# Patient Record
Sex: MALE | Race: WHITE | NOT HISPANIC OR LATINO | Employment: OTHER | URBAN - NONMETROPOLITAN AREA
[De-identification: names, ages, dates, MRNs, and addresses within clinical notes are randomized per-mention and may not be internally consistent; named-entity substitution may affect disease eponyms.]

---

## 2017-01-09 ENCOUNTER — OFFICE VISIT (OUTPATIENT)
Dept: FAMILY MEDICINE | Facility: CLINIC | Age: 67
End: 2017-01-09
Payer: COMMERCIAL

## 2017-01-09 VITALS
WEIGHT: 259 LBS | TEMPERATURE: 98.2 F | BODY MASS INDEX: 39.25 KG/M2 | OXYGEN SATURATION: 97 % | RESPIRATION RATE: 18 BRPM | DIASTOLIC BLOOD PRESSURE: 62 MMHG | HEART RATE: 76 BPM | SYSTOLIC BLOOD PRESSURE: 128 MMHG | HEIGHT: 68 IN

## 2017-01-09 DIAGNOSIS — I10 BENIGN ESSENTIAL HYPERTENSION: ICD-10-CM

## 2017-01-09 DIAGNOSIS — E78.5 HYPERLIPIDEMIA, UNSPECIFIED HYPERLIPIDEMIA TYPE: ICD-10-CM

## 2017-01-09 DIAGNOSIS — E66.9 OBESITY (BMI 30-39.9): ICD-10-CM

## 2017-01-09 DIAGNOSIS — M25.552 HIP PAIN, LEFT: Primary | ICD-10-CM

## 2017-01-09 PROBLEM — E78.2 MIXED HYPERLIPIDEMIA: Status: ACTIVE | Noted: 2017-01-09

## 2017-01-09 PROCEDURE — 20610 DRAIN/INJ JOINT/BURSA W/O US: CPT | Mod: LT | Performed by: FAMILY MEDICINE

## 2017-01-09 PROCEDURE — 99204 OFFICE O/P NEW MOD 45 MIN: CPT | Mod: 25 | Performed by: FAMILY MEDICINE

## 2017-01-09 RX ORDER — TRIAMCINOLONE ACETONIDE 40 MG/ML
40 INJECTION, SUSPENSION INTRA-ARTICULAR; INTRAMUSCULAR ONCE
Qty: 1 ML | Refills: 0 | COMMUNITY
Start: 2017-01-09 | End: 2018-05-18

## 2017-01-09 RX ORDER — LISINOPRIL 10 MG/1
10 TABLET ORAL
COMMUNITY
Start: 2017-01-05 | End: 2017-02-06

## 2017-01-09 RX ORDER — SIMVASTATIN 20 MG
20 TABLET ORAL
COMMUNITY
Start: 2016-11-09 | End: 2018-02-19

## 2017-01-09 NOTE — PATIENT INSTRUCTIONS
R.I.C.E.    R.I.C.E. stands for Rest, Ice, Compression, and Elevation. Doing these things helps limit pain and swelling after an injury. R.I.C.E. also helps injuries heal faster. Use R.I.C.E. for sprains, strains, and severe bruises or bumps. Follow the tips on this handout and begin R.I.C.E. as soon as possible after an injury.     Rest  Pain is your body s way of telling you to rest an injured area. Whether you have hurt an elbow, hand, foot, or knee, limiting its use will prevent further injury and help you heal.     Ice  Applying ice right after an injury helps prevent swelling and reduce pain. Don t place ice directly on your skin.    Wrap a cold pack or bag of ice in a thin cloth. Place it over the injured area.    Ice for 10 minutes every 3 hours. Don t ice for more than 20 minutes at a time.     Compression  Putting pressure (compression) on an injury helps prevent swelling and provides support.    Wrap the injured area firmly with an elastic bandage. If your hand or foot tingles, becomes discolored, or feels cold to the touch, the bandage may be too tight. Rewrap it more loosely.    If your bandage becomes too loose, rewrap it.    Do not wear an elastic bandage overnight.     Elevation   Keeping an injury elevated helps reduce swelling, pain, and throbbing. Elevation is most effective when the injury is kept elevated higher than the heart.     Call your healthcare provider if you notice any of the following:    Fingers or toes feel numb, are cold to the touch, or change color    Skin looks shiny or tight    Pain, swelling, or bruising worsens and is not improved with elevation     4592-6396 [employer]. [address] [place] [address], [place] [address]. All rights reserved. This information is not intended as a substitute for professional medical care. Always follow your healthcare professional's instructions.        Hip Strain    You have a strain of the muscles around the hip joint. A muscle  strain is a stretching or tearing of muscle fibers. This causes pain, especially when you move that muscle. There may also be some swelling and bruising.  Home care    Stay off the injured leg as much as possible until you can walk on it without pain. If you have a lot of pain with walking, crutches or a walker may be prescribed. These can be rented or purchased at many pharmacies and surgical or orthopedic supply stores. Follow your healthcare provider's advice regarding when to begin putting weight on that leg.    Apply an ice pack over the injured area for 15 to 20 minutes every 3 to 6 hours. You should do this for the first 24 to 48 hours. You can make an ice pack by filling a plastic bag that seals at the top with ice cubes and then wrapping it with a thin towel. Be careful not to injure your skin with the ice treatments. Ice should never be applied directly to skin. Continue the use of ice packs for relief of pain and swelling as needed. After 48 hours, apply heat (warm shower or warm bath) for 15 to 20 minutes several times a day, or alternate ice and heat.    You may use over-the-counter pain medicine to control pain, unless another pain medicine was prescribed. If you have chronic liver or kidney disease or ever had a stomach ulcer or GI bleeding, talk with your healthcare provider before using these medicines.    If you play sports, you may resume these activities when you are able to hop and run on the injured leg without pain.  Follow-up care  Follow up with your healthcare provider, or as advised. If your symptoms do not begin to get better after a week, more tests may be needed.  If X-rays were taken, you will be told of any new findings that may affect your care.  When to seek medical advice  Call your healthcare provider right away if any of these occur:    Increased swelling or  bruising    Increased pain    Losing the ability to put weight on the injured side    4541-2994 [employer]  [address] [place] [address]. All rights reserved. This information is not intended as a substitute for professional medical care. Always follow your healthcare professional's instructions.

## 2017-01-09 NOTE — NURSING NOTE
"Chief Complaint   Patient presents with     Musculoskeletal Problem       Initial /62 mmHg  Pulse 76  Temp(Src) 98.2  F (36.8  C) (Tympanic)  Resp 18  Ht 5' 8\" (1.727 m)  Wt 259 lb (117.482 kg)  BMI 39.39 kg/m2  SpO2 97% Estimated body mass index is 39.39 kg/(m^2) as calculated from the following:    Height as of this encounter: 5' 8\" (1.727 m).    Weight as of this encounter: 259 lb (117.482 kg).  BP completed using cuff size: large  "

## 2017-01-09 NOTE — MR AVS SNAPSHOT
After Visit Summary   1/9/2017    Roscoe Jang    MRN: [id number]           Patient Information     Date Of Birth          1950        Visit Information        Provider Department      1/9/2017 12:40 PM Rell Rivers MD [facility]        Today's Diagnoses     Hip pain, left    -  1     Benign essential hypertension         Hyperlipidemia, unspecified hyperlipidemia type         Obesity (BMI 30-39.9)           Care Instructions      R.I.C.E.    R.I.C.E. stands for Rest, Ice, Compression, and Elevation. Doing these things helps limit pain and swelling after an injury. R.I.C.E. also helps injuries heal faster. Use R.I.C.E. for sprains, strains, and severe bruises or bumps. Follow the tips on this handout and begin R.I.C.E. as soon as possible after an injury.     Rest  Pain is your body s way of telling you to rest an injured area. Whether you have hurt an elbow, hand, foot, or knee, limiting its use will prevent further injury and help you heal.     Ice  Applying ice right after an injury helps prevent swelling and reduce pain. Don t place ice directly on your skin.    Wrap a cold pack or bag of ice in a thin cloth. Place it over the injured area.    Ice for 10 minutes every 3 hours. Don t ice for more than 20 minutes at a time.     Compression  Putting pressure (compression) on an injury helps prevent swelling and provides support.    Wrap the injured area firmly with an elastic bandage. If your hand or foot tingles, becomes discolored, or feels cold to the touch, the bandage may be too tight. Rewrap it more loosely.    If your bandage becomes too loose, rewrap it.    Do not wear an elastic bandage overnight.     Elevation   Keeping an injury elevated helps reduce swelling, pain, and throbbing. Elevation is most effective when the injury is kept elevated higher than the heart.     Call your healthcare provider if you notice any of the following:    Fingers or toes feel numb,  are cold to the touch, or change color    Skin looks shiny or tight    Pain, swelling, or bruising worsens and is not improved with elevation     2189-4920 [employer]. [address] [place] [address], [place] [address]. All rights reserved. This information is not intended as a substitute for professional medical care. Always follow your healthcare professional's instructions.        Hip Strain    You have a strain of the muscles around the hip joint. A muscle strain is a stretching or tearing of muscle fibers. This causes pain, especially when you move that muscle. There may also be some swelling and bruising.  Home care    Stay off the injured leg as much as possible until you can walk on it without pain. If you have a lot of pain with walking, crutches or a walker may be prescribed. These can be rented or purchased at many pharmacies and surgical or orthopedic supply stores. Follow your healthcare provider's advice regarding when to begin putting weight on that leg.    Apply an ice pack over the injured area for 15 to 20 minutes every 3 to 6 hours. You should do this for the first 24 to 48 hours. You can make an ice pack by filling a plastic bag that seals at the top with ice cubes and then wrapping it with a thin towel. Be careful not to injure your skin with the ice treatments. Ice should never be applied directly to skin. Continue the use of ice packs for relief of pain and swelling as needed. After 48 hours, apply heat (warm shower or warm bath) for 15 to 20 minutes several times a day, or alternate ice and heat.    You may use over-the-counter pain medicine to control pain, unless another pain medicine was prescribed. If you have chronic liver or kidney disease or ever had a stomach ulcer or GI bleeding, talk with your healthcare provider before using these medicines.    If you play sports, you may resume these activities when you are able to hop and run on the injured leg without pain.  Follow-up  care  Follow up with your healthcare provider, or as advised. If your symptoms do not begin to get better after a week, more tests may be needed.  If X-rays were taken, you will be told of any new findings that may affect your care.  When to seek medical advice  Call your healthcare provider right away if any of these occur:    Increased swelling or  bruising    Increased pain    Losing the ability to put weight on the injured side    8027-6498 [employer]. [address] [place] [address], [place] [address]. All rights reserved. This information is not intended as a substitute for professional medical care. Always follow your healthcare professional's instructions.              Follow-ups after your visit        Additional Services     PHYSICAL THERAPY REFERRAL       *This therapy referral will be filtered to a centralized scheduling office at [facility] and the patient will receive a call to schedule an appointment at a [place] location most convenient for them. *     [facility] provides Physical Therapy evaluation and treatment and many specialty services across the [place] system.  If requesting a specialty program, please choose from the list below.    If you have not heard from the scheduling office within 2 business days, please call [phone] for all locations, with the exception of [place], please call [phone].  Treatment: Evaluation & Treatment  Special Instructions/Modalities: none  Special Programs: None    Please be aware that coverage of these services is subject to the terms and limitations of your health insurance plan.  Call member services at your health plan with any benefit or coverage questions.      **Note to Provider:  If you are referring outside of [place] for the therapy appointment, please list the name of the location in the  special instructions  above, print the referral and give to the patient to schedule the appointment.          "         Follow-up notes from your care team     Return in about 6 months (around 2017).      Who to contact     If you have questions or need follow up information about today's clinic visit or your schedule please contact [facility] directly at [phone].  Normal or non-critical lab and imaging results will be communicated to you by MyChart, letter or phone within 4 business days after the clinic has received the results. If you do not hear from us within 7 days, please contact the clinic through MyChart or phone. If you have a critical or abnormal lab result, we will notify you by phone as soon as possible.  Submit refill requests through [employer] or call your pharmacy and they will forward the refill request to us. Please allow 3 business days for your refill to be completed.          Additional Information About Your Visit        MyChart Information     [employer] lets you send messages to your doctor, view your test results, renew your prescriptions, schedule appointments and more. To sign up, go to [email or web address] . Click on \"Log in\" on the left side of the screen, which will take you to the Welcome page. Then click on \"Sign up Now\" on the right side of the page.     You will be asked to enter the access code listed below, as well as some personal information. Please follow the directions to create your username and password.     Your access code is: [email or web address]  Expires: 2017  1:25 PM     Your access code will  in 90 days. If you need help or a new code, please call your [facility] or [phone].        Care EveryWhere ID     This is your Care EveryWhere ID. This could be used by other organizations to access your [place] medical records  [id number]        Your Vitals Were     Pulse Temperature Respirations Height BMI (Body Mass Index) Pulse Oximetry    76 98.2  F (36.8  C) (Tympanic) 18 5' 8\" (1.727 m) 39.39 kg/m2 97%       Blood Pressure from Last 3 " Encounters:   01/09/17 128/62    Weight from Last 3 Encounters:   01/09/17 259 lb (117.482 kg)              We Performed the Following     PHYSICAL THERAPY REFERRAL        Primary Care Provider    None Specified       No primary provider on file.        Thank you!     Thank you for choosing [facility]  for your care. Our goal is always to provide you with excellent care. Hearing back from our patients is one way we can continue to improve our services. Please take a few minutes to complete the written survey that you may receive in the mail after your visit with us. Thank you!             Your Updated Medication List - Protect others around you: Learn how to safely use, store and throw away your medicines at www.disposemymeds.org.          This list is accurate as of: 1/9/17  1:26 PM.  Always use your most recent med list.                   Brand Name Dispense Instructions for use    lisinopril 10 MG tablet    PRINIVIL/ZESTRIL     10 mg       simvastatin 20 MG tablet    ZOCOR     20 mg

## 2017-01-09 NOTE — PROCEDURES
PROCEDURE:  Trochanter bursa injection     After a discussion of risks, benefits and side effects of procedure, informed patient consent was obtained.  The left hip was prepped and draped in the usual clean fashion (sterile not required for this procedure).  5.0 cc of 1 % lidocaine was used for local analgesia.    INJECTION:  Using 5 cc of 1 % lidocaine mixed with 40 mg of kenalog, the left trochanteric bursa was successfully injected without complication. Band-Aid applied. Patient did experience some pain relief following injection. Patient tolerated the procedure well.       Plan:   Routine postprocedure care discussed  RICE therapy recommended  Physical therapy if symptoms persist or worsen  Over-the-counter analgesia for pain control        Rell Rivers MD  [facility]

## 2017-01-09 NOTE — PROGRESS NOTES
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 Past medical history significant for hypertension, hyperlipidemia and obesity.    Problem list and histories reviewed & adjusted, as indicated.  Additional history: as documented    There is no problem list on file for this patient.    History reviewed. No pertinent past surgical history.    Social History   Substance Use Topics     Smoking status: Former Smoker     Quit date: 10/03/2007     Smokeless tobacco: Not on file     Alcohol Use: No     History reviewed. No pertinent family history.      Current Outpatient Prescriptions   Medication Sig Dispense Refill     simvastatin (ZOCOR) 20 MG tablet 20 mg       lisinopril (PRINIVIL/ZESTRIL) 10 MG tablet 10 mg       triamcinolone acetonide (KENALOG) 40 MG/ML injection 1 mL (40 mg) by INTRA-ARTICULAR route once 1 mL 0     No Known Allergies  No lab results found.   BP Readings from Last 3 Encounters:   01/09/17 128/62    Wt Readings from Last 3 Encounters:   01/09/17 259 lb (117.482 kg)                  Problem list, Medication list, Allergies, and Medical/Social/Surgical histories reviewed in EPIC and updated as  "appropriate.    ROS:  Constitutional, HEENT, cardiovascular, pulmonary, GI, , musculoskeletal, neuro, skin, endocrine and psych systems are negative, except as otherwise noted.    OBJECTIVE:                                                    /62 mmHg  Pulse 76  Temp(Src) 98.2  F (36.8  C) (Tympanic)  Resp 18  Ht 5' 8\" (1.727 m)  Wt 259 lb (117.482 kg)  BMI 39.39 kg/m2  SpO2 97%  Body mass index is 39.39 kg/(m^2).  GENERAL: alert, no distress and obese  NECK: no adenopathy, no asymmetry, masses, or scars and thyroid normal to palpation  RESP: lungs clear to auscultation - no rales, rhonchi or wheezes  CV: regular rate and rhythm, normal S1 S2, no S3 or S4, no murmur, click or rub, no peripheral edema and peripheral pulses strong  ABDOMEN: soft, nontender, no hepatosplenomegaly, no masses and bowel sounds normal  MS: left hip: mild tenderness over left trochanteric bursa area, no skin discoloration, swelling or warmth noted, SLR negative bilaterally, pulses 3+, sensation to touch and pressure intact, ROM intact  SKIN: no suspicious lesions or rashes  NEURO: Normal strength and tone, mentation intact and speech normal  BACK: no CVA tenderness, no paralumbar tenderness  PSYCH: mentation appears normal, affect normal/bright  LYMPH: no cervical, supraclavicular, axillary, or inguinal adenopathy       ASSESSMENT/PLAN:                                                          ICD-10-CM    1. Hip pain, left M25.552 PHYSICAL THERAPY REFERRAL     Large Joint/Bursa injection and/or drainage (Shoulder, Knee)     Kenalog 40 MG  []     triamcinolone acetonide (KENALOG) 40 MG/ML injection   2. Benign essential hypertension I10    3. Hyperlipidemia, unspecified hyperlipidemia type E78.5    4. Obesity (BMI 30-39.9) E66.9        66-year-old male presents with acute left hip pain. No history of fall, trauma or any other injury. Past medical history significant for hypertension, hyperlipidemia and obesity. Physical " examination remarkable for mild tenderness over left trochanteric bursa area. Discussed in detail differentials including trochanteric bursitis and left hip sprain. Various treatment options discussed including waitful watching versus steriodal injection, patient would like to try second option, involved risks explained in detail. Trochanteric bursa steriodal injection administered in office today. RICE therapy recommended and suggested over-the-counter analgesia. Physical therapy referral placed and suggested to schedule an appointment if symptoms persist or worsen, we'll consider imaging as well. Blood pressure stable. Suggest to continue lisinopril and simvastatin. Patient understood and in agreement with the above plan. All questions answered.      Rell Rivers MD  [facility]

## 2017-01-10 ENCOUNTER — TELEPHONE (OUTPATIENT)
Dept: FAMILY MEDICINE | Facility: CLINIC | Age: 67
End: 2017-01-10

## 2017-01-10 ENCOUNTER — DOCUMENTATION ONLY (OUTPATIENT)
Dept: FAMILY MEDICINE | Facility: CLINIC | Age: 67
End: 2017-01-10

## 2017-01-10 DIAGNOSIS — Z12.11 SCREENING FOR COLON CANCER: ICD-10-CM

## 2017-01-10 DIAGNOSIS — Z86.79 H/O ABDOMINAL AORTIC ANEURYSM: Primary | ICD-10-CM

## 2017-01-10 PROBLEM — I71.40 ABDOMINAL AORTIC ANEURYSM (H): Status: ACTIVE | Noted: 2017-01-10

## 2017-01-10 PROBLEM — K76.0 FATTY LIVER: Status: ACTIVE | Noted: 2017-01-10

## 2017-01-10 NOTE — PROGRESS NOTES
Reviewed medical notes from previous provider. He deferred colonoscopy in the past, known to have abdominal aortic aneurysm and fatty liver. I called him and left the message to return the call so that we can discuss about further options.       Rell Rivers MD  [facility]

## 2017-01-10 NOTE — PROGRESS NOTES
Patient called back, refused colonoscopy. FIT test (will be mailing to his home address) and ultrasound aorta ordered (contact number provided). All questions answered.      Rell Rivers MD  [facility]

## 2017-01-10 NOTE — TELEPHONE ENCOUNTER
Received incoming records from [facility]. Given to Dr. Rivers to review.    Judy Herrera-Station

## 2017-01-11 ENCOUNTER — TELEPHONE (OUTPATIENT)
Dept: FAMILY MEDICINE | Facility: CLINIC | Age: 67
End: 2017-01-11

## 2017-01-11 NOTE — TELEPHONE ENCOUNTER
Left message for patient to return call to clinic  Needs FIT and phone number for ultrasound  [phone]    Myranda Guillen MA

## 2017-01-16 ENCOUNTER — TELEPHONE (OUTPATIENT)
Dept: FAMILY MEDICINE | Facility: CLINIC | Age: 67
End: 2017-01-16

## 2017-01-16 DIAGNOSIS — M25.552 HIP PAIN, LEFT: Primary | ICD-10-CM

## 2017-01-16 NOTE — TELEPHONE ENCOUNTER
Pt informed lt hip MRI ordered by Dr. Rivers.  Has # to schedule.  MD to F/U on results and contact pt.  VIK Huynh RN

## 2017-01-16 NOTE — TELEPHONE ENCOUNTER
"Clinic Action Needed:Yes, Please call patient  Reason for Call:Patient calling reporting ongoing left hip pain. Pain unchanged since clinic visit 1/9/17. Patient is requesting orders for \"imaging.\" Stating he has to schedule ultrasound for abdominal aorta and would like to do this at the same time.     Routed to:[facility] Johan Stanley RN  [facility]            "

## 2017-01-16 NOTE — TELEPHONE ENCOUNTER
Per 01-09-17 OV-      ICD-10-CM      1.  Hip pain, left  M25.552  PHYSICAL THERAPY REFERRAL        Large Joint/Bursa injection and/or drainage (Shoulder, Knee)        Kenalog 40 MG  []        triamcinolone acetonide (KENALOG) 40 MG/ML injection    2.  Benign essential hypertension  I10      3.  Hyperlipidemia, unspecified hyperlipidemia type  E78.5      4.  Obesity (BMI 30-39.9)  E66.9          66-year-old male presents with acute left hip pain. No history of fall, trauma or any other injury. Past medical history significant for hypertension, hyperlipidemia and obesity. Physical examination remarkable for mild tenderness over left trochanteric bursa area. Discussed in detail differentials including trochanteric bursitis and left hip sprain. Various treatment options discussed including waitful watching versus steriodal injection, patient would like to try second option, involved risks explained in detail. Trochanteric bursa steriodal injection administered in office today. RICE therapy recommended and suggested over-the-counter analgesia. Physical therapy referral placed and suggested to schedule an appointment if symptoms persist or worsen, we'll consider imaging as well. Blood pressure stable. Suggest to continue lisinopril and simvastatin. Patient understood and in agreement with the above plan. All questions answered.               Spoke with pt who reports persist lt hip pain, worse with ambulation, describes sharp pain 10/10. Denies groin pain.  Has had 2 massages which have helped temporarily, doing stretching exercises, applying heat/ ice, taking ibu.  Has not started PT.  Pt requesting imaging order for lt hip.   Py will also be scheduling abdominal aortic US so would like to coordinate on same day.  Please advise.  VIK Huynh RN

## 2017-01-18 ENCOUNTER — HOSPITAL ENCOUNTER (OUTPATIENT)
Dept: ULTRASOUND IMAGING | Facility: CLINIC | Age: 67
Discharge: HOME | End: 2017-01-18
Attending: FAMILY MEDICINE | Admitting: FAMILY MEDICINE
Payer: COMMERCIAL

## 2017-01-18 ENCOUNTER — HOSPITAL ENCOUNTER (OUTPATIENT)
Dept: MRI IMAGING | Facility: CLINIC | Age: 67
End: 2017-01-18
Attending: FAMILY MEDICINE
Payer: COMMERCIAL

## 2017-01-18 DIAGNOSIS — M25.552 HIP PAIN, LEFT: ICD-10-CM

## 2017-01-18 DIAGNOSIS — Z86.79 H/O ABDOMINAL AORTIC ANEURYSM: ICD-10-CM

## 2017-01-18 PROCEDURE — 73721 MRI JNT OF LWR EXTRE W/O DYE: CPT | Mod: LT

## 2017-01-18 PROCEDURE — 76775 US EXAM ABDO BACK WALL LIM: CPT

## 2017-01-20 DIAGNOSIS — M25.552 HIP PAIN, LEFT: Primary | ICD-10-CM

## 2017-01-20 NOTE — PROGRESS NOTES
Patient has been informed of MRI left hip which showed probable lumbosacral degenerative disc disease but otherwise unremarkable. Physical therapy ordered, will consider MRI lumbar spine if symptoms persist or worsen. All questions answered.      Results for orders placed or performed during the hospital encounter of 01/18/17   MR Hip Left w/o Contrast    Narrative    MR HIP LEFT WITHOUT CONTRAST January 18, 2017 10:36 AM    HISTORY: Two week history of left hip pain. No specific injury.    TECHNIQUE: Multiplanar, multisequence without contrast. Compromise of  some sequences due to patient breathing motion artifact.    COMPARISON: None.    FINDINGS:   Osseous and Cartilaginous Structures:  No fracture or destructive bone  lesion. No femoral head osteonecrosis. No significant hip  osteoarthritis or apparent chondromalacia.  Probable degenerative disc  disease at the lumbosacral junction is noted.    Acetabular Labrum: No juxta-acetabular cyst.  No obvious labral tear  is appreciated, allowing for the large FOV technique.  If indicated  clinically, MR arthrography would be considered the study of choice in  this regard.    Hip joint space:  No significant joint effusion.     Trochanteric and Iliopsoas Bursae: No fluid collection in the  trochanteric or iliopsoas bursae.    Common Hamstring Tendon: No evidence of tear or significant  tendinosis.    Additional Findings: Muscles and other soft tissues around the hips  and pelvis appear normal.  The gluteus medius and minimus tendons  appear unremarkable.     Internal Pelvic Structures: Unremarkable.      Impression    IMPRESSION:   1. No significant abnormalities in the pelvis or hips bilaterally. In  particular, no left hip region abnormality is identified.  2. Probable degenerative disc disease at the lumbosacral junction.    MD Rell BROWN MD  [facility]

## 2017-02-06 DIAGNOSIS — I10 BENIGN ESSENTIAL HYPERTENSION: Primary | ICD-10-CM

## 2017-02-06 NOTE — TELEPHONE ENCOUNTER
LISINOPRIL      Last Written Prescription Date: ?  Last Fill Quantity: ?, # refills: ?  Last Office Visit with [facility], [facility] or [facility] prescribing provider: 1/9/17       No results found for: POTASSIUM  No results found for: CR  BP Readings from Last 3 Encounters:   01/09/17 128/62

## 2017-02-07 RX ORDER — LISINOPRIL 10 MG/1
10 TABLET ORAL DAILY
Qty: 90 TABLET | Refills: 1 | Status: SHIPPED | OUTPATIENT
Start: 2017-02-07 | End: 2017-07-07

## 2017-07-07 ENCOUNTER — OFFICE VISIT (OUTPATIENT)
Dept: EDUCATION SERVICES | Facility: CLINIC | Age: 67
End: 2017-07-07
Payer: COMMERCIAL

## 2017-07-07 ENCOUNTER — OFFICE VISIT (OUTPATIENT)
Dept: FAMILY MEDICINE | Facility: CLINIC | Age: 67
End: 2017-07-07
Payer: COMMERCIAL

## 2017-07-07 VITALS
WEIGHT: 254 LBS | SYSTOLIC BLOOD PRESSURE: 130 MMHG | HEIGHT: 70 IN | HEART RATE: 88 BPM | DIASTOLIC BLOOD PRESSURE: 80 MMHG | BODY MASS INDEX: 36.36 KG/M2 | OXYGEN SATURATION: 97 %

## 2017-07-07 DIAGNOSIS — E66.9 OBESITY, UNSPECIFIED OBESITY SEVERITY, UNSPECIFIED OBESITY TYPE: ICD-10-CM

## 2017-07-07 DIAGNOSIS — E11.65 TYPE 2 DIABETES MELLITUS WITH HYPERGLYCEMIA, WITHOUT LONG-TERM CURRENT USE OF INSULIN (H): ICD-10-CM

## 2017-07-07 DIAGNOSIS — E11.9 TYPE 2 DIABETES MELLITUS (H): Primary | ICD-10-CM

## 2017-07-07 DIAGNOSIS — I10 BENIGN ESSENTIAL HYPERTENSION: Primary | ICD-10-CM

## 2017-07-07 LAB
ANION GAP SERPL CALCULATED.3IONS-SCNC: 6 MMOL/L (ref 3–14)
BUN SERPL-MCNC: 14 MG/DL (ref 7–30)
CALCIUM SERPL-MCNC: 8.9 MG/DL (ref 8.5–10.1)
CHLORIDE SERPL-SCNC: 107 MMOL/L (ref 94–109)
CO2 SERPL-SCNC: 29 MMOL/L (ref 20–32)
CREAT SERPL-MCNC: 1 MG/DL (ref 0.66–1.25)
CREAT UR-MCNC: 163 MG/DL
GFR SERPL CREATININE-BSD FRML MDRD: 75 ML/MIN/1.7M2
GLUCOSE BLD-MCNC: 216 MG/DL (ref 70–99)
GLUCOSE SERPL-MCNC: 224 MG/DL (ref 70–99)
HBA1C MFR BLD: 8.4 % (ref 4.3–6)
LDLC SERPL DIRECT ASSAY-MCNC: 86 MG/DL
MICROALBUMIN UR-MCNC: 23 MG/L
MICROALBUMIN/CREAT UR: 14.36 MG/G CR (ref 0–17)
POTASSIUM SERPL-SCNC: 4.4 MMOL/L (ref 3.4–5.3)
SODIUM SERPL-SCNC: 142 MMOL/L (ref 133–144)

## 2017-07-07 PROCEDURE — 82947 ASSAY GLUCOSE BLOOD QUANT: CPT | Performed by: NURSE PRACTITIONER

## 2017-07-07 PROCEDURE — G0108 DIAB MANAGE TRN  PER INDIV: HCPCS

## 2017-07-07 PROCEDURE — 80048 BASIC METABOLIC PNL TOTAL CA: CPT | Performed by: NURSE PRACTITIONER

## 2017-07-07 PROCEDURE — 99207 C FOOT EXAM  NO CHARGE: CPT | Performed by: NURSE PRACTITIONER

## 2017-07-07 PROCEDURE — 36415 COLL VENOUS BLD VENIPUNCTURE: CPT | Performed by: NURSE PRACTITIONER

## 2017-07-07 PROCEDURE — 83036 HEMOGLOBIN GLYCOSYLATED A1C: CPT | Performed by: NURSE PRACTITIONER

## 2017-07-07 PROCEDURE — 83721 ASSAY OF BLOOD LIPOPROTEIN: CPT | Performed by: NURSE PRACTITIONER

## 2017-07-07 PROCEDURE — 82043 UR ALBUMIN QUANTITATIVE: CPT | Performed by: NURSE PRACTITIONER

## 2017-07-07 PROCEDURE — 99214 OFFICE O/P EST MOD 30 MIN: CPT | Performed by: NURSE PRACTITIONER

## 2017-07-07 RX ORDER — LISINOPRIL 5 MG/1
5 TABLET ORAL DAILY
Qty: 30 TABLET | Refills: 3 | Status: SHIPPED | OUTPATIENT
Start: 2017-07-07 | End: 2017-10-06

## 2017-07-07 NOTE — PATIENT INSTRUCTIONS
Labs are in the diabetes range     Start metformin 1 tab daily for 1 week and then increase to twice a day     Restart lisinopril at 5 mg daily     See Judi Dotson our diabetic educator after this      I would like to see you back in 3 months   DOT card good for 6 months

## 2017-07-07 NOTE — PROGRESS NOTES
"  SUBJECTIVE:                                                    Roscoe Jang is a 66 year old male who presents to clinic today for the following health issues:      Glucose in Urine      Duration: 1 day    Description (location/character/radiation): Was here for DOT physical, needs follow up labs    Intensity:  moderate    Accompanying signs and symptoms: none    History (similar episodes/previous evaluation): None    Precipitating or alleviating factors: None    Therapies tried and outcome: None       Was told he was pre diabetic in the past   No family history   He was on an ACE up until 3 months ago   He stopped it due to dizziness   He was on 10 mg   He is on a statin   Non smoker    Problem list and histories reviewed & adjusted, as indicated.  Additional history: as documented    Labs reviewed in EPIC    Reviewed and updated as needed this visit by clinical staff       Reviewed and updated as needed this visit by Provider         ROS:  Constitutional, HEENT, cardiovascular, pulmonary, gi and gu systems are negative, except as otherwise noted.    OBJECTIVE:                                                    /80 (BP Location: Right arm, Patient Position: Chair, Cuff Size: Adult Large)  Pulse 88  Ht 5' 9.5\" (1.765 m)  Wt 254 lb (115.2 kg)  SpO2 97%  BMI 36.97 kg/m2  Body mass index is 36.97 kg/(m^2).  GENERAL APPEARANCE: healthy, alert and no distress  HENT: ear canals and TM's normal and nose and mouth without ulcers or lesions  RESP: lungs clear to auscultation - no rales, rhonchi or wheezes  CV: regular rates and rhythm, normal S1 S2, no S3 or S4 and no murmur, click or rub  ABDOMEN: soft, nontender, without hepatosplenomegaly or masses and bowel sounds normal  MS: extremities normal- no gross deformities noted  SKIN: no suspicious lesions or rashes  DIABETIC FOOT EXAM: normal DP and PT pulses, no trophic changes or ulcerative lesions and normal sensory exam    Diagnostic test " results:  Diagnostic Test Results:  Results for orders placed or performed in visit on 07/07/17   Albumin Random Urine Quantitative   Result Value Ref Range    Creatinine Urine 163 mg/dL    Albumin Urine mg/L 23 mg/L    Albumin Urine mg/g Cr 14.36 0 - 17 mg/g Cr   Basic metabolic panel   Result Value Ref Range    Sodium 142 133 - 144 mmol/L    Potassium 4.4 3.4 - 5.3 mmol/L    Chloride 107 94 - 109 mmol/L    Carbon Dioxide 29 20 - 32 mmol/L    Anion Gap 6 3 - 14 mmol/L    Glucose 224 (H) 70 - 99 mg/dL    Urea Nitrogen 14 7 - 30 mg/dL    Creatinine 1.00 0.66 - 1.25 mg/dL    GFR Estimate 75 >60 mL/min/1.7m2    GFR Estimate If Black >90   GFR Calc   >60 mL/min/1.7m2    Calcium 8.9 8.5 - 10.1 mg/dL   Hemoglobin A1c   Result Value Ref Range    Hemoglobin A1C 8.4 (H) 4.3 - 6.0 %   Glucose whole blood   Result Value Ref Range    Glucose Whole Blood 216 (H) 70 - 99 mg/dL   LDL cholesterol direct   Result Value Ref Range    LDL Cholesterol Direct 86 <100 mg/dL        ASSESSMENT/PLAN:                                                    1. Type 2 diabetes mellitus with hyperglycemia, without long-term current use of insulin (H)  New diagnosis   Will start on metformin   He will see the diabetic educator today and continue to work closely with her  I plan on seeing him back in 3 months   Restart ACE  He is on a statin       - DIABETES EDUCATOR REFERRAL  - metFORMIN (GLUCOPHAGE) 500 MG tablet; I tab daily for 1 week and the increase to twice a day  Dispense: 30 tablet; Refill: 1  - LDL cholesterol direct  - FOOT EXAM    2. Benign essential hypertension  Stable   Restart ACE in light of new DM diagnosis   - Albumin Random Urine Quantitative  - Basic metabolic panel  - lisinopril (PRINIVIL/ZESTRIL) 5 MG tablet; Take 1 tablet (5 mg) by mouth daily  Dispense: 30 tablet; Refill: 3    3. Obesity, unspecified obesity severity, unspecified obesity type  Needs to work on diet and exercise this was discussed         Patient  Instructions   Labs are in the diabetes range     Start metformin 1 tab daily for 1 week and then increase to twice a day     Restart lisinopril at 5 mg daily     See Judi Dotson our diabetic educator after this      I would like to see you back in 3 months   DOT card good for 6 months                   Mel Pappas NP  [facility]

## 2017-07-07 NOTE — MR AVS SNAPSHOT
After Visit Summary   7/7/2017    Roscoe Jnag    MRN: [id number]           Patient Information     Date Of Birth          1950        Visit Information        Provider Department      7/7/2017 2:00 PM [address]i [address], RD [facility]        Today's Diagnoses     Type 2 diabetes mellitus (H)    -  1      Care Instructions    1.  Watch your carbohydrate choices 3-4 at meals, 1-2 at snacks    2.  Test blood sugars once daily, rotating testing times    3.  Stay active    4.  Call with your blood sugars in two weeks.  Judi [phone].          Follow-ups after your visit        Your next 10 appointments already scheduled     Jul 07, 2017  3:20 PM CDT   SHORT with Mel Pappas NP   [facility]    [address] [place] [id number] [phone]            Aug 11, 2017  2:00 PM CDT   Diabetic Education with Judi [address], RD   [facility] ([address] [address] [phone]              Who to contact     If you have questions or need follow up information about today's clinic visit or your schedule please contact [facility] directly at [phone].  Normal or non-critical lab and imaging results will be communicated to you by [employer], letter or phone within 4 business days after the clinic has received the results. If you do not hear from us within 7 days, please contact the clinic through [employer] or phone. If you have a critical or abnormal lab result, we will notify you by phone as soon as possible.  Submit refill requests through [employer] or call your pharmacy and they will forward the refill request to us. Please allow 3 business days for your refill to be completed.          Additional Information About Your Visit        [employer] Information     [employer] lets you send messages to your doctor, view your test results, renew your  "prescriptions, schedule appointments and more. To sign up, go to [email or web address] . Click on \"Log in\" on the left side of the screen, which will take you to the Welcome page. Then click on \"Sign up Now\" on the right side of the page.     You will be asked to enter the access code listed below, as well as some personal information. Please follow the directions to create your username and password.     Your access code is: [email or web address]  Expires: 10/5/2017  1:50 PM     Your access code will  in 90 days. If you need help or a new code, please call your [place] clinic or [phone].        Care EveryWhere ID     This is your Care EveryWhere ID. This could be used by other organizations to access your [place] medical records  [id number]         Blood Pressure from Last 3 Encounters:   17 128/62    Weight from Last 3 Encounters:   17 117.5 kg (259 lb)              Today, you had the following     No orders found for display         Today's Medication Changes          These changes are accurate as of: 17  2:40 PM.  If you have any questions, ask your nurse or doctor.               Start taking these medicines.        Dose/Directions    blood glucose monitoring lancets   Used for:  Type 2 diabetes mellitus (H)   Started by:  Judi [address]otson [address], RD        Use to test blood sugar 1 times daily or as directed.   Quantity:  100 each   Refills:  5       blood glucose monitoring test strip   Commonly known as:  RHYS CONTOUR NEXT   Used for:  Type 2 diabetes mellitus (H)   Started by:  Judi [address]otson [address], RD        Use to test blood sugar 1 times daily or as directed.   Quantity:  100 strip   Refills:  6       metFORMIN 500 MG tablet   Commonly known as:  GLUCOPHAGE   Used for:  Type 2 diabetes mellitus with hyperglycemia, without long-term current use of insulin (H)   Started by:  Mel Pappas NP        I tab daily for 1 week and the increase to twice a day   Quantity:  30 tablet   Refills:  1    "      These medicines have changed or have updated prescriptions.        Dose/Directions    lisinopril 5 MG tablet   Commonly known as:  PRINIVIL/ZESTRIL   This may have changed:    - medication strength  - how much to take   Used for:  Benign essential hypertension   Changed by:  Mel Pappas NP        Dose:  5 mg   Take 1 tablet (5 mg) by mouth daily   Quantity:  30 tablet   Refills:  3            Where to get your medicines      These medications were sent to [id number] [address] [facility]  [address] [facility] [address]     Phone:  [phone]     blood glucose monitoring lancets    blood glucose monitoring test strip    lisinopril 5 MG tablet    metFORMIN 500 MG tablet                Primary Care Provider Office Phone # Fax #    Rell Ur MD Tita [phone]       [facility] [address]  [facility] [address]        Equal Access to Services     SHRUTHI MARIANO : Hadii brittaney ku hadasho Soomaali, waaxda luqadaha, qaybta kaalmada adeegyada, letitia upin hayaan nanci tellez . So [facility] [phone].    ATENCIÓN: Si habla español, tiene a tapia disposición servicios gratuitos de asistencia lingüística. Llame al [phone].    We comply with applicable federal civil rights laws and [place] laws. We do not discriminate on the basis of race, color, national origin, age, disability sex, sexual orientation or gender identity.            Thank you!     Thank you for choosing [facility]  for your care. Our goal is always to provide you with excellent care. Hearing back from our patients is one way we can continue to improve our services. Please take a few minutes to complete the written survey that you may receive in the mail after your visit with us. Thank you!             Your Updated Medication List - Protect others around you: Learn how to safely use, store and throw away your medicines at www.disposemymeds.org.          This  list is accurate as of: 7/7/17  2:40 PM.  Always use your most recent med list.                   Brand Name Dispense Instructions for use Diagnosis    blood glucose monitoring lancets     100 each    Use to test blood sugar 1 times daily or as directed.    Type 2 diabetes mellitus (H)       blood glucose monitoring test strip    RHYS CONTOUR NEXT    100 strip    Use to test blood sugar 1 times daily or as directed.    Type 2 diabetes mellitus (H)       KENALOG 40 MG/ML injection   Generic drug:  triamcinolone acetonide     1 mL    1 mL (40 mg) by INTRA-ARTICULAR route once    Hip pain, left       lisinopril 5 MG tablet    PRINIVIL/ZESTRIL    30 tablet    Take 1 tablet (5 mg) by mouth daily    Benign essential hypertension       metFORMIN 500 MG tablet    GLUCOPHAGE    30 tablet    I tab daily for 1 week and the increase to twice a day    Type 2 diabetes mellitus with hyperglycemia, without long-term current use of insulin (H)       simvastatin 20 MG tablet    ZOCOR     20 mg

## 2017-07-07 NOTE — MR AVS SNAPSHOT
After Visit Summary   7/7/2017    Roscoe Jang    MRN: [id number]           Patient Information     Date Of Birth          1950        Visit Information        Provider Department      7/7/2017 3:20 PM Mel Pappas NP [facility]        Today's Diagnoses     Benign essential hypertension    -  1    Type 2 diabetes mellitus with hyperglycemia, without long-term current use of insulin (H)          Care Instructions    Labs are in the diabetes range     Start metformin 1 tab daily for 1 week and then increase to twice a day     Restart lisinopril at 5 mg daily     See Judi Dotson our diabetic educator after this      I would like to see you back in 3 months   DOT card good for 6 months                       Follow-ups after your visit        Additional Services     DIABETES EDUCATOR REFERRAL       DIABETES SELF MANAGEMENT TRAINING (DSMT)      Your provider has referred you to Diabetes Education: PREFERRED PROVIDERS:  FMG: Diabetes Education - All [facility] [phone]   [email or web address]    Type of training and number of hours: New Diagnosis: Initial group DSMT - 10 hours.      Medicare covers: 10 hours of initial DSMT in 12 month period from the time of first visit, plus 2 hours of follow-up DSMT annually, and additional hours as requested for insulin training.    Diabetes Type: Type 2 - On Oral Medication             Diabetes Co-Morbidities: none               A1C Goal:  <7.0       A1C is: Lab Results       Component                Value               Date                       A1C                      8.4                 07/07/2017              If an urgent visit is needed or A1C is above 12, Care Team to call the Diabetes Education Team at [phone] or send an In Basket message to the Diabetes Education Pool (P DIAB ED-PATIENT CARE).    Diabetes Education Topics: Comprehensive Knowledge Assessment and  Instruction    Special Educational Needs Requiring Individual DSMT: None       MEDICAL NUTRITION THERAPY (MNT) for Diabetes    Medical Nutrition Therapy with a Registered Dietitian can be provided in coordination with Diabetes Self-Management Training to assist in achieving optimal diabetes management.    MNT Type and Hours: Do not initiate MNT at this time.                       Medicare will cover: 3 hours initial MNT in 12 month period after first visit, plus 2 hours of follow-up MNT annually    Please be aware that coverage of these services is subject to the terms and limitations of your health insurance plan.  Call member services at your health plan to determine Diabetes Self-Management Training benefits and ask which blood glucose monitor brands are covered by your plan.      Please bring the following with you to your appointment:    (1)  List of current medications   (2)  List of Blood Glucose Monitor brands that are covered by your insurance plan  (3)  Blood Glucose Monitor and log book  (4)   Food records for the 3 days prior to your visit    The Certified Diabetes Educator may make diabetes medication adjustments per the CDE Protocol and Collaborative Practice Agreement.                  Your next 10 appointments already scheduled     Jul 07, 2017  2:00 PM CDT   New Visit with Judi Dotson Rd, [place]   [facility]    [address] [facility] [address] [phone]            Jul 07, 2017  3:20 PM CDT   SHORT with Mel Pappas NP   [facility]    [address] [facility] [address] [phone]              Who to contact     If you have questions or need follow up information about today's clinic visit or your schedule please contact [facility] directly at [phone].  Normal or non-critical lab and imaging results will be communicated to you by MyChart, letter or phone within  "4 business days after the clinic has received the results. If you do not hear from us within 7 days, please contact the clinic through [employer] or phone. If you have a critical or abnormal lab result, we will notify you by phone as soon as possible.  Submit refill requests through [employer] or call your pharmacy and they will forward the refill request to us. Please allow 3 business days for your refill to be completed.          Additional Information About Your Visit        [employer] Information     [employer] lets you send messages to your doctor, view your test results, renew your prescriptions, schedule appointments and more. To sign up, go to [email or web address] . Click on \"Log in\" on the left side of the screen, which will take you to the Welcome page. Then click on \"Sign up Now\" on the right side of the page.     You will be asked to enter the access code listed below, as well as some personal information. Please follow the directions to create your username and password.     Your access code is: [email or web address]  Expires: 10/5/2017  1:50 PM     Your access code will  in 90 days. If you need help or a new code, please call your [place] clinic or [phone].        Care EveryWhere ID     This is your Care EveryWhere ID. This could be used by other organizations to access your [place] medical records  [id number]        Your Vitals Were     Pulse Height Pulse Oximetry BMI (Body Mass Index)          88 5' 9.5\" (1.765 m) 97% 36.97 kg/m2         Blood Pressure from Last 3 Encounters:   17 130/80   17 128/62    Weight from Last 3 Encounters:   17 254 lb (115.2 kg)   17 259 lb (117.5 kg)              We Performed the Following     Albumin Random Urine Quantitative     Basic metabolic panel     DIABETES EDUCATOR REFERRAL     Glucose whole blood     Hemoglobin A1c     LDL cholesterol direct          Today's Medication Changes          These changes are accurate as of: 17  1:50 PM.  If " you have any questions, ask your nurse or doctor.               Start taking these medicines.        Dose/Directions    metFORMIN 500 MG tablet   Commonly known as:  GLUCOPHAGE   Used for:  Type 2 diabetes mellitus with hyperglycemia, without long-term current use of insulin (H)   Started by:  Mel Pappas NP        I tab daily for 1 week and the increase to twice a day   Quantity:  30 tablet   Refills:  1         These medicines have changed or have updated prescriptions.        Dose/Directions    lisinopril 5 MG tablet   Commonly known as:  PRINIVIL/ZESTRIL   This may have changed:    - medication strength  - how much to take   Used for:  Benign essential hypertension   Changed by:  Mel Pappas NP        Dose:  5 mg   Take 1 tablet (5 mg) by mouth daily   Quantity:  30 tablet   Refills:  3            Where to get your medicines      These [facility] [address] [facility]  [address] [facility] [address]     Phone:  [phone]     lisinopril 5 MG tablet    metFORMIN 500 MG tablet                Primary Care Provider Office Phone # Fax #    Rell Ur MD Tita [phone]       [facility] [address]  [facility] [address]        Equal Access to Services     SHRUTHI MARIANO AH: Hadii brittaney ku hadasho Soomaali, waaxda luqadaha, qaybta kaalmada adeegyada, letitia devine haymisbahn nanci flores. So [facility] [phone].    ATENCIÓN: Si habla español, tiene a tapia disposición servicios gratuitos de asistencia lingüística. Llame al [phone].    We comply with applicable federal civil rights laws and [place] laws. We do not discriminate on the basis of race, color, national origin, age, disability sex, sexual orientation or gender identity.            Thank you!     Thank you for choosing [facility]  for your care. Our goal is always to provide you with excellent care. Hearing back from our patients is one  way we can continue to improve our services. Please take a few minutes to complete the written survey that you may receive in the mail after your visit with us. Thank you!             Your Updated Medication List - Protect others around you: Learn how to safely use, store and throw away your medicines at www.disposemymeds.org.          This list is accurate as of: 7/7/17  1:50 PM.  Always use your most recent med list.                   Brand Name Dispense Instructions for use Diagnosis    KENALOG 40 MG/ML injection   Generic drug:  triamcinolone acetonide     1 mL    1 mL (40 mg) by INTRA-ARTICULAR route once    Hip pain, left       lisinopril 5 MG tablet    PRINIVIL/ZESTRIL    30 tablet    Take 1 tablet (5 mg) by mouth daily    Benign essential hypertension       metFORMIN 500 MG tablet    GLUCOPHAGE    30 tablet    I tab daily for 1 week and the increase to twice a day    Type 2 diabetes mellitus with hyperglycemia, without long-term current use of insulin (H)       simvastatin 20 MG tablet    ZOCOR     20 mg

## 2017-07-07 NOTE — PROGRESS NOTES
Diabetes Self Management Training: Initial Assessment Visit for Newly Diagnosed Patients (Complete AADE Goals Flowsheet)    Roscoe Jang presents today for education related to Type 2 diabetes.    He is accompanied by self    Patient's diabetes management related comments/concerns: new to diabetes    Patient's emotional response to diabetes: expresses readiness to learn    Patient would like this visit to be focused around the following diabetes-related behaviors and goals: Healthy Eating, Being Active, Monitoring and Taking Medication    ASSESSMENT:  Patient Problem List and Family Medical History reviewed for relevant medical history, current medical status, and diabetes risk factors.    Current Diabetes Management per Patient:  Taking diabetes medications?   yes:     Diabetes Medication(s)     Biguanides Sig    metFORMIN (GLUCOPHAGE) 500 MG tablet I tab daily for 1 week and the increase to twice a day          *Abbreviated insulin dose documentation key: Insulin Trade Name (nyqgzncic-sjozl-mmlxvp-bedtime) - i.e. Humalog 5-5-5-0 (Humalog 5 units at breakfast, 5 units at lunch, and 5 units at dinner).    Past Diabetes Education: Newly diagnosed    Patient glucose self monitoring as follows: one time daily.   BG meter: Contour Next EZ one meter  BG results: 227 two hours after becker cheeseburger, fries and a root beer     BG values are: Not in goal  Patient's most recent   Lab Results   Component Value Date    A1C 8.4 07/07/2017    is not meeting goal of <7.0    Nutrition:  Breakfast is two toast if he has it, banana in am  Lunch-  Burger and fries or two sandwiches, water.  Chips, not usually fruit to work  Supper- two cheddar dogs, bushes beans 1 cup, regular pepsi, gladys lemonade  Steak, baked potato, green beans and 16 oz milk  Snack- none, popcorn    Physical Activity:    Limitations: back  Stays really active    Diabetes Risk Factors:  family history, age over 45 years and overweight/obesity    Diabetes  "Complications:  Not discussed today.    Vitals:  There were no vitals taken for this visit.  Estimated body mass index is 36.97 kg/(m^2) as calculated from the following:    Height as of an earlier encounter on 7/7/17: 1.765 m (5' 9.5\").    Weight as of an earlier encounter on 7/7/17: 115.2 kg (254 lb).   Last 3 BP:   BP Readings from Last 3 Encounters:   01/09/17 128/62       History   Smoking Status     Former Smoker     Quit date: 10/3/2007   Smokeless Tobacco     Not on file       Labs:  Lab Results   Component Value Date    A1C 8.4 07/07/2017     No results found for: GLC  No results found for: LDL  No results found for: HDL]  No results found for: GFRESTIMATED  No results found for: GFRESTBLACK  No results found for: CR  No results found for: MICROALBUMIN    Socio/Economic Considerations:    Support system: family    Health Beliefs and Attitudes:   Patient Activation Measure Survey Score:  No flowsheet data found.    Stage of Change: ACTION (Actively working towards change)      Diabetes knowledge and skills assessment:     Patient is knowledgeable in diabetes management concepts related to: new to diabetes    Patient needs further education on the following diabetes management concepts: Healthy Eating, Being Active, Monitoring, Taking Medication, Problem Solving, Reducing Risks and Healthy Coping    Barriers to Learning Assessment: No Barriers identified    Based on learning assessment above, most appropriate setting for further diabetes education would be: Individual setting.    INTERVENTION:   Watch carb choices  Stay active  Take metformin  Test BG once daily  Education provided today on:  AADE Self-Care Behaviors:  Healthy Eating: carbohydrate counting, consistency in amount, composition, and timing of food intake and label reading  Being Active: relationship to blood glucose and describe appropriate activity program  Monitoring: purpose, proper technique, log and interpret results, individual blood " glucose targets and frequency of monitoring  Taking Medication: action of prescribed medication, side effects of prescribed medications and when to take medications    Opportunities for ongoing education and support in diabetes-self management were discussed.    Pt verbalized understanding of concepts discussed and recommendations provided today.       Education Materials Provided:  Jordana Taking Charge of Your Diabetes Book, BG Log Sheet and Carbohydrate Counting, metformin information    PLAN:  See Patient Instructions for co-developed, patient-stated behavior change goals.  AVS printed and provided to patient today.    FOLLOW-UP:  Follow-up appointment scheduled on 8/11.    Ongoing plan for education and support: Follow-up visit with diabetes educator in one month, two week phone call      Time Spent: 60 minutes  Encounter Type: Individual    Any diabetes medication dose changes were made via the CDE Protocol and Collaborative Practice Agreement with the patient's primary care provider. A copy of this encounter was shared with the provider.

## 2017-08-09 ENCOUNTER — VIRTUAL VISIT (OUTPATIENT)
Dept: EDUCATION SERVICES | Facility: CLINIC | Age: 67
End: 2017-08-09

## 2017-08-09 DIAGNOSIS — E11.65 TYPE 2 DIABETES MELLITUS WITH HYPERGLYCEMIA, WITHOUT LONG-TERM CURRENT USE OF INSULIN (H): ICD-10-CM

## 2017-08-09 NOTE — MR AVS SNAPSHOT
"              After Visit Summary   8/9/2017    Roscoe Jang    MRN: [id number]           Patient Information     Date Of Birth          1950        Visit Information        Provider Department      8/9/2017 2:35 PM Judi [address], RD [facility]        Today's Diagnoses     Type 2 diabetes mellitus with hyperglycemia, without long-term current use of insulin (H)           Follow-ups after your visit        Your next 10 appointments already scheduled     Aug 11, 2017  2:00 PM CDT   Diabetic Education with Judi [address], RD   [facility]    [address] [place] [id number] [phone]              Who to contact     If you have questions or need follow up information about today's clinic visit or your schedule please contact [facility] directly at [phone].  Normal or non-critical lab and imaging results will be communicated to you by FibeRiohart, letter or phone within 4 business days after the clinic has received the results. If you do not hear from us within 7 days, please contact the clinic through FibeRiohart or phone. If you have a critical or abnormal lab result, we will notify you by phone as soon as possible.  Submit refill requests through [employer] or call your pharmacy and they will forward the refill request to us. Please allow 3 business days for your refill to be completed.          Additional Information About Your Visit        MyChart Information     [employer] lets you send messages to your doctor, view your test results, renew your prescriptions, schedule appointments and more. To sign up, go to [email or web address] . Click on \"Log in\" on the left side of the screen, which will take you to the Welcome page. Then click on \"Sign up Now\" on the right side of the page.     You will be asked to enter the access code listed below, as well as some personal information. Please follow the directions to create " your username and password.     Your access code is: [email or web address]  Expires: 10/5/2017  1:50 PM     Your access code will  in 90 days. If you need help or a new code, please call your [place] clinic or [phone].        Care EveryWhere ID     This is your Care EveryWhere ID. This could be used by other organizations to access your [place] medical records  [id number]         Blood Pressure from Last 3 Encounters:   17 130/80   17 128/62    Weight from Last 3 Encounters:   17 115.2 kg (254 lb)   17 117.5 kg (259 lb)              Today, you had the following     No orders found for display         Today's Medication Changes          These changes are accurate as of: 17  2:51 PM.  If you have any questions, ask your nurse or doctor.               These medicines have changed or have updated prescriptions.        Dose/Directions    metFORMIN 500 MG tablet   Commonly known as:  GLUCOPHAGE   This may have changed:  additional instructions   Used for:  Type 2 diabetes mellitus with hyperglycemia, without long-term current use of insulin (H)        Take one tablet with breakfast and two tablets with lunch   Quantity:  270 tablet   Refills:  1            Where to get your medicines      These medications were sent to [facility] [address] [place], [place] [address]     Phone:  [phone]     metFORMIN 500 MG tablet                Primary Care Provider Office Phone # Fax #    Rell Ur MD Tita [phone]       [address] [facility] [address]        Equal Access to Services     SHRUTHI MARIANO : Hadii brittaney evangelista Soobi, waaxda luqadaha, qaybta kaalmada olya, letitia flores. So [facility] [phone].    ATENCIÓN: Si habla español, tiene a tapia disposición servicios gratuitos de asistencia lingüística. Llame al [phone].    We comply with applicable federal civil rights laws and  [place] laws. We do not discriminate on the basis of race, color, national origin, age, disability sex, sexual orientation or gender identity.            Thank you!     Thank you for choosing [facility]  for your care. Our goal is always to provide you with excellent care. Hearing back from our patients is one way we can continue to improve our services. Please take a few minutes to complete the written survey that you may receive in the mail after your visit with us. Thank you!             Your Updated Medication List - Protect others around you: Learn how to safely use, store and throw away your medicines at www.disposemymeds.org.          This list is accurate as of: 8/9/17  2:51 PM.  Always use your most recent med list.                   Brand Name Dispense Instructions for use Diagnosis    blood glucose monitoring lancets     100 each    Use to test blood sugar 1 times daily or as directed.    Type 2 diabetes mellitus (H)       blood glucose monitoring test strip    RHYS CONTOUR NEXT    100 strip    Use to test blood sugar 1 times daily or as directed.    Type 2 diabetes mellitus (H)       KENALOG 40 MG/ML injection   Generic drug:  triamcinolone acetonide     1 mL    1 mL (40 mg) by INTRA-ARTICULAR route once    Hip pain, left       lisinopril 5 MG tablet    PRINIVIL/ZESTRIL    30 tablet    Take 1 tablet (5 mg) by mouth daily    Benign essential hypertension       metFORMIN 500 MG tablet    GLUCOPHAGE    270 tablet    Take one tablet with breakfast and two tablets with lunch    Type 2 diabetes mellitus with hyperglycemia, without long-term current use of insulin (H)       simvastatin 20 MG tablet    ZOCOR     20 mg

## 2017-08-09 NOTE — PROGRESS NOTES
Breakfast pp Lunch pp Supper  pp HS   2-Aug 143         3-Aug 149         4-Aug 141         5-Aug 138         6-Aug 147    115     7-Aug 139         8-Aug          9-Aug 138          #DIV/0! #DIV/0! #DIV/0! 115 #DIV/0! #DIV/0!     Numbers still elevated in am.  Will increase the Metformin to one tablet at bkfast and two tablets.  Judi Dotson RD, CDE    Any diabetes medication dose changes were made via the CDE Protocol and Collaborative Practice Agreement with the patient's primary care provider. A copy of this encounter was shared with the provider.

## 2017-08-11 ENCOUNTER — ALLIED HEALTH/NURSE VISIT (OUTPATIENT)
Dept: FAMILY MEDICINE | Facility: CLINIC | Age: 67
End: 2017-08-11
Payer: COMMERCIAL

## 2017-08-11 ENCOUNTER — TELEPHONE (OUTPATIENT)
Dept: FAMILY MEDICINE | Facility: CLINIC | Age: 67
End: 2017-08-11

## 2017-08-11 ENCOUNTER — ALLIED HEALTH/NURSE VISIT (OUTPATIENT)
Dept: EDUCATION SERVICES | Facility: CLINIC | Age: 67
End: 2017-08-11
Payer: COMMERCIAL

## 2017-08-11 VITALS — DIASTOLIC BLOOD PRESSURE: 70 MMHG | HEART RATE: 80 BPM | SYSTOLIC BLOOD PRESSURE: 132 MMHG

## 2017-08-11 VITALS — BODY MASS INDEX: 36.27 KG/M2 | WEIGHT: 249.2 LBS

## 2017-08-11 DIAGNOSIS — E11.65 TYPE 2 DIABETES MELLITUS WITH HYPERGLYCEMIA, WITHOUT LONG-TERM CURRENT USE OF INSULIN (H): Primary | ICD-10-CM

## 2017-08-11 DIAGNOSIS — I10 BENIGN ESSENTIAL HYPERTENSION: Primary | ICD-10-CM

## 2017-08-11 PROCEDURE — G0108 DIAB MANAGE TRN  PER INDIV: HCPCS

## 2017-08-11 PROCEDURE — 99207 ZZC NO CHARGE NURSE ONLY: CPT

## 2017-08-11 NOTE — MR AVS SNAPSHOT
After Visit Summary   8/11/2017    Roscoe Jang    MRN: [id number]           Patient Information     Date Of Birth          1950        Visit Information        Provider Department      8/11/2017 2:00 PM Mauri Emery, XENIA [facility]        Care Instructions    1.  Eye exam yearly dilated part important, tell them you have diabetes.  -check your feet, keep shoes on and keep moisturized  -microalbumin test yearly (urine test to check kidneys)  -dentist twice a year at least, brush and floss  -blood pressure: ideal 130/80, ok if 140/90  -a1c goal is under 7%    Call me your blood sugars in two weeks. Judi- [phone].     Make an appt in three months              Follow-ups after your visit        Your next 10 appointments already scheduled     Aug 11, 2017  3:00 PM CDT   Nurse Only with DUARTE SUTTON RN   [facility]    [address] [id number] [phone]              Who to contact     If you have questions or need follow up information about today's clinic visit or your schedule please contact [facility] directly at [phone].  Normal or non-critical lab and imaging results will be communicated to you by MyChart, letter or phone within 4 business days after the clinic has received the results. If you do not hear from us within 7 days, please contact the clinic through [employer] or phone. If you have a critical or abnormal lab result, we will notify you by phone as soon as possible.  Submit refill requests through [employer] or call your pharmacy and they will forward the refill request to us. Please allow 3 business days for your refill to be completed.          Additional Information About Your Visit        MyChart Information     [employer] lets you send messages to your doctor, view your test results, renew your prescriptions, schedule appointments and more. To sign up, go to [email or web address] .  "Click on \"Log in\" on the left side of the screen, which will take you to the Welcome page. Then click on \"Sign up Now\" on the right side of the page.     You will be asked to enter the access code listed below, as well as some personal information. Please follow the directions to create your username and password.     Your access code is: [email or web address]  Expires: 10/5/2017  1:50 PM     Your access code will  in 90 days. If you need help or a new code, please call your [place] clinic or [phone].        Care EveryWhere ID     This is your Care EveryWhere ID. This could be used by other organizations to access your [place] medical records  [id number]        Your Vitals Were     BMI (Body Mass Index)                   36.27 kg/m2            Blood Pressure from Last 3 Encounters:   17 130/80   17 128/62    Weight from Last 3 Encounters:   17 113 kg (249 lb 3.2 oz)   17 115.2 kg (254 lb)   17 117.5 kg (259 lb)              Today, you had the following     No orders found for display       Primary Care Provider Office Phone # Fax #    Rell Ur MD Tita [phone]       [address] [facility] [address]        Equal Access to Services     SHRUTHI MARIANO : Hadii aad ku hadasho Soomaali, waaxda luqadaha, qaybta kaalmada adeegyada, waxhailee flores. So [facility] [phone].    ATENCIÓN: Si habla español, tiene a tapia disposición servicios gratuitos de asistencia lingüística. Llame al [phone].    We comply with applicable federal civil rights laws and [place] laws. We do not discriminate on the basis of race, color, national origin, age, disability sex, sexual orientation or gender identity.            Thank you!     Thank you for choosing [facility]  for your care. Our goal is always to provide you with excellent care. Hearing back from our patients is one way we can continue to improve our services. Please take a few " minutes to complete the written survey that you may receive in the mail after your visit with us. Thank you!             Your Updated Medication List - Protect others around you: Learn how to safely use, store and throw away your medicines at www.disposemymeds.org.          This list is accurate as of: 8/11/17  2:13 PM.  Always use your most recent med list.                   Brand Name Dispense Instructions for use Diagnosis    blood glucose monitoring lancets     100 each    Use to test blood sugar 1 times daily or as directed.    Type 2 diabetes mellitus (H)       blood glucose monitoring test strip    RHYS CONTOUR NEXT    100 strip    Use to test blood sugar 1 times daily or as directed.    Type 2 diabetes mellitus (H)       KENALOG 40 MG/ML injection   Generic drug:  triamcinolone acetonide     1 mL    1 mL (40 mg) by INTRA-ARTICULAR route once    Hip pain, left       lisinopril 5 MG tablet    PRINIVIL/ZESTRIL    30 tablet    Take 1 tablet (5 mg) by mouth daily    Benign essential hypertension       metFORMIN 500 MG tablet    GLUCOPHAGE    270 tablet    Take one tablet with breakfast and two tablets with lunch    Type 2 diabetes mellitus with hyperglycemia, without long-term current use of insulin (H)       simvastatin 20 MG tablet    ZOCOR     20 mg

## 2017-08-11 NOTE — MR AVS SNAPSHOT
"              After Visit Summary   8/11/2017    Roscoe Jang    MRN: [id number]           Patient Information     Date Of Birth          1950        Visit Information        Provider Department      8/11/2017 3:00 PM DUARTE SUTTON RN [facility]        Today's Diagnoses     Benign essential hypertension    -  1       Follow-ups after your visit        Your next 10 appointments already scheduled     Aug 11, 2017  3:00 PM CDT   Nurse Only with FL PI RN   [facility]    [address] [id number] [phone]            Oct 06, 2017 10:00 AM CDT   SHORT with Mel Pappas NP   [facility]    [address] [id number] [phone]              Who to contact     If you have questions or need follow up information about today's clinic visit or your schedule please contact [facility] directly at [phone].  Normal or non-critical lab and imaging results will be communicated to you by MyChart, letter or phone within 4 business days after the clinic has received the results. If you do not hear from us within 7 days, please contact the clinic through [employer] or phone. If you have a critical or abnormal lab result, we will notify you by phone as soon as possible.  Submit refill requests through [employer] or call your pharmacy and they will forward the refill request to us. Please allow 3 business days for your refill to be completed.          Additional Information About Your Visit        Snapversehart Information     [employer] lets you send messages to your doctor, view your test results, renew your prescriptions, schedule appointments and more. To sign up, go to [email or web address] . Click on \"Log in\" on the left side of the screen, which will take you to the Welcome page. Then click on \"Sign up Now\" on the right side of the page.     You will be asked to enter " the access code listed below, as well as some personal information. Please follow the directions to create your username and password.     Your access code is: [email or web address]  Expires: 10/5/2017  1:50 PM     Your access code will  in 90 days. If you need help or a new code, please call your [place] clinic or [phone].        Care EveryWhere ID     This is your Care EveryWhere ID. This could be used by other organizations to access your [place] medical records  [id number]        Your Vitals Were     Pulse                   80            Blood Pressure from Last 3 Encounters:   17 132/70   17 130/80   17 128/62    Weight from Last 3 Encounters:   17 249 lb 3.2 oz (113 kg)   17 254 lb (115.2 kg)   17 259 lb (117.5 kg)              Today, you had the following     No orders found for display       Primary Care Provider Office Phone # Fax #    Rell Ur MD Tita [phone]       [address] [facility] [address]        Equal Access to Services     ANGELO [facility] Hospital[facility] AH: Hadii aad ku hadasho Soomaali, waaxda luqadaha, qaybta kaalmada adeegyada, letitia devine haykirby tellez . So [facility] [phone].    ATENCIÓN: Si habla español, tiene a tapia disposición servicios gratuitos de asistencia lingüística. Llame al [phone].    We comply with applicable federal civil rights laws and [place] laws. We do not discriminate on the basis of race, color, national origin, age, disability sex, sexual orientation or gender identity.            Thank you!     Thank you for choosing [facility]  for your care. Our goal is always to provide you with excellent care. Hearing back from our patients is one way we can continue to improve our services. Please take a few minutes to complete the written survey that you may receive in the mail after your visit with us. Thank you!             Your Updated Medication List - Protect others around you: Learn how to  safely use, store and throw away your medicines at www.disposemymeds.org.          This list is accurate as of: 8/11/17  2:51 PM.  Always use your most recent med list.                   Brand Name Dispense Instructions for use Diagnosis    blood glucose monitoring lancets     100 each    Use to test blood sugar 1 times daily or as directed.    Type 2 diabetes mellitus (H)       blood glucose monitoring test strip    RHYS CONTOUR NEXT    100 strip    Use to test blood sugar 1 times daily or as directed.    Type 2 diabetes mellitus (H)       KENALOG 40 MG/ML injection   Generic drug:  triamcinolone acetonide     1 mL    1 mL (40 mg) by INTRA-ARTICULAR route once    Hip pain, left       lisinopril 5 MG tablet    PRINIVIL/ZESTRIL    30 tablet    Take 1 tablet (5 mg) by mouth daily    Benign essential hypertension       metFORMIN 500 MG tablet    GLUCOPHAGE    270 tablet    Take one tablet with breakfast and two tablets with lunch    Type 2 diabetes mellitus with hyperglycemia, without long-term current use of insulin (H)       simvastatin 20 MG tablet    ZOCOR     20 mg

## 2017-08-11 NOTE — PATIENT INSTRUCTIONS
1.  Eye exam yearly dilated part important, tell them you have diabetes.  -check your feet, keep shoes on and keep moisturized  -microalbumin test yearly (urine test to check kidneys)  -dentist twice a year at least, brush and floss  -blood pressure: ideal 130/80, ok if 140/90  -a1c goal is under 7%    Call me your blood sugars in two weeks. Judi- [phone].     Make an appt in three months

## 2017-08-11 NOTE — PROGRESS NOTES
S-(situation): RN visit for BP check in F/U to 07-07-17 OV-  2. Benign essential hypertension  Stable   Restart ACE in light of new DM diagnosis   - Albumin Random Urine Quantitative  - Basic metabolic panel  - lisinopril (PRINIVIL/ZESTRIL) 5 MG tablet; Take 1 tablet (5 mg) by mouth daily  Dispense: 30 tablet; Refill: 3       B-(background): Has restarted lisinopril 5 mg, reports dizziness mostly in morning when first gets up. Also some intermittent blurry vision. Home BP's have been variable with lowest reading 98/67, average past few days 118/70, highest reading 160/87.    A-(assessment):   BP Readings from Last 6 Encounters:   08/11/17 132/70   07/07/17 130/80   01/09/17 128/62     HR- 80.  Reports feeling well today. States feels listless when BP's are lower, feels best when BP's are higher.   Reports adequate fluid intake on daily basis.   Last eye exam 2 yrs ago. Planning to schedule eye exam in next wk or so. Thinks may need prescription change.     R-(recommendations): Advise may try taking lisinopril later in day as long as takes at same time daily to see if helps reduce dizziness. Advise to continue home BP monitoring. F/U BP check with nurse only visit after has eye exam. Forwarded to Donya per 08-11-17 EDSON Huynh RN

## 2017-08-11 NOTE — PROGRESS NOTES
Diabetes Self Management Training: Individual Review Visit    Roscoe Jang presents today for education related to Type 2 diabetes.    He is accompanied by self    Patient's diabetes management related comments/concerns: visit 2, just picked up metformin so will increase the dose now    Patient's emotional response to diabetes: expresses readiness to learn    Patient would like this visit to be focused around the following diabetes-related behaviors and goals: Healthy Eating, Being Active, Monitoring, Taking Medication and Reducing Risks    ASSESSMENT:  Patient Problem List and Family Medical History reviewed for relevant medical history, current medical status, and diabetes risk factors.    Current Diabetes Management per Patient:  Taking diabetes medications?   yes:     Diabetes Medication(s)     Biguanides Sig    metFORMIN (GLUCOPHAGE) 500 MG tablet Take one tablet with breakfast and two tablets with lunch          *Abbreviated insulin dose documentation key: Insulin Trade Name (vpqnhczbj-exjze-sxbzhw-bedtime) - i.e. Humalog 5-5-5-0 (Humalog 5 units at breakfast, 5 units at lunch, and 5 units at dinner).    Past Diabetes Education: Yes    Patient glucose self monitoring as follows:1-2 times     BG results:   8/10: 140 in am, 127 in pm  8/11: 124 in am    BG values are: Not in goal  Patient's most recent   Lab Results   Component Value Date    A1C 8.4 07/07/2017    is not meeting goal of <7.0    Nutrition:  Breakfast- two pc of english muffin toast with PB and light yogurt or 2 light bread, water or tomato chaim  Snack- none  Lunch- sandwich with light bread (egg salad or chicken/cheese), pickles, carrots, small bag of chips, diet pepsi or water  Snack- none, grapes or pc of cheese or small bag of chips  Supper-one pork chop, fried potatoes 1 cup, veggies, diet pepsi or   Snack- rice krispies or grapes or cheese stick  Physical Activity:    Limitations:   Always active, no formal exercise  Spring and fall split  "wood  Needs it to be cool out    Diabetes Risk Factors:  family history, age over 45 years and overweight/obesity    Diabetes Complications:  Not discussed today.    Vitals:  There were no vitals taken for this visit.  Estimated body mass index is 36.97 kg/(m^2) as calculated from the following:    Height as of 7/7/17: 1.765 m (5' 9.5\").    Weight as of 7/7/17: 115.2 kg (254 lb).   Last 3 BP:   BP Readings from Last 3 Encounters:   07/07/17 130/80   01/09/17 128/62       History   Smoking Status     Former Smoker     Quit date: 10/3/2007   Smokeless Tobacco     Not on file       Labs:  Lab Results   Component Value Date    A1C 8.4 07/07/2017     Lab Results   Component Value Date     07/07/2017     Lab Results   Component Value Date    LDL 86 07/07/2017     No results found for: HDL]  GFR Estimate   Date Value Ref Range Status   07/07/2017 75 >60 mL/min/1.7m2 Final     Comment:     Non  GFR Calc     GFR Estimate If Black   Date Value Ref Range Status   07/07/2017 >90   GFR Calc   >60 mL/min/1.7m2 Final     Lab Results   Component Value Date    CR 1.00 07/07/2017     No results found for: MICROALBUMIN    Socio/Economic Considerations:    Support system: family    Health Beliefs and Attitudes:   Patient Activation Measure Survey Score:  No flowsheet data found.    Stage of Change: ACTION (Actively working towards change)      Diabetes knowledge and skills assessment:     Patient is knowledgeable in diabetes management concepts related to: Healthy Eating, Being Active, Monitoring and Taking Medication    Patient needs further education on the following diabetes management concepts: Healthy Eating, Being Active, Monitoring, Taking Medication, Problem Solving and Reducing Risks    Barriers to Learning Assessment: No Barriers identified    Based on learning assessment above, most appropriate setting for further diabetes education would be: Individual setting.    INTERVENTION: "   Increased metformin a few days ago, had not increased yet, needed to  pills  Education provided today on:  AADE Self-Care Behaviors:  Reducing Risks: prevention, early diagnostic measures and treatment of complications, foot care, appropriate dental care, annual eye exam, smoking cessation, aspirin therapy, A1C - goals, relating to blood glucose levels, how often to check, lipids levels and goals and blood pressure and goals    Opportunities for ongoing education and support in diabetes-self management were discussed.    Pt verbalized understanding of concepts discussed and recommendations provided today.       Education Materials Provided:  No new materials provided today    PLAN:  See Patient Instructions for co-developed, patient-stated behavior change goals.  AVS printed and provided to patient today.    FOLLOW-UP:  Follow-up appointment in three months  Ongoing plan for education and support: Follow-up visit with diabetes educator in three months call in numbers in two weeks      Time Spent: 60 minutes  Encounter Type: Individual    Any diabetes medication dose changes were made via the CDE Protocol and Collaborative Practice Agreement with the patient's primary care provider. A copy of this encounter was shared with the provider.

## 2017-08-13 NOTE — TELEPHONE ENCOUNTER
Agree with this plan. Would recommend trying a few more weeks and see if symptoms level off.   Donya

## 2017-08-21 ENCOUNTER — VIRTUAL VISIT (OUTPATIENT)
Dept: EDUCATION SERVICES | Facility: CLINIC | Age: 67
End: 2017-08-21

## 2017-08-21 DIAGNOSIS — E11.65 TYPE 2 DIABETES MELLITUS WITH HYPERGLYCEMIA, WITHOUT LONG-TERM CURRENT USE OF INSULIN (H): Primary | ICD-10-CM

## 2017-08-21 NOTE — MR AVS SNAPSHOT
"              After Visit Summary   8/21/2017    Roscoe Jang    MRN: [id number]           Patient Information     Date Of Birth          1950        Visit Information        Provider Department      8/21/2017 3:50 PM Mauri Emery, XENIA [facility]        Today's Diagnoses     Type 2 diabetes mellitus with hyperglycemia, without long-term current use of insulin (H)    -  1       Follow-ups after your visit        Your next 10 appointments already scheduled     Oct 06, 2017 10:00 AM CDT   [facility] with Mel [facility]appas NP   [facility]    [address] [address]   [phone]              Who to contact     If you have questions or need follow up information about today's clinic visit or your schedule please contact [facility] directly at [phone].  Normal or non-critical lab and imaging results will be communicated to you by bSafehart, letter or phone within 4 business days after the clinic has received the results. If you do not hear from us within 7 days, please contact the clinic through MyChart or phone. If you have a critical or abnormal lab result, we will notify you by phone as soon as possible.  Submit refill requests through Anchanto or call your pharmacy and they will forward the refill request to us. Please allow 3 business days for your refill to be completed.          Additional Information About Your Visit        MyChart Information     Anchanto lets you send messages to your doctor, view your test results, renew your prescriptions, schedule appointments and more. To sign up, go to [email or web address] . Click on \"Log in\" on the left side of the screen, which will take you to the Welcome page. Then click on \"Sign up Now\" on the right side of the page.     You will be asked to enter the access code listed below, as well as some personal information. Please follow the directions to create your " username and password.     Your access code is: [email or web address]  Expires: 10/5/2017  1:50 PM     Your access code will  in 90 days. If you need help or a new code, please call your [facility] clinic or [phone].        Care EveryWhere ID     This is your Care EveryWhere ID. This could be used by other organizations to access your [facility] medical records  [id number]         Blood Pressure from Last 3 Encounters:   17 132/70   17 130/80   17 128/62    Weight from Last 3 Encounters:   17 113 kg (249 lb 3.2 oz)   17 115.2 kg (254 lb)   17 117.5 kg (259 lb)              Today, you had the following     No orders found for display       Primary Care Provider Office Phone # Fax #    Rell Hernandez MD T[place] [phone]       [address] [address]        Equal Access to Services     [facility]: Hadii aad ku hadasho Soomaali, waaxda luqadaha, qaybta kaalmada adeegyada, waxay idiin haykirby tellez . So [facility] [phone].    ATENCIÓN: Si habla español, tiene a tapia disposición servicios gratuitos de asistencia lingüística. [facility] [phone].    We comply with applicable federal civil rights laws and [place] laws. We do not discriminate on the basis of race, color, national origin, age, disability sex, sexual orientation or gender identity.            Thank you!     Thank you for choosing [facility]  for your care. Our goal is always to provide you with excellent care. Hearing back from our patients is one way we can continue to improve our services. Please take a few minutes to complete the written survey that you may receive in the mail after your visit with us. Thank you!             Your Updated Medication List - Protect others around you: Learn how to safely use, store and throw away your medicines at www.disposemymeds.org.          This list is accurate as of: 17 11:59 PM.  Always use your most recent med list.                    Brand Name Dispense Instructions for use Diagnosis    blood glucose monitoring lancets     100 each    Use to test blood sugar 1 times daily or as directed.    Type 2 diabetes mellitus (H)       blood glucose monitoring test strip    RHYS CONTOUR NEXT    100 strip    Use to test blood sugar 1 times daily or as directed.    Type 2 diabetes mellitus (H)       KENALOG 40 MG/ML injection   Generic drug:  triamcinolone acetonide     1 mL    1 mL (40 mg) by INTRA-ARTICULAR route once    Hip pain, left       lisinopril 5 MG tablet    PRINIVIL/ZESTRIL    30 tablet    Take 1 tablet (5 mg) by mouth daily    Benign essential hypertension       metFORMIN 500 MG tablet    GLUCOPHAGE    270 tablet    Take one tablet with breakfast and two tablets with lunch    Type 2 diabetes mellitus with hyperglycemia, without long-term current use of insulin (H)       simvastatin 20 MG tablet    ZOCOR     20 mg

## 2017-09-20 ENCOUNTER — VIRTUAL VISIT (OUTPATIENT)
Dept: EDUCATION SERVICES | Facility: CLINIC | Age: 67
End: 2017-09-20

## 2017-09-20 DIAGNOSIS — E11.65 TYPE 2 DIABETES MELLITUS WITH HYPERGLYCEMIA, WITHOUT LONG-TERM CURRENT USE OF INSULIN (H): Primary | ICD-10-CM

## 2017-09-20 NOTE — MR AVS SNAPSHOT
"              After Visit Summary   9/20/2017    Roscoe Jang    MRN: [id number]           Patient Information     Date Of Birth          1950        Visit Information        Provider Department      9/20/2017 10:20 AM Mauri Emery, XENIA [facility]        Today's Diagnoses     Type 2 diabetes mellitus with hyperglycemia, without long-term current use of insulin (H)    -  1       Follow-ups after your visit        Your next 10 appointments already scheduled     Oct 06, 2017 10:00 AM CDT   SHORT with Mel Pappas NP   [facility]    [address] [id number] [phone]              Who to contact     If you have questions or need follow up information about today's clinic visit or your schedule please contact [facility] directly at [phone].  Normal or non-critical lab and imaging results will be communicated to you by Hopelahart, letter or phone within 4 business days after the clinic has received the results. If you do not hear from us within 7 days, please contact the clinic through Hopelahart or phone. If you have a critical or abnormal lab result, we will notify you by phone as soon as possible.  Submit refill requests through Realm or call your pharmacy and they will forward the refill request to us. Please allow 3 business days for your refill to be completed.          Additional Information About Your Visit        MyChart Information     Realm lets you send messages to your doctor, view your test results, renew your prescriptions, schedule appointments and more. To sign up, go to [email or web address] . Click on \"Log in\" on the left side of the screen, which will take you to the Welcome page. Then click on \"Sign up Now\" on the right side of the page.     You will be asked to enter the access code listed below, as well as some personal information. Please follow the directions to create your " username and password.     Your access code is: [email or web address]  Expires: 10/5/2017  1:50 PM     Your access code will  in 90 days. If you need help or a new code, please call your [facility] or [phone].        Care EveryWhere ID     This is your Care EveryWhere ID. This could be used by other organizations to access your [facility] medical records  [id number]         Blood Pressure from Last 3 Encounters:   17 132/70   17 130/80   17 128/62    Weight from Last 3 Encounters:   17 113 kg (249 lb 3.2 oz)   17 115.2 kg (254 lb)   17 117.5 kg (259 lb)              Today, you had the following     No orders found for display         Today's Medication Changes          These changes are accurate as of: 17 10:26 AM.  If you have any questions, ask your nurse or doctor.               These medicines have changed or have updated prescriptions.        Dose/Directions    * blood glucose monitoring test strip   Commonly known as:  RHYS CONTOUR NEXT   This may have changed:  Another medication with the same name was added. Make sure you understand how and when to take each.   Used for:  Type 2 diabetes mellitus (H)        Use to test blood sugar 1 times daily or as directed.   Quantity:  100 strip   Refills:  6       * blood glucose monitoring test strip   Commonly known as:  RHYS CONTOUR NEXT   This may have changed:  You were already taking a medication with the same name, and this prescription was added. Make sure you understand how and when to take each.   Used for:  Type 2 diabetes mellitus with hyperglycemia, without long-term current use of insulin (H)        Use to test blood sugar 1 times daily or as directed.  Ok to substitute alternative if insurance prefers.   Quantity:  100 strip   Refills:  11       * Notice:  This list has 2 medication(s) that are the same as other medications prescribed for you. Read the directions carefully, and ask your doctor or other care  provider to review them with you.         Where to get your medicines      [id number] [place], [place] - [address]   [address], [facility] [address]     Phone:  [phone]     blood glucose monitoring test strip                Primary Care Provider Office Phone # Fax #    Rell Hernandez MD [place] [phone]       [address]  [facility] [address]        Equal Access to Services     SHRUTHI MARIANO : Hadii aad ku hadasho Soomaali, waaxda luqadaha, qaybta kaalmada adeegyada, waxay idiin hayaan adeeg kharash la'aan . So [facility] [phone].    ATENCIÓN: Si habla español, tiene a tapia disposición servicios gratuitos de asistencia lingüística. [facility] [phone].    We comply with applicable federal civil rights laws and [place] laws. We do not discriminate on the basis of race, color, national origin, age, disability sex, sexual orientation or gender identity.            Thank you!     Thank you for choosing [facility]  for your care. Our goal is always to provide you with excellent care. Hearing back from our patients is one way we can continue to improve our services. Please take a few minutes to complete the written survey that you may receive in the mail after your visit with us. Thank you!             Your Updated Medication List - Protect others around you: Learn how to safely use, store and throw away your medicines at www.disposemymeds.org.          This list is accurate as of: 9/20/17 10:26 AM.  Always use your most recent med list.                   Brand Name Dispense Instructions for use Diagnosis    blood glucose monitoring lancets     100 each    Use to test blood sugar 1 times daily or as directed.    Type 2 diabetes mellitus (H)       * blood glucose monitoring test strip    RHYS CONTOUR NEXT    100 strip    Use to test blood sugar 1 times daily or as directed.    Type 2 diabetes mellitus (H)       * blood glucose  monitoring test strip    RHYS CONTOUR NEXT    100 strip    Use to test blood sugar 1 times daily or as directed.  Ok to substitute alternative if insurance prefers.    Type 2 diabetes mellitus with hyperglycemia, without long-term current use of insulin (H)       KENALOG 40 MG/ML injection   Generic drug:  triamcinolone acetonide     1 mL    1 mL (40 mg) by INTRA-ARTICULAR route once    Hip pain, left       lisinopril 5 MG tablet    PRINIVIL/ZESTRIL    30 tablet    Take 1 tablet (5 mg) by mouth daily    Benign essential hypertension       metFORMIN 500 MG tablet    GLUCOPHAGE    270 tablet    Take one tablet with breakfast and two tablets with lunch    Type 2 diabetes mellitus with hyperglycemia, without long-term current use of insulin (H)       simvastatin 20 MG tablet    ZOCOR     20 mg        * Notice:  This list has 2 medication(s) that are the same as other medications prescribed for you. Read the directions carefully, and ask your doctor or other care provider to review them with you.

## 2017-09-20 NOTE — PROGRESS NOTES
Pt needed strips sent to pharmacy.    Blood sugars 120-130, during day 111-130.    Rx sent to [facility] in [place] for strips. Judi lopez RD, CDE

## 2017-10-06 ENCOUNTER — OFFICE VISIT (OUTPATIENT)
Dept: FAMILY MEDICINE | Facility: CLINIC | Age: 67
End: 2017-10-06
Payer: COMMERCIAL

## 2017-10-06 VITALS
SYSTOLIC BLOOD PRESSURE: 139 MMHG | BODY MASS INDEX: 34.79 KG/M2 | WEIGHT: 239 LBS | HEART RATE: 73 BPM | RESPIRATION RATE: 16 BRPM | OXYGEN SATURATION: 99 % | DIASTOLIC BLOOD PRESSURE: 78 MMHG

## 2017-10-06 DIAGNOSIS — H60.8X3 CHRONIC ECZEMATOUS OTITIS EXTERNA OF BOTH EARS: ICD-10-CM

## 2017-10-06 DIAGNOSIS — I10 BENIGN ESSENTIAL HYPERTENSION: ICD-10-CM

## 2017-10-06 DIAGNOSIS — Z23 NEED FOR PROPHYLACTIC VACCINATION AND INOCULATION AGAINST INFLUENZA: ICD-10-CM

## 2017-10-06 DIAGNOSIS — E11.65 TYPE 2 DIABETES MELLITUS WITH HYPERGLYCEMIA, WITHOUT LONG-TERM CURRENT USE OF INSULIN (H): Primary | ICD-10-CM

## 2017-10-06 LAB
HBA1C MFR BLD: 6.4 % (ref 4.3–6)
TSH SERPL DL<=0.005 MIU/L-ACNC: 0.9 MU/L (ref 0.4–4)

## 2017-10-06 PROCEDURE — 99214 OFFICE O/P EST MOD 30 MIN: CPT | Mod: 25 | Performed by: NURSE PRACTITIONER

## 2017-10-06 PROCEDURE — 36415 COLL VENOUS BLD VENIPUNCTURE: CPT | Performed by: NURSE PRACTITIONER

## 2017-10-06 PROCEDURE — 86803 HEPATITIS C AB TEST: CPT | Performed by: NURSE PRACTITIONER

## 2017-10-06 PROCEDURE — 90670 PCV13 VACCINE IM: CPT | Performed by: NURSE PRACTITIONER

## 2017-10-06 PROCEDURE — 90662 IIV NO PRSV INCREASED AG IM: CPT | Performed by: NURSE PRACTITIONER

## 2017-10-06 PROCEDURE — 83036 HEMOGLOBIN GLYCOSYLATED A1C: CPT | Performed by: NURSE PRACTITIONER

## 2017-10-06 PROCEDURE — G0008 ADMIN INFLUENZA VIRUS VAC: HCPCS | Performed by: NURSE PRACTITIONER

## 2017-10-06 PROCEDURE — G0009 ADMIN PNEUMOCOCCAL VACCINE: HCPCS | Performed by: NURSE PRACTITIONER

## 2017-10-06 PROCEDURE — 84443 ASSAY THYROID STIM HORMONE: CPT | Performed by: NURSE PRACTITIONER

## 2017-10-06 RX ORDER — LISINOPRIL 10 MG/1
10 TABLET ORAL DAILY
Qty: 90 TABLET | Refills: 0 | Status: SHIPPED | OUTPATIENT
Start: 2017-10-06 | End: 2017-11-20

## 2017-10-06 RX ORDER — FLUOCINOLONE ACETONIDE 0.11 MG/ML
5 OIL AURICULAR (OTIC) DAILY
Qty: 20 ML | Refills: 1 | Status: SHIPPED | OUTPATIENT
Start: 2017-10-06 | End: 2018-03-27

## 2017-10-06 NOTE — PROGRESS NOTES
SUBJECTIVE:   Roscoe Jang is a 67 year old male who presents to clinic today for the following health issues:      Diabetes Follow-up    Patient is checking blood sugars: once daily.  Results are as follows:       am - 100's-130's  Sometimes checks in the afternoon        Diabetic concerns: None     Symptoms of hypoglycemia (low blood sugar): none     Paresthesias (numbness or burning in feet) or sores: No   Date of last diabetic eye exam: 9/2017      Wt Readings from Last 10 Encounters:   10/06/17 239 lb (108.4 kg)   08/11/17 249 lb 3.2 oz (113 kg)   07/07/17 254 lb (115.2 kg)   01/09/17 259 lb (117.5 kg)       Hypertension Follow-up      Outpatient blood pressures are being checked at home.  Results are 110-130/70's, has been creeping up a little bit    Low Salt Diet: no added salt          Amount of exercise or physical activity: Has increased physical activity since last visit    Problems taking medications regularly: No    Medication side effects: none  Diet: regular (no restrictions), really watching what he eats    BP Readings from Last 6 Encounters:   10/06/17 139/78   08/11/17 132/70   07/07/17 130/80   01/09/17 128/62         Problem list and histories reviewed & adjusted, as indicated.  Additional history: as documented    Labs reviewed in EPIC    Reviewed and updated as needed this visit by clinical staffTobacco  Allergies  Med Hx  Surg Hx  Fam Hx  Soc Hx      Reviewed and updated as needed this visit by Provider         ROS:  Constitutional, HEENT, cardiovascular, pulmonary, gi and gu systems are negative, except as otherwise noted.      OBJECTIVE:                                                    /78  Pulse 73  Resp 16  Wt 239 lb (108.4 kg)  SpO2 99%  BMI 34.79 kg/m2  Body mass index is 34.79 kg/(m^2).  GENERAL APPEARANCE: healthy, alert and no distress  RESP: lungs clear to auscultation - no rales, rhonchi or wheezes  CV: regular rates and rhythm, normal S1 S2, no S3 or S4 and no  murmur, click or rub  ABDOMEN: soft, nontender, without hepatosplenomegaly or masses and bowel sounds normal  MS: extremities normal- no gross deformities noted  SKIN: no suspicious lesions or rashes  PSYCH: mentation appears normal and affect normal/bright    Diagnostic test results:  Diagnostic Test Results:  Results for orders placed or performed in visit on 10/06/17   Hemoglobin A1c   Result Value Ref Range    Hemoglobin A1C 6.4 (H) 4.3 - 6.0 %          ASSESSMENT/PLAN:                                                    1. Type 2 diabetes mellitus with hyperglycemia, without long-term current use of insulin (H)  Much improved  - TSH with free T4 reflex  - **Hepatitis C Screen Reflex to RNA FUTURE anytime  - Hemoglobin A1c    2. Chronic eczematous otitis externa of both ears  refilled  - fluocinolone acetonide 0.01 % OIL; Place 5 drops in ear(s) daily  Dispense: 20 mL; Refill: 1    3. Need for prophylactic vaccination and inoculation against influenza  Given today   - FLU VACCINE, INCREASED ANTIGEN, PRESV FREE, AGE 65+ [34569]  - Pneumococcal vaccine 13 valent PCV13 IM (Prevnar) [16955]  - Vaccine Administration, Initial [43676]    4. Benign essential hypertension  Will increase lisinopril   - lisinopril (PRINIVIL/ZESTRIL) 10 MG tablet; Take 1 tablet (10 mg) by mouth daily  Dispense: 90 tablet; Refill: 0    Patient Instructions   Recommend increasing lisinopril to 10 mg daily     Monitor BP at home   Great work on diet and exercise   A1C 6.4!!!!      Pneumonia shot (will need booster next year) and influenza vaccine refilled today       See me back in March     DOT card for 1 year will  in October        Mel Pappas NP  [facility]

## 2017-10-06 NOTE — MR AVS SNAPSHOT
"              After Visit Summary   10/6/2017    Roscoe Jang    MRN: [id number]           Patient Information     Date Of Birth          1950        Visit Information        Provider Department      10/6/2017 10:00 AM Mel Pappas NP [facility]        Today's Diagnoses     Type 2 diabetes mellitus with hyperglycemia, without long-term current use of insulin (H)    -  1    Chronic eczematous otitis externa of both ears          Care Instructions    Recommend increasing lisinopril to 10 mg daily     Monitor BP at home   Great work on diet and exercise   A1C 6.4!!!!      Pneumonia shot (will need booster next year) and influenza vaccine refilled today       See me back in March     DOT card for 1 year will  in October            Follow-ups after your visit        Who to contact     If you have questions or need follow up information about today's clinic visit or your schedule please contact [facility] directly at [phone].  Normal or non-critical lab and imaging results will be communicated to you by MyChart, letter or phone within 4 business days after the clinic has received the results. If you do not hear from us within 7 days, please contact the clinic through [email or web address] or phone. If you have a critical or abnormal lab result, we will notify you by phone as soon as possible.  Submit refill requests through Rol[employer] or call your pharmacy and they will forward the refill request to us. Please allow 3 business days for your refill to be completed.          Additional Information About Your Visit        MyChart Information     Roller lets you send messages to your doctor, view your test results, renew your prescriptions, schedule appointments and more. To sign up, go to [email or web address] . Click on \"Log in\" on the left side of the screen, which will take you to the Welcome page. Then click on \"Sign up Now\" on the right side of the page.     You will be asked " to enter the access code listed below, as well as some personal information. Please follow the directions to create your username and password.     Your access code is: [email or web address]  Expires: 2018 10:49 AM     Your access code will  in 90 days. If you need help or a new code, please call your [place] clinic or [phone].        Care EveryWhere ID     This is your Care EveryWhere ID. This could be used by other organizations to access your [facility] medical records  [id number]        Your Vitals Were     Pulse Respirations Pulse Oximetry BMI (Body Mass Index)          73 16 99% 34.79 kg/m2         Blood Pressure from Last 3 Encounters:   10/06/17 139/78   17 132/70   17 130/80    Weight from Last 3 Encounters:   10/06/17 239 lb (108.4 kg)   17 249 lb 3.2 oz (113 kg)   17 254 lb (115.2 kg)              We Performed the Following     **Hepatitis C Screen Reflex to RNA FUTURE anytime     Hemoglobin A1c     TSH with free T4 reflex          Today's Medication Changes          These changes are accurate as of: 10/6/17 10:50 AM.  If you have any questions, ask your nurse or doctor.               Start taking these medicines.        Dose/Directions    fluocinolone acetonide 0.01 % Oil   Used for:  Chronic eczematous otitis externa of both ears   Started by:  Mel Pappas, NP        Dose:  5 drop   Place 5 drops in ear(s) daily   Quantity:  20 mL   Refills:  1            Where to get your medicines      These medications [facility] to [facility] [address] [place] [address] [address]  [address] [facility] [address]     Phone:  [phone]     fluocinolone acetonide 0.01 % Oil                Primary Care Provider Office Phone # Fax #    Rell Ur MD Tita [phone]       [address]  [facility] [address]        Equal Access to Services     SHRUTHI MARIANO AH: Funmilayo Murillo, wariddhi luismael, qaletitia conn  sammarcella magdaleneleonardolisandra shaver'aarichie ah. So [facility] [phone].    ATENCIÓN: Si adriannala juanita, tiene a tapia disposición servicios gratuitos de asistencia lingüística. Angie al [phone].    We comply with applicable federal civil rights laws and [place] laws. We do not discriminate on the basis of race, color, national origin, age, disability, sex, sexual orientation, or gender identity.            Thank you!     Thank you for choosing [facility]  for your care. Our goal is always to provide you with excellent care. Hearing back from our patients is one way we can continue to improve our services. Please take a few minutes to complete the written survey that you may receive in the mail after your visit with us. Thank you!             Your Updated Medication List - Protect others around you: Learn how to safely use, store and throw away your medicines at www.disposemymeds.org.          This list is accurate as of: 10/6/17 10:50 AM.  Always use your most recent med list.                   Brand Name Dispense Instructions for use Diagnosis    blood glucose monitoring lancets     100 each    Use to test blood sugar 1 times daily or as directed.    Type 2 diabetes mellitus (H)       * blood glucose monitoring test strip    RHYS CONTOUR NEXT    100 strip    Use to test blood sugar 1 times daily or as directed.    Type 2 diabetes mellitus (H)       * blood glucose monitoring test strip    RHYS CONTOUR NEXT    100 strip    Use to test blood sugar 1 times daily or as directed.  Ok to substitute alternative if insurance prefers.    Type 2 diabetes mellitus with hyperglycemia, without long-term current use of insulin (H)       fluocinolone acetonide 0.01 % Oil     20 mL    Place 5 drops in ear(s) daily    Chronic eczematous otitis externa of both ears       KENALOG 40 MG/ML injection   Generic drug:  triamcinolone acetonide     1 mL    1 mL (40 mg) by INTRA-ARTICULAR route once    Hip pain, left       lisinopril 5 MG tablet     PRINIVIL/ZESTRIL    30 tablet    Take 1 tablet (5 mg) by mouth daily    Benign essential hypertension       metFORMIN 500 MG tablet    GLUCOPHAGE    270 tablet    Take one tablet with breakfast and two tablets with lunch    Type 2 diabetes mellitus with hyperglycemia, without long-term current use of insulin (H)       simvastatin 20 MG tablet    ZOCOR     20 mg        * Notice:  This list has 2 medication(s) that are the same as other medications prescribed for you. Read the directions carefully, and ask your doctor or other care provider to review them with you.

## 2017-10-06 NOTE — PROGRESS NOTES
Injectable Influenza Immunization Documentation    1.  Is the person to be vaccinated sick today?   No    2. Does the person to be vaccinated have an allergy to a component   of the vaccine?   No    3. Has the person to be vaccinated ever had a serious reaction   to influenza vaccine in the past?   No    4. Has the person to be vaccinated ever had Guillain-Barré syndrome?   No    Form completed by patient

## 2017-10-06 NOTE — PATIENT INSTRUCTIONS
Recommend increasing lisinopril to 10 mg daily     Monitor BP at home   Great work on diet and exercise   A1C 6.4!!!!      Pneumonia shot (will need booster next year) and influenza vaccine refilled today       See me back in March     DOT card for 1 year will  in October

## 2017-10-06 NOTE — LETTER
October 9, 2017      Roscoe Jang  [address]  [place] [address]        Dear ,    We are writing to inform you of your test results.    Your test results fall within the expected range(s) or remain unchanged from previous results.  A1C looks great!    Resulted Orders   TSH with free T4 reflex   Result Value Ref Range    TSH 0.90 0.40 - 4.00 mU/L   **Hepatitis C Screen Reflex to RNA FUTURE anytime   Result Value Ref Range    Hepatitis C Antibody Nonreactive NR^Nonreactive      Comment:      Assay performance characteristics have not been established for newborns,   infants, and children     Hemoglobin A1c   Result Value Ref Range    Hemoglobin A1C 6.4 (H) 4.3 - 6.0 %       If you have any questions or concerns, please call the clinic at the number listed above.       Sincerely,      Mel Pappas NP

## 2017-10-06 NOTE — NURSING NOTE
"Chief Complaint   Patient presents with     Diabetes     Hypertension       Initial /78  Pulse 73  Resp 16  Wt 239 lb (108.4 kg)  SpO2 99%  BMI 34.79 kg/m2 Estimated body mass index is 34.79 kg/(m^2) as calculated from the following:    Height as of 7/7/17: 5' 9.5\" (1.765 m).    Weight as of this encounter: 239 lb (108.4 kg).  Medication Reconciliation: complete    Health Maintenance that is potentially due pending provider review:  Influenza, TSH, Hep C, Colonoscopy, advanced directive, prevnar    Will discuss with provider.    Is there anyone who you would like to be able to receive your results? No  If yes have patient fill out JARVIS      "

## 2017-10-08 LAB — HCV AB SERPL QL IA: NONREACTIVE

## 2017-10-30 DIAGNOSIS — I10 BENIGN ESSENTIAL HYPERTENSION: ICD-10-CM

## 2017-10-30 RX ORDER — LISINOPRIL 5 MG/1
TABLET ORAL
Qty: 30 TABLET | Refills: 3 | OUTPATIENT
Start: 2017-10-30

## 2017-10-30 NOTE — TELEPHONE ENCOUNTER
BP Readings from Last 6 Encounters:   10/06/17 139/78   08/11/17 132/70   07/07/17 130/80   01/09/17 128/62     Lisinopril 10 mg ordered 10-06-17 #90 tabs.  Pharmacy has order and will fill.  VIK Huynh RN

## 2017-11-10 ENCOUNTER — OFFICE VISIT (OUTPATIENT)
Dept: FAMILY MEDICINE | Facility: CLINIC | Age: 67
End: 2017-11-10
Payer: COMMERCIAL

## 2017-11-10 VITALS
HEART RATE: 90 BPM | DIASTOLIC BLOOD PRESSURE: 84 MMHG | SYSTOLIC BLOOD PRESSURE: 130 MMHG | RESPIRATION RATE: 16 BRPM | OXYGEN SATURATION: 98 % | BODY MASS INDEX: 34.93 KG/M2 | WEIGHT: 240 LBS | TEMPERATURE: 98.6 F

## 2017-11-10 DIAGNOSIS — H66.011 ACUTE SUPPR OTITIS MEDIA W SPON RUPT EAR DRUM, RIGHT EAR: ICD-10-CM

## 2017-11-10 DIAGNOSIS — Z12.11 COLON CANCER SCREENING: Primary | ICD-10-CM

## 2017-11-10 PROCEDURE — 99213 OFFICE O/P EST LOW 20 MIN: CPT | Performed by: NURSE PRACTITIONER

## 2017-11-10 RX ORDER — OFLOXACIN 3 MG/ML
10 SOLUTION AURICULAR (OTIC) 2 TIMES DAILY
Qty: 10 ML | Refills: 0 | Status: SHIPPED | OUTPATIENT
Start: 2017-11-10 | End: 2017-11-17

## 2017-11-10 NOTE — PATIENT INSTRUCTIONS
Acute suppr otitis media w spon rupt ear drum, right ear  Will treat with antibiotic oral twice a day for 10 days   Ear drops right ear twice a day   Recheck in 2 weeks  Avoid getting water in ear- cotton ball with showering     - amoxicillin-clavulanate (AUGMENTIN) 875-125 MG per tablet; Take 1 tablet by mouth 2 times daily  Dispense: 20 tablet; Refill: 0  - ofloxacin (FLOXIN) 0.3 % otic solution; Place 10 drops into the right ear 2 times daily for 7 days  Dispense: 10 mL; Refill: 0    Due for colon cancer screening - mail in FIT test       Ruptured Infected Eardrum (Adult)    The middle ear is the space behind the eardrum. You have an infection of the middle ear. This can lead to pressure that causes the eardrum to break (rupture). This may cause sudden pain. Pus or blood will drain out of the ear canal. Your hearing will also likely be affected.  The infection may be treated with antibiotics. The eardrum usually heals completely on its own. If it does not, further treatment is needed. For this reason, it s important to have a follow-up exam with an ear specialist.  Home care    Keep taking prescribed antibiotics until all of the medicine is gone. Do this even if you feel better after the first few days.    Take any other medicines as prescribed.    Keep a clean cotton ball in the ear canal to absorb drainage. Change the cotton often, when it becomes soiled with fluid drainage. Don t let water get into the ear. Don t put any medicine drops into the ear unless your healthcare provider tells you to do so.  Follow-up care  Follow up with your healthcare provider in 2 weeks, or as advised. This is to make sure the infection is getting better and the eardrum is healing. Also follow up with specialists as advised for a hearing test or exam.  When to seek medical advice  Call your healthcare provider if you have any of the following:    Fever of 100.4 F (38 C) or higher    Pain that gets worse or doesn t get  better    Unusual drowsiness or confusion    Headache, neck pain or a stiff neck    Convulsions (seizure)    Worsening dizziness    Worsening hearing loss or ringing in the ear  Date Last Reviewed: 5/3/2015    4044-4783 [employer]. [address] [place] [address], [place] [address]. All rights reserved. This information is not intended as a substitute for professional medical care. Always follow your healthcare professional's instructions.

## 2017-11-10 NOTE — NURSING NOTE
"Chief Complaint   Patient presents with     Ear Problem       Initial /84  Pulse 90  Temp 98.6  F (37  C) (Tympanic)  Resp 16  Wt 240 lb (108.9 kg)  SpO2 98%  BMI 34.93 kg/m2 Estimated body mass index is 34.93 kg/(m^2) as calculated from the following:    Height as of 7/7/17: 5' 9.5\" (1.765 m).    Weight as of this encounter: 240 lb (108.9 kg).  Medication Reconciliation: complete    Health Maintenance that is potentially due pending provider review:  Eye exam, colonoscopy, advanced directive    Will discuss with provider.    Is there anyone who you would like to be able to receive your results? No  If yes have patient fill out JARVIS      "

## 2017-11-10 NOTE — PROGRESS NOTES
SUBJECTIVE:   Roscoe Jang is a 67 year old male who presents to clinic today for the following health issues:      Ear Problem      Duration: 1 week    Description (location/character/radiation): Both Ears, drainage, some pain, Hard of hearing    Intensity:  moderate    Accompanying signs and symptoms: none    History (similar episodes/previous evaluation): Yes, has drops these are bothering ears more    Precipitating or alleviating factors: None    Therapies tried and outcome: Ear drops         Problem list and histories reviewed & adjusted, as indicated.  Additional history: as documented    Patient Active Problem List   Diagnosis     Benign essential hypertension     Mixed hyperlipidemia     Obesity     Abdominal aortic aneurysm (H)     Fatty liver     Type 2 diabetes mellitus with hyperglycemia, without long-term current use of insulin (H)     History reviewed. No pertinent surgical history.    Social History   Substance Use Topics     Smoking status: Former Smoker     Quit date: 10/3/2007     Smokeless tobacco: Never Used     Alcohol use No     History reviewed. No pertinent family history.          Reviewed and updated as needed this visit by clinical staffTobacco  Allergies  Med Hx  Surg Hx  Fam Hx  Soc Hx      Reviewed and updated as needed this visit by Provider         ROS:  Constitutional, HEENT, cardiovascular, pulmonary, gi and gu systems are negative, except as otherwise noted.      OBJECTIVE:                                                    /84  Pulse 90  Temp 98.6  F (37  C) (Tympanic)  Resp 16  Wt 240 lb (108.9 kg)  SpO2 98%  BMI 34.93 kg/m2  Body mass index is 34.93 kg/(m^2).  GENERAL APPEARANCE: healthy, alert and no distress  HENT: ear canals and TM's normal, nose and mouth without ulcers or lesions and TM congested/bulging right and ruptured   RESP: lungs clear to auscultation - no rales, rhonchi or wheezes  CV: regular rates and rhythm, normal S1 S2, no S3 or S4 and no  murmur, click or rub  ABDOMEN: soft, nontender, without hepatosplenomegaly or masses and bowel sounds normal  SKIN: no suspicious lesions or rashes  PSYCH: mentation appears normal and affect normal/bright    Diagnostic test results:  Diagnostic Test Results:  none        ASSESSMENT/PLAN:                                                    Acute suppr otitis media w spon rupt ear drum, right ear  Will treat with antibiotic oral twice a day for 10 days   Ear drops right ear twice a day   Recheck in 2 weeks  Avoid getting water in ear- cotton ball with showering     - amoxicillin-clavulanate (AUGMENTIN) 875-125 MG per tablet; Take 1 tablet by mouth 2 times daily  Dispense: 20 tablet; Refill: 0  - ofloxacin (FLOXIN) 0.3 % otic solution; Place 10 drops into the right ear 2 times daily for 7 days  Dispense: 10 mL; Refill: 0    Colon Cancer screening   Due for colon cancer screening - mail in FIT test       Ruptured Infected Eardrum (Adult)    The middle ear is the space behind the eardrum. You have an infection of the middle ear. This can lead to pressure that causes the eardrum to break (rupture). This may cause sudden pain. Pus or blood will drain out of the ear canal. Your hearing will also likely be affected.  The infection may be treated with antibiotics. The eardrum usually heals completely on its own. If it does not, further treatment is needed. For this reason, it s important to have a follow-up exam with an ear specialist.  Home care    Keep taking prescribed antibiotics until all of the medicine is gone. Do this even if you feel better after the first few days.    Take any other medicines as prescribed.    Keep a clean cotton ball in the ear canal to absorb drainage. Change the cotton often, when it becomes soiled with fluid drainage. Don t let water get into the ear. Don t put any medicine drops into the ear unless your healthcare provider tells you to do so.  Follow-up care  Follow up with your healthcare  provider in 2 weeks, or as advised. This is to make sure the infection is getting better and the eardrum is healing. Also follow up with specialists as advised for a hearing test or exam.  When to seek medical advice  Call your healthcare provider if you have any of the following:    Fever of 100.4 F (38 C) or higher    Pain that gets worse or doesn t get better    Unusual drowsiness or confusion    Headache, neck pain or a stiff neck    Convulsions (seizure)    Worsening dizziness    Worsening hearing loss or ringing in the ear  Date Last Reviewed: 5/3/2015    [id number] [employer]. [address] [place] [address]. All rights reserved. This information is not intended as a substitute for professional medical care. Always follow your healthcare professional's instructions.            Mel Pappas NP  [facility]

## 2017-11-10 NOTE — MR AVS SNAPSHOT
After Visit Summary   11/10/2017    Roscoe Jang    MRN: [id number]           Patient Information     Date Of Birth          1950        Visit Information        Provider Department      11/10/2017 10:40 AM Mel Pappas NP [facility]        Today's Diagnoses     Colon cancer screening    -  1    Acute suppr otitis media w spon rupt ear drum, right ear          Care Instructions    Acute suppr otitis media w spon rupt ear drum, right ear  Will treat with antibiotic oral twice a day for 10 days   Ear drops right ear twice a day   Recheck in 2 weeks  Avoid getting water in ear- cotton ball with showering     - amoxicillin-clavulanate (AUGMENTIN) 875-125 MG per tablet; Take 1 tablet by mouth 2 times daily  Dispense: 20 tablet; Refill: 0  - ofloxacin (FLOXIN) 0.3 % otic solution; Place 10 drops into the right ear 2 times daily for 7 days  Dispense: 10 mL; Refill: 0    Due for colon cancer screening - mail in FIT test       Ruptured Infected Eardrum (Adult)    The middle ear is the space behind the eardrum. You have an infection of the middle ear. This can lead to pressure that causes the eardrum to break (rupture). This may cause sudden pain. Pus or blood will drain out of the ear canal. Your hearing will also likely be affected.  The infection may be treated with antibiotics. The eardrum usually heals completely on its own. If it does not, further treatment is needed. For this reason, it s important to have a follow-up exam with an ear specialist.  Home care    Keep taking prescribed antibiotics until all of the medicine is gone. Do this even if you feel better after the first few days.    Take any other medicines as prescribed.    Keep a clean cotton ball in the ear canal to absorb drainage. Change the cotton often, when it becomes soiled with fluid drainage. Don t let water get into the ear. Don t put any medicine drops into the ear unless your healthcare provider tells you to  do so.  Follow-up care  Follow up with your healthcare provider in 2 weeks, or as advised. This is to make sure the infection is getting better and the eardrum is healing. Also follow up with specialists as advised for a hearing test or exam.  When to seek medical advice  Call your healthcare provider if you have any of the following:    Fever of 100.4 F (38 C) or higher    Pain that gets worse or doesn t get better    Unusual drowsiness or confusion    Headache, neck pain or a stiff neck    Convulsions (seizure)    Worsening dizziness    Worsening hearing loss or ringing in the ear  Date Last Reviewed: 5/3/2015    [id number] [employer]. [address] [place], [place] [address]. All rights reserved. This information is not intended as a substitute for professional medical care. Always follow your healthcare professional's instructions.                Follow-ups after your visit        Future tests that were ordered for you today     Open Future Orders        Priority Expected Expires Ordered    Fecal colorectal cancer screen (FIT) Routine 12/1/2017 2/2/2018 11/10/2017            Who to contact     If you have questions or need follow up information about today's clinic visit or your schedule please contact [facility] directly at [phone].  Normal or non-critical lab and imaging results will be communicated to you by MyChart, letter or phone within 4 business days after the clinic has received the results. If you do not hear from us within 7 days, please contact the clinic through [employer] or phone. If you have a critical or abnormal lab result, we will notify you by phone as soon as possible.  Submit refill requests through [employer] or call your pharmacy and they will forward the refill request to us. Please allow 3 business days for your refill to be completed.          Additional Information About Your Visit        [employer] Information     [employer] lets you send messages to your  "doctor, view your test results, renew your prescriptions, schedule appointments and more. To sign up, go to [email or web address] . Click on \"Log in\" on the left side of the screen, which will take you to the Welcome page. Then click on \"Sign up Now\" on the right side of the page.     You will be asked to enter the access code listed below, as well as some personal information. Please follow the directions to create your username and password.     Your access code is: [email or web address]  Expires: 2018  9:49 AM     Your access code will  in 90 days. If you need help or a new code, please call your [facility] clinic or [phone].        Care EveryWhere ID     This is your Care EveryWhere ID. This could be used by other organizations to access your [facility] medical records  [id number]        Your Vitals Were     Pulse Temperature Respirations Pulse Oximetry BMI (Body Mass Index)       90 98.6  F (37  C) (Tympanic) 16 98% 34.93 kg/m2        Blood Pressure from Last 3 Encounters:   11/10/17 130/84   10/06/17 139/78   17 132/70    Weight from Last 3 Encounters:   11/10/17 240 lb (108.9 kg)   10/06/17 239 lb (108.4 kg)   17 249 lb 3.2 oz (113 kg)                 Today's Medication Changes          These changes are accurate as of: 11/10/17 11:24 AM.  If you have any questions, ask your nurse or doctor.               Start taking these medicines.        Dose/Directions    amoxicillin-clavulanate 875-125 MG per tablet   Commonly known as:  AUGMENTIN   Used for:  Acute suppr otitis media w spon rupt ear drum, right ear   Started by:  Mel Pappas NP        Dose:  1 tablet   Take 1 tablet by mouth 2 times daily   Quantity:  20 tablet   Refills:  0       ofloxacin 0.3 % otic solution   Commonly known as:  FLOXIN   Used for:  Acute suppr otitis media w spon rupt ear drum, right ear   Started by:  Mel Pappas NP        Dose:  10 drop   Place 10 drops into the right ear 2 times daily for 7 days "   Quantity:  10 mL   Refills:  0            Where to get your medicines      These medications [id number] [place] [place] [facility] [address] [facility]  [address] , [facility] [address]     Phone:  [phone]     amoxicillin-clavulanate 875-125 MG per tablet    ofloxacin 0.3 % otic solution                Primary Care Provider Office Phone # Fax #    Mel Pappas, [phone]       [address] [facility] [address]        Equal Access to Services     SHRUTHI [facility]: Hadii aad ku hadasho Soomaali, waaxda luqadaha, qaybta kaalmada adeegyada, waxay idiin hayaan adeeg damien tellez . So [facility] [phone].    ATENCIÓN: Si habla español, tiene a tapia disposición servicios gratuitos de asistencia lingüística. [facility] [phone].    We comply with applicable federal civil rights laws and [place] laws. We do not discriminate on the basis of race, color, national origin, age, disability, sex, sexual orientation, or gender identity.            Thank you!     Thank you for choosing [facility]  for your care. Our goal is always to provide you with excellent care. Hearing back from our patients is one way we can continue to improve our services. Please take a few minutes to complete the written survey that you may receive in the mail after your visit with us. Thank you!             Your Updated Medication List - Protect others around you: Learn how to safely use, store and throw away your medicines at www.disposemymeds.org.          This list is accurate as of: 11/10/17 11:24 AM.  Always use your most recent med list.                   Brand Name Dispense Instructions for use Diagnosis    amoxicillin-clavulanate 875-125 MG per tablet    AUGMENTIN    20 tablet    Take 1 tablet by mouth 2 times daily    Acute suppr otitis media w spon rupt ear drum, right ear       ASPIRIN PO      Take 81 mg by mouth daily        blood glucose monitoring lancets     100 each    Use to  test blood sugar 1 times daily or as directed.    Type 2 diabetes mellitus (H)       * blood glucose monitoring test strip    RHYS CONTOUR NEXT    100 strip    Use to test blood sugar 1 times daily or as directed.    Type 2 diabetes mellitus (H)       * blood glucose monitoring test strip    RHYS CONTOUR NEXT    100 strip    Use to test blood sugar 1 times daily or as directed.  Ok to substitute alternative if insurance prefers.    Type 2 diabetes mellitus with hyperglycemia, without long-term current use of insulin (H)       fluocinolone acetonide 0.01 % Oil     20 mL    Place 5 drops in ear(s) daily    Chronic eczematous otitis externa of both ears       KENALOG 40 MG/ML injection   Generic drug:  triamcinolone acetonide     1 mL    1 mL (40 mg) by INTRA-ARTICULAR route once    Hip pain, left       lisinopril 10 MG tablet    PRINIVIL/ZESTRIL    90 tablet    Take 1 tablet (10 mg) by mouth daily    Benign essential hypertension       metFORMIN 500 MG tablet    GLUCOPHAGE    270 tablet    Take one tablet with breakfast and two tablets with lunch    Type 2 diabetes mellitus with hyperglycemia, without long-term current use of insulin (H)       ofloxacin 0.3 % otic solution    FLOXIN    10 mL    Place 10 drops into the right ear 2 times daily for 7 days    Acute suppr otitis media w spon rupt ear drum, right ear       simvastatin 20 MG tablet    ZOCOR     20 mg        * Notice:  This list has 2 medication(s) that are the same as other medications prescribed for you. Read the directions carefully, and ask your doctor or other care provider to review them with you.

## 2017-11-20 ENCOUNTER — OFFICE VISIT (OUTPATIENT)
Dept: FAMILY MEDICINE | Facility: CLINIC | Age: 67
End: 2017-11-20
Payer: COMMERCIAL

## 2017-11-20 VITALS
RESPIRATION RATE: 16 BRPM | TEMPERATURE: 97.3 F | DIASTOLIC BLOOD PRESSURE: 82 MMHG | BODY MASS INDEX: 34.93 KG/M2 | HEART RATE: 77 BPM | WEIGHT: 240 LBS | SYSTOLIC BLOOD PRESSURE: 136 MMHG | OXYGEN SATURATION: 98 %

## 2017-11-20 DIAGNOSIS — I10 BENIGN ESSENTIAL HYPERTENSION: ICD-10-CM

## 2017-11-20 DIAGNOSIS — H66.011 ACUTE SUPPURATIVE OTITIS MEDIA OF RIGHT EAR WITH SPONTANEOUS RUPTURE OF TYMPANIC MEMBRANE, RECURRENCE NOT SPECIFIED: Primary | ICD-10-CM

## 2017-11-20 PROCEDURE — 99214 OFFICE O/P EST MOD 30 MIN: CPT | Performed by: NURSE PRACTITIONER

## 2017-11-20 RX ORDER — OFLOXACIN 3 MG/ML
10 SOLUTION AURICULAR (OTIC) 2 TIMES DAILY
Qty: 10 ML | Refills: 1 | Status: SHIPPED | OUTPATIENT
Start: 2017-11-20 | End: 2017-12-04

## 2017-11-20 RX ORDER — CEFDINIR 300 MG/1
300 CAPSULE ORAL 2 TIMES DAILY
Qty: 20 CAPSULE | Refills: 0 | Status: SHIPPED | OUTPATIENT
Start: 2017-11-20 | End: 2018-03-27

## 2017-11-20 RX ORDER — LISINOPRIL 20 MG/1
20 TABLET ORAL DAILY
Qty: 90 TABLET | Refills: 0 | Status: SHIPPED | OUTPATIENT
Start: 2017-11-20 | End: 2018-02-12

## 2017-11-20 NOTE — PROGRESS NOTES
SUBJECTIVE:   Roscoe Jang is a 67 year old male who presents to clinic today for the following health issues:      Ear Problem      Duration: Ongoing    Description (location/character/radiation): Mostly right ear, painful, drainage    Intensity:  moderate    Accompanying signs and symptoms: was on antibiotics    History (similar episodes/previous evaluation): yes seen on 11/10/2017    Precipitating or alleviating factors: None    Therapies tried and outcome: antibiotics, ear is still sore     Treated with augmentin and ofloxacin     Blood pressure readings have been higher at home   130-140 SBP   DBP  On 10 mg of lisinopril   Problem list and histories reviewed & adjusted, as indicated.  Additional history: as documented    Patient Active Problem List   Diagnosis     Benign essential hypertension     Mixed hyperlipidemia     Obesity     Abdominal aortic aneurysm (H)     Fatty liver     Type 2 diabetes mellitus with hyperglycemia, without long-term current use of insulin (H)     History reviewed. No pertinent surgical history.    Social History   Substance Use Topics     Smoking status: Former Smoker     Quit date: 10/3/2007     Smokeless tobacco: Never Used     Alcohol use No     History reviewed. No pertinent family history.          Reviewed and updated as needed this visit by clinical staff     Reviewed and updated as needed this visit by Provider         ROS:  Constitutional, HEENT, cardiovascular, pulmonary, gi and gu systems are negative, except as otherwise noted.      OBJECTIVE:                                                    /82  Pulse 77  Temp 97.3  F (36.3  C) (Tympanic)  Resp 16  Wt 240 lb (108.9 kg)  SpO2 98%  BMI 34.93 kg/m2  Body mass index is 34.93 kg/(m^2).  GENERAL APPEARANCE: healthy, alert and no distress  HENT: TM congested/bulging right, erythematous, ruptured   RESP: lungs clear to auscultation - no rales, rhonchi or wheezes  CV: regular rates and rhythm, normal S1  S2, no S3 or S4 and no murmur, click or rub  ABDOMEN: soft, nontender, without hepatosplenomegaly or masses and bowel sounds normal  MS: extremities normal- no gross deformities noted  SKIN: no suspicious lesions or rashes  NEURO: Normal strength and tone, mentation intact and speech normal    Diagnostic test results:  Diagnostic Test Results:  none        ASSESSMENT/PLAN:                                                    1. Acute suppurative otitis media of right ear with spontaneous rupture of tympanic membrane, recurrence not specified  Will treat with omnicef   Continue the floxin drops   ENT referral and appointment made    - OTOLARYNGOLOGY REFERRAL  - AUDIOLOGY ADULT REFERRAL  - cefdinir (OMNICEF) 300 MG capsule; Take 1 capsule (300 mg) by mouth 2 times daily  Dispense: 20 capsule; Refill: 0  - ofloxacin (FLOXIN) 0.3 % otic solution; Place 10 drops into the right ear 2 times daily for 14 days  Dispense: 10 mL; Refill: 1    2. Benign essential hypertension  Increase to 20 mg daily   Recheck with nurse visit in 2 weeks     - lisinopril (PRINIVIL/ZESTRIL) 20 MG tablet; Take 1 tablet (20 mg) by mouth daily  Dispense: 90 tablet; Refill: 0      Patient Instructions   Start different antibiotic omnicef twice a day for 10 days   Continue the drops- refilled today     See ENT next week appointment as below       Increase the lisinopril to 20 mg daily (take 2 of the 10 mg tablets and  new prescription for 20 mg tablets)      Mel Ppapas NP  [facility]

## 2017-11-20 NOTE — NURSING NOTE
"Chief Complaint   Patient presents with     Ear Problem       Initial /82  Pulse 77  Temp 97.3  F (36.3  C) (Tympanic)  Resp 16  Wt 240 lb (108.9 kg)  SpO2 98%  BMI 34.93 kg/m2 Estimated body mass index is 34.93 kg/(m^2) as calculated from the following:    Height as of 7/7/17: 5' 9.5\" (1.765 m).    Weight as of this encounter: 240 lb (108.9 kg).  Medication Reconciliation: complete    Health Maintenance that is potentially due pending provider review:  Eye Exam, Colonoscopy, Advanced Directive    Will discuss with provider, has FIT at home.    Is there anyone who you would like to be able to receive your results? No  If yes have patient fill out JARVIS      "
None

## 2017-11-20 NOTE — PATIENT INSTRUCTIONS
Start different antibiotic omnicef twice a day for 10 days   Continue the drops- refilled today     See ENT next week appointment as below       Increase the lisinopril to 20 mg daily (take 2 of the 10 mg tablets and  new prescription for 20 mg tablets)

## 2017-11-20 NOTE — MR AVS SNAPSHOT
After Visit Summary   11/20/2017    Roscoe Jang    MRN: [id number]           Patient Information     Date Of Birth          1950        Visit Information        Provider Department      11/20/2017 9:00 AM Mel Pappas NP [facility]        Today's Diagnoses     Acute suppurative otitis media of right ear with spontaneous rupture of tympanic membrane, recurrence not specified    -  1    Benign essential hypertension          Care Instructions    Start different antibiotic omnicef twice a day for 10 days   Continue the drops- refilled today     See ENT next week appointment as below       Increase the lisinopril to 20 mg daily (take 2 of the 10 mg tablets and  new prescription for 20 mg tablets)          Follow-ups after your visit        Additional Services     AUDIOLOGY ADULT REFERRAL       Your provider has referred you to: [facility] [phone]   [email or web address]    Specialty Testing:  Audiogram Only            OTOLARYNGOLOGY REFERRAL       Your provider has referred you to: [facility]: [facility] [phone]   [email or web address]    Please be aware that coverage of these services is subject to the terms and limitations of your health insurance plan.  Call member services at your health plan with any benefit or coverage questions.      Please bring the following with you to your appointment:    (1) Any X-Rays, CTs or MRIs which have been performed.  Contact the facility where they were done to arrange for  prior to your scheduled appointment.   (2) List of current medications  (3) This referral request   (4) Any documents/labs given to you for this referral                  Your next 10 appointments already scheduled     Nov 27, 2017 11:00 AM CST   SHORT with Mel Pappas NP   [facility]    [address]  "[facility] [address]   [phone]            2017  2:00 PM CST   [facility]    [address] [facility] [address] [phone]            2017  2:30 PM CST   New Visit with Sofi Joseph MD   [facility]    [address] [facility] [address] [phone]              Who to contact     If you have questions or need follow up information about today's clinic visit or your schedule please contact [facility] directly at [phone].  Normal or non-critical lab and imaging results will be communicated to you by MyChart, letter or phone within 4 business days after the clinic has received the results. If you do not hear from us within 7 days, please contact the clinic through MyChart or phone. If you have a critical or abnormal lab result, we will notify you by phone as soon as possible.  Submit refill requests through [employer] or call your pharmacy and they will forward the refill request to us. Please allow 3 business days for your refill to be completed.          Additional Information About Your Visit        [employer] Information     [employer] lets you send messages to your doctor, view your test results, renew your prescriptions, schedule appointments and more. To sign up, go to [email or web address] . Click on \"Log in\" on the left side of the screen, which will take you to the Welcome page. Then click on \"Sign up Now\" on the right side of the page.     You will be asked to enter the access code listed below, as well as some personal information. Please follow the directions to create your username and password.     Your access code is: [email or web address]  Expires: 2018  9:49 AM     Your access code will  in 90 days. If you need help or a new code, please call your [facility] or [phone].        Care EveryWhere ID     " This is your Care EveryWhere ID. This could be used by other organizations to access your [facility] medical records  [id number]        Your Vitals Were     Pulse Temperature Respirations Pulse Oximetry BMI (Body Mass Index)       77 97.3  F (36.3  C) (Tympanic) 16 98% 34.93 kg/m2        Blood Pressure from Last 3 Encounters:   11/20/17 136/82   11/10/17 130/84   10/06/17 139/78    Weight from Last 3 Encounters:   11/20/17 240 lb (108.9 kg)   11/10/17 240 lb (108.9 kg)   10/06/17 239 lb (108.4 kg)              We Performed the Following     AUDIOLOGY ADULT REFERRAL     OTOLARYNGOLOGY REFERRAL          Today's Medication Changes          These changes are accurate as of: 11/20/17  9:19 AM.  If you have any questions, ask your nurse or doctor.               Start taking these medicines.        Dose/Directions    cefdinir 300 MG capsule   Commonly known as:  OMNICEF   Used for:  Acute suppurative otitis media of right ear with spontaneous rupture of tympanic membrane, recurrence not specified   Started by:  Mel Pappas NP        Dose:  300 mg   Take 1 capsule (300 mg) by mouth 2 times daily   Quantity:  20 capsule   Refills:  0       ofloxacin 0.3 % otic solution   Commonly known as:  FLOXIN   Used for:  Acute suppurative otitis media of right ear with spontaneous rupture of tympanic membrane, recurrence not specified   Started by:  Mel Pappas NP        Dose:  10 drop   Place 10 drops into the right ear 2 times daily for 14 days   Quantity:  10 mL   Refills:  1         These medicines have changed or have updated prescriptions.        Dose/Directions    lisinopril 20 MG tablet   Commonly known as:  PRINIVIL/ZESTRIL   This may have changed:    - medication strength  - how much to take   Used for:  Benign essential hypertension   Changed by:  Mel Pappas NP        Dose:  20 mg   Take 1 tablet (20 mg) by mouth daily   Quantity:  90 tablet   Refills:  0            Where to get your medicines      These  medications were sent to [facility] - [place], [place] - [address]   [address] , [place] [address]     Phone:  [phone]     cefdinir 300 MG capsule    lisinopril 20 MG tablet    ofloxacin 0.3 % otic solution                Primary Care Provider Office Phone # Fax #    Mel Pappas [phone]       [address]  [facility] [place] [address]        Equal Access to Services     SHRUTHI MARIANO : Hadii aad ku hadasho Soomaali, waaxda luqadaha, qaybta kaalmada adeegyada, waxay idiin hayaan adeeg kharash la'misbahn . So [facility] [phone].    ATENCIÓN: Si habla español, tiene a tapia disposición servicios gratuitos de asistencia lingüística. [facility] [phone].    We comply with applicable federal civil rights laws and [place] laws. We do not discriminate on the basis of race, color, national origin, age, disability, sex, sexual orientation, or gender identity.            Thank you!     Thank you for choosing [facility]  for your care. Our goal is always to provide you with excellent care. Hearing back from our patients is one way we can continue to improve our services. Please take a few minutes to complete the written survey that you may receive in the mail after your visit with us. Thank you!             Your Updated Medication List - Protect others around you: Learn how to safely use, store and throw away your medicines at www.disposemymeds.org.          This list is accurate as of: 11/20/17  9:19 AM.  Always use your most recent med list.                   Brand Name Dispense Instructions for use Diagnosis    ASPIRIN PO      Take 81 mg by mouth daily        blood glucose monitoring lancets     100 each    Use to test blood sugar 1 times daily or as directed.    Type 2 diabetes mellitus (H)       * blood glucose monitoring test strip    RHYS CONTOUR NEXT    100 strip    Use to test blood sugar 1 times daily or as directed.    Type 2 diabetes mellitus (H)       *  blood glucose monitoring test strip    RHYS CONTOUR NEXT    100 strip    Use to test blood sugar 1 times daily or as directed.  Ok to substitute alternative if insurance prefers.    Type 2 diabetes mellitus with hyperglycemia, without long-term current use of insulin (H)       cefdinir 300 MG capsule    OMNICEF    20 capsule    Take 1 capsule (300 mg) by mouth 2 times daily    Acute suppurative otitis media of right ear with spontaneous rupture of tympanic membrane, recurrence not specified       fluocinolone acetonide 0.01 % Oil     20 mL    Place 5 drops in ear(s) daily    Chronic eczematous otitis externa of both ears       KENALOG 40 MG/ML injection   Generic drug:  triamcinolone acetonide     1 mL    1 mL (40 mg) by INTRA-ARTICULAR route once    Hip pain, left       lisinopril 20 MG tablet    PRINIVIL/ZESTRIL    90 tablet    Take 1 tablet (20 mg) by mouth daily    Benign essential hypertension       metFORMIN 500 MG tablet    GLUCOPHAGE    270 tablet    Take one tablet with breakfast and two tablets with lunch    Type 2 diabetes mellitus with hyperglycemia, without long-term current use of insulin (H)       ofloxacin 0.3 % otic solution    FLOXIN    10 mL    Place 10 drops into the right ear 2 times daily for 14 days    Acute suppurative otitis media of right ear with spontaneous rupture of tympanic membrane, recurrence not specified       simvastatin 20 MG tablet    ZOCOR     20 mg        * Notice:  This list has 2 medication(s) that are the same as other medications prescribed for you. Read the directions carefully, and ask your doctor or other care provider to review them with you.

## 2017-11-29 ENCOUNTER — OFFICE VISIT (OUTPATIENT)
Dept: OTOLARYNGOLOGY | Facility: CLINIC | Age: 67
End: 2017-11-29
Payer: COMMERCIAL

## 2017-11-29 DIAGNOSIS — B36.9 FUNGAL OTITIS EXTERNA: Primary | ICD-10-CM

## 2017-11-29 DIAGNOSIS — H62.40 FUNGAL OTITIS EXTERNA: Primary | ICD-10-CM

## 2017-11-29 PROCEDURE — 99203 OFFICE O/P NEW LOW 30 MIN: CPT | Performed by: OTOLARYNGOLOGY

## 2017-11-29 RX ORDER — CLOTRIMAZOLE 1 G/ML
1 SOLUTION TOPICAL 2 TIMES DAILY
Qty: 10 ML | Refills: 1 | Status: SHIPPED | OUTPATIENT
Start: 2017-11-29 | End: 2017-12-18

## 2017-11-29 NOTE — PROGRESS NOTES
History of Present Illness - Roscoe Jang is a 67 year old male who presents in consultation at the request of Dr. Pappas with complaints of right sided ear pain and otorrhea. He was seen by Mel Pappas  11/10/17 for bilateral drainage and a ruptured right TM. He was given oral Augmentin and Floxin drops. 10 days later he was still having pain and right sided drainage. Treated again with oral Omnicef and Floxin drops again. He reports that the pain has decreased, but it is very itchy.He had a rash that seemed to break out in his ear that he applied oil and witch hazel in the ear without success. He has some drainage still.      Past Medical History -   Patient Active Problem List   Diagnosis     Benign essential hypertension     Mixed hyperlipidemia     Obesity     Abdominal aortic aneurysm (H)     Fatty liver     Type 2 diabetes mellitus with hyperglycemia, without long-term current use of insulin (H)       Current Medications -   Current Outpatient Prescriptions:      clotrimazole (LOTRIMIN) 1 % solution, Apply 1 mL topically 2 times daily, Disp: 10 mL, Rfl: 1     cefdinir (OMNICEF) 300 MG capsule, Take 1 capsule (300 mg) by mouth 2 times daily, Disp: 20 capsule, Rfl: 0     ofloxacin (FLOXIN) 0.3 % otic solution, Place 10 drops into the right ear 2 times daily for 14 days, Disp: 10 mL, Rfl: 1     lisinopril (PRINIVIL/ZESTRIL) 20 MG tablet, Take 1 tablet (20 mg) by mouth daily, Disp: 90 tablet, Rfl: 0     ASPIRIN PO, Take 81 mg by mouth daily, Disp: , Rfl:      fluocinolone acetonide 0.01 % OIL, Place 5 drops in ear(s) daily (Patient not taking: Reported on 11/10/2017), Disp: 20 mL, Rfl: 1     blood glucose monitoring (RHYS CONTOUR NEXT) test strip, Use to test blood sugar 1 times daily or as directed.  Ok to substitute alternative if insurance prefers., Disp: 100 strip, Rfl: 11     metFORMIN (GLUCOPHAGE) 500 MG tablet, Take one tablet with breakfast and two tablets with lunch, Disp: 270 tablet, Rfl:  1     blood glucose monitoring (RHYS CONTOUR NEXT) test strip, Use to test blood sugar 1 times daily or as directed., Disp: 100 strip, Rfl: 6     blood glucose monitoring (RHYS MICROLET) lancets, Use to test blood sugar 1 times daily or as directed., Disp: 100 each, Rfl: 5     simvastatin (ZOCOR) 20 MG tablet, 20 mg, Disp: , Rfl:      triamcinolone acetonide (KENALOG) 40 MG/ML injection, 1 mL (40 mg) by INTRA-ARTICULAR route once, Disp: 1 mL, Rfl: 0    Allergies - No Known Allergies    Social History -   Social History     Social History     Marital status:      Spouse name: N/A     Number of children: N/A     Years of education: N/A     Social History Main Topics     Smoking status: Former Smoker     Quit date: 10/3/2007     Smokeless tobacco: Never Used     Alcohol use No     Drug use: No     Sexual activity: Yes     Partners: Female     Other Topics Concern     Not on file     Social History Narrative    Live with girl friend, 3 children, wife passed away in Jan 2000, quit smoking in 2007.        Family History - No family history on file.    Review of Systems - As per HPI and PMHx, otherwise 7 system review of the head and neck negative. 10+ system review negative.    Physical Exam  There were no vitals taken for this visit.  General - The patient is well nourished and well developed, and appears to have good nutritional status.  Alert and oriented to person and place, answers questions and cooperates with examination appropriately.   Head and Face - Normocephalic and atraumatic, with no gross asymmetry noted of the contour of the facial features.  The facial nerve is intact, with strong symmetric movements.  Voice and Breathing - The patient was breathing comfortably without the use of accessory muscles. There was no wheezing, stridor, or stertor.  The patients voice was clear and strong, and had appropriate pitch and quality.  Ears - Bilateral pinna and EACs with normal appearing overlying skin.  Tympanic membrane intact with good mobility on pneumatic otoscopy bilaterally. Bony landmarks of the ossicular chain are normal. The tympanic membranes are normal in appearance. No retraction, perforation, or masses. Erythema and mild edema of ear canal and posterior aspect of TM on right.  NO perforation.  Eyes - Extraocular movements intact.  Sclera were not icteric or injected, conjunctiva were pink and moist.  Mouth - Examination of the oral cavity showed pink, healthy oral mucosa. No lesions or ulcerations noted.  The tongue was mobile and midline, and the dentition were in good condition.    Throat - The walls of the oropharynx were smooth, pink, moist, symmetric, and had no lesions or ulcerations.  The tonsillar pillars and soft palate were symmetric.  The uvula was midline on elevation.  Neck - Normal midline excursion of the laryngotracheal complex during swallowing.  Full range of motion on passive movement.  Palpation of the occipital, submental, submandibular, internal jugular chain, and supraclavicular nodes did not demonstrate any abnormal lymph nodes or masses.  The carotid pulse was palpable bilaterally.  Palpation of the thyroid was soft and smooth, with no nodules or goiter appreciated.  The trachea was mobile and midline.  Nose - External contour is symmetric, no gross deflection or scars.  Nasal mucosa is pink and moist with no abnormal mucus.  The septum was midline and non-obstructive, turbinates of normal size and position.  No polyps, masses, or purulence noted on examination.          Assessment - Roscoe Jang is a 67 year old male with right sided otorrhea with a ruptured TM. Debris suctioned out and patient noticed immediate improvement. Possible fungal debris seen, which would explain the itching. Given antifungal drops for a week. Patient should return in one week if he is still having symptoms. Audiogram today was cancelled due to current otorrhea.      This document serves as a  record of the services and decisions personally performed and made by Dr. Sofi Joseph MD. It was created on his behalf by Aicha Alvarez, a trained medical scribe. The creation of this document is based the provider's statements to the medical scribe.  Aicha Alvarez 2:48 PM 11/29/2017    Provider:   The information in this document, created by the medical scribe for me, accurately reflects the services I personally performed and the decisions made by me. I have reviewed and approved this document for accuracy prior to leaving the patient care area.  Dr. Sofi Joseph MD 2:48 PM 11/29/2017    Dr. Sofi Joseph MD  Otolaryngology  [facility]

## 2017-11-29 NOTE — LETTER
11/29/2017         RE: Roscoe Jang  [id number]  [facility] [address]        Dear Colleague,    Thank you for referring your patient, Roscoe Jang, to the [facility]. Please see a copy of my visit note below.        History of Present Illness - Roscoe Jang is a 67 year old male who presents in consultation at the request of Dr. Pappas with complaints of right sided ear pain and otorrhea. He was seen by Mel Pappas  11/10/17 for bilateral drainage and a ruptured right TM. He was given oral Augmentin and Floxin drops. 10 days later he was still having pain and right sided drainage. Treated again with oral Omnicef and Floxin drops again. He reports that the pain has decreased, but it is very itchy.He had a rash that seemed to break out in his ear that he applied oil and witch hazel in the ear without success. He has some drainage still.      Past Medical History -   Patient Active Problem List   Diagnosis     Benign essential hypertension     Mixed hyperlipidemia     Obesity     Abdominal aortic aneurysm (H)     Fatty liver     Type 2 diabetes mellitus with hyperglycemia, without long-term current use of insulin (H)       Current Medications -   Current Outpatient Prescriptions:      clotrimazole (LOTRIMIN) 1 % solution, Apply 1 mL topically 2 times daily, Disp: 10 mL, Rfl: 1     cefdinir (OMNICEF) 300 MG capsule, Take 1 capsule (300 mg) by mouth 2 times daily, Disp: 20 capsule, Rfl: 0     ofloxacin (FLOXIN) 0.3 % otic solution, Place 10 drops into the right ear 2 times daily for 14 days, Disp: 10 mL, Rfl: 1     lisinopril (PRINIVIL/ZESTRIL) 20 MG tablet, Take 1 tablet (20 mg) by mouth daily, Disp: 90 tablet, Rfl: 0     ASPIRIN PO, Take 81 mg by mouth daily, Disp: , Rfl:      fluocinolone acetonide 0.01 % OIL, Place 5 drops in ear(s) daily (Patient not taking: Reported on 11/10/2017), Disp: 20 mL, Rfl: 1     blood glucose monitoring (RHYS CONTOUR NEXT) test strip, Use to test blood sugar  1 times daily or as directed.  Ok to substitute alternative if insurance prefers., Disp: 100 strip, Rfl: 11     metFORMIN (GLUCOPHAGE) 500 MG tablet, Take one tablet with breakfast and two tablets with lunch, Disp: 270 tablet, Rfl: 1     blood glucose monitoring (RHYS CONTOUR NEXT) test strip, Use to test blood sugar 1 times daily or as directed., Disp: 100 strip, Rfl: 6     blood glucose monitoring (RHYS MICROLET) lancets, Use to test blood sugar 1 times daily or as directed., Disp: 100 each, Rfl: 5     simvastatin (ZOCOR) 20 MG tablet, 20 mg, Disp: , Rfl:      triamcinolone acetonide (KENALOG) 40 MG/ML injection, 1 mL (40 mg) by INTRA-ARTICULAR route once, Disp: 1 mL, Rfl: 0    Allergies - No Known Allergies    Social History -   Social History     Social History     Marital status:      Spouse name: N/A     Number of children: N/A     Years of education: N/A     Social History Main Topics     Smoking status: Former Smoker     Quit date: 10/3/2007     Smokeless tobacco: Never Used     Alcohol use No     Drug use: No     Sexual activity: Yes     Partners: Female     Other Topics Concern     Not on file     Social History Narrative    Live with girl friend, 3 children, wife passed away in Jan 2000, quit smoking in 2007.        Family History - No family history on file.    Review of Systems - As per HPI and PMHx, otherwise 7 system review of the head and neck negative. 10+ system review negative.    Physical Exam  There were no vitals taken for this visit.  General - The patient is well nourished and well developed, and appears to have good nutritional status.  Alert and oriented to person and place, answers questions and cooperates with examination appropriately.   Head and Face - Normocephalic and atraumatic, with no gross asymmetry noted of the contour of the facial features.  The facial nerve is intact, with strong symmetric movements.  Voice and Breathing - The patient was breathing comfortably without  the use of accessory muscles. There was no wheezing, stridor, or stertor.  The patients voice was clear and strong, and had appropriate pitch and quality.  Ears - Bilateral pinna and EACs with normal appearing overlying skin. Tympanic membrane intact with good mobility on pneumatic otoscopy bilaterally. Bony landmarks of the ossicular chain are normal. The tympanic membranes are normal in appearance. No retraction, perforation, or masses. Erythema and mild edema of ear canal and posterior aspect of TM on right.  NO perforation.  Eyes - Extraocular movements intact.  Sclera were not icteric or injected, conjunctiva were pink and moist.  Mouth - Examination of the oral cavity showed pink, healthy oral mucosa. No lesions or ulcerations noted.  The tongue was mobile and midline, and the dentition were in good condition.    Throat - The walls of the oropharynx were smooth, pink, moist, symmetric, and had no lesions or ulcerations.  The tonsillar pillars and soft palate were symmetric.  The uvula was midline on elevation.  Neck - Normal midline excursion of the laryngotracheal complex during swallowing.  Full range of motion on passive movement.  Palpation of the occipital, submental, submandibular, internal jugular chain, and supraclavicular nodes did not demonstrate any abnormal lymph nodes or masses.  The carotid pulse was palpable bilaterally.  Palpation of the thyroid was soft and smooth, with no nodules or goiter appreciated.  The trachea was mobile and midline.  Nose - External contour is symmetric, no gross deflection or scars.  Nasal mucosa is pink and moist with no abnormal mucus.  The septum was midline and non-obstructive, turbinates of normal size and position.  No polyps, masses, or purulence noted on examination.          Assessment - Roscoe Jang is a 67 year old male with right sided otorrhea with a ruptured TM. Debris suctioned out and patient noticed immediate improvement. Possible fungal debris seen,  which would explain the itching. Given antifungal drops for a week. Patient should return in one week if he is still having symptoms. Audiogram today was cancelled due to current otorrhea.      This document serves as a record of the services and decisions personally performed and made by Dr. Sofi Joseph MD. It was created on his behalf by Aicha Alvarez, a trained medical scribe. The creation of this document is based the provider's statements to the medical scribe.  Aicha Alvarez 2:48 PM 11/29/2017    Provider:   The information in this document, created by the medical scribe for me, accurately reflects the services I personally performed and the decisions made by me. I have reviewed and approved this document for accuracy prior to leaving the patient care area.  Dr. Sofi Joseph MD 2:48 PM 11/29/2017    Dr. Sofi Joseph MD  Otolaryngology  [facility]        Again, thank you for allowing me to participate in the care of your patient.        Sincerely,        Sofi Joseph MD

## 2017-11-29 NOTE — MR AVS SNAPSHOT
"              After Visit Summary   2017    Roscoe Jang    MRN: [id number]           Patient Information     Date Of Birth          1950        Visit Information        Provider Department      2017 2:30 PM Sofi Joseph MD [facility]        Today's Diagnoses     Fungal otitis externa    -  1       Follow-ups after your visit        Who to contact     If you have questions or need follow up information about today's clinic visit or your schedule please contact [facility] directly at [phone].  Normal or non-critical lab and imaging results will be communicated to you by MyChart, letter or phone within 4 business days after the clinic has received the results. If you do not hear from us within 7 days, please contact the clinic through [facility] or phone. If you have a critical or abnormal lab result, we will notify you by phone as soon as possible.  Submit refill requests through [employer] or call your pharmacy and they will forward the refill request to us. Please allow 3 business days for your refill to be completed.          Additional Information About Your Visit        MyChart Information     [employer] lets you send messages to your doctor, view your test results, renew your prescriptions, schedule appointments and more. To sign up, go to [email or web address] . Click on \"Log in\" on the left side of the screen, which will take you to the Welcome page. Then click on \"Sign up Now\" on the right side of the page.     You will be asked to enter the access code listed below, as well as some personal information. Please follow the directions to create your username and password.     Your access code is: [email or web address]  Expires: 2018  9:49 AM     Your access code will  in 90 days. If you need help or a new code, please call your [facility] or [phone].        Care EveryWhere ID     This is your Care EveryWhere ID. This could be used by other " organizations to access your [facility] medical records  [id number]         Blood Pressure from Last 3 Encounters:   11/20/17 136/82   11/10/17 130/84   10/06/17 139/78    Weight from Last 3 Encounters:   11/20/17 108.9 kg (240 lb)   11/10/17 108.9 kg (240 lb)   10/06/17 108.4 kg (239 lb)              Today, you had the following     No orders found for display         Today's Medication Changes          These changes are accurate as of: 11/29/17  4:33 PM.  If you have any questions, ask your nurse or doctor.               Start taking these medicines.        Dose/Directions    clotrimazole 1 % solution   Commonly known as:  LOTRIMIN   Used for:  Fungal otitis externa   Started by:  Sofi oJseph MD        Dose:  1 mL   Apply 1 mL topically 2 times daily   Quantity:  10 mL   Refills:  1            Where to get your medicines      These medications were sent to [facility] [address] [place] [address]  [address] [place] [id number]     Phone:  [phone]     clotrimazole 1 % solution                Primary Care Provider Office Phone # Fax #    Mel Pappas, [phone]       [address] [place] [id number]        Equal Access to Services     SHRUTHI MARIANO AH: Hadii brittaney browne hadasho Soomaali, waaxda luqadaha, qaybta kaalmada adeegyada, waxhailee flores. So [facility] [phone].    ATENCIÓN: Si habla español, tiene a tapia disposición servicios gratuitos de asistencia lingüística. Llame al [phone].    We comply with applicable federal civil rights laws and [place] laws. We do not discriminate on the basis of race, color, national origin, age, disability, sex, sexual orientation, or gender identity.            Thank you!     Thank you for choosing [facility]  for your care. Our goal is always to provide you with excellent care. Hearing back from our patients is one way we can continue to improve our services. Please take a few  minutes to complete the written survey that you may receive in the mail after your visit with us. Thank you!             Your Updated Medication List - Protect others around you: Learn how to safely use, store and throw away your medicines at www.disposemymeds.org.          This list is accurate as of: 11/29/17  4:33 PM.  Always use your most recent med list.                   Brand Name Dispense Instructions for use Diagnosis    ASPIRIN PO      Take 81 mg by mouth daily        blood glucose monitoring lancets     100 each    Use to test blood sugar 1 times daily or as directed.    Type 2 diabetes mellitus (H)       * blood glucose monitoring test strip    RHYS CONTOUR NEXT    100 strip    Use to test blood sugar 1 times daily or as directed.    Type 2 diabetes mellitus (H)       * blood glucose monitoring test strip    RHYS CONTOUR NEXT    100 strip    Use to test blood sugar 1 times daily or as directed.  Ok to substitute alternative if insurance prefers.    Type 2 diabetes mellitus with hyperglycemia, without long-term current use of insulin (H)       cefdinir 300 MG capsule    OMNICEF    20 capsule    Take 1 capsule (300 mg) by mouth 2 times daily    Acute suppurative otitis media of right ear with spontaneous rupture of tympanic membrane, recurrence not specified       clotrimazole 1 % solution    LOTRIMIN    10 mL    Apply 1 mL topically 2 times daily    Fungal otitis externa       fluocinolone acetonide 0.01 % Oil     20 mL    Place 5 drops in ear(s) daily    Chronic eczematous otitis externa of both ears       KENALOG 40 MG/ML injection   Generic drug:  triamcinolone acetonide     1 mL    1 mL (40 mg) by INTRA-ARTICULAR route once    Hip pain, left       lisinopril 20 MG tablet    PRINIVIL/ZESTRIL    90 tablet    Take 1 tablet (20 mg) by mouth daily    Benign essential hypertension       metFORMIN 500 MG tablet    GLUCOPHAGE    270 tablet    Take one tablet with breakfast and two tablets with lunch    Type  2 diabetes mellitus with hyperglycemia, without long-term current use of insulin (H)       ofloxacin 0.3 % otic solution    FLOXIN    10 mL    Place 10 drops into the right ear 2 times daily for 14 days    Acute suppurative otitis media of right ear with spontaneous rupture of tympanic membrane, recurrence not specified       simvastatin 20 MG tablet    ZOCOR     20 mg        * Notice:  This list has 2 medication(s) that are the same as other medications prescribed for you. Read the directions carefully, and ask your doctor or other care provider to review them with you.

## 2017-12-14 ENCOUNTER — TELEPHONE (OUTPATIENT)
Dept: FAMILY MEDICINE | Facility: CLINIC | Age: 67
End: 2017-12-14

## 2017-12-14 ENCOUNTER — NURSE TRIAGE (OUTPATIENT)
Dept: NURSING | Facility: CLINIC | Age: 67
End: 2017-12-14

## 2017-12-14 NOTE — TELEPHONE ENCOUNTER
Patient is calling because he needs copies of his [employer] physical sent to his home address (address was confirmed and is correct). He needs to send them in to the State. Please call to confirm this has been done.  Shanti Calvillo RN-[facility]

## 2017-12-14 NOTE — TELEPHONE ENCOUNTER
Patient requesting a copy of his [employer] Physical be sent to his home address so he can send them in to the State [facility] [employer]. Epic encounter sent.  Shanti Calvillo RN-[facility]

## 2017-12-18 ENCOUNTER — OFFICE VISIT (OUTPATIENT)
Dept: FAMILY MEDICINE | Facility: CLINIC | Age: 67
End: 2017-12-18
Payer: COMMERCIAL

## 2017-12-18 ENCOUNTER — RADIANT APPOINTMENT (OUTPATIENT)
Dept: GENERAL RADIOLOGY | Facility: CLINIC | Age: 67
End: 2017-12-18
Attending: NURSE PRACTITIONER
Payer: COMMERCIAL

## 2017-12-18 VITALS
WEIGHT: 246 LBS | HEART RATE: 78 BPM | DIASTOLIC BLOOD PRESSURE: 82 MMHG | BODY MASS INDEX: 35.81 KG/M2 | RESPIRATION RATE: 18 BRPM | SYSTOLIC BLOOD PRESSURE: 154 MMHG | TEMPERATURE: 98.6 F

## 2017-12-18 DIAGNOSIS — S91.209A AVULSION OF TOENAIL OF RIGHT FOOT: ICD-10-CM

## 2017-12-18 DIAGNOSIS — M79.674 PAIN OF TOE OF RIGHT FOOT: Primary | ICD-10-CM

## 2017-12-18 DIAGNOSIS — L03.818 CELLULITIS OF OTHER SPECIFIED SITE: ICD-10-CM

## 2017-12-18 DIAGNOSIS — S90.211A SUBUNGUAL HEMATOMA OF GREAT TOE OF RIGHT FOOT, INITIAL ENCOUNTER: ICD-10-CM

## 2017-12-18 DIAGNOSIS — M79.671 RIGHT FOOT PAIN: ICD-10-CM

## 2017-12-18 PROCEDURE — 99214 OFFICE O/P EST MOD 30 MIN: CPT | Performed by: NURSE PRACTITIONER

## 2017-12-18 PROCEDURE — 73660 X-RAY EXAM OF TOE(S): CPT | Mod: RT

## 2017-12-18 RX ORDER — CEPHALEXIN 500 MG/1
500 CAPSULE ORAL 3 TIMES DAILY
Qty: 30 CAPSULE | Refills: 0 | Status: SHIPPED | OUTPATIENT
Start: 2017-12-18 | End: 2018-05-07

## 2017-12-18 NOTE — NURSING NOTE
"Chief Complaint   Patient presents with     Toe Injury     big toe Right footis  painful  ,patient dropped a metal piece on it about 3 1/2 weeks ago .        Initial /82  Pulse 78  Temp 98.6  F (37  C)  Resp 18  Wt 246 lb (111.6 kg)  BMI 35.81 kg/m2 Estimated body mass index is 35.81 kg/(m^2) as calculated from the following:    Height as of 7/7/17: 5' 9.5\" (1.765 m).    Weight as of this encounter: 246 lb (111.6 kg).  Medication Reconciliation: complete   Yvonne Aponte CMA      "

## 2017-12-18 NOTE — PROGRESS NOTES
SUBJECTIVE:   Roscoe Jang is a 67 year old male who presents to clinic today for the following health issues:      Chief Complaint   Patient presents with     Toe Injury     big toe Right footis  painful  ,patient dropped a metal piece on it about 3 1/2 weeks ago .        Reports in the past few days it has become much more tender, toenail has lifted off nail bed   It is red   No fever      Problem list and histories reviewed & adjusted, as indicated.  Additional history: as documented    Patient Active Problem List   Diagnosis     Benign essential hypertension     Mixed hyperlipidemia     Obesity     Abdominal aortic aneurysm (H)     Fatty liver     Type 2 diabetes mellitus with hyperglycemia, without long-term current use of insulin (H)     History reviewed. No pertinent surgical history.    Social History   Substance Use Topics     Smoking status: Former Smoker     Quit date: 10/3/2007     Smokeless tobacco: Never Used     Alcohol use No     History reviewed. No pertinent family history.          Reviewed and updated as needed this visit by clinical staffTobacco  Allergies  Meds  Med Hx  Surg Hx  Fam Hx  Soc Hx      Reviewed and updated as needed this visit by Provider         ROS:  Constitutional, HEENT, cardiovascular, pulmonary, gi and gu systems are negative, except as otherwise noted.      OBJECTIVE:                                                    /82  Pulse 78  Temp 98.6  F (37  C)  Resp 18  Wt 246 lb (111.6 kg)  BMI 35.81 kg/m2  Body mass index is 35.81 kg/(m^2).  GENERAL APPEARANCE: healthy, alert and no distress  ORTHO: : right toe is tender there is erythema at the nail base, avulsion of nail noted       Diagnostic test results:  Diagnostic Test Results:  Right toe xray negative        ASSESSMENT/PLAN:                                                      1. Pain of toe of right foot    2. Subungual hematoma of great toe of right foot, initial encounter    3. Avulsion of toenail  of right foot    4. Cellulitis of other specified site      Will redness and tenderness with treat with keflex for cellulitis  Referral and appointment made to see Dr. Kuo on Wednesday as the nail likely needs to be removed.      Patient Instructions   Recommend treating with keflex three times a day for 1 week   See Dr. Kuo- podiatrist in [place] at 4 PM- will likely remove toenail   Xray was negative for fracture - awaiting final review per radiologist       Detached Fingernail or Toenail  A detached nail is when the nail becomes  from the area underneath it (the nail bed). This often means losing all or part of the nail. An injury to your finger or toe is often the cause. It can also be caused by an infection around or under the nail.  Sometimes there is a cut in the nail bed or a bone fracture under the nail. In most cases, the nail will grow back from the area under the cuticle (the matrix). A fingernail takes about 4 to 6 months to grow back. A toenail takes about 12 months to grow back. If the nail bed or matrix was damaged, the nail may grow back with a rough or abnormal shape. In some cases the nail may not grow back at all.    There may be damage or a cut to the nail bed. This may need to be repaired. Often this is done with stitches. If the nail is still in good condition, it might be cleaned and trimmed, then put back in place. This is done to help and protect the new nail as it grows back. It also prevents the nail bed from drying out.  Home care    Keep the injured part raised to reduce pain and swelling. This is important, especially in the first 48 hours.    Apply an ice pack for up to 20 minutes. Do this every 3 to 6 hours during the first 24 to 48 hours. Keep using ice packs to ease pain and swelling as needed. To make an ice pack, put ice cubes in a plastic bag that seals at the top. Wrap the bag in a clean, thin towel or cloth. Never put ice or an ice pack directly on the skin. The  ice pack can be put right on a wrap, cast, or splint. As the ice melts, be careful not to get any wrap, cast, or splint wet.    The nail bed is moist, soft, and sensitive. It needs to be protected from injury for the first 7 to 10 days until it dries out and becomes hard. Keep it covered with a nonstick dressing or a bandage without adhesive.    When a dressing is placed on an exposed nail bed, it may stick and be hard to remove if left in place more than 24 hours. Unless you were told otherwise, change dressings every 24 hours. If needed, soak the dressing off while holding it under warm running water. Then lightly pat the wound dry. Apply a layer of antibiotic ointment before putting on the new dressing or bandage. This will help keep it from sticking. Use a nonstick dressing with the antibiotic ointment under the outer dressing.    If an X-ray showed that you have a fracture, it will take about 4 weeks for this to heal. The injured part should be protected with a splint or tape while it is healing.    If you were given antibiotics to prevent infection, take them as directed until they are all gone.  Medicine    You can take over-the-counter medicine for pain, unless you were given a different pain medicine to use. Talk with your provider before using these medicines if you have chronic liver or kidney disease. Also talk with your provider if you ever had a stomach ulcer or GI bleeding, or are taking blood thinner medicines.    If you were given antibiotics, take them until they are done. It is important to finish the antibiotics even if the wound looks better. This is to make sure the infection has cleared.  Follow-up care  Follow up with your healthcare provider, or as advised. If X-rays were taken, you will be told of any new findings that may affect your care.  When to seek medical advice  Call your healthcare provider right away if any of these occur:    Pain or swelling increases    Redness around the  nail    Pus (creamy white or yellow fluid) draining from the nail    Fever of 100.4 F (38 C) or higher, or as directed by your provider  Date Last Reviewed: 8/1/2016 2000-2017 [employer]. [address] [place] [address], [place] [address]. All rights reserved. This information is not intended as a substitute for professional medical care. Always follow your healthcare professional's instructions.            Mel Pappas NP  [facility]

## 2017-12-18 NOTE — MR AVS SNAPSHOT
After Visit Summary   12/18/2017    Roscoe Jang    MRN: [id number]           Patient Information     Date Of Birth          1950        Visit Information        Provider Department      12/18/2017 2:20 PM Mel Pappas NP [facility]        Today's Diagnoses     Right foot pain    -  1    Avulsion of toenail of right foot        Cellulitis of other specified site          Care Instructions    Recommend treating with keflex three times a day for 1 week   See Dr. Kuo- podiatrist in [place] at 4 PM- will likely remove toenail   Xray was negative for fracture - awaiting final review per radiologist       Detached Fingernail or Toenail  A detached nail is when the nail becomes  from the area underneath it (the nail bed). This often means losing all or part of the nail. An injury to your finger or toe is often the cause. It can also be caused by an infection around or under the nail.  Sometimes there is a cut in the nail bed or a bone fracture under the nail. In most cases, the nail will grow back from the area under the cuticle (the matrix). A fingernail takes about 4 to 6 months to grow back. A toenail takes about 12 months to grow back. If the nail bed or matrix was damaged, the nail may grow back with a rough or abnormal shape. In some cases the nail may not grow back at all.    There may be damage or a cut to the nail bed. This may need to be repaired. Often this is done with stitches. If the nail is still in good condition, it might be cleaned and trimmed, then put back in place. This is done to help and protect the new nail as it grows back. It also prevents the nail bed from drying out.  Home care    Keep the injured part raised to reduce pain and swelling. This is important, especially in the first 48 hours.    Apply an ice pack for up to 20 minutes. Do this every 3 to 6 hours during the first 24 to 48 hours. Keep using ice packs to ease pain and swelling as  needed. To make an ice pack, put ice cubes in a plastic bag that seals at the top. Wrap the bag in a clean, thin towel or cloth. Never put ice or an ice pack directly on the skin. The ice pack can be put right on a wrap, cast, or splint. As the ice melts, be careful not to get any wrap, cast, or splint wet.    The nail bed is moist, soft, and sensitive. It needs to be protected from injury for the first 7 to 10 days until it dries out and becomes hard. Keep it covered with a nonstick dressing or a bandage without adhesive.    When a dressing is placed on an exposed nail bed, it may stick and be hard to remove if left in place more than 24 hours. Unless you were told otherwise, change dressings every 24 hours. If needed, soak the dressing off while holding it under warm running water. Then lightly pat the wound dry. Apply a layer of antibiotic ointment before putting on the new dressing or bandage. This will help keep it from sticking. Use a nonstick dressing with the antibiotic ointment under the outer dressing.    If an X-ray showed that you have a fracture, it will take about 4 weeks for this to heal. The injured part should be protected with a splint or tape while it is healing.    If you were given antibiotics to prevent infection, take them as directed until they are all gone.  Medicine    You can take over-the-counter medicine for pain, unless you were given a different pain medicine to use. Talk with your provider before using these medicines if you have chronic liver or kidney disease. Also talk with your provider if you ever had a stomach ulcer or GI bleeding, or are taking blood thinner medicines.    If you were given antibiotics, take them until they are done. It is important to finish the antibiotics even if the wound looks better. This is to make sure the infection has cleared.  Follow-up care  Follow up with your healthcare provider, or as advised. If X-rays were taken, you will be told of any new  "findings that may affect your care.  When to seek medical advice  Call your healthcare provider right away if any of these occur:    Pain or swelling increases    Redness around the nail    Pus (creamy white or yellow fluid) draining from the nail    Fever of 100.4 F (38 C) or higher, or as directed by your provider  Date Last Reviewed: 8/1/2016 2000-2017 [employer]. [address] [place] [address]. All rights reserved. This information is not intended as a substitute for professional medical care. Always follow your healthcare professional's instructions.                Follow-ups after your visit        Your next 10 appointments already scheduled     Dec 20, 2017  4:00 PM CST   New Visit with Carlos Kuo DPM   [facility] ([facility]    [address] [place] [id number] [phone]              Who to contact     If you have questions or need follow up information about today's clinic visit or your schedule please contact [facility] directly at [phone].  Normal or non-critical lab and imaging results will be communicated to you by MyChart, letter or phone within 4 business days after the clinic has received the results. If you do not hear from us within 7 days, please contact the clinic through [employer] or phone. If you have a critical or abnormal lab result, we will notify you by phone as soon as possible.  Submit refill requests through [employer] or call your pharmacy and they will forward the refill request to us. Please allow 3 business days for your refill to be completed.          Additional Information About Your Visit        MyChart Information     [employer] lets you send messages to your doctor, view your test results, renew your prescriptions, schedule appointments and more. To sign up, go to [email or web address] . Click on \"Log in\" on the left side of the screen, which will take you to the Welcome page. Then " "click on \"Sign up Now\" on the right side of the page.     You will be asked to enter the access code listed below, as well as some personal information. Please follow the directions to create your username and password.     Your access code is: [email or web address]  Expires: 2018  9:49 AM     Your access code will  in 90 days. If you need help or a new code, please call your [facility] clinic or [phone].        Care EveryWhere ID     This is your Care EveryWhere ID. This could be used by other organizations to access your [facility] medical records  [id number]        Your Vitals Were     Pulse Temperature Respirations BMI (Body Mass Index)          78 98.6  F (37  C) 18 35.81 kg/m2         Blood Pressure from Last 3 Encounters:   17 154/82   17 136/82   11/10/17 130/84    Weight from Last 3 Encounters:   17 246 lb (111.6 kg)   17 240 lb (108.9 kg)   11/10/17 240 lb (108.9 kg)                 Today's Medication Changes          These changes are accurate as of: 17  3:12 PM.  If you have any questions, ask your nurse or doctor.               Start taking these medicines.        Dose/Directions    cephALEXin 500 MG capsule   Commonly known as:  KEFLEX   Used for:  Avulsion of toenail of right foot, Cellulitis of other specified site   Started by:  Mel Pappas NP        Dose:  500 mg   Take 1 capsule (500 mg) by mouth 3 times daily   Quantity:  30 capsule   Refills:  0         Stop taking these medicines if you haven't already. Please contact your care team if you have questions.     clotrimazole 1 % solution   Commonly known as:  LOTRIMIN   Stopped by:  Mel Pappas NP                Where to get your medicines      These medications were sent to [facility] [address] [place] [address]     Phone:  [phone]     cephALEXin 500 MG capsule                Primary Care Provider Office Phone # Fax #    Mel Pappas NP " [phone]       [address] [facility] [address]        Equal Access to Services     SHRUTHI MARIANO : Hadii aad ku hadjocelynnany Lidia, wababitada luqjeffy, ranita kaalmada olya, letitia annelin hayaan sammarcella quezada laZeferinokirby flores. So [facility] [phone].    ATENCIÓN: Si habla español, tiene a tapia disposición servicios gratuitos de asistencia lingüística. Llame al [phone].    We comply with applicable federal civil rights laws and [place] laws. We do not discriminate on the basis of race, color, national origin, age, disability, sex, sexual orientation, or gender identity.            Thank you!     Thank you for choosing [facility]  for your care. Our goal is always to provide you with excellent care. Hearing back from our patients is one way we can continue to improve our services. Please take a few minutes to complete the written survey that you may receive in the mail after your visit with us. Thank you!             Your Updated Medication List - Protect others around you: Learn how to safely use, store and throw away your medicines at www.disposemymeds.org.          This list is accurate as of: 12/18/17  3:12 PM.  Always use your most recent med list.                   Brand Name Dispense Instructions for use Diagnosis    ASPIRIN PO      Take 81 mg by mouth daily        blood glucose monitoring lancets     100 each    Use to test blood sugar 1 times daily or as directed.    Type 2 diabetes mellitus (H)       * blood glucose monitoring test strip    RHYS CONTOUR NEXT    100 strip    Use to test blood sugar 1 times daily or as directed.    Type 2 diabetes mellitus (H)       * blood glucose monitoring test strip    RHYS CONTOUR NEXT    100 strip    Use to test blood sugar 1 times daily or as directed.  Ok to substitute alternative if insurance prefers.    Type 2 diabetes mellitus with hyperglycemia, without long-term current use of insulin (H)       cefdinir 300 MG capsule    OMNICEF     20 capsule    Take 1 capsule (300 mg) by mouth 2 times daily    Acute suppurative otitis media of right ear with spontaneous rupture of tympanic membrane, recurrence not specified       cephALEXin 500 MG capsule    KEFLEX    30 capsule    Take 1 capsule (500 mg) by mouth 3 times daily    Avulsion of toenail of right foot, Cellulitis of other specified site       fluocinolone acetonide 0.01 % Oil     20 mL    Place 5 drops in ear(s) daily    Chronic eczematous otitis externa of both ears       KENALOG 40 MG/ML injection   Generic drug:  triamcinolone acetonide     1 mL    1 mL (40 mg) by INTRA-ARTICULAR route once    Hip pain, left       lisinopril 20 MG tablet    PRINIVIL/ZESTRIL    90 tablet    Take 1 tablet (20 mg) by mouth daily    Benign essential hypertension       metFORMIN 500 MG tablet    GLUCOPHAGE    270 tablet    Take one tablet with breakfast and two tablets with lunch    Type 2 diabetes mellitus with hyperglycemia, without long-term current use of insulin (H)       simvastatin 20 MG tablet    ZOCOR     20 mg        * Notice:  This list has 2 medication(s) that are the same as other medications prescribed for you. Read the directions carefully, and ask your doctor or other care provider to review them with you.

## 2017-12-18 NOTE — PATIENT INSTRUCTIONS
Recommend treating with keflex three times a day for 1 week   See Dr. Kuo- podiatrist in [place] at 4 PM- will likely remove toenail   Xray was negative for fracture - awaiting final review per radiologist       Detached Fingernail or Toenail  A detached nail is when the nail becomes  from the area underneath it (the nail bed). This often means losing all or part of the nail. An injury to your finger or toe is often the cause. It can also be caused by an infection around or under the nail.  Sometimes there is a cut in the nail bed or a bone fracture under the nail. In most cases, the nail will grow back from the area under the cuticle (the matrix). A fingernail takes about 4 to 6 months to grow back. A toenail takes about 12 months to grow back. If the nail bed or matrix was damaged, the nail may grow back with a rough or abnormal shape. In some cases the nail may not grow back at all.    There may be damage or a cut to the nail bed. This may need to be repaired. Often this is done with stitches. If the nail is still in good condition, it might be cleaned and trimmed, then put back in place. This is done to help and protect the new nail as it grows back. It also prevents the nail bed from drying out.  Home care    Keep the injured part raised to reduce pain and swelling. This is important, especially in the first 48 hours.    Apply an ice pack for up to 20 minutes. Do this every 3 to 6 hours during the first 24 to 48 hours. Keep using ice packs to ease pain and swelling as needed. To make an ice pack, put ice cubes in a plastic bag that seals at the top. Wrap the bag in a clean, thin towel or cloth. Never put ice or an ice pack directly on the skin. The ice pack can be put right on a wrap, cast, or splint. As the ice melts, be careful not to get any wrap, cast, or splint wet.    The nail bed is moist, soft, and sensitive. It needs to be protected from injury for the first 7 to 10 days until it dries out  and becomes hard. Keep it covered with a nonstick dressing or a bandage without adhesive.    When a dressing is placed on an exposed nail bed, it may stick and be hard to remove if left in place more than 24 hours. Unless you were told otherwise, change dressings every 24 hours. If needed, soak the dressing off while holding it under warm running water. Then lightly pat the wound dry. Apply a layer of antibiotic ointment before putting on the new dressing or bandage. This will help keep it from sticking. Use a nonstick dressing with the antibiotic ointment under the outer dressing.    If an X-ray showed that you have a fracture, it will take about 4 weeks for this to heal. The injured part should be protected with a splint or tape while it is healing.    If you were given antibiotics to prevent infection, take them as directed until they are all gone.  Medicine    You can take over-the-counter medicine for pain, unless you were given a different pain medicine to use. Talk with your provider before using these medicines if you have chronic liver or kidney disease. Also talk with your provider if you ever had a stomach ulcer or GI bleeding, or are taking blood thinner medicines.    If you were given antibiotics, take them until they are done. It is important to finish the antibiotics even if the wound looks better. This is to make sure the infection has cleared.  Follow-up care  Follow up with your healthcare provider, or as advised. If X-rays were taken, you will be told of any new findings that may affect your care.  When to seek medical advice  Call your healthcare provider right away if any of these occur:    Pain or swelling increases    Redness around the nail    Pus (creamy white or yellow fluid) draining from the nail    Fever of 100.4 F (38 C) or higher, or as directed by your provider  Date Last Reviewed: 8/1/2016 2000-2017 [employer]. [address] [place] [address], [place] [address]. All rights  reserved. This information is not intended as a substitute for professional medical care. Always follow your healthcare professional's instructions.

## 2017-12-20 ENCOUNTER — OFFICE VISIT (OUTPATIENT)
Dept: PODIATRY | Facility: CLINIC | Age: 67
End: 2017-12-20
Payer: COMMERCIAL

## 2017-12-20 VITALS — WEIGHT: 246 LBS | HEART RATE: 80 BPM | OXYGEN SATURATION: 99 % | BODY MASS INDEX: 35.81 KG/M2

## 2017-12-20 DIAGNOSIS — S90.211A SUBUNGUAL HEMATOMA OF GREAT TOE OF RIGHT FOOT, INITIAL ENCOUNTER: Primary | ICD-10-CM

## 2017-12-20 PROCEDURE — 99202 OFFICE O/P NEW SF 15 MIN: CPT | Performed by: PODIATRIST

## 2017-12-20 NOTE — PROGRESS NOTES
Roscoe Jang is a 67 year old male who presents with a chief complain of a painful loose toenail on the right great toe. The patient relates injuring the right great toe with loosening of the toenail noted.    REVIEW OF SYSTEMS:  Constitutional, HEENT, cardiovascular, pulmonary, GI, , musculoskeletal, neuro, skin, endocrine and psych systems are negative, except as otherwise noted.     PAST MEDICAL HISTORY:   Past Medical History:   Diagnosis Date     Hyperlipidemia      Hypertension         PAST SURGICAL HISTORY: No past surgical history on file.     MEDICATIONS:   Current Outpatient Prescriptions:      cephALEXin (KEFLEX) 500 MG capsule, Take 1 capsule (500 mg) by mouth 3 times daily, Disp: 30 capsule, Rfl: 0     lisinopril (PRINIVIL/ZESTRIL) 20 MG tablet, Take 1 tablet (20 mg) by mouth daily, Disp: 90 tablet, Rfl: 0     ASPIRIN PO, Take 81 mg by mouth daily, Disp: , Rfl:      blood glucose monitoring (RHYS CONTOUR NEXT) test strip, Use to test blood sugar 1 times daily or as directed.  Ok to substitute alternative if insurance prefers., Disp: 100 strip, Rfl: 11     metFORMIN (GLUCOPHAGE) 500 MG tablet, Take one tablet with breakfast and two tablets with lunch, Disp: 270 tablet, Rfl: 1     blood glucose monitoring (RHYS CONTOUR NEXT) test strip, Use to test blood sugar 1 times daily or as directed., Disp: 100 strip, Rfl: 6     blood glucose monitoring (RHYS MICROLET) lancets, Use to test blood sugar 1 times daily or as directed., Disp: 100 each, Rfl: 5     simvastatin (ZOCOR) 20 MG tablet, 20 mg, Disp: , Rfl:      triamcinolone acetonide (KENALOG) 40 MG/ML injection, 1 mL (40 mg) by INTRA-ARTICULAR route once, Disp: 1 mL, Rfl: 0     cefdinir (OMNICEF) 300 MG capsule, Take 1 capsule (300 mg) by mouth 2 times daily (Patient not taking: Reported on 12/18/2017), Disp: 20 capsule, Rfl: 0     fluocinolone acetonide 0.01 % OIL, Place 5 drops in ear(s) daily (Patient not taking: Reported on 11/10/2017), Disp: 20  mL, Rfl: 1     ALLERGIES:  No Known Allergies     SOCIAL HISTORY:   Social History     Social History     Marital status:      Spouse name: N/A     Number of children: N/A     Years of education: N/A     Occupational History     Not on file.     Social History Main Topics     Smoking status: Former Smoker     Quit date: 10/3/2007     Smokeless tobacco: Never Used     Alcohol use No     Drug use: No     Sexual activity: Yes     Partners: Female     Other Topics Concern     Not on file     Social History Narrative    Live with girl friend, 3 children, wife passed away in Jan 2000, quit smoking in 2007.         FAMILY HISTORY: No family history on file.     EXAM:Vitals: Pulse 80  Wt 246 lb (111.6 kg)  SpO2 99%  BMI 35.81 kg/m2  BMI= Body mass index is 35.81 kg/(m^2).  Weight management plan: Patient was referred to their PCP to discuss a diet and exercise plan.    General appearance: Patient is alert and fully cooperative with history & exam.  No sign of distress is noted during the visit.     Psychiatric: Affect is pleasant & appropriate.  Patient appears motivated to improve health.     Respiratory: Breathing is regular & unlabored while sitting.     HEENT: Hearing is intact to spoken word.  Speech is clear.  No gross evidence of visual impairment that would impact ambulation.     Dermatologic: Skin is intact to both lower extremities without significant lesions, rash or abrasion.  Noted loose toenail of the right great toe. No surrounding erythema or edema noted. No drainage noted.     Vascular: DP & PT pulses are intact & regular bilaterally.  No significant edema or varicosities noted.  CFT and skin temperature is normal to both lower extremities.     Neurologic: Lower extremity sensation is intact to light touch.  No evidence of weakness or contracture in the lower extremities.  No evidence of neuropathy.     Musculoskeletal: Patient is ambulatory without assistive device or brace.  No gross ankle  deformity noted.  No foot or ankle joint effusion is noted.      Assessment:  1.  Onycholysis      Plan:  The loose nail was removed.  No bleeding noted.    Disclaimer: This note consists of symbols derived from keyboarding, dictation and/or voice recognition software. As a result, there may be errors in the script that have gone undetected. Please consider this when interpreting information found in this chart.      YASMINE Kuo D.P.M., FTEO.[email or web address].S.

## 2017-12-20 NOTE — NURSING NOTE
"Chief Complaint   Patient presents with     Procedure     Smashed Right great toe, toenail removal?       Initial Pulse 80  Wt 246 lb (111.6 kg)  SpO2 99%  BMI 35.81 kg/m2 Estimated body mass index is 35.81 kg/(m^2) as calculated from the following:    Height as of 7/7/17: 5' 9.5\" (1.765 m).    Weight as of this encounter: 246 lb (111.6 kg).  Medication Reconciliation: complete     Trish Gilbert MA        "

## 2017-12-20 NOTE — LETTER
12/20/2017         RE: Roscoe Jang  [address]  [place] [address]        Dear Colleague,    Thank you for referring your patient, Roscoe Jang, to the [facility]. Please see a copy of my visit note below.    Roscoe Jang is a 67 year old male who presents with a chief complain of a painful loose toenail on the right great toe. The patient relates injuring the right great toe with loosening of the toenail noted.    REVIEW OF SYSTEMS:  Constitutional, HEENT, cardiovascular, pulmonary, GI, , musculoskeletal, neuro, skin, endocrine and psych systems are negative, except as otherwise noted.     PAST MEDICAL HISTORY:   Past Medical History:   Diagnosis Date     Hyperlipidemia      Hypertension         PAST SURGICAL HISTORY: No past surgical history on file.     MEDICATIONS:   Current Outpatient Prescriptions:      cephALEXin (KEFLEX) 500 MG capsule, Take 1 capsule (500 mg) by mouth 3 times daily, Disp: 30 capsule, Rfl: 0     lisinopril (PRINIVIL/ZESTRIL) 20 MG tablet, Take 1 tablet (20 mg) by mouth daily, Disp: 90 tablet, Rfl: 0     ASPIRIN PO, Take 81 mg by mouth daily, Disp: , Rfl:      blood glucose monitoring (RHYS CONTOUR NEXT) test strip, Use to test blood sugar 1 times daily or as directed.  Ok to substitute alternative if insurance prefers., Disp: 100 strip, Rfl: 11     metFORMIN (GLUCOPHAGE) 500 MG tablet, Take one tablet with breakfast and two tablets with lunch, Disp: 270 tablet, Rfl: 1     blood glucose monitoring (RHYS CONTOUR NEXT) test strip, Use to test blood sugar 1 times daily or as directed., Disp: 100 strip, Rfl: 6     blood glucose monitoring (RHYS MICROLET) lancets, Use to test blood sugar 1 times daily or as directed., Disp: 100 each, Rfl: 5     simvastatin (ZOCOR) 20 MG tablet, 20 mg, Disp: , Rfl:      triamcinolone acetonide (KENALOG) 40 MG/ML injection, 1 mL (40 mg) by INTRA-ARTICULAR route once, Disp: 1 mL, Rfl: 0     cefdinir (OMNICEF) 300 MG capsule, Take 1  capsule (300 mg) by mouth 2 times daily (Patient not taking: Reported on 12/18/2017), Disp: 20 capsule, Rfl: 0     fluocinolone acetonide 0.01 % OIL, Place 5 drops in ear(s) daily (Patient not taking: Reported on 11/10/2017), Disp: 20 mL, Rfl: 1     ALLERGIES:  No Known Allergies     SOCIAL HISTORY:   Social History     Social History     Marital status:      Spouse name: N/A     Number of children: N/A     Years of education: N/A     Occupational History     Not on file.     Social History Main Topics     Smoking status: Former Smoker     Quit date: 10/3/2007     Smokeless tobacco: Never Used     Alcohol use No     Drug use: No     Sexual activity: Yes     Partners: Female     Other Topics Concern     Not on file     Social History Narrative    Live with girl friend, 3 children, wife passed away in Jan 2000, quit smoking in 2007.         FAMILY HISTORY: No family history on file.     EXAM:Vitals: Pulse 80  Wt 246 lb (111.6 kg)  SpO2 99%  BMI 35.81 kg/m2  BMI= Body mass index is 35.81 kg/(m^2).  Weight management plan: Patient was referred to their PCP to discuss a diet and exercise plan.    General appearance: Patient is alert and fully cooperative with history & exam.  No sign of distress is noted during the visit.     Psychiatric: Affect is pleasant & appropriate.  Patient appears motivated to improve health.     Respiratory: Breathing is regular & unlabored while sitting.     HEENT: Hearing is intact to spoken word.  Speech is clear.  No gross evidence of visual impairment that would impact ambulation.     Dermatologic: Skin is intact to both lower extremities without significant lesions, rash or abrasion.  Noted loose toenail of the right great toe. No surrounding erythema or edema noted. No drainage noted.     Vascular: DP & PT pulses are intact & regular bilaterally.  No significant edema or varicosities noted.  CFT and skin temperature is normal to both lower extremities.     Neurologic: Lower  extremity sensation is intact to light touch.  No evidence of weakness or contracture in the lower extremities.  No evidence of neuropathy.     Musculoskeletal: Patient is ambulatory without assistive device or brace.  No gross ankle deformity noted.  No foot or ankle joint effusion is noted.      Assessment:  1.  Onycholysis      Plan:  The loose nail was removed.  No bleeding noted.    Disclaimer: This note consists of symbols derived from keyboarding, dictation and/or voice recognition software. As a result, there may be errors in the script that have gone undetected. Please consider this when interpreting information found in this chart.      ANTHONY Jarrett.P.M., F.JULES.C.F.A.S.        Again, thank you for allowing me to participate in the care of your patient.        Sincerely,        Carlos Kuo DPM

## 2017-12-20 NOTE — MR AVS SNAPSHOT
"              After Visit Summary   12/20/2017    Roscoe Jang    MRN: [id number]           Patient Information     Date Of Birth          1950        Visit Information        Provider Department      12/20/2017 4:00 PM Carlos Kuo DPM [facility]        Today's Diagnoses     Subungual hematoma of great toe of right foot, initial encounter    -  1      Care Instructions    Post toenail procedure instructions:    1.  Keep bandage on the rest of the day; take off in the morning.  2.  Soak the toe in warm water with Epsom's Salt or Dreft detergent for 20 min.  3.  Clean out any scab tissue from the treated area with a toothpick or Q-tip.  4.  Apply a topical antibiotic to the treated area and bandage with a Band-Aid.  5.  Repeat morning and night for two weeks            Follow-ups after your visit        Follow-up notes from your care team     Return if symptoms worsen or fail to improve.      Who to contact     If you have questions or need follow up information about today's clinic visit or your schedule please contact [facility] directly at [phone].  Normal or non-critical lab and imaging results will be communicated to you by Appercodehart, letter or phone within 4 business days after the clinic has received the results. If you do not hear from us within 7 days, please contact the clinic through [employer] (QEEP)t or phone. If you have a critical or abnormal lab result, we will notify you by phone as soon as possible.  Submit refill requests through Py[employer]ian or call your pharmacy and they will forward the refill request to us. Please allow 3 business days for your refill to be completed.          Additional Information About Your Visit        MyChart Information     Py[employer]ian lets you send messages to your doctor, view your test results, renew your prescriptions, schedule appointments and more. To sign up, go to [email or web address] . Click on \"Log in\" on the left side of the " "screen, which will take you to the Welcome page. Then click on \"Sign up Now\" on the right side of the page.     You will be asked to enter the access code listed below, as well as some personal information. Please follow the directions to create your username and password.     Your access code is: [email or web address]  Expires: 2018  9:49 AM     Your access code will  in 90 days. If you need help or a new code, please call your [place] clinic or [phone].        Care EveryWhere ID     This is your Care EveryWhere ID. This could be used by other organizations to access your [place] medical records  [id number]        Your Vitals Were     Pulse Pulse Oximetry BMI (Body Mass Index)             80 99% 35.81 kg/m2          Blood Pressure from Last 3 Encounters:   17 154/82   17 136/82   11/10/17 130/84    Weight from Last 3 Encounters:   17 246 lb (111.6 kg)   17 246 lb (111.6 kg)   17 240 lb (108.9 kg)              Today, you had the following     No orders found for display       Primary Care Provider Office Phone # Fax #    Mel Mian, [phone]       [address] [place], [place] [address]        Equal Access to Services     [facility]: Hadii aad ku hadasho Soomaali, waaxda luqadaha, qaybta kaalmada adeegyada, letitia devine haykirby tellez . So [facility] [phone].    ATENCIÓN: Si habla español, tiene a tapia disposición servicios gratuitos de asistencia lingüística. Llame al [phone].    We comply with applicable federal civil rights laws and [place] laws. We do not discriminate on the basis of race, color, national origin, age, disability, sex, sexual orientation, or gender identity.            Thank you!     Thank you for choosing [facility]  for your care. Our goal is always to provide you with excellent care. Hearing back from our patients is one way we can continue to improve our services. Please take a few minutes to " complete the written survey that you may receive in the mail after your visit with us. Thank you!             Your Updated Medication List - Protect others around you: Learn how to safely use, store and throw away your medicines at www.disposemymeds.org.          This list is accurate as of: 12/20/17 11:59 PM.  Always use your most recent med list.                   Brand Name Dispense Instructions for use Diagnosis    ASPIRIN PO      Take 81 mg by mouth daily        blood glucose monitoring lancets     100 each    Use to test blood sugar 1 times daily or as directed.    Type 2 diabetes mellitus (H)       * blood glucose monitoring test strip    RHYS CONTOUR NEXT    100 strip    Use to test blood sugar 1 times daily or as directed.    Type 2 diabetes mellitus (H)       * blood glucose monitoring test strip    RHYS CONTOUR NEXT    100 strip    Use to test blood sugar 1 times daily or as directed.  Ok to substitute alternative if insurance prefers.    Type 2 diabetes mellitus with hyperglycemia, without long-term current use of insulin (H)       cefdinir 300 MG capsule    OMNICEF    20 capsule    Take 1 capsule (300 mg) by mouth 2 times daily    Acute suppurative otitis media of right ear with spontaneous rupture of tympanic membrane, recurrence not specified       cephALEXin 500 MG capsule    KEFLEX    30 capsule    Take 1 capsule (500 mg) by mouth 3 times daily    Avulsion of toenail of right foot, Cellulitis of other specified site       fluocinolone acetonide 0.01 % Oil     20 mL    Place 5 drops in ear(s) daily    Chronic eczematous otitis externa of both ears       KENALOG 40 MG/ML injection   Generic drug:  triamcinolone acetonide     1 mL    1 mL (40 mg) by INTRA-ARTICULAR route once    Hip pain, left       lisinopril 20 MG tablet    PRINIVIL/ZESTRIL    90 tablet    Take 1 tablet (20 mg) by mouth daily    Benign essential hypertension       metFORMIN 500 MG tablet    GLUCOPHAGE    270 tablet    Take one  tablet with breakfast and two tablets with lunch    Type 2 diabetes mellitus with hyperglycemia, without long-term current use of insulin (H)       simvastatin 20 MG tablet    ZOCOR     20 mg        * Notice:  This list has 2 medication(s) that are the same as other medications prescribed for you. Read the directions carefully, and ask your doctor or other care provider to review them with you.

## 2018-01-03 ENCOUNTER — TELEPHONE (OUTPATIENT)
Dept: FAMILY MEDICINE | Facility: CLINIC | Age: 68
End: 2018-01-03

## 2018-02-12 DIAGNOSIS — E11.65 TYPE 2 DIABETES MELLITUS WITH HYPERGLYCEMIA, WITHOUT LONG-TERM CURRENT USE OF INSULIN (H): ICD-10-CM

## 2018-02-12 DIAGNOSIS — I10 BENIGN ESSENTIAL HYPERTENSION: ICD-10-CM

## 2018-02-12 RX ORDER — LISINOPRIL 20 MG/1
TABLET ORAL
Qty: 90 TABLET | Refills: 0 | Status: SHIPPED | OUTPATIENT
Start: 2018-02-12 | End: 2018-07-06

## 2018-02-12 NOTE — TELEPHONE ENCOUNTER
"Requested Prescriptions   Pending Prescriptions Disp Refills     lisinopril (PRINIVIL/ZESTRIL) 20 MG tablet [Pharmacy Med Name: LISINOPRIL 20MG     TAB] 90 tablet 0     Sig: TAKE ONE TABLET BY MOUTH ONCE DAILY    ACE Inhibitors (Including Combos) Protocol Failed    2/12/2018  5:30 AM       Failed - Blood pressure under 140/90    BP Readings from Last 3 Encounters:   12/18/17 154/82   11/20/17 136/82   11/10/17 130/84                Passed - Recent or future visit with authorizing provider's specialty    Patient had office visit in the last year or has a visit in the next 30 days with authorizing provider.  See \"Patient Info\" tab in inbasket, or \"Choose Columns\" in Meds & Orders section of the refill encounter.            Passed - Patient is age 18 or older       Passed - Normal serum creatinine on file in past 12 months    Recent Labs   Lab Test  07/07/17   1326   CR  1.00            Passed - Normal serum potassium on file in past 12 months    Recent Labs   Lab Test  07/07/17   1326   POTASSIUM  4.4             metFORMIN (GLUCOPHAGE) 500 MG tablet [Pharmacy Med Name: METFORMIN 500MG TAB] 270 tablet 1     Sig: TAKE ONE TABLET BY MOUTH WITH BREAKFAST AND TWO TABLETS WITH LUNCH    Biguanide Agents Failed    2/12/2018  5:30 AM       Failed - Patient's BP is less than 140/90    BP Readings from Last 3 Encounters:   12/18/17 154/82   11/20/17 136/82   11/10/17 130/84                Passed - Patient has documented LDL within the past 12 mos.    Recent Labs   Lab Test  07/07/17   1326   LDL  86            Passed - Patient has had a Microalbumin in the past 12 mos.    Recent Labs   Lab Test  07/07/17   1245   MICROL  23   UMALCR  14.36            Passed - Patient is age 10 or older       Passed - Patient has documented A1c within the specified period of time.    Recent Labs   Lab Test  10/06/17   1000   A1C  6.4*            Passed - Patient's CR is NOT>1.4 OR Patient's EGFR is NOT<45 within past 12 mos.    Recent Labs   Lab " "Test  07/07/17   1326   GFRESTIMATED  75   GFRESTBLACK  >90   GFR Calc         Recent Labs   Lab Test  07/07/17   1326   CR  1.00            Passed - Patient does NOT have a diagnosis of CHF.       Passed - Recent (6 mos) or future visit with authorizing provider's specialty    Patient had office visit in the last 6 months or has a visit in the next 30 days with authorizing provider.  See \"Patient Info\" tab in inbasket, or \"Choose Columns\" in Meds & Orders section of the refill encounter.            lisinopril (PRINIVIL/ZESTRIL) 20 MG tablet  Last Written Prescription Date:  11/20/2017  Last Fill Quantity: 90 tablet,  # refills: 0   Last office visit: 12/18/2017 with prescribing provider:  12/18/2017 with MANUEL Sifuentes   Future Office Visit:      metFORMIN (GLUCOPHAGE) 500 MG tablet  Last Written Prescription Date:  08/09/2017  Last Fill Quantity: 270 tablet,  # refills: 1   Last office visit: 12/18/2017 with prescribing provider:  12/18/2017 with MANUEL Sifuentes   Future Office Visit:      Halle Sheridan RT (R) (M)    "

## 2018-02-12 NOTE — TELEPHONE ENCOUNTER
"Requested Prescriptions   Pending Prescriptions Disp Refills     lisinopril (PRINIVIL/ZESTRIL) 20 MG tablet [Pharmacy Med Name: LISINOPRIL 20MG     TAB] 90 tablet 0     Sig: TAKE ONE TABLET BY MOUTH ONCE DAILY    ACE Inhibitors (Including Combos) Protocol Failed    2/12/2018  5:30 AM       Failed - Blood pressure under 140/90    BP Readings from Last 3 Encounters:   12/18/17 154/82   11/20/17 136/82   11/10/17 130/84                Passed - Recent or future visit with authorizing provider's specialty    Patient had office visit in the last year or has a visit in the next 30 days with authorizing provider.  See \"Patient Info\" tab in inbasket, or \"Choose Columns\" in Meds & Orders section of the refill encounter.     Last Written Prescription Date:  11/20/17  Last Fill Quantity: 90,  # refills: 0   Last office visit: 12/18/2017 with prescribing provider:  12/18/17   Future Office Visit:               Passed - Patient is age 18 or older       Passed - Normal serum creatinine on file in past 12 months    Recent Labs   Lab Test  07/07/17   1326   CR  1.00            Passed - Normal serum potassium on file in past 12 months    Recent Labs   Lab Test  07/07/17   1326   POTASSIUM  4.4             metFORMIN (GLUCOPHAGE) 500 MG tablet [Pharmacy Med Name: METFORMIN 500MG TAB] 270 tablet 1     Sig: TAKE ONE TABLET BY MOUTH WITH BREAKFAST AND TWO TABLETS WITH LUNCH    Biguanide Agents Failed    2/12/2018  5:30 AM       Failed - Patient's BP is less than 140/90    BP Readings from Last 3 Encounters:   12/18/17 154/82   11/20/17 136/82   11/10/17 130/84                Passed - Patient has documented LDL within the past 12 mos.    Recent Labs   Lab Test  07/07/17   1326   LDL  86            Passed - Patient has had a Microalbumin in the past 12 mos.    Recent Labs   Lab Test  07/07/17   1245   MICROL  23   UMALCR  14.36            Passed - Patient is age 10 or older       Passed - Patient has documented A1c within the specified " "period of time.    Recent Labs   Lab Test  10/06/17   1000   A1C  6.4*            Passed - Patient's CR is NOT>1.4 OR Patient's EGFR is NOT<45 within past 12 mos.    Recent Labs   Lab Test  07/07/17   1326   GFRESTIMATED  75   GFRESTBLACK  >90   GFR Calc         Recent Labs   Lab Test  07/07/17   1326   CR  1.00            Passed - Patient does NOT have a diagnosis of CHF.       Passed - Recent (6 mos) or future visit with authorizing provider's specialty    Patient had office visit in the last 6 months or has a visit in the next 30 days with authorizing provider.  See \"Patient Info\" tab in inbasket, or \"Choose Columns\" in Meds & Orders section of the refill encounter.          Last Written Prescription Date:  8/9/17  Last Fill Quantity: 270,  # refills: 1   Last office visit: 12/18/2017 with prescribing provider:  12/18/17   Future Office Visit:                "

## 2018-02-19 DIAGNOSIS — E78.5 HYPERLIPIDEMIA LDL GOAL <100: Primary | ICD-10-CM

## 2018-02-19 RX ORDER — SIMVASTATIN 20 MG
20 TABLET ORAL AT BEDTIME
Qty: 90 TABLET | Refills: 1 | Status: SHIPPED | OUTPATIENT
Start: 2018-02-19 | End: 2018-05-10

## 2018-02-19 NOTE — TELEPHONE ENCOUNTER
Medication list as reported by patient now would like new prescription for simvastatin (ZOCOR) 20 MG tablet .    Last office visit 12/18/17

## 2018-03-27 ENCOUNTER — OFFICE VISIT (OUTPATIENT)
Dept: FAMILY MEDICINE | Facility: CLINIC | Age: 68
End: 2018-03-27
Payer: COMMERCIAL

## 2018-03-27 VITALS
HEART RATE: 79 BPM | TEMPERATURE: 98 F | DIASTOLIC BLOOD PRESSURE: 81 MMHG | BODY MASS INDEX: 35.65 KG/M2 | HEIGHT: 70 IN | SYSTOLIC BLOOD PRESSURE: 126 MMHG | WEIGHT: 249 LBS | RESPIRATION RATE: 16 BRPM

## 2018-03-27 DIAGNOSIS — E11.65 TYPE 2 DIABETES MELLITUS WITH HYPERGLYCEMIA, WITHOUT LONG-TERM CURRENT USE OF INSULIN (H): ICD-10-CM

## 2018-03-27 DIAGNOSIS — G89.29 CHRONIC RIGHT SHOULDER PAIN: Primary | ICD-10-CM

## 2018-03-27 DIAGNOSIS — L98.9 SKIN LESION: ICD-10-CM

## 2018-03-27 DIAGNOSIS — M25.511 CHRONIC RIGHT SHOULDER PAIN: Primary | ICD-10-CM

## 2018-03-27 LAB — HBA1C MFR BLD: 6.3 % (ref 4.3–6)

## 2018-03-27 PROCEDURE — 20610 DRAIN/INJ JOINT/BURSA W/O US: CPT | Mod: RT | Performed by: NURSE PRACTITIONER

## 2018-03-27 PROCEDURE — 99213 OFFICE O/P EST LOW 20 MIN: CPT | Mod: 25 | Performed by: NURSE PRACTITIONER

## 2018-03-27 PROCEDURE — 80048 BASIC METABOLIC PNL TOTAL CA: CPT | Performed by: NURSE PRACTITIONER

## 2018-03-27 PROCEDURE — 36415 COLL VENOUS BLD VENIPUNCTURE: CPT | Performed by: NURSE PRACTITIONER

## 2018-03-27 PROCEDURE — 83036 HEMOGLOBIN GLYCOSYLATED A1C: CPT | Performed by: NURSE PRACTITIONER

## 2018-03-27 NOTE — PROGRESS NOTES
".  SUBJECTIVE:   Roscoe Jang is a 67 year old male who presents to clinic today for the following health issues:        Musculoskeletal problem/pain      Duration: Has been hurting again 2-3 months, was given an injection 3-4 years ago and since then his shoulder has been good.    Description  Location: Right shoulder    Intensity:  moderate    Accompanying signs and symptoms: numbness and tingling- especially with sleeping    History  Previous similar problem: YES  Previous evaluation:  x-ray    Precipitating or alleviating factors:  Trauma or overuse: YES  Aggravating factors include: lifting    Therapies tried and outcome: rest/inactivity and ice- didn't help    Had 3 injection 3-4 years ago and this provided relief  No known injury       Problem list and histories reviewed & adjusted, as indicated.  Additional history: as documented    Patient Active Problem List   Diagnosis     Benign essential hypertension     Mixed hyperlipidemia     Obesity     Abdominal aortic aneurysm (H)     Fatty liver     Type 2 diabetes mellitus with hyperglycemia, without long-term current use of insulin (H)     History reviewed. No pertinent surgical history.    Social History   Substance Use Topics     Smoking status: Former Smoker     Quit date: 10/3/2007     Smokeless tobacco: Never Used     Alcohol use No     History reviewed. No pertinent family history.        Reviewed and updated as needed this visit by clinical staff  Tobacco  Allergies  Meds  Med Hx  Surg Hx  Fam Hx  Soc Hx      Reviewed and updated as needed this visit by Provider         ROS:  Constitutional, HEENT, cardiovascular, pulmonary, gi and gu systems are negative, except as otherwise noted.    OBJECTIVE:                                                    /81  Pulse 79  Temp 98  F (36.7  C) (Tympanic)  Resp 16  Ht 5' 9.5\" (1.765 m)  Wt 249 lb (112.9 kg)  BMI 36.24 kg/m2  Body mass index is 36.24 kg/(m^2).  GENERAL APPEARANCE: healthy, alert " and no distress  RESP: lungs clear to auscultation - no rales, rhonchi or wheezes  CV: regular rates and rhythm, normal S1 S2, no S3 or S4 and no murmur, click or rub  ABDOMEN: soft, nontender, without hepatosplenomegaly or masses and bowel sounds normal  ORTHO:   SHOULDER Exam-Right   Inspection: no swelling, no bruising, no discoloration, no obvious deformity, no asymmetry, no glenohumeral joint anterior bulge, no distal clavicle elevation, no muscle atrophy, no scapular winging   Tenderness of: SC joint- no, clavicle(prox-mid)- no, clavicle-(mid-distal)- no, AC joint- no, acromion- no, anterior capsule- no, prox bicep tendon- no, greater tuberosity- no, prox humerus- no, supraspinatous- no, infraspinatous- no, superior trapezious- no, rhomboids- no   Range of Motion: Active- limited due to pain.  Range of Motion: Passive- limited due to pain.   Strength: forward flexion- 5/5, abduction- 5/5, internal rotation- 5/5, external rotation- 5/5 and bicep- full           SKIN: suspicious lesions or rashes and keratoses - suspect actinic on top of head  PSYCH: mentation appears normal and affect normal/bright  After informed consent  A steroid injection was performed in the right shoulder using 1% plain Lidocaine and 40 mg of Kenalog. This was well tolerated.       ASSESSMENT/PLAN:                                                    1. Chronic right shoulder pain  Shoulder injected today   - triamcinolone acetonide (KENALOG) 40 MG/ML injection; 1 mL (40 mg) by INTRA-ARTICULAR route once for 1 dose  Dispense: 1 mL; Refill: 0  - lidocaine 1 % injection; 1 mL by INTRA-ARTICULAR route once for 1 dose  Dispense: 1 mL; Refill: 0  - Large Joint/Bursa injection and/or drainage (Shoulder, Knee)    2. Type 2 diabetes mellitus with hyperglycemia, without long-term current use of insulin (H)  Due for labs   Will obtain today   - Hemoglobin A1c  - Basic metabolic panel    3. Skin lesion  Referral placed for skin check   - DERMATOLOGY  REFERRAL    Patient Instructions   Shoulder injected today   Keep covered with band aid for 24 hours   Can repeat injection in 3 months   Mail in FIT test for colon cancer screening   Labs for diabetes today   Referral for skin check on lesions on head in [place]           Mel Pappas NP  [facility]

## 2018-03-27 NOTE — MR AVS SNAPSHOT
After Visit Summary   3/27/2018    Roscoe Jang    MRN: [id number]           Patient Information     Date Of Birth          1950        Visit Information        Provider Department      3/27/2018 3:00 PM Mel Pappas NP [facility]        Today's Diagnoses     Skin lesion    -  1    Type 2 diabetes mellitus with hyperglycemia, without long-term current use of insulin (H)          Care Instructions    Shoulder injected today   Keep covered with band aid for 24 hours   Can repeat injection in 3 months   Mail in FIT test for colon cancer screening   Labs for diabetes today   Referral for skin check on lesions on head in [place]               Follow-ups after your visit        Additional Services     DERMATOLOGY REFERRAL       Your provider has referred you to: [facility]: [facility] [phone]   [email or web address]    Please be aware that coverage of these services is subject to the terms and limitations of your health insurance plan.  Call member services at your health plan with any benefit or coverage questions.      Please bring the following with you to your appointment:    (1) Any X-Rays, CTs or MRIs which have been performed.  Contact the facility where they were done to arrange for  prior to your scheduled appointment.    (2) List of current medications  (3) This referral request   (4) Any documents/labs given to you for this referral                  Who to contact     If you have questions or need follow up information about today's clinic visit or your schedule please contact [facility] directly at [phone].  Normal or non-critical lab and imaging results will be communicated to you by MyChart, letter or phone within 4 business days after the clinic has received the results. If you do not hear from us within 7 days, please contact the clinic through MyChart or phone. If you have a critical or  "abnormal lab result, we will notify you by phone as soon as possible.  Submit refill requests through [employer] or call your pharmacy and they will forward the refill request to us. Please allow 3 business days for your refill to be completed.          Additional Information About Your Visit        [employer] Information     [employer] lets you send messages to your doctor, view your test results, renew your prescriptions, schedule appointments and more. To sign up, go to [email or web address] . Click on \"Log in\" on the left side of the screen, which will take you to the Welcome page. Then click on \"Sign up Now\" on the right side of the page.     You will be asked to enter the access code listed below, as well as some personal information. Please follow the directions to create your username and password.     Your access code is: [email or web address]  Expires: 2018  3:39 PM     Your access code will  in 90 days. If you need help or a new code, please call your [place] clinic or [phone].        Care EveryWhere ID     This is your Care EveryWhere ID. This could be used by other organizations to access your [place] medical records  [id number]        Your Vitals Were     Pulse Temperature Respirations Height BMI (Body Mass Index)       79 98  F (36.7  C) (Tympanic) 16 5' 9.5\" (1.765 m) 36.24 kg/m2        Blood Pressure from Last 3 Encounters:   18 126/81   17 154/82   17 136/82    Weight from Last 3 Encounters:   18 249 lb (112.9 kg)   17 246 lb (111.6 kg)   17 246 lb (111.6 kg)              We Performed the Following     Basic metabolic panel     DERMATOLOGY REFERRAL     Hemoglobin A1c          Today's Medication Changes          These changes are accurate as of 3/27/18  3:39 PM.  If you have any questions, ask your nurse or doctor.               Stop taking these medicines if you haven't already. Please contact your care team if you have questions.     cefdinir 300 MG capsule "   Commonly known as:  OMNICEF   Stopped by:  Mel Pappas NP           fluocinolone acetonide 0.01 % Oil   Stopped by:  Mel Pappas NP                    Primary Care Provider Office Phone # Fax #    Mel Pappas -[phone]       [address] [facility] [address]        Equal Access to Services     ANGELO MARIANO : Hadii aad ku hadasho Soomaali, waaxda luqadaha, qaybta kaalmada adeegyada, waxay idiin hayaan adeeg kharash la'aan . So [facility] [phone].    ATENCIÓN: Si habla español, tiene a tapia disposición servicios gratuitos de asistencia lingüística. Llame al [phone].    We comply with applicable federal civil rights laws and [place] laws. We do not discriminate on the basis of race, color, national origin, age, disability, sex, sexual orientation, or gender identity.            Thank you!     Thank you for choosing [facility]  for your care. Our goal is always to provide you with excellent care. Hearing back from our patients is one way we can continue to improve our services. Please take a few minutes to complete the written survey that you may receive in the mail after your visit with us. Thank you!             Your Updated Medication List - Protect others around you: Learn how to safely use, store and throw away your medicines at www.disposemymeds.org.          This list is accurate as of 3/27/18  3:39 PM.  Always use your most recent med list.                   Brand Name Dispense Instructions for use Diagnosis    ASPIRIN PO      Take 81 mg by mouth daily        blood glucose monitoring lancets     100 each    Use to test blood sugar 1 times daily or as directed.    Type 2 diabetes mellitus (H)       * blood glucose monitoring test strip    RHYS CONTOUR NEXT    100 strip    Use to test blood sugar 1 times daily or as directed.    Type 2 diabetes mellitus (H)       * blood glucose monitoring test strip    RHYS CONTOUR NEXT    100 strip    Use to test  blood sugar 1 times daily or as directed.  Ok to substitute alternative if insurance prefers.    Type 2 diabetes mellitus with hyperglycemia, without long-term current use of insulin (H)       cephALEXin 500 MG capsule    KEFLEX    30 capsule    Take 1 capsule (500 mg) by mouth 3 times daily    Avulsion of toenail of right foot, Cellulitis of other specified site       KENALOG 40 MG/ML injection   Generic drug:  triamcinolone acetonide     1 mL    1 mL (40 mg) by INTRA-ARTICULAR route once    Hip pain, left       lisinopril 20 MG tablet    PRINIVIL/ZESTRIL    90 tablet    TAKE ONE TABLET BY MOUTH ONCE DAILY    Benign essential hypertension       metFORMIN 500 MG tablet    GLUCOPHAGE    270 tablet    TAKE ONE TABLET BY MOUTH WITH BREAKFAST AND TWO TABLETS WITH LUNCH    Type 2 diabetes mellitus with hyperglycemia, without long-term current use of insulin (H)       simvastatin 20 MG tablet    ZOCOR    90 tablet    Take 1 tablet (20 mg) by mouth At Bedtime    Hyperlipidemia LDL goal <100       * Notice:  This list has 2 medication(s) that are the same as other medications prescribed for you. Read the directions carefully, and ask your doctor or other care provider to review them with you.

## 2018-03-27 NOTE — NURSING NOTE
"Chief Complaint   Patient presents with     Shoulder Pain     Right shoulder pain has reccouring- had injection 3 years ago, and now is acting up again       Initial /81  Pulse 79  Temp 98  F (36.7  C) (Tympanic)  Resp 16  Ht 5' 9.5\" (1.765 m)  Wt 249 lb (112.9 kg)  BMI 36.24 kg/m2 Estimated body mass index is 36.24 kg/(m^2) as calculated from the following:    Height as of this encounter: 5' 9.5\" (1.765 m).    Weight as of this encounter: 249 lb (112.9 kg).  Medication Reconciliation: complete    "

## 2018-03-27 NOTE — PATIENT INSTRUCTIONS
Shoulder injected today   Keep covered with band aid for 24 hours   Can repeat injection in 3 months   Mail in FIT test for colon cancer screening   Labs for diabetes today   Referral for skin check on lesions on head in [place]

## 2018-03-27 NOTE — LETTER
March 29, 2018      Roscoe Jang  [id number]  [place] [address]        Dear ,    We are writing to inform you of your test results.    Your test results fall within the expected range(s) or remain unchanged from previous results.  Please continue with current treatment plan.    Resulted Orders   Hemoglobin A1c   Result Value Ref Range    Hemoglobin A1C 6.3 (H) 4.3 - 6.0 %   Basic metabolic panel   Result Value Ref Range    Sodium 141 133 - 144 mmol/L    Potassium 4.2 3.4 - 5.3 mmol/L    Chloride 106 94 - 109 mmol/L    Carbon Dioxide 28 20 - 32 mmol/L    Anion Gap 7 3 - 14 mmol/L    Glucose 97 70 - 99 mg/dL      Comment:      Non Fasting    Urea Nitrogen 22 7 - 30 mg/dL    Creatinine 1.08 0.66 - 1.25 mg/dL    GFR Estimate 68 >60 mL/min/1.7m2      Comment:      Non  GFR Calc    GFR Estimate If Black 83 >60 mL/min/1.7m2      Comment:       GFR Calc    Calcium 8.3 (L) 8.5 - 10.1 mg/dL       If you have any questions or concerns, please call the clinic at the number listed above.       Sincerely, Mel Pappas NP

## 2018-03-28 LAB
ANION GAP SERPL CALCULATED.3IONS-SCNC: 7 MMOL/L (ref 3–14)
BUN SERPL-MCNC: 22 MG/DL (ref 7–30)
CALCIUM SERPL-MCNC: 8.3 MG/DL (ref 8.5–10.1)
CHLORIDE SERPL-SCNC: 106 MMOL/L (ref 94–109)
CO2 SERPL-SCNC: 28 MMOL/L (ref 20–32)
CREAT SERPL-MCNC: 1.08 MG/DL (ref 0.66–1.25)
GFR SERPL CREATININE-BSD FRML MDRD: 68 ML/MIN/1.7M2
GLUCOSE SERPL-MCNC: 97 MG/DL (ref 70–99)
POTASSIUM SERPL-SCNC: 4.2 MMOL/L (ref 3.4–5.3)
SODIUM SERPL-SCNC: 141 MMOL/L (ref 133–144)

## 2018-03-29 RX ORDER — LIDOCAINE HYDROCHLORIDE 10 MG/ML
1 INJECTION, SOLUTION INFILTRATION; PERINEURAL ONCE
Qty: 1 ML | Refills: 0 | OUTPATIENT
Start: 2018-03-29 | End: 2018-03-29

## 2018-03-29 RX ORDER — TRIAMCINOLONE ACETONIDE 40 MG/ML
40 INJECTION, SUSPENSION INTRA-ARTICULAR; INTRAMUSCULAR ONCE
Qty: 1 ML | Refills: 0 | OUTPATIENT
Start: 2018-03-29 | End: 2018-03-29

## 2018-05-07 ENCOUNTER — TELEPHONE (OUTPATIENT)
Dept: FAMILY MEDICINE | Facility: CLINIC | Age: 68
End: 2018-05-07

## 2018-05-07 ENCOUNTER — OFFICE VISIT (OUTPATIENT)
Dept: FAMILY MEDICINE | Facility: CLINIC | Age: 68
End: 2018-05-07
Payer: COMMERCIAL

## 2018-05-07 VITALS
DIASTOLIC BLOOD PRESSURE: 62 MMHG | OXYGEN SATURATION: 99 % | TEMPERATURE: 97.2 F | BODY MASS INDEX: 34.79 KG/M2 | RESPIRATION RATE: 18 BRPM | SYSTOLIC BLOOD PRESSURE: 110 MMHG | WEIGHT: 243 LBS | HEART RATE: 76 BPM | HEIGHT: 70 IN

## 2018-05-07 DIAGNOSIS — R42 DIZZINESS: Primary | ICD-10-CM

## 2018-05-07 DIAGNOSIS — M54.2 CERVICALGIA: ICD-10-CM

## 2018-05-07 DIAGNOSIS — E11.65 TYPE 2 DIABETES MELLITUS WITH HYPERGLYCEMIA, WITHOUT LONG-TERM CURRENT USE OF INSULIN (H): ICD-10-CM

## 2018-05-07 DIAGNOSIS — H53.8 BLURRED VISION: ICD-10-CM

## 2018-05-07 PROBLEM — E66.01 MORBID OBESITY (H): Status: ACTIVE | Noted: 2018-05-07

## 2018-05-07 PROCEDURE — 99214 OFFICE O/P EST MOD 30 MIN: CPT | Performed by: FAMILY MEDICINE

## 2018-05-07 NOTE — PROGRESS NOTES
SUBJECTIVE:   Roscoe Jang is a 67 year old male who presents to clinic today for the following health issues:      Neck Pain      Duration: Quite awhile     Description:  Location: Right side   Radiation: into the right neck and into the right shoulder    Intensity:  moderate    Accompanying signs and symptoms: Over the weekend made him dizzy, eye start fluttering, vision blurry, will get dizzy while standing and looking up to the millie    History (similar episodes/previous evaluation): Had eye test last fall. Did have an ear issue last time    Precipitating or alleviating factors: See's a massage therapist every other week    Therapies tried and outcome: Chiropractor and massage. Had an injection in shoulder awhile back      Problem list and histories reviewed & adjusted, as indicated.  Additional history: as documented    Patient Active Problem List   Diagnosis     Benign essential hypertension     Mixed hyperlipidemia     Obesity     Abdominal aortic aneurysm (H)     Fatty liver     Type 2 diabetes mellitus with hyperglycemia, without long-term current use of insulin (H)     History reviewed. No pertinent surgical history.    Social History   Substance Use Topics     Smoking status: Former Smoker     Quit date: 10/3/2007     Smokeless tobacco: Never Used     Alcohol use No     History reviewed. No pertinent family history.      Current Outpatient Prescriptions   Medication Sig Dispense Refill     ASPIRIN PO Take 81 mg by mouth daily       blood glucose monitoring (RHYS CONTOUR NEXT) test strip Use to test blood sugar 1 times daily or as directed. 100 strip 6     blood glucose monitoring (RHYS CONTOUR NEXT) test strip Use to test blood sugar 1 times daily or as directed.  Ok to substitute alternative if insurance prefers. 100 strip 11     blood glucose monitoring (RHYS MICROLET) lancets Use to test blood sugar 1 times daily or as directed. 100 each 5     lisinopril (PRINIVIL/ZESTRIL) 20 MG tablet TAKE ONE  "TABLET BY MOUTH ONCE DAILY 90 tablet 0     metFORMIN (GLUCOPHAGE) 500 MG tablet TAKE ONE TABLET BY MOUTH WITH BREAKFAST AND TWO TABLETS WITH LUNCH 270 tablet 1     simvastatin (ZOCOR) 20 MG tablet Take 1 tablet (20 mg) by mouth At Bedtime 90 tablet 1     triamcinolone acetonide (KENALOG) 40 MG/ML injection 1 mL (40 mg) by INTRA-ARTICULAR route once 1 mL 0     No Known Allergies  Recent Labs   Lab Test  03/27/18   1545  10/06/17   1000  07/07/17   1326   A1C  6.3*  6.4*  8.4*   LDL   --    --   86   CR  1.08   --   1.00   GFRESTIMATED  68   --   75   GFRESTBLACK  83   --   >90   GFR Calc     POTASSIUM  4.2   --   4.4   TSH   --   0.90   --       BP Readings from Last 3 Encounters:   05/07/18 110/62   03/27/18 126/81   12/18/17 154/82    Wt Readings from Last 3 Encounters:   05/07/18 243 lb (110.2 kg)   03/27/18 249 lb (112.9 kg)   12/20/17 246 lb (111.6 kg)                  Labs reviewed in EPIC    Reviewed and updated as needed this visit by clinical staff       Reviewed and updated as needed this visit by Provider         ROS:  10 point ROS of systems including Constitutional, Eyes, Respiratory, Cardiovascular, Gastroenterology, Genitourinary, Integumentary, Muscularskeletal, Psychiatric were all negative except for pertinent positives noted in my HPI.    OBJECTIVE:     /62 (Cuff Size: Adult Large)  Pulse 76  Temp 97.2  F (36.2  C) (Tympanic)  Resp 18  Ht 5' 9.5\" (1.765 m)  Wt 243 lb (110.2 kg)  SpO2 99%  BMI 35.37 kg/m2  Body mass index is 35.37 kg/(m^2).  GENERAL: alert, no distress and obese  EYES: Eyes grossly normal to inspection, PERRL and conjunctivae and sclerae normal  NECK: no adenopathy, no asymmetry, masses, or scars and thyroid normal to palpation  RESP: lungs clear to auscultation - no rales, rhonchi or wheezes  CV: regular rate and rhythm, normal S1 S2, no S3 or S4, no murmur, click or rub, no peripheral edema and peripheral pulses strong  ABDOMEN: soft, nontender, no " hepatosplenomegaly, no masses and bowel sounds normal  MS: mild cervical paraspinal tenderness, no skin discoloration or swelling noted, range of movement normal  NEURO: Normal strength and tone, sensory exam grossly normal, mentation intact, cranial nerves 2-12 intact and DTR's normal and symmetric bilaterally mild  PSYCH: mentation appears normal, affect normal/bright  LYMPH: no cervical, supraclavicular, axillary, or inguinal adenopathy    ASSESSMENT/PLAN:         ICD-10-CM    1. Dizziness R42 MRA Angiogram Neck w/o & w Contrast     MR Brain w Contrast   2. Blurred vision H53.8 MR Brain w Contrast   3. Cervicalgia M54.2 MR Cervical Spine w/o Contrast       67-year-old male presents with dizziness and blurring vision which he experienced over the weekend.  Known to have cervicalgia, following chiropractic therapy.  Differentials are broad including but not limited to posterior circulation insufficiency, degenerative cervical disc disease.  MRI/MRA ordered for further evaluation.  Suggested to continue over-the-counter analgesia, chiropractic therapy.  Will inform you with the results once available.  Patient understood and in agreement with above plan.  All questions answered.      Patient Instructions   Please call [phone] to schedule MRI scans.    Dizziness (Uncertain Cause)  Dizziness is a common symptom. It may be described as lightheadedness, spinning, or feeling like you are going to faint. Dizziness can have many causes.  Be sure to tell the healthcare provider about:    All medicines you take, including prescription, over-the-counter, herbs, and supplements    Any other symptoms you have    Any health problems you are being treated for    Any past major health problems you've had, such as a heart attack, balance issues, hearing problems, or blood pressure problems    Anything that causes the dizziness to get worse or better  Today's exam did not show an exact cause for your dizziness. Other tests may  be needed. Follow up with your healthcare provider.  Home care    Dizziness that occurs with sudden standing may be a sign of mild dehydration. Drink extra fluids for the next few days.    If you recently started a new medicine, stopped a medicine, or had the dose of a current medicine changed, talk with the prescribing healthcare provider. Your medicine plan may need adjustment.    If dizziness lasts more than a few seconds, sit or lie down until it passes. This may help prevent injury in case you pass out. Get up slowly when you feel better.    Don't drive or use power tools or dangerous equipment until you have had no dizziness for at least 48 hours.  Follow-up care  Follow up with your healthcare provider for further evaluation within the next 7 days or as advised.  When to seek medical advice  Call your healthcare provider for any of the following:    Worsening of symptoms or new symptoms    Passing out or seizure    Repeated vomiting    Headache    Palpitations (the sense that your heart is fluttering or beating fast or hard)    Shortness of breath    Blood in vomit or stool (black or red color)    Weakness of an arm or leg or 1 side of the face    Vision or hearing changes    Trouble walking or speaking    Chest, arm, neck, back, or jaw pain  Date Last Reviewed: 11/1/2017 2000-2017 [employer]. [address] [place] [address], [place] [id number]. All rights reserved. This information is not intended as a substitute for professional medical care. Always follow your healthcare professional's instructions.            Rell Rivers MD  [facility]

## 2018-05-07 NOTE — TELEPHONE ENCOUNTER
Per OV today with Dr. Rivers-      1. Dizziness R42 MRA Angiogram Neck w/o & w Contrast       MR Brain w Contrast   2. Blurred vision H53.8 MR Brain w Contrast   3. Cervicalgia M54.2 MR Cervical Spine w/o Contrast         67-year-old male presents with dizziness and blurring vision which he experienced over the weekend.  Known to have cervicalgia, following chiropractic therapy.  Differentials are broad including but not limited to posterior circulation insufficiency, degenerative cervical disc disease.  MRI/MRA ordered for further evaluation.  Suggested to continue over-the-counter analgesia, chiropractic therapy.  Will inform you with the results once available.  Patient understood and in agreement with above plan.  All questions answered.    Informed pt Dr. Rivers recommends eval for above sx before addressing shoulder issue.  Pt informed, agreeable with this.  VIK Huynh RN

## 2018-05-07 NOTE — MR AVS SNAPSHOT
After Visit Summary   5/7/2018    Roscoe Jang    MRN: [id number]           Patient Information     Date Of Birth          1950        Visit Information        Provider Department      5/7/2018 9:20 AM Rell Rivers MD [facility]        Today's Diagnoses     Dizziness    -  1    Blurred vision          Care Instructions    Please call [phone] to schedule MRI scans.    Dizziness (Uncertain Cause)  Dizziness is a common symptom. It may be described as lightheadedness, spinning, or feeling like you are going to faint. Dizziness can have many causes.  Be sure to tell the healthcare provider about:    All medicines you take, including prescription, over-the-counter, herbs, and supplements    Any other symptoms you have    Any health problems you are being treated for    Any past major health problems you've had, such as a heart attack, balance issues, hearing problems, or blood pressure problems    Anything that causes the dizziness to get worse or better  Today's exam did not show an exact cause for your dizziness. Other tests may be needed. Follow up with your healthcare provider.  Home care    Dizziness that occurs with sudden standing may be a sign of mild dehydration. Drink extra fluids for the next few days.    If you recently started a new medicine, stopped a medicine, or had the dose of a current medicine changed, talk with the prescribing healthcare provider. Your medicine plan may need adjustment.    If dizziness lasts more than a few seconds, sit or lie down until it passes. This may help prevent injury in case you pass out. Get up slowly when you feel better.    Don't drive or use power tools or dangerous equipment until you have had no dizziness for at least 48 hours.  Follow-up care  Follow up with your healthcare provider for further evaluation within the next 7 days or as advised.  When to seek medical advice  Call your healthcare provider for any of the  following:    Worsening of symptoms or new symptoms    Passing out or seizure    Repeated vomiting    Headache    Palpitations (the sense that your heart is fluttering or beating fast or hard)    Shortness of breath    Blood in vomit or stool (black or red color)    Weakness of an arm or leg or 1 side of the face    Vision or hearing changes    Trouble walking or speaking    Chest, arm, neck, back, or jaw pain  Date Last Reviewed: 11/1/2017 2000-2017 [employer]. [address] [place] [address]. All rights reserved. This information is not intended as a substitute for professional medical care. Always follow your healthcare professional's instructions.                Follow-ups after your visit        Future tests that were ordered for you today     Open Future Orders        Priority Expected Expires Ordered    MR Brain w Contrast Routine  5/7/2019 5/7/2018    MRA Angiogram Neck w/o & w Contrast Routine  5/7/2019 5/7/2018    MR Cervical Spine w/o Contrast Routine  5/7/2019 5/7/2018            Who to contact     If you have questions or need follow up information about today's clinic visit or your schedule please contact [facility] directly at [phone].  Normal or non-critical lab and imaging results will be communicated to you by Ecatohart, letter or phone within 4 business days after the clinic has received the results. If you do not hear from us within 7 days, please contact the clinic through Ecatohart or phone. If you have a critical or abnormal lab result, we will notify you by phone as soon as possible.  Submit refill requests through Tellja or call your pharmacy and they will forward the refill request to us. Please allow 3 business days for your refill to be completed.          Additional Information About Your Visit        MyChart Information     Tellja lets you send messages to your doctor, view your test results, renew your prescriptions, schedule appointments  "and more. To sign up, go to [email or web address] . Click on \"Log in\" on the left side of the screen, which will take you to the Welcome page. Then click on \"Sign up Now\" on the right side of the page.     You will be asked to enter the access code listed below, as well as some personal information. Please follow the directions to create your username and password.     Your access code is: [email or web address]  Expires: 2018  3:39 PM     Your access code will  in 90 days. If you need help or a new code, please call your [facility] clinic or [phone].        Care EveryWhere ID     This is your Care EveryWhere ID. This could be used by other organizations to access your [facility] medical records  [id number]        Your Vitals Were     Pulse Temperature Respirations Height Pulse Oximetry BMI (Body Mass Index)    76 97.2  F (36.2  C) (Tympanic) 18 5' 9.5\" (1.765 m) 99% 35.37 kg/m2       Blood Pressure from Last 3 Encounters:   18 110/62   18 126/81   17 154/82    Weight from Last 3 Encounters:   18 243 lb (110.2 kg)   18 249 lb (112.9 kg)   17 246 lb (111.6 kg)               Primary Care Provider Office Phone # Fax #    Mel NOAH Pappas [phone]       [address] [facility] [address]        Equal Access to Services     ANGELO MARIANO : Hadii brittaney browne hadasho Soomaali, waaxda luqadaha, qaybta kaalmada adeegyada, eltitia flores. So [facility] [phone].    ATENCIÓN: Si habla español, tiene a tapia disposición servicios gratuitos de asistencia lingüística. Llame al [phone].    We comply with applicable federal civil rights laws and [place] laws. We do not discriminate on the basis of race, color, national origin, age, disability, sex, sexual orientation, or gender identity.            Thank you!     Thank you for choosing [facility]  for your care. Our goal is always to provide you with excellent care. Hearing back " from our patients is one way we can continue to improve our services. Please take a few minutes to complete the written survey that you may receive in the mail after your visit with us. Thank you!             Your Updated Medication List - Protect others around you: Learn how to safely use, store and throw away your medicines at www.disposemymeds.org.          This list is accurate as of 5/7/18 10:02 AM.  Always use your most recent med list.                   Brand Name Dispense Instructions for use Diagnosis    ASPIRIN PO      Take 81 mg by mouth daily        blood glucose monitoring lancets     100 each    Use to test blood sugar 1 times daily or as directed.    Type 2 diabetes mellitus (H)       * blood glucose monitoring test strip    RHYS CONTOUR NEXT    100 strip    Use to test blood sugar 1 times daily or as directed.    Type 2 diabetes mellitus (H)       * blood glucose monitoring test strip    RHYS CONTOUR NEXT    100 strip    Use to test blood sugar 1 times daily or as directed.  Ok to substitute alternative if insurance prefers.    Type 2 diabetes mellitus with hyperglycemia, without long-term current use of insulin (H)       KENALOG 40 MG/ML injection   Generic drug:  triamcinolone acetonide     1 mL    1 mL (40 mg) by INTRA-ARTICULAR route once    Hip pain, left       lisinopril 20 MG tablet    PRINIVIL/ZESTRIL    90 tablet    TAKE ONE TABLET BY MOUTH ONCE DAILY    Benign essential hypertension       metFORMIN 500 MG tablet    GLUCOPHAGE    270 tablet    TAKE ONE TABLET BY MOUTH WITH BREAKFAST AND TWO TABLETS WITH LUNCH    Type 2 diabetes mellitus with hyperglycemia, without long-term current use of insulin (H)       simvastatin 20 MG tablet    ZOCOR    90 tablet    Take 1 tablet (20 mg) by mouth At Bedtime    Hyperlipidemia LDL goal <100       * Notice:  This list has 2 medication(s) that are the same as other medications prescribed for you. Read the directions carefully, and ask your doctor or  other care provider to review them with you.

## 2018-05-07 NOTE — TELEPHONE ENCOUNTER
Patient is calling requesting an MRI on his right shoulder. He was just seen and forgot to request this from Dr. Rivers.      Judy Herrera-[facility] [place]

## 2018-05-07 NOTE — NURSING NOTE
"Chief Complaint   Patient presents with     Neck Pain       Initial /62 (Cuff Size: Adult Large)  Pulse 76  Temp 97.2  F (36.2  C) (Tympanic)  Resp 18  Ht 5' 9.5\" (1.765 m)  Wt 243 lb (110.2 kg)  SpO2 99%  BMI 35.37 kg/m2 Estimated body mass index is 35.37 kg/(m^2) as calculated from the following:    Height as of this encounter: 5' 9.5\" (1.765 m).    Weight as of this encounter: 243 lb (110.2 kg).      Health Maintenance that is potentially due pending provider review:  Colonoscopy/FIT    Gave pt phone number/pended order to schedule mammo and/or colonoscopy(or FIT)    Is there anyone who you would like to be able to receive your results? Not Applicable  If yes have patient fill out JARVIS    "

## 2018-05-07 NOTE — PATIENT INSTRUCTIONS
Please call [phone] to schedule MRI scans.    Dizziness (Uncertain Cause)  Dizziness is a common symptom. It may be described as lightheadedness, spinning, or feeling like you are going to faint. Dizziness can have many causes.  Be sure to tell the healthcare provider about:    All medicines you take, including prescription, over-the-counter, herbs, and supplements    Any other symptoms you have    Any health problems you are being treated for    Any past major health problems you've had, such as a heart attack, balance issues, hearing problems, or blood pressure problems    Anything that causes the dizziness to get worse or better  Today's exam did not show an exact cause for your dizziness. Other tests may be needed. Follow up with your healthcare provider.  Home care    Dizziness that occurs with sudden standing may be a sign of mild dehydration. Drink extra fluids for the next few days.    If you recently started a new medicine, stopped a medicine, or had the dose of a current medicine changed, talk with the prescribing healthcare provider. Your medicine plan may need adjustment.    If dizziness lasts more than a few seconds, sit or lie down until it passes. This may help prevent injury in case you pass out. Get up slowly when you feel better.    Don't drive or use power tools or dangerous equipment until you have had no dizziness for at least 48 hours.  Follow-up care  Follow up with your healthcare provider for further evaluation within the next 7 days or as advised.  When to seek medical advice  Call your healthcare provider for any of the following:    Worsening of symptoms or new symptoms    Passing out or seizure    Repeated vomiting    Headache    Palpitations (the sense that your heart is fluttering or beating fast or hard)    Shortness of breath    Blood in vomit or stool (black or red color)    Weakness of an arm or leg or 1 side of the face    Vision or hearing changes    Trouble walking or  speaking    Chest, arm, neck, back, or jaw pain  Date Last Reviewed: 11/1/2017 2000-2017 [employer]. [address] [place] [address], [place] [address]. All rights reserved. This information is not intended as a substitute for professional medical care. Always follow your healthcare professional's instructions.

## 2018-05-08 ENCOUNTER — HOSPITAL ENCOUNTER (OUTPATIENT)
Dept: MRI IMAGING | Facility: CLINIC | Age: 68
End: 2018-05-08
Attending: FAMILY MEDICINE
Payer: MEDICARE

## 2018-05-08 ENCOUNTER — HOSPITAL ENCOUNTER (OUTPATIENT)
Dept: MRI IMAGING | Facility: CLINIC | Age: 68
Discharge: HOME | End: 2018-05-08
Attending: FAMILY MEDICINE | Admitting: FAMILY MEDICINE
Payer: MEDICARE

## 2018-05-08 DIAGNOSIS — R42 DIZZINESS: ICD-10-CM

## 2018-05-08 DIAGNOSIS — H53.8 BLURRED VISION: ICD-10-CM

## 2018-05-08 DIAGNOSIS — M54.2 CERVICALGIA: ICD-10-CM

## 2018-05-08 LAB
CREAT BLD-MCNC: 1.1 MG/DL (ref 0.66–1.25)
GFR SERPL CREATININE-BSD FRML MDRD: 67 ML/MIN/1.7M2

## 2018-05-08 PROCEDURE — A9585 GADOBUTROL INJECTION: HCPCS | Performed by: FAMILY MEDICINE

## 2018-05-08 PROCEDURE — 27210995 ZZH RX 272: Performed by: FAMILY MEDICINE

## 2018-05-08 PROCEDURE — 82565 ASSAY OF CREATININE: CPT

## 2018-05-08 PROCEDURE — 25000128 H RX IP 250 OP 636: Performed by: FAMILY MEDICINE

## 2018-05-08 PROCEDURE — 70549 MR ANGIOGRAPH NECK W/O&W/DYE: CPT

## 2018-05-08 PROCEDURE — 72141 MRI NECK SPINE W/O DYE: CPT

## 2018-05-08 PROCEDURE — 70553 MRI BRAIN STEM W/O & W/DYE: CPT

## 2018-05-08 RX ORDER — ACYCLOVIR 200 MG/1
60 CAPSULE ORAL ONCE
Status: COMPLETED | OUTPATIENT
Start: 2018-05-08 | End: 2018-05-08

## 2018-05-08 RX ORDER — GADOBUTROL 604.72 MG/ML
10 INJECTION INTRAVENOUS ONCE
Status: COMPLETED | OUTPATIENT
Start: 2018-05-08 | End: 2018-05-08

## 2018-05-08 RX ADMIN — GADOBUTROL 10 ML: 604.72 INJECTION INTRAVENOUS at 20:21

## 2018-05-08 RX ADMIN — SODIUM CHLORIDE 60 ML: 9 INJECTION, SOLUTION INTRAMUSCULAR; INTRAVENOUS; SUBCUTANEOUS at 20:21

## 2018-05-10 ENCOUNTER — DOCUMENTATION ONLY (OUTPATIENT)
Dept: FAMILY MEDICINE | Facility: CLINIC | Age: 68
End: 2018-05-10

## 2018-05-10 ENCOUNTER — TELEPHONE (OUTPATIENT)
Dept: OTHER | Facility: CLINIC | Age: 68
End: 2018-05-10

## 2018-05-10 DIAGNOSIS — I77.9 BILATERAL CAROTID ARTERY DISEASE (H): Primary | ICD-10-CM

## 2018-05-10 DIAGNOSIS — M54.2 CERVICALGIA: ICD-10-CM

## 2018-05-10 DIAGNOSIS — H53.8 BLURRED VISION: ICD-10-CM

## 2018-05-10 RX ORDER — ATORVASTATIN CALCIUM 80 MG/1
80 TABLET, FILM COATED ORAL DAILY
Qty: 30 TABLET | Refills: 1 | Status: ON HOLD | OUTPATIENT
Start: 2018-05-10 | End: 2018-06-06

## 2018-05-10 NOTE — TELEPHONE ENCOUNTER
Left message on home number for patient to call back to schedule US and consult appointment with Vascular Surgery ASAP.

## 2018-05-10 NOTE — TELEPHONE ENCOUNTER
Pt referred to [facility] by Mel Pappas NP for symptomatic carotid stenosis    Pertinent history/ imaging includes: dizziness, blurred vision    Pt needs to be schedueld for  Bilateral carotid US stat and consult with vascular surgery stat.  Will route to scheduling to coordinate and appointment stat    America Roper RN, BSN

## 2018-05-10 NOTE — TELEPHONE ENCOUNTER
[facility]    I called Roscoe Jang on the phone this evening.  This 67-year-old that lives in [place] [facility] and is very active has had episodes of dizziness and blurry vision that of common gone for some period of time.  These are always short-lived and associated with no focal neurological changes.  He was splitting wood over the weekend and the dizziness recurred along with bilateral blurry vision.  He was starting to veer to the left side when he was walking.  This again was not associate with any focal neurological changes.  This resolved but he noticed it again on Sunday doing some more activities.    He had his vision looked at in the past is completely normal.  He spoke with his physician Dr. Pappas who recommended an MRI on 5/8/2018 that was normal.  MRA however revealed an 80% stenosis of the right carotid bifurcation and 60% stenosis of the left.  He was recommended the obtain a vascular consultation.    Past history significant for hypertension is been well-controlled with medications.  He has been adjusting his dose of lisinopril originally 20 mg then down to 5 mg but back to 20 mg daily.      He had been on simvastatin for hyperlipidemia and recently switched to Lipitor 80 mg daily.  LDL= 86 last year.      He has been prediabetic for many years.  He is presently takes Glucophage 500 mg in the morning and 1000 mg in the evening with a recent hemoglobin A1c of 6.3.    He complains of arthritic problems in his shoulders.  Never had any surgery.      Impression: High-grade right carotid stenosis of moderate change on the left.  His symptoms are not focal and likely are not related to the carotid stenosis.  However, with activity if he becomes hypotensive he may see some global cerebral hypoperfusion which could cause these symptoms.  Nevertheless, with a high-grade stenosis in a young active patient with other risk factors under good control I feel that carotid endarterectomy  is indicated.    Since he lives in [place] several hour drive to see me in the [facility] we will plan to see him in the office tomorrow morning with a carotid duplex ultrasound.  If this confirms the MRA findings will proceed later that day with her right CEA.  Risks benefits of the procedure have been discussed with him on the phone.  He will not take his Glucophage in the morning but take his lisinopril and aspirin.      Gaurav Bustos MD

## 2018-05-10 NOTE — TELEPHONE ENCOUNTER
Dr Bustos is calling the patient today on cell . Planning for him to come to [facility] 5/11/18 for ultrasound, consult, and possible surgery. Magui Cullen -  at [facility].

## 2018-05-10 NOTE — PROGRESS NOTES
MRI results discussed with the patient.  MRA consistent with bilateral carotid stenosis and cervical MRI showed multilevel degenerative disc disease.  Vascular surgery referral placed.  Simvastatin discontinued and suggested to start Lipitor 80 mg, will check liver function test in 1 month.  Ortho  referral placed for considering steroidal injection regarding neck pain.  Other medication reviewed and no changes made.    MRI BRAIN WITHOUT AND WITH CONTRAST  May 8, 2018 8:21 PM     HISTORY: Dizziness, blurred vision.      TECHNIQUE: Multiplanar, multisequence MRI of the brain without and  with 10mL Gadavist.     COMPARISON: None.     FINDINGS: Diffusion images are normal. There is no evidence for  intracranial hemorrhage or acute infarct. The brain parenchyma shows a  few tiny punctate signal hyperintensities in the supratentorial white  matter. These are not associated with any mass effect or enhancement.  Postcontrast images do not show any abnormal areas of enhancement or  any focal mass lesions. Vascular structures are patent at the skull  base.         IMPRESSION:  1. Few scattered nonspecific white matter lesions.  2. No evidence for intracranial hemorrhage, acute infarct, any focal  mass lesions.      MRA ANGIOGRAM NECK WITH AND WITHOUT CONTRAST May 8, 2018 8:22 PM      HISTORY: Blurry vision, dizziness.     TECHNIQUE: MR angiography was performed through the neck without and  with intravenous contrast. 10 mL of Gadavist given. Carotid stenoses  were evaluated by comparing the caliber of the proximal internal  carotid artery to the caliber of the distal internal carotid artery.     FINDINGS: Brachiocephalic vessels are patent off the arch. Both  vertebral arteries are also patent without stenosis or dissection.  There is moderate to severe atherosclerotic disease involving both  carotid bifurcations. There is 80% short segment stenosis of the  proximal right internal carotid artery and there is 60%  stenosis of  the proximal left internal carotid artery.         IMPRESSION:  1. 80% stenosis of the proximal right internal carotid artery.  2. 60% stenosis of the proximal left internal carotid artery.  3. Patent vertebrobasilar system.     MR CERVICAL SPINE WITHOUT CONTRAST May 8, 2018 8:02 PM      HISTORY: Cervicalgia.     TECHNIQUE: Multiplanar multisequence images were obtained through the  cervical spine without contrast.     COMPARISON: None.     FINDINGS: Sagittal images demonstrate mild gentle cervical kyphosis,  otherwise normal posterior alignment. There is no evidence for  craniovertebral or cervical medullary junction abnormality. The  cervical cord is normal in morphology and signal characteristics. Disc  space narrowing is present at C5-C6. Bone marrow signal intensity is  normal.     C2-C3: Normal.     C3-C4: Minimal disc bulging and moderate right-sided facet hypertrophy  is present causing some mild right-sided foraminal stenosis and  borderline to mild central canal stenosis.     C4-C5: There is a right paramedian disc protrusion and right-sided  uncinate spurring along with facet hypertrophy. Findings are causing  mild central and mild right-sided foraminal stenosis.     C5-C6: Posterior osteophyte formation, bilateral uncinate spurring and  facet hypertrophy is present causing moderate right-sided foraminal  stenosis, mild left-sided foraminal stenosis and borderline to mild  central canal stenosis.     C6-C7: Minimal posterior osteophyte formation and facet hypertrophy.  No stenosis.     C7-T1: Normal.     Paraspinal soft tissues: Unremarkable as visualized.         IMPRESSION: Multilevel degenerative disc and facet disease most  advanced at C5-C6 where there is moderate right-sided foraminal  stenosis and mild left-sided foraminal stenosis. There is also mild  central canal and mild right-sided foraminal stenosis at C4-C5.        Rell Rivers MD  [facility]

## 2018-05-10 NOTE — TELEPHONE ENCOUNTER
"Pt referred to [facility] by Rell Rivers MD for symptomatic bilateral carotid artery disease.  Pt having blurry vision and dizziness.    5/8/18 MRA Angio neck:  \"IMPRESSION:  1. 80% stenosis of the proximal right internal carotid artery.  2. 60% stenosis of the proximal left internal carotid artery.  3. Patent vertebrobasilar system.\"    Pt needs to be scheduled for bilateral carotid US and consult with Vascular Surgery.  Per Dr. Bustos, pt may be put on his schedule today.  Will route to scheduling to coordinate an appointment today.    BONY KiranN RN    "

## 2018-05-11 ENCOUNTER — ANESTHESIA (OUTPATIENT)
Dept: SURGERY | Facility: CLINIC | Age: 68
DRG: 039 | End: 2018-05-11
Payer: MEDICARE

## 2018-05-11 ENCOUNTER — HOSPITAL ENCOUNTER (INPATIENT)
Facility: CLINIC | Age: 68
LOS: 1 days | Discharge: HOME | DRG: 039 | End: 2018-05-12
Attending: SURGERY | Admitting: SURGERY
Payer: MEDICARE

## 2018-05-11 ENCOUNTER — HOSPITAL ENCOUNTER (OUTPATIENT)
Dept: ULTRASOUND IMAGING | Facility: CLINIC | Age: 68
DRG: 039 | End: 2018-05-11
Attending: SURGERY | Admitting: SURGERY
Payer: MEDICARE

## 2018-05-11 ENCOUNTER — OFFICE VISIT (OUTPATIENT)
Dept: OTHER | Facility: CLINIC | Age: 68
DRG: 039 | End: 2018-05-11
Attending: SURGERY
Payer: MEDICARE

## 2018-05-11 ENCOUNTER — ANESTHESIA EVENT (OUTPATIENT)
Dept: SURGERY | Facility: CLINIC | Age: 68
DRG: 039 | End: 2018-05-11
Payer: MEDICARE

## 2018-05-11 ENCOUNTER — OFFICE VISIT (OUTPATIENT)
Dept: SURGERY | Facility: PHYSICIAN GROUP | Age: 68
End: 2018-05-11
Payer: COMMERCIAL

## 2018-05-11 VITALS
HEART RATE: 58 BPM | SYSTOLIC BLOOD PRESSURE: 128 MMHG | DIASTOLIC BLOOD PRESSURE: 80 MMHG | WEIGHT: 238 LBS | BODY MASS INDEX: 36.07 KG/M2 | HEIGHT: 68 IN

## 2018-05-11 DIAGNOSIS — E78.5 HYPERLIPIDEMIA LDL GOAL <70: ICD-10-CM

## 2018-05-11 DIAGNOSIS — I65.21 CAROTID ARTERY STENOSIS, SYMPTOMATIC, RIGHT: Primary | ICD-10-CM

## 2018-05-11 DIAGNOSIS — I65.23 CAROTID STENOSIS, SYMPTOMATIC W/O INFARCT, BILATERAL: Primary | ICD-10-CM

## 2018-05-11 DIAGNOSIS — H53.8 BLURRED VISION: ICD-10-CM

## 2018-05-11 DIAGNOSIS — I77.9 BILATERAL CAROTID ARTERY DISEASE (H): ICD-10-CM

## 2018-05-11 LAB
ANION GAP SERPL CALCULATED.3IONS-SCNC: 4 MMOL/L (ref 3–14)
BUN SERPL-MCNC: 22 MG/DL (ref 7–30)
CALCIUM SERPL-MCNC: 9.2 MG/DL (ref 8.5–10.1)
CHLORIDE SERPL-SCNC: 107 MMOL/L (ref 94–109)
CHOLEST SERPL-MCNC: 118 MG/DL
CO2 SERPL-SCNC: 28 MMOL/L (ref 20–32)
CREAT SERPL-MCNC: 0.94 MG/DL (ref 0.66–1.25)
GFR SERPL CREATININE-BSD FRML MDRD: 80 ML/MIN/1.7M2
GLUCOSE BLDC GLUCOMTR-MCNC: 106 MG/DL (ref 70–99)
GLUCOSE SERPL-MCNC: 115 MG/DL (ref 70–99)
HBA1C MFR BLD: 6.7 % (ref 0–5.6)
HDLC SERPL-MCNC: 48 MG/DL
LDLC SERPL CALC-MCNC: 45 MG/DL
NONHDLC SERPL-MCNC: 70 MG/DL
POTASSIUM SERPL-SCNC: 4.4 MMOL/L (ref 3.4–5.3)
SODIUM SERPL-SCNC: 139 MMOL/L (ref 133–144)
TRIGL SERPL-MCNC: 124 MG/DL

## 2018-05-11 PROCEDURE — 27211022 ZZHC OR IOM SUPPLIES OPNP: Performed by: SURGERY

## 2018-05-11 PROCEDURE — 25000125 ZZHC RX 250: Performed by: SURGERY

## 2018-05-11 PROCEDURE — 12000007 ZZH R&B INTERMEDIATE

## 2018-05-11 PROCEDURE — 80061 LIPID PANEL: CPT | Performed by: SURGERY

## 2018-05-11 PROCEDURE — 25000566 ZZH SEVOFLURANE, EA 15 MIN: Performed by: SURGERY

## 2018-05-11 PROCEDURE — 36000063 ZZH SURGERY LEVEL 4 EA 15 ADDTL MIN: Performed by: SURGERY

## 2018-05-11 PROCEDURE — 25000128 H RX IP 250 OP 636: Performed by: NURSE ANESTHETIST, CERTIFIED REGISTERED

## 2018-05-11 PROCEDURE — 00000146 ZZHCL STATISTIC GLUCOSE BY METER IP

## 2018-05-11 PROCEDURE — 93010 ELECTROCARDIOGRAM REPORT: CPT | Performed by: INTERNAL MEDICINE

## 2018-05-11 PROCEDURE — 03CK0ZZ EXTIRPATION OF MATTER FROM RIGHT INTERNAL CAROTID ARTERY, OPEN APPROACH: ICD-10-PCS | Performed by: SURGERY

## 2018-05-11 PROCEDURE — 35301 RECHANNELING OF ARTERY: CPT | Mod: AS | Performed by: PHYSICIAN ASSISTANT

## 2018-05-11 PROCEDURE — 25000125 ZZHC RX 250: Performed by: PHYSICIAN ASSISTANT

## 2018-05-11 PROCEDURE — A9270 NON-COVERED ITEM OR SERVICE: HCPCS | Mod: GY | Performed by: PHYSICIAN ASSISTANT

## 2018-05-11 PROCEDURE — 25000128 H RX IP 250 OP 636: Performed by: ANESTHESIOLOGY

## 2018-05-11 PROCEDURE — 25000125 ZZHC RX 250: Performed by: NURSE ANESTHETIST, CERTIFIED REGISTERED

## 2018-05-11 PROCEDURE — G0463 HOSPITAL OUTPT CLINIC VISIT: HCPCS

## 2018-05-11 PROCEDURE — 95940 IONM IN OPERATNG ROOM 15 MIN: CPT | Performed by: SURGERY

## 2018-05-11 PROCEDURE — 25000128 H RX IP 250 OP 636: Performed by: SURGERY

## 2018-05-11 PROCEDURE — 83036 HEMOGLOBIN GLYCOSYLATED A1C: CPT | Performed by: SURGERY

## 2018-05-11 PROCEDURE — 99204 OFFICE O/P NEW MOD 45 MIN: CPT | Mod: ZP | Performed by: SURGERY

## 2018-05-11 PROCEDURE — 36000093 ZZH SURGERY LEVEL 4 1ST 30 MIN: Performed by: SURGERY

## 2018-05-11 PROCEDURE — 03UK07Z SUPPLEMENT RIGHT INTERNAL CAROTID ARTERY WITH AUTOLOGOUS TISSUE SUBSTITUTE, OPEN APPROACH: ICD-10-PCS | Performed by: SURGERY

## 2018-05-11 PROCEDURE — 37000008 ZZH ANESTHESIA TECHNICAL FEE, 1ST 30 MIN: Performed by: SURGERY

## 2018-05-11 PROCEDURE — 27210794 ZZH OR GENERAL SUPPLY STERILE: Performed by: SURGERY

## 2018-05-11 PROCEDURE — 25000132 ZZH RX MED GY IP 250 OP 250 PS 637: Mod: GY | Performed by: PHYSICIAN ASSISTANT

## 2018-05-11 PROCEDURE — 71000012 ZZH RECOVERY PHASE 1 LEVEL 1 FIRST HR: Performed by: SURGERY

## 2018-05-11 PROCEDURE — 25000125 ZZHC RX 250: Performed by: ANESTHESIOLOGY

## 2018-05-11 PROCEDURE — 25000128 H RX IP 250 OP 636: Performed by: PHYSICIAN ASSISTANT

## 2018-05-11 PROCEDURE — 40000170 ZZH STATISTIC PRE-PROCEDURE ASSESSMENT II: Performed by: SURGERY

## 2018-05-11 PROCEDURE — 35301 RECHANNELING OF ARTERY: CPT | Mod: RT | Performed by: SURGERY

## 2018-05-11 PROCEDURE — 36415 COLL VENOUS BLD VENIPUNCTURE: CPT | Performed by: SURGERY

## 2018-05-11 PROCEDURE — 37000009 ZZH ANESTHESIA TECHNICAL FEE, EACH ADDTL 15 MIN: Performed by: SURGERY

## 2018-05-11 PROCEDURE — 80048 BASIC METABOLIC PNL TOTAL CA: CPT | Performed by: SURGERY

## 2018-05-11 PROCEDURE — P9041 ALBUMIN (HUMAN),5%, 50ML: HCPCS | Performed by: NURSE ANESTHETIST, CERTIFIED REGISTERED

## 2018-05-11 PROCEDURE — 93880 EXTRACRANIAL BILAT STUDY: CPT

## 2018-05-11 PROCEDURE — 93005 ELECTROCARDIOGRAM TRACING: CPT

## 2018-05-11 RX ORDER — SODIUM CHLORIDE, SODIUM LACTATE, POTASSIUM CHLORIDE, CALCIUM CHLORIDE 600; 310; 30; 20 MG/100ML; MG/100ML; MG/100ML; MG/100ML
INJECTION, SOLUTION INTRAVENOUS CONTINUOUS
Status: DISCONTINUED | OUTPATIENT
Start: 2018-05-11 | End: 2018-05-11 | Stop reason: HOSPADM

## 2018-05-11 RX ORDER — ONDANSETRON 4 MG/1
4 TABLET, ORALLY DISINTEGRATING ORAL EVERY 6 HOURS PRN
Status: DISCONTINUED | OUTPATIENT
Start: 2018-05-11 | End: 2018-05-12 | Stop reason: HOSPADM

## 2018-05-11 RX ORDER — HEPARIN SODIUM 1000 [USP'U]/ML
INJECTION, SOLUTION INTRAVENOUS; SUBCUTANEOUS PRN
Status: DISCONTINUED | OUTPATIENT
Start: 2018-05-11 | End: 2018-05-11

## 2018-05-11 RX ORDER — ONDANSETRON 2 MG/ML
4 INJECTION INTRAMUSCULAR; INTRAVENOUS EVERY 30 MIN PRN
Status: DISCONTINUED | OUTPATIENT
Start: 2018-05-11 | End: 2018-05-11 | Stop reason: HOSPADM

## 2018-05-11 RX ORDER — ATORVASTATIN CALCIUM 40 MG/1
80 TABLET, FILM COATED ORAL DAILY
Status: DISCONTINUED | OUTPATIENT
Start: 2018-05-12 | End: 2018-05-12 | Stop reason: HOSPADM

## 2018-05-11 RX ORDER — ONDANSETRON 2 MG/ML
4 INJECTION INTRAMUSCULAR; INTRAVENOUS EVERY 6 HOURS PRN
Status: DISCONTINUED | OUTPATIENT
Start: 2018-05-11 | End: 2018-05-12 | Stop reason: HOSPADM

## 2018-05-11 RX ORDER — DIPHENHYDRAMINE HYDROCHLORIDE 50 MG/ML
12.5 INJECTION INTRAMUSCULAR; INTRAVENOUS EVERY 6 HOURS PRN
Status: DISCONTINUED | OUTPATIENT
Start: 2018-05-11 | End: 2018-05-12 | Stop reason: HOSPADM

## 2018-05-11 RX ORDER — EPHEDRINE SULFATE 50 MG/ML
INJECTION, SOLUTION INTRAMUSCULAR; INTRAVENOUS; SUBCUTANEOUS PRN
Status: DISCONTINUED | OUTPATIENT
Start: 2018-05-11 | End: 2018-05-11

## 2018-05-11 RX ORDER — NEOSTIGMINE METHYLSULFATE 1 MG/ML
VIAL (ML) INJECTION PRN
Status: DISCONTINUED | OUTPATIENT
Start: 2018-05-11 | End: 2018-05-11

## 2018-05-11 RX ORDER — AMOXICILLIN 250 MG
2 CAPSULE ORAL 2 TIMES DAILY
Status: DISCONTINUED | OUTPATIENT
Start: 2018-05-11 | End: 2018-05-12 | Stop reason: HOSPADM

## 2018-05-11 RX ORDER — CLOPIDOGREL BISULFATE 75 MG/1
75 TABLET ORAL DAILY
Status: DISCONTINUED | OUTPATIENT
Start: 2018-05-11 | End: 2018-05-12 | Stop reason: HOSPADM

## 2018-05-11 RX ORDER — VECURONIUM BROMIDE 1 MG/ML
INJECTION, POWDER, LYOPHILIZED, FOR SOLUTION INTRAVENOUS PRN
Status: DISCONTINUED | OUTPATIENT
Start: 2018-05-11 | End: 2018-05-11

## 2018-05-11 RX ORDER — LABETALOL HYDROCHLORIDE 5 MG/ML
10 INJECTION, SOLUTION INTRAVENOUS
Status: DISCONTINUED | OUTPATIENT
Start: 2018-05-11 | End: 2018-05-11 | Stop reason: HOSPADM

## 2018-05-11 RX ORDER — DIPHENHYDRAMINE HCL 12.5MG/5ML
12.5 LIQUID (ML) ORAL EVERY 6 HOURS PRN
Status: DISCONTINUED | OUTPATIENT
Start: 2018-05-11 | End: 2018-05-12 | Stop reason: HOSPADM

## 2018-05-11 RX ORDER — BUPIVACAINE HYDROCHLORIDE 5 MG/ML
INJECTION, SOLUTION PERINEURAL PRN
Status: DISCONTINUED | OUTPATIENT
Start: 2018-05-11 | End: 2018-05-11 | Stop reason: HOSPADM

## 2018-05-11 RX ORDER — LIDOCAINE 40 MG/G
CREAM TOPICAL
Status: DISCONTINUED | OUTPATIENT
Start: 2018-05-11 | End: 2018-05-12 | Stop reason: HOSPADM

## 2018-05-11 RX ORDER — GLYCOPYRROLATE 0.2 MG/ML
INJECTION, SOLUTION INTRAMUSCULAR; INTRAVENOUS PRN
Status: DISCONTINUED | OUTPATIENT
Start: 2018-05-11 | End: 2018-05-11

## 2018-05-11 RX ORDER — HYDROMORPHONE HYDROCHLORIDE 1 MG/ML
.3-.5 INJECTION, SOLUTION INTRAMUSCULAR; INTRAVENOUS; SUBCUTANEOUS EVERY 5 MIN PRN
Status: DISCONTINUED | OUTPATIENT
Start: 2018-05-11 | End: 2018-05-11 | Stop reason: HOSPADM

## 2018-05-11 RX ORDER — OXYCODONE HYDROCHLORIDE 5 MG/1
5-10 TABLET ORAL EVERY 4 HOURS PRN
Status: DISCONTINUED | OUTPATIENT
Start: 2018-05-11 | End: 2018-05-12 | Stop reason: HOSPADM

## 2018-05-11 RX ORDER — PROCHLORPERAZINE MALEATE 5 MG
5 TABLET ORAL EVERY 6 HOURS PRN
Status: DISCONTINUED | OUTPATIENT
Start: 2018-05-11 | End: 2018-05-12 | Stop reason: HOSPADM

## 2018-05-11 RX ORDER — PROPOFOL 10 MG/ML
INJECTION, EMULSION INTRAVENOUS PRN
Status: DISCONTINUED | OUTPATIENT
Start: 2018-05-11 | End: 2018-05-11

## 2018-05-11 RX ORDER — ONDANSETRON 2 MG/ML
INJECTION INTRAMUSCULAR; INTRAVENOUS PRN
Status: DISCONTINUED | OUTPATIENT
Start: 2018-05-11 | End: 2018-05-11

## 2018-05-11 RX ORDER — ALBUMIN, HUMAN INJ 5% 5 %
SOLUTION INTRAVENOUS CONTINUOUS PRN
Status: DISCONTINUED | OUTPATIENT
Start: 2018-05-11 | End: 2018-05-11

## 2018-05-11 RX ORDER — SODIUM CHLORIDE 9 MG/ML
INJECTION, SOLUTION INTRAVENOUS CONTINUOUS
Status: DISCONTINUED | OUTPATIENT
Start: 2018-05-11 | End: 2018-05-12 | Stop reason: HOSPADM

## 2018-05-11 RX ORDER — NICOTINE POLACRILEX 4 MG
15-30 LOZENGE BUCCAL
Status: DISCONTINUED | OUTPATIENT
Start: 2018-05-11 | End: 2018-05-12 | Stop reason: HOSPADM

## 2018-05-11 RX ORDER — NALOXONE HYDROCHLORIDE 0.4 MG/ML
.1-.4 INJECTION, SOLUTION INTRAMUSCULAR; INTRAVENOUS; SUBCUTANEOUS
Status: DISCONTINUED | OUTPATIENT
Start: 2018-05-11 | End: 2018-05-11

## 2018-05-11 RX ORDER — FENTANYL CITRATE 50 UG/ML
INJECTION, SOLUTION INTRAMUSCULAR; INTRAVENOUS PRN
Status: DISCONTINUED | OUTPATIENT
Start: 2018-05-11 | End: 2018-05-11

## 2018-05-11 RX ORDER — KETOROLAC TROMETHAMINE 30 MG/ML
INJECTION, SOLUTION INTRAMUSCULAR; INTRAVENOUS PRN
Status: DISCONTINUED | OUTPATIENT
Start: 2018-05-11 | End: 2018-05-11

## 2018-05-11 RX ORDER — LIDOCAINE HYDROCHLORIDE 20 MG/ML
INJECTION, SOLUTION INFILTRATION; PERINEURAL PRN
Status: DISCONTINUED | OUTPATIENT
Start: 2018-05-11 | End: 2018-05-11

## 2018-05-11 RX ORDER — LISINOPRIL 20 MG/1
20 TABLET ORAL DAILY
Status: DISCONTINUED | OUTPATIENT
Start: 2018-05-12 | End: 2018-05-12 | Stop reason: HOSPADM

## 2018-05-11 RX ORDER — NALOXONE HYDROCHLORIDE 0.4 MG/ML
.1-.4 INJECTION, SOLUTION INTRAMUSCULAR; INTRAVENOUS; SUBCUTANEOUS
Status: DISCONTINUED | OUTPATIENT
Start: 2018-05-11 | End: 2018-05-12 | Stop reason: HOSPADM

## 2018-05-11 RX ORDER — ACETAMINOPHEN 325 MG/1
975 TABLET ORAL EVERY 8 HOURS
Status: DISCONTINUED | OUTPATIENT
Start: 2018-05-11 | End: 2018-05-12 | Stop reason: HOSPADM

## 2018-05-11 RX ORDER — HEPARIN SODIUM 1000 [USP'U]/ML
INJECTION, SOLUTION INTRAVENOUS; SUBCUTANEOUS PRN
Status: DISCONTINUED | OUTPATIENT
Start: 2018-05-11 | End: 2018-05-11 | Stop reason: HOSPADM

## 2018-05-11 RX ORDER — HYDROMORPHONE HYDROCHLORIDE 1 MG/ML
.3-.5 INJECTION, SOLUTION INTRAMUSCULAR; INTRAVENOUS; SUBCUTANEOUS
Status: DISCONTINUED | OUTPATIENT
Start: 2018-05-11 | End: 2018-05-12 | Stop reason: HOSPADM

## 2018-05-11 RX ORDER — LABETALOL HYDROCHLORIDE 5 MG/ML
10 INJECTION, SOLUTION INTRAVENOUS EVERY 10 MIN PRN
Status: DISCONTINUED | OUTPATIENT
Start: 2018-05-11 | End: 2018-05-12 | Stop reason: HOSPADM

## 2018-05-11 RX ORDER — FENTANYL CITRATE 50 UG/ML
25-50 INJECTION, SOLUTION INTRAMUSCULAR; INTRAVENOUS
Status: DISCONTINUED | OUTPATIENT
Start: 2018-05-11 | End: 2018-05-11 | Stop reason: HOSPADM

## 2018-05-11 RX ORDER — ASPIRIN 81 MG/1
81 TABLET, CHEWABLE ORAL DAILY
Status: DISCONTINUED | OUTPATIENT
Start: 2018-05-12 | End: 2018-05-12 | Stop reason: HOSPADM

## 2018-05-11 RX ORDER — ACETAMINOPHEN 325 MG/1
650 TABLET ORAL EVERY 4 HOURS PRN
Status: DISCONTINUED | OUTPATIENT
Start: 2018-05-14 | End: 2018-05-12 | Stop reason: HOSPADM

## 2018-05-11 RX ORDER — CEFAZOLIN SODIUM 2 G/100ML
2 INJECTION, SOLUTION INTRAVENOUS
Status: COMPLETED | OUTPATIENT
Start: 2018-05-11 | End: 2018-05-11

## 2018-05-11 RX ORDER — DEXTROSE MONOHYDRATE 25 G/50ML
25-50 INJECTION, SOLUTION INTRAVENOUS
Status: DISCONTINUED | OUTPATIENT
Start: 2018-05-11 | End: 2018-05-12 | Stop reason: HOSPADM

## 2018-05-11 RX ORDER — ONDANSETRON 4 MG/1
4 TABLET, ORALLY DISINTEGRATING ORAL EVERY 30 MIN PRN
Status: DISCONTINUED | OUTPATIENT
Start: 2018-05-11 | End: 2018-05-11 | Stop reason: HOSPADM

## 2018-05-11 RX ORDER — ASPIRIN 600 MG/1
600 SUPPOSITORY RECTAL ONCE
Status: COMPLETED | OUTPATIENT
Start: 2018-05-11 | End: 2018-05-11

## 2018-05-11 RX ORDER — FENTANYL CITRATE 50 UG/ML
25-100 INJECTION, SOLUTION INTRAMUSCULAR; INTRAVENOUS
Status: DISCONTINUED | OUTPATIENT
Start: 2018-05-11 | End: 2018-05-11 | Stop reason: HOSPADM

## 2018-05-11 RX ORDER — AMOXICILLIN 250 MG
1 CAPSULE ORAL 2 TIMES DAILY
Status: DISCONTINUED | OUTPATIENT
Start: 2018-05-11 | End: 2018-05-12 | Stop reason: HOSPADM

## 2018-05-11 RX ADMIN — ROCURONIUM BROMIDE 50 MG: 10 INJECTION INTRAVENOUS at 16:29

## 2018-05-11 RX ADMIN — FENTANYL CITRATE 50 MCG: 50 INJECTION, SOLUTION INTRAMUSCULAR; INTRAVENOUS at 16:21

## 2018-05-11 RX ADMIN — Medication 5 MG: at 16:46

## 2018-05-11 RX ADMIN — PHENYLEPHRINE HYDROCHLORIDE 100 MCG: 10 INJECTION, SOLUTION INTRAMUSCULAR; INTRAVENOUS; SUBCUTANEOUS at 16:46

## 2018-05-11 RX ADMIN — SODIUM CHLORIDE: 9 INJECTION, SOLUTION INTRAVENOUS at 20:57

## 2018-05-11 RX ADMIN — GLYCOPYRROLATE 0.8 MG: 0.2 INJECTION, SOLUTION INTRAMUSCULAR; INTRAVENOUS at 18:42

## 2018-05-11 RX ADMIN — PHENYLEPHRINE HYDROCHLORIDE 0.4 MCG/KG/MIN: 10 INJECTION, SOLUTION INTRAMUSCULAR; INTRAVENOUS; SUBCUTANEOUS at 16:24

## 2018-05-11 RX ADMIN — FENTANYL CITRATE 50 MCG: 50 INJECTION, SOLUTION INTRAMUSCULAR; INTRAVENOUS at 15:56

## 2018-05-11 RX ADMIN — ALBUMIN (HUMAN): 12.5 SOLUTION INTRAVENOUS at 16:48

## 2018-05-11 RX ADMIN — VECURONIUM BROMIDE 2 MG: 1 INJECTION, POWDER, LYOPHILIZED, FOR SOLUTION INTRAVENOUS at 17:04

## 2018-05-11 RX ADMIN — ROCURONIUM BROMIDE 50 MG: 10 INJECTION INTRAVENOUS at 15:56

## 2018-05-11 RX ADMIN — PHENYLEPHRINE HYDROCHLORIDE 50 MCG: 10 INJECTION, SOLUTION INTRAMUSCULAR; INTRAVENOUS; SUBCUTANEOUS at 16:26

## 2018-05-11 RX ADMIN — SODIUM CHLORIDE, POTASSIUM CHLORIDE, SODIUM LACTATE AND CALCIUM CHLORIDE: 600; 310; 30; 20 INJECTION, SOLUTION INTRAVENOUS at 14:17

## 2018-05-11 RX ADMIN — PHENYLEPHRINE HYDROCHLORIDE 50 MCG: 10 INJECTION, SOLUTION INTRAMUSCULAR; INTRAVENOUS; SUBCUTANEOUS at 16:28

## 2018-05-11 RX ADMIN — ASPIRIN 600 MG: 600 SUPPOSITORY RECTAL at 19:10

## 2018-05-11 RX ADMIN — HEPARIN SODIUM 5000 UNITS: 1000 INJECTION, SOLUTION INTRAVENOUS; SUBCUTANEOUS at 16:57

## 2018-05-11 RX ADMIN — MIDAZOLAM HYDROCHLORIDE 1 MG: 1 INJECTION, SOLUTION INTRAMUSCULAR; INTRAVENOUS at 14:28

## 2018-05-11 RX ADMIN — MIDAZOLAM 2 MG: 1 INJECTION INTRAMUSCULAR; INTRAVENOUS at 15:50

## 2018-05-11 RX ADMIN — FENTANYL CITRATE 50 MCG: 50 INJECTION, SOLUTION INTRAMUSCULAR; INTRAVENOUS at 16:20

## 2018-05-11 RX ADMIN — LIDOCAINE HYDROCHLORIDE 100 MG: 20 INJECTION, SOLUTION INFILTRATION; PERINEURAL at 15:56

## 2018-05-11 RX ADMIN — PHENYLEPHRINE HYDROCHLORIDE 100 MCG: 10 INJECTION, SOLUTION INTRAMUSCULAR; INTRAVENOUS; SUBCUTANEOUS at 16:15

## 2018-05-11 RX ADMIN — KETOROLAC TROMETHAMINE 15 MG: 30 INJECTION, SOLUTION INTRAMUSCULAR at 17:30

## 2018-05-11 RX ADMIN — VECURONIUM BROMIDE 2 MG: 1 INJECTION, POWDER, LYOPHILIZED, FOR SOLUTION INTRAVENOUS at 16:41

## 2018-05-11 RX ADMIN — Medication 10 MG: at 16:49

## 2018-05-11 RX ADMIN — CLOPIDOGREL 75 MG: 75 TABLET, FILM COATED ORAL at 21:44

## 2018-05-11 RX ADMIN — Medication 5 MG: at 16:33

## 2018-05-11 RX ADMIN — ONDANSETRON 4 MG: 2 INJECTION INTRAMUSCULAR; INTRAVENOUS at 18:04

## 2018-05-11 RX ADMIN — CEFAZOLIN SODIUM 1 G: 2 INJECTION, SOLUTION INTRAVENOUS at 18:06

## 2018-05-11 RX ADMIN — ACETAMINOPHEN 975 MG: 325 TABLET ORAL at 21:43

## 2018-05-11 RX ADMIN — VECURONIUM BROMIDE 2 MG: 1 INJECTION, POWDER, LYOPHILIZED, FOR SOLUTION INTRAVENOUS at 16:42

## 2018-05-11 RX ADMIN — SENNOSIDES AND DOCUSATE SODIUM 1 TABLET: 8.6; 5 TABLET ORAL at 21:44

## 2018-05-11 RX ADMIN — ALBUMIN (HUMAN): 12.5 SOLUTION INTRAVENOUS at 16:25

## 2018-05-11 RX ADMIN — CEFAZOLIN SODIUM 2 G: 2 INJECTION, SOLUTION INTRAVENOUS at 16:06

## 2018-05-11 RX ADMIN — FENTANYL CITRATE 50 MCG: 50 INJECTION, SOLUTION INTRAMUSCULAR; INTRAVENOUS at 16:22

## 2018-05-11 RX ADMIN — PROPOFOL 30 MG: 10 INJECTION, EMULSION INTRAVENOUS at 18:16

## 2018-05-11 RX ADMIN — NEOSTIGMINE METHYLSULFATE 5 MG: 1 INJECTION, SOLUTION INTRAVENOUS at 18:42

## 2018-05-11 RX ADMIN — PROPOFOL 200 MG: 10 INJECTION, EMULSION INTRAVENOUS at 15:56

## 2018-05-11 RX ADMIN — LIDOCAINE HYDROCHLORIDE 1 ML: 10 INJECTION, SOLUTION EPIDURAL; INFILTRATION; INTRACAUDAL; PERINEURAL at 14:15

## 2018-05-11 RX ADMIN — PHENYLEPHRINE HYDROCHLORIDE 100 MCG: 10 INJECTION, SOLUTION INTRAMUSCULAR; INTRAVENOUS; SUBCUTANEOUS at 16:32

## 2018-05-11 RX ADMIN — FAMOTIDINE 20 MG: 10 INJECTION, SOLUTION INTRAVENOUS at 21:43

## 2018-05-11 ASSESSMENT — COPD QUESTIONNAIRES: COPD: 0

## 2018-05-11 ASSESSMENT — LIFESTYLE VARIABLES: TOBACCO_USE: 1

## 2018-05-11 NOTE — PROGRESS NOTES
Admission medication history interview status for the 5/11/2018  admission is complete. See EPIC admission navigator for prior to admission medications     Medication history source reliability:Good    Medication history interview source(s):Patient    Medication history resources (including written lists, pill bottles, clinic record):None    Primary pharmacy.[facility]    Additional medication history information not noted on PTA med list :None    Time spent in this activity: 45 minutes    Prior to Admission medications    Medication Sig Last Dose Taking? Auth Provider   ASPIRIN PO Take 81 mg by mouth daily 5/11/2018 at 0630 Yes Reported, Patient   atorvastatin (LIPITOR) 80 MG tablet Take 1 tablet (80 mg) by mouth daily 5/11/2018 at 0630 Yes Rell Rivers MD   IBUPROFEN PO Take 200-800 mg by mouth daily as needed for moderate pain 5/10/2018 at am Yes Reported, Patient   lisinopril (PRINIVIL/ZESTRIL) 20 MG tablet TAKE ONE TABLET BY MOUTH ONCE DAILY 5/11/2018 at 0630 Yes Mel Pappas NP   METFORMIN HCL PO Take 500 mg by mouth every morning 5/10/2018 at am Yes Reported, Patient   METFORMIN HCL PO Take 1,000 mg by mouth every evening (Takes 2 x 500mg tablet = 1000mg) 5/10/2018 at 1800 Yes Reported, Patient   blood glucose monitoring (RHYS CONTOUR NEXT) test strip Use to test blood sugar 1 times daily or as directed.   Rell Rivers MD   blood glucose monitoring (RHYS CONTOUR NEXT) test strip Use to test blood sugar 1 times daily or as directed.  Ok to substitute alternative if insurance prefers.   Rell Rivers MD   blood glucose monitoring (RHYS MICROLET) lancets Use to test blood sugar 1 times daily or as directed.   Rell Rivers MD   triamcinolone acetonide (KENALOG) 40 MG/ML injection 1 mL (40 mg) by INTRA-ARTICULAR route once more than a month  Rell Rivers MD

## 2018-05-11 NOTE — ANESTHESIA PREPROCEDURE EVALUATION
Anesthesia Evaluation     . Pt has not had prior anesthetic            ROS/MED HX    ENT/Pulmonary:     (+)tobacco use (Very significant smoking history, was 7 packs a day for a long time.  Quit about 10yrs ago), Past use , . .   (-) COPD and sleep apnea   Neurologic:     (+)TIA date: 5/2018     Cardiovascular:     (+) Dyslipidemia, hypertension-range: typically well controlled per patient, Peripheral Vascular Disease (symptomatic, 80% right, 60% left)-- Carotid Stenosis, --. : . . . :. .       METS/Exercise Tolerance:     Hematologic:         Musculoskeletal:   (+) arthritis, , , -       GI/Hepatic:     (+) liver disease (fatty liver),      (-) GERD   Renal/Genitourinary:         Endo:     (+) type II DM Not using insulin Obesity (BMI 36), .      Psychiatric:         Infectious Disease:         Malignancy:         Other:                     Physical Exam  Normal systems: cardiovascular and pulmonary    Airway   Mallampati: III  TM distance: >3 FB  Neck ROM: full    Dental   (+) missing    Cardiovascular       Pulmonary                     Anesthesia Plan      History & Physical Review  History and physical reviewed and following examination; no interval change.    ASA Status:  4 .    NPO Status:  > 8 hours    Plan for General and ETT with Intravenous induction. Maintenance will be Balanced.    PONV prophylaxis:  Ondansetron (or other 5HT-3)  Additional equipment: Videolaryngoscope and Arterial Line      Postoperative Care  Postoperative pain management:  IV analgesics.      Consents  Anesthetic plan, risks, benefits and alternatives discussed with:  Patient..                          .

## 2018-05-11 NOTE — BRIEF OP NOTE
General Surgery Brief Operative Note    Pre-operative diagnosis: SYMPTOMATIC RIGHT CAROTID STENOSIS   Post-operative diagnosis Same   Procedure: Procedure(s):  RIGHT CAROTID ENDARTERECTOMY WITH EEG - Wound Class: I-Clean    Surgeon(s), Assistant(s): Surgeon(s) and Role:     * Gaurav Bustos MD - Primary     * Aurora Felix PA-C   Estimated blood loss: * No values recorded between 5/11/2018  4:21 PM and 5/11/2018  6:32 PM *   Drains: None   Specimens: * No specimens in log *   Findings: See postop diagnosis   Condition: Stable   Comments:      Aurora Felix PA-C See dictated operative report for full details

## 2018-05-11 NOTE — NURSING NOTE
Patient Education    Procedure: Right carotid endarterectomy with electroencephalogram  Diagnosis: carotid artery stenosis  Anticoagulation Instruction: continue aspirin  Pre-Operative Physical Exam: You need to have a pre-op physical exam within 30 days of your procedure. Your procedure may be cancelled if you do not have a current History and Physical. Call your PCP's office to schedule.  Allergies:  Updated in Epic  Bowel Prep: n/a  Post Procedure Education: [facility] post-procedure fact sheet reviewed with patient.    Learner(s):patient  Method: Listening  Barriers to Learning:No Barrier  Outcome: Patient did verbalize understanding of above education.    Ivette Alcantar, BONYN, RN

## 2018-05-11 NOTE — NURSING NOTE
"Roscoe Jang is a 67 year old male who presents for:  Chief Complaint   Patient presents with     Consult     Cesar carotid US (10:15 [facility], 11:15 [facility]) New patient ref by Rell Rivers for blurry vision and dizziness, MRA in EPIC; tentative R CEA surgery scheduled with Dr Bustos at 3:55pm on Fri 5/11/18        Vitals:    Vitals:    05/11/18 1017   BP: 128/80   BP Location: Left arm   Patient Position: Chair   Cuff Size: Adult Large   Pulse: 58   Weight: 238 lb (108 kg)   Height: 5' 8\" (1.727 m)       BMI:  Estimated body mass index is 36.19 kg/(m^2) as calculated from the following:    Height as of this encounter: 5' 8\" (1.727 m).    Weight as of this encounter: 238 lb (108 kg).    Pain Score:  Data Unavailable      Do you feel safe in your environment?  Yes      Eriak Smyth      "

## 2018-05-11 NOTE — LETTER
[facility]  [address]  [place] [address]  Phone: [phone]  Fax: [phone]    May 11, 2018    Re: Roscoe Jang, : 1950    [facility]     Roscoe Jang was referred to see me today by Dr. Pappas.  This 67-year-old patient who lives in [place], [place] has a history of well-controlled hypertension and non-insulin-dependent diabetes.  He is noticed for up to 10 years very intermittent episodes of lightheadedness and dizziness with some mild vision blurriness associated with activities.  He is always been very short-lived lasting oftentimes less than a minute with no focal neurological changes.  He will get these once every several months and usually associates with strenuous activity.  He did see an ophthalmologist approximately a year ago and except for corrective lenses his eyes were normal.      He experienced a similar episode approximately a week ago while working out in his yard. Again this was relatively short-lived.  He was cutting wood on 2018 when again he noted similar symptoms.  This was associated with dizziness, markedly blurred vision to the point he had trouble seeing objects seeing objects, and lightheadedness.  Again there were no focal neurological changes.  He was with his son and started walking towards his house and was noted to be veering off to the left side while walking.  His vision was blurred enough that he said he could have not drive his car but was able to get back to his house where he laid down.  His symptoms lasted approximately 2 hours and were completely resolved.  The following day he was again out chopping wood when almost the exact same symptoms occurred again also lasting for 2 hours.     He has some arthritic changes neck with some minor neck discomfort.  No history of any cardiac problems.  He spoke with Dr. Pappas who recommended a MRI which was negative for stroke or any lesions.  MRA  however revealed an 80% right and 50% left proximal ICA stenosis.  They contacted our office and I spoke with the patient on the phone last evening as documented in epic phone record.  He drove down to see me in the office today.  He has had no symptoms since this past weekend.     PMH: No allergies.            Medications: Lisinopril 20 mg daily                                 Aspirin 81 mg daily                                   Lipitor 80 mg daily                                  Metformin 500 mg every morning and 1000 mg every                                                      Evening            Medical: Hypertension diagnosed approximately 6 years ago.                             initially on 20 mg lisinopril.  Blood pressure was low                             And this was decreased to 5 mg and eventually stopped.                             Blood pressure increased and restarted a year ago on 20 mg                               With normal blood pressure between 110 and 125.                                No obvious episodes of hypotension.                              Hyperlipidemia.  Previously on simvastatin and recently switched                                    to Lipitor.  Last recorded LDL= 86                              Type 2 diabetes diagnosed approximately 6 years ago.  Has been                                    Glucophage with recent hemoglobin A1c= 6.3                               Mild arthritic problems in his shoulders and neck.                 Surgical: None     Patient was a heavy smoker up to 7 packs daily.  He quit smoking in 2007.  Does not use alcohol presently.     Family medical history :  No vascular or cardiac problems.     ROS: Negative except for above and arthritis.     Exam: Alert and appropriate.  Blood pressure 128/80.  Pulse 58              Height= 5 foot 8 inches    Weight= 108 kg   BMI= 36.2 kg/m               Normal affect.   Right louder than left carotid bruit.    Glasses. Beard.               Chest= clear   Cardiovascular= RR with no murmur               Abdomen= moderately obese, nontender               Extremities  = no distal edema, normal sensation                     +3 palpable posterior tibial pulses bilaterally.       I reviewed the MRI which is negative.  MRA reveals a normal [place] of [place] with no aneurysms. Approximately 80% stenosis of the proximal right ICA and 50% of the left.     Carotid duplex today Reveals a markedly elevated ICA PSV= 751 cm/s    With a diastolic = 416 cm/s    Ratio= 20.9        On the left PSV= 485 cm/s with a ratio= 5.7        Impression: Symptomatic bilateral carotid stenosis.  I suspect that with his exercise he became somewhat  hypotensive and with the critical stenosis of the carotid bifurcations bilaterally the started experiencing cerebral symptoms that resolved when he laid down and stop the activities.  This is been very repetitive and worsening.  The MRA appears to have underestimated the degree of stenosis.  He likely has a greater than 90% stenosis of the right and 80% stenosis of the left but fortunately vertebral arteries and [place] of [place] are normal.  I feel that he should undergo initially a right carotid endarterectomy with intraoperative shunting and EEG monitoring which will be performed later today.  Risks benefits have been discussed on the phone last evening and again today in the office.     In several weeks we performed a similar procedure on the left side which also has a relatively critical his dominant hemisphere.    Gaurav Bustos MD

## 2018-05-11 NOTE — IP AVS SNAPSHOT
MRN:[id number]                      After Visit Summary   5/11/2018    Roscoe Jang    MRN: [id number]           Thank you!     Thank you for choosing [facility] for your care. Our goal is always to provide you with excellent care. Hearing back from our patients is one way we can continue to improve our services. Please take a few minutes to complete the written survey that you may receive in the mail after you visit with us. Thank you!        Patient Information     Date Of Birth          1950        Designated Caregiver       Most Recent Value    Caregiver    Will someone help with your care after discharge? no    Name of designated caregiver self    Phone number of caregiver self      About your hospital stay     You were admitted on:  May 11, 2018 You last received care in the:  [facility] [address] [facility]    You were discharged on:  May 12, 2018       Who to Call     For medical emergencies, please call 911.  For non-urgent questions about your medical care, please call your primary care provider or clinic, [phone]  For questions related to your surgery, please call your surgery clinic        Attending Provider     Provider Specialty    Gaurav Bustos MD Surgery       Primary Care Provider Office Phone # Fax #    Mel PappasNOAH [phone]      After Care Instructions     Activity       Your activity upon discharge: activity as tolerated and no driving for today.  May shower.            Diet       Follow this diet upon discharge: Regular                  Follow-up Appointments     Follow-up and recommended labs and tests        Follow up with me,  Gaurav Bustos, within 2 weeks by phone. to evaluate after surgery. and schedule left CEA surgeryThe following labs/tests are recommended: Carotid Duplex in three months.                  Further instructions from your care team       Carotid Endartectomy  Post-Operative  Instructions    Typical length of stay in the hospital is 1-2 days.  On occasion, an evening in the Intensive Care Unit may be required for blood pressure regulation.    Activity    Most people are able to resume normal activities 1-2 weeks after surgery.  The key is to gradually increase your level of activity as you are able.  It is not uncommon to feel easily fatigued - this will improve with time.      You should avoid heavy lifting more than 30 lbs for 1 week.      You may return to work when you feel able - typically 1-2 weeks after surgery, depending on the type of work you do.      You should not drive a car for 1 week after surgery.  In addition,  you can not be taking prescription strength pain medication and you must feel strong enough to drive safely.    Incision Care    You can shower or bathe 2 days after surgery - avoid rubbing and soaking your incision.      You may have strips of white tape called  steri-strips  across your incision; they should stay on for 5-7 days or until the ends start to curl up.  You can then remove the steri-strips, or we will remove them at your post-operative appointment.      Avoid shaving directly over the incision for 2-3 weeks.    Common Concerns    You may notice mild skin numbness on the side of your surgery in your neck, chin, face, and earlobe.  This is due to nerve  irritation  and will gradually resolve over the next several months.  Call our office if you experience any short-lasting episode of numbness or weakness affecting one side of your body.      Some bruising and firmness around you incision can be expected.  This will soften and resolve over the next few weeks.  You may notice that some discoloration spreads to an area lower than the incision, such as the shoulder, neck or upper chest.  Likewise, swelling can occur near the incision or below the chin.  Call our office if the swelling or bruising worsens, or if you have difficulty  swallowing.    Diet    You may resume a regular diet before you leave the hospital.  You may find that it is best to try smaller, more frequent meals until your appetite returns.    Medications    You may be started on a blood thinner after surgery - typically Aspirin and / or Plavix.      You may be given a prescription for pain medication after surgery.  If you need to have this refilled, please call your pharmacy where you had it filled.  They will then call us for approval.  Please do not call after hours for a refill on pain medication.    Post-Operative Appointments    You need to see your surgeon in the clinic approximately 1-2 weeks after surgery.  Post-operative appointment:   Date: _____________________________  Time: ____________________________  Dr. ______________________________      You will have an ultrasound test and evaluation with your surgeon 90 days (3 months) after surgery.      We will notify you by mail when you are due to schedule further ultrasound testing and evaluation; typically this is done annually.     When You Should Call Our Office    Body aches, chills or temperature greater than 101      Incision redness or drainage      Loss of vision in one eye      Loss of strength / weakness in one side of your body      Severe headache      Difficulty with speech      If you will be having an invasive procedure, such as a colonoscopy or dental work within one year of your surgery, you may need to take an antibiotic prior to the procedure if you had a Dacron patch with your surgery; please call us so we can assist you with this.    Call our main number, [phone] or [phone] for assistance with any concerns or questions.     Revised 5/2014    Pending Results     Date and Time Order Name Status Description    5/11/2018 1336 EKG 12-lead, tracing only Preliminary             Statement of Approval     Ordered          05/12/18 0838  I have reviewed and agree with all the recommendations and  "orders detailed in this document.  EFFECTIVE NOW     Approved and electronically signed by:  Gaurav Bustos MD             Admission Information     Date & Time Provider Department Dept. Phone    2018 Gaurav Bustos MD [facility] [address] [facility] [phone]      Your Vitals Were     Blood Pressure Pulse Temperature Respirations Height Weight    112/50 (BP Location: Right arm) 64 97.6  F (36.4  C) (Oral) 16 1.727 m (5' 8\") 108.8 kg (239 lb 12.8 oz)    Pulse Oximetry BMI (Body Mass Index)                99% 36.46 kg/m2          MyChart Information     [facility] lets you send messages to your doctor, view your test results, renew your prescriptions, schedule appointments and more. To sign up, go to [email or web address] . Click on \"Log in\" on the left side of the screen, which will take you to the Welcome page. Then click on \"Sign up Now\" on the right side of the page.     You will be asked to enter the access code listed below, as well as some personal information. Please follow the directions to create your username and password.     Your access code is: [email or web address]  Expires: 2018  3:39 PM     Your access code will  in 90 days. If you need help or a new code, please call your [place] clinic or [phone].        Care EveryWhere ID     This is your Care EveryWhere ID. This could be used by other organizations to access your [place] medical records  [id number]        Equal Access to Services     [facility] HospitalROSSANA : Hadii brittaney arzateo Soobi, waaxda luqadaha, qaybta kaalmada olya, letitia flores. So [facility] [phone].    ATENCIÓN: Si habla español, tiene a tapia disposición servicios gratuitos de asistencia lingüística. Llame al [phone].    We comply with applicable federal civil rights laws and [place] laws. We do not discriminate on the basis of race, color, national origin, age, disability, sex, sexual orientation, or gender " identity.               Review of your medicines      START taking        Dose / Directions    acetaminophen 325 MG tablet   Commonly known as:  TYLENOL        Dose:  650 mg   Start taking on:  5/14/2018   Take 2 tablets (650 mg) by mouth every 4 hours as needed for other (multimodal surgical pain management along with NSAIDS and opioid medication as indicated based on pain control and physical function.)   Quantity:  100 tablet   Refills:  0       clopidogrel 75 MG tablet   Commonly known as:  PLAVIX        Dose:  75 mg   Start taking on:  5/13/2018   Take 1 tablet (75 mg) by mouth daily   Quantity:  30 tablet   Refills:  1         CONTINUE these medicines which have NOT CHANGED        Dose / Directions    ASPIRIN PO        Dose:  81 mg   Take 81 mg by mouth daily   Refills:  0       atorvastatin 80 MG tablet   Commonly known as:  LIPITOR   Used for:  Bilateral carotid artery disease (H)        Dose:  80 mg   Take 1 tablet (80 mg) by mouth daily   Quantity:  30 tablet   Refills:  1       blood glucose monitoring lancets   Used for:  Type 2 diabetes mellitus (H)        Use to test blood sugar 1 times daily or as directed.   Quantity:  100 each   Refills:  5       * blood glucose monitoring test strip   Commonly known as:  RHYS CONTOUR NEXT   Used for:  Type 2 diabetes mellitus (H)        Use to test blood sugar 1 times daily or as directed.   Quantity:  100 strip   Refills:  6       * blood glucose monitoring test strip   Commonly known as:  RHYS CONTOUR NEXT   Used for:  Type 2 diabetes mellitus with hyperglycemia, without long-term current use of insulin (H)        Use to test blood sugar 1 times daily or as directed.  Ok to substitute alternative if insurance prefers.   Quantity:  100 strip   Refills:  11       IBUPROFEN PO        Dose:  200-800 mg   Take 200-800 mg by mouth daily as needed for moderate pain   Refills:  0       KENALOG 40 MG/ML injection   Used for:  Hip pain, left   Generic drug:   triamcinolone acetonide        Dose:  40 mg   1 mL (40 mg) by INTRA-ARTICULAR route once   Quantity:  1 mL   Refills:  0       lisinopril 20 MG tablet   Commonly known as:  PRINIVIL/ZESTRIL   Used for:  Benign essential hypertension        TAKE ONE TABLET BY MOUTH ONCE DAILY   Quantity:  90 tablet   Refills:  0       * METFORMIN HCL PO        Dose:  500 mg   Take 500 mg by mouth every morning   Refills:  0       * METFORMIN HCL PO        Dose:  1000 mg   Take 1,000 mg by mouth every evening (Takes 2 x 500mg tablet = 1000mg)   Refills:  0       * Notice:  This list has 4 medication(s) that are the same as other medications prescribed for you. Read the directions carefully, and ask your doctor or other care provider to review them with you.         Where to get your medicines      These medications were sent to [facility] [place] - [address]  [address], [place] [address]     Phone:  [phone]     clopidogrel 75 MG tablet         Some of these will need a paper prescription and others can be bought over the counter. Ask your nurse if you have questions.     You don't need a prescription for these medications     acetaminophen 325 MG tablet                Protect others around you: Learn how to safely use, store and throw away your medicines at www.disposemymeds.org.             Medication List: This is a list of all your medications and when to take them. Check marks below indicate your daily home schedule. Keep this list as a reference.      Medications           Morning Afternoon Evening Bedtime As Needed    acetaminophen 325 MG tablet   Commonly known as:  TYLENOL   Take 2 tablets (650 mg) by mouth every 4 hours as needed for other (multimodal surgical pain management along with NSAIDS and opioid medication as indicated based on pain control and physical function.)   Start taking on:  5/14/2018   Last time this was given:  975 mg on 5/12/2018  5:59 AM                                    ASPIRIN PO   Take 81 mg by mouth daily   Last time this was given:  81 mg on 5/12/2018  8:02 AM                                   atorvastatin 80 MG tablet   Commonly known as:  LIPITOR   Take 1 tablet (80 mg) by mouth daily   Last time this was given:  80 mg on 5/12/2018  8:01 AM                                   blood glucose monitoring lancets   Use to test blood sugar 1 times daily or as directed.                                      * blood glucose monitoring test strip   Commonly known as:  RHYS CONTOUR NEXT   Use to test blood sugar 1 times daily or as directed.                                   * blood glucose monitoring test strip   Commonly known as:  RHYS CONTOUR NEXT   Use to test blood sugar 1 times daily or as directed.  Ok to substitute alternative if insurance prefers.                                   clopidogrel 75 MG tablet   Commonly known as:  PLAVIX   Take 1 tablet (75 mg) by mouth daily   Start taking on:  5/13/2018   Last time this was given:  75 mg on 5/12/2018  8:01 AM                                   IBUPROFEN PO   Take 200-800 mg by mouth daily as needed for moderate pain                                   KENALOG 40 MG/ML injection   1 mL (40 mg) by INTRA-ARTICULAR route once   Generic drug:  triamcinolone acetonide                                lisinopril 20 MG tablet   Commonly known as:  PRINIVIL/ZESTRIL   TAKE ONE TABLET BY MOUTH ONCE DAILY   Last time this was given:  20 mg on 5/12/2018  8:02 AM                                   * METFORMIN HCL PO   Take 500 mg by mouth every morning                                   * METFORMIN HCL PO   Take 1,000 mg by mouth every evening (Takes 2 x 500mg tablet = 1000mg)                                   * Notice:  This list has 4 medication(s) that are the same as other medications prescribed for you. Read the directions carefully, and ask your doctor or other care provider to review them with you.

## 2018-05-11 NOTE — PROGRESS NOTES
[facility]    Roscoe Jang was referred to see me today by Dr. Pappas.  This 67-year-old patient who lives in [place], [place] has a history of well-controlled hypertension and non-insulin-dependent diabetes.  He is noticed for up to 10 years very intermittent episodes of lightheadedness and dizziness with some mild vision blurriness associated with activities.  He is always been very short-lived lasting oftentimes less than a minute with no focal neurological changes.  He will get these once every several months and usually associates with strenuous activity.  He did see an ophthalmologist approximately a year ago and except for corrective lenses his eyes were normal.      He experienced a similar episode approximately a week ago while working out in his yard.  Again this was relatively short-lived.  He was cutting wood on 5/5/2018 when again he noted similar symptoms.  This was associated with dizziness, markedly blurred vision to the point he had trouble seeing objects seeing objects, and lightheadedness.  Again there were no focal neurological changes.  He was with his son and started walking towards his house and was noted to be veering off to the left side while walking.  His vision was blurred enough that he said he could have not drive his car but was able to get back to his house where he laid down.  His symptoms lasted approximately 2 hours and were completely resolved.  The following day he was again out chopping wood when almost the exact same symptoms occurred again also lasting for 2 hours.    He has some arthritic changes neck with some minor neck discomfort.  No history of any cardiac problems.  He spoke with Dr. Pappas who recommended a MRI which was negative for stroke or any lesions.  MRA however revealed an 80% right and 50% left proximal ICA stenosis.  They contacted our office and I spoke with the patient on the phone last evening as documented in epic phone record.  He  drove down to see me in the office today.  He has had no symptoms since this past weekend.    PMH: No allergies.            Medications: Lisinopril 20 mg daily                                 Aspirin 81 mg daily                                   Lipitor 80 mg daily                                  Metformin 500 mg every morning and 1000 mg every                                                      Evening            Medical: Hypertension diagnosed approximately 6 years ago.                             initially on 20 mg lisinopril.  Blood pressure was low                             And this was decreased to 5 mg and eventually stopped.                             Blood pressure increased and restarted a year ago on 20 mg                               With normal blood pressure between 110 and 125.                                No obvious episodes of hypotension.                             Hyperlipidemia.  Previously on simvastatin and recently switched                                    to Lipitor.  Last recorded LDL= 86                             Type 2 diabetes diagnosed approximately 6 years ago.  Has been                                    Glucophage with recent hemoglobin A1c= 6.3                              Mild arthritic problems in his shoulders and neck.                Surgical: None    Patient was a heavy smoker up to 7 packs daily.  He quit smoking in 2007.  Does not use alcohol presently.    Family medical history :  No vascular or cardiac problems.    ROS: Negative except for above and arthritis.      Exam: Alert and appropriate.  Blood pressure 128/80.  Pulse 58              Height= 5 foot 8 inches    Weight= 108 kg   BMI= 36.2 kg/m               Normal affect.   Right louder than left carotid bruit.   Glasses. Beard.               Chest= clear   Cardiovascular= RR with no murmur               Abdomen= moderately obese, nontender               Extremities  = no distal edema, normal sensation                      +3 palpable posterior tibial pulses bilaterally.        I reviewed the MRI which is negative.  MRA reveals a normal Sob[place] of Richard with no aneurysms.  Approximately 80% stenosis of the proximal right ICA and 50% of the left.    Carotid duplex today Reveals a markedly elevated ICA PSV= 751 cm/s    With a diastolic = 416 cm/s    Ratio= 20.9       On the left PSV= 485 cm/s with a ratio= 5.7       Impression: Symptomatic bilateral carotid stenosis.  I suspect that with his exercise he became somewhat  hypotensive and with the critical stenosis of the carotid bifurcations bilaterally the started experiencing cerebral symptoms that resolved when he laid down and stop the activities.  This is been very repetitive and worsening.  The MRA appears to have underestimated the degree of stenosis.  He likely has a greater than 90% stenosis of the right and 80% stenosis of the left but fortunately vertebral arteries and Sob[place] of Richard are normal.  I feel that he should undergo initially a right carotid endarterectomy with intraoperative shunting and EEG monitoring which will be performed later today.  Risks benefits have been discussed on the phone last evening and again today in the office.               In several weeks we performed a similar procedure on the left side which also has a relatively critical his dominant hemisphere.      We spent 45 minutes in the office today with over 50% in counseling.     ./mem     Please route or send letter to:  Primary Care Provider (PCP)

## 2018-05-11 NOTE — IP AVS SNAPSHOT
[place] [address] [facility]    [facility] [address] [place] [address]    Phone:  [phone]                                       After Visit Summary   5/11/2018    Roscoe Jang    MRN: [id number]           After Visit Summary Signature Page     I have received my discharge instructions, and my questions have been answered. I have discussed any challenges I see with this plan with the nurse or doctor.    ..........................................................................................................................................  Patient/Patient Representative Signature      ..........................................................................................................................................  Patient Representative Print Name and Relationship to Patient    ..................................................               ................................................  Date                                            Time    ..........................................................................................................................................  Reviewed by Signature/Title    ...................................................              ..............................................  Date                                                            Time

## 2018-05-11 NOTE — CONSULTS
VASCULAR MEDICINE CHART CHECK    Patient is a 67 year old male presenting today for a right carotid endarterectomy for symptomatic high-grade bilateral carotid artery stenosis. He has an extensive smoking history (up to 7 packs a day!), but quit in 2007. His diabetes has been well controlled on metformin with an A1C recently of 6.3%. He had previously been on simvastatin for his cholesterol, but he was just switched yesterday to Atorvastatin 80 mg daily. It is too soon to recheck his lipids at this time. He is presently optimized from a Vascular Medicine standpoint. No formal Vascular Medicine consult will be undertaken at this time. Please call if we can be of any further assistance this admission or in the future ([phone]). Thanks.     Clau Valero PA-C

## 2018-05-11 NOTE — MR AVS SNAPSHOT
After Visit Summary   5/11/2018    Roscoe Jang    MRN: [id number]           Patient Information     Date Of Birth          1950        Visit Information        Provider Department      5/11/2018 11:15 AM Gaurav Bustos MD [facility]      Today's Diagnoses     Carotid stenosis, symptomatic w/o infarct, bilateral    -  1    Hyperlipidemia LDL goal <70           Follow-ups after your visit        Your next 10 appointments already scheduled     May 11, 2018   Procedure with Gaurav Bustos MD   [facility] (--)    [address]  [facility] [address]   [phone]            May 11, 2018  3:45 PM CDT   [facility] OR with Gaurav Bustos MD   [facility])    [facility] ([facility])   [phone]              Future tests that were ordered for you today     Open Future Orders        Priority Expected Expires Ordered    US Carotid Bilateral Routine 5/10/2018 5/10/2019 5/10/2018    Hepatic panel (Albumin, ALT, AST, Bili, Alk Phos, TP) Routine 6/7/2018 5/10/2019 5/10/2018            Who to contact     If you have questions or need follow up information about today's clinic visit or your schedule please contact [facility] directly at [phone].  Normal or non-critical lab and imaging results will be communicated to you by MyChart, letter or phone within 4 business days after the clinic has received the results. If you do not hear from us within 7 days, please contact the clinic through MyChart or phone. If you have a critical or abnormal lab result, we will notify you by phone as soon as possible.  Submit refill requests through [employer] or call your pharmacy and they will forward the refill request to us. Please allow 3 business days for your  "refill to be completed.          Additional Information About Your Visit        [employer] Information     [employer] lets you send messages to your doctor, view your test results, renew your prescriptions, schedule appointments and more. To sign up, go to [email or web address] . Click on \"Log in\" on the left side of the screen, which will take you to the Welcome page. Then click on \"Sign up Now\" on the right side of the page.     You will be asked to enter the access code listed below, as well as some personal information. Please follow the directions to create your username and password.     Your access code is: [email or web address]  Expires: 2018  3:39 PM     Your access code will  in 90 days. If you need help or a new code, please call your [facility] clinic or [phone].        Care EveryWhere ID     This is your Care EveryWhere ID. This could be used by other organizations to access your [facility] medical records  [id number]        Your Vitals Were     Pulse Height BMI (Body Mass Index)             58 5' 8\" (1.727 m) 36.19 kg/m2          Blood Pressure from Last 3 Encounters:   18 128/80   18 110/62   18 126/81    Weight from Last 3 Encounters:   18 238 lb (108 kg)   18 243 lb (110.2 kg)   18 249 lb (112.9 kg)              Today, you had the following     No orders found for display         Today's Medication Changes      Notice     This visit is during an admission. Changes to the med list made in this visit will be reflected in the After Visit Summary of the admission.             Primary Care Provider Office Phone # Fax #    Mel Pappas [phone]       [address] [place] [address]        Equal Access to Services     [facility] Hospital[facility] : Funmilayo Murillo, wariddhi rankin, qaybta kaalmabella dobson, letitia flores. So [facility] [phone].    ATENCIÓN: Si habla español, tiene a tapia disposición servicios " silvano de asistencia lingüística. Angie barber [phone].    We comply with applicable federal civil rights laws and [place] laws. We do not discriminate on the basis of race, color, national origin, age, disability, sex, sexual orientation, or gender identity.            Thank you!     Thank you for choosing [facility] [place]  for your care. Our goal is always to provide you with excellent care. Hearing back from our patients is one way we can continue to improve our services. Please take a few minutes to complete the written survey that you may receive in the mail after your visit with us. Thank you!             Your Updated Medication List - Protect others around you: Learn how to safely use, store and throw away your medicines at www.disposemymeds.org.      Notice     This visit is during an admission. Changes to the med list made in this visit will be reflected in the After Visit Summary of the admission.

## 2018-05-12 VITALS
WEIGHT: 239.8 LBS | OXYGEN SATURATION: 99 % | TEMPERATURE: 97.6 F | HEART RATE: 64 BPM | SYSTOLIC BLOOD PRESSURE: 112 MMHG | BODY MASS INDEX: 36.34 KG/M2 | HEIGHT: 68 IN | DIASTOLIC BLOOD PRESSURE: 50 MMHG | RESPIRATION RATE: 16 BRPM

## 2018-05-12 LAB
GLUCOSE BLDC GLUCOMTR-MCNC: 115 MG/DL (ref 70–99)
GLUCOSE BLDC GLUCOMTR-MCNC: 132 MG/DL (ref 70–99)

## 2018-05-12 PROCEDURE — A9270 NON-COVERED ITEM OR SERVICE: HCPCS | Mod: GY | Performed by: PHYSICIAN ASSISTANT

## 2018-05-12 PROCEDURE — 00000146 ZZHCL STATISTIC GLUCOSE BY METER IP

## 2018-05-12 PROCEDURE — 25000132 ZZH RX MED GY IP 250 OP 250 PS 637: Mod: GY | Performed by: PHYSICIAN ASSISTANT

## 2018-05-12 RX ORDER — ACETAMINOPHEN 325 MG/1
650 TABLET ORAL EVERY 4 HOURS PRN
Qty: 100 TABLET | Refills: 0 | COMMUNITY
Start: 2018-05-14 | End: 2018-11-11

## 2018-05-12 RX ORDER — CLOPIDOGREL BISULFATE 75 MG/1
75 TABLET ORAL DAILY
Qty: 30 TABLET | Refills: 1 | Status: ON HOLD | OUTPATIENT
Start: 2018-05-13 | End: 2018-06-06

## 2018-05-12 RX ADMIN — CLOPIDOGREL 75 MG: 75 TABLET, FILM COATED ORAL at 08:01

## 2018-05-12 RX ADMIN — ASPIRIN 81 MG 81 MG: 81 TABLET ORAL at 08:02

## 2018-05-12 RX ADMIN — ACETAMINOPHEN 975 MG: 325 TABLET ORAL at 05:59

## 2018-05-12 RX ADMIN — ATORVASTATIN CALCIUM 80 MG: 40 TABLET, FILM COATED ORAL at 08:01

## 2018-05-12 RX ADMIN — RANITIDINE 150 MG: 150 TABLET ORAL at 08:01

## 2018-05-12 RX ADMIN — SENNOSIDES AND DOCUSATE SODIUM 1 TABLET: 8.6; 5 TABLET ORAL at 08:02

## 2018-05-12 RX ADMIN — LISINOPRIL 20 MG: 20 TABLET ORAL at 08:02

## 2018-05-12 NOTE — PLAN OF CARE
Problem: Patient Care Overview  Goal: Plan of Care/Patient Progress Review  Outcome: Improving  VSS. A/O. SBA. R neck incision CDI. Neuros intact. Denies pain. Tolerating diet. Tele SR

## 2018-05-12 NOTE — OP NOTE
Procedure Date: 05/11/2018      DATE OF PROCEDURE: 05/11/2018      PREOPERATIVE DIAGNOSIS:  High-grade calcified 90% symptomatic stenosis right carotid bifurcation.      POSTOPERATIVE DIAGNOSIS:  High-grade calcified 90% symptomatic stenosis right carotid bifurcation.      PROCEDURES:   1.  Right carotid endarterectomy with external jugular vein patch angioplasty.   a.  Intraoperative shunting and EEG monitoring.      OPERATING SURGEON: Gaurav Bustos MD      FIRST ASSISTANT: Aurora Felix PA-C      ANESTHESIA:  General.      PREOPERATIVE MEDICATIONS:  Ancef 2 grams IV.      INDICATIONS:  This 67-year-old patient has had multiple episodes of dizziness, blurry vision, and instability associated with exercise.  He was found to have a greater than 90% stenosis of the proximal right internal carotid artery and an 80% stenosis of the left.  It was felt this was causing global hypoperfusion with activities.  MRI is normal with no evidence of a stroke.  He is neurologically intact at this time.  He presents for carotid endarterectomy on the worst right side initially.  Risks and benefits were reviewed.      DESCRIPTION OF PROCEDURE:  In preinduction, a radial arterial line was placed.  He was brought to the operating room, induced under general anesthesia and orally intubated without difficulty.  A rolled towel was placed under his shoulders.  Calf pneumatic compression boots were used and a pillow was placed under the knees.  The right neck area was prepped and draped in the usual fashion.  He was muscular, but thin.  Timeout was called and the sites were identified.      VASCULAR EXPOSURE: Standard right carotid incision was made.  Dissection was carried through the platysma.  We identified an excellent external jugular vein.  This was dissected free with no side branches noted.  Sternocleidomastoid muscles retracted laterally.  We identified the internal jugular vein.  The facial vein was divided between 2-0 silk sutures,  with visualization of the hypoglossal nerve immediately underlying this.  The patient has a slightly low bifurcation.  We cautiously dissected down to the common carotid artery and bifurcation.  The ansa cervicalis was mobilized up to the hypoglossal nerve with minimal retraction.  Dissection was carried down, mobilizing the omohyoid muscle to encircle the common carotid artery before the plaque.  We then cautiously dissected free the carotid bifurcation, which was markedly calcified.  This extended up 1 cm onto the external carotid artery.  This artery was extremely small, no more than 2.5 mm in diameter, but completely free of disease beyond the plaque.  The external carotid artery was also very small, measuring less than 2 mm in diameter and this was likewise encircled with a 1 cm nearly occlusive plaque noted in its origin, but free of disease distally.      CAROTID ENDARTERECTOMY:  5000 units of intravenous heparin were given.  After 5 minutes, the vessel was sequentially tightened with a stable EEG and blood pressure.  An 11 blade was used to enter the common carotid artery.  Bose scissors was used to open this up onto the internal carotid artery, which was quite difficult due to the markedly calcified and nearly occlusive plaque.  We extended this up onto the internal carotid artery, which was completely free of disease, though quite small.  A preheparinized Sundt shunt was inserted and secured with vessel loops proximally and distally with a stable EEG.      A distal endpoint in the IC was made with the aid of a [place] blade and had an excellent transition on this very small artery.  Endarterectomy was performed  with the aid of  spatula.  A good eversion endarterectomy was performed on the external carotid artery, removing all plaque with good backbleeding.  CCA endpoint measured approximately 1 mm in thickness and had a nice transition.  This artery measured approximately 5 mm in diameter.  All loose  medial fibers were removed.      Due to the very small size of the vessel, we did have some tear in the common wall between the superior border of the very small external carotid artery and internal carotid artery.  This was reapproximated with 3 interrupted 7-0 Prolene sutures very nicely.  We had good backbleeding via the external carotid artery.  Distal endpoint in the IC was tacked down very nicely with 5 interrupted 7-0 Prolene sutures with a very smooth transition and very minimal, if any distal disease despite being a very small artery.  The endarterectomy plane was slightly roughened due to the extent of the calcified disease, but overall quite good.      EXTERNAL JUGULAR VEIN PATCH: Due to the very small size of the artery, we felt a patch would be beneficial.  Our previously exposed external jugular vein was ligated proximally and distally with 2-0 silk suture.  This was transected and gently dilated with 0.5% Marcaine.  This was everted and left double-thickness in normal direction of flow with no valve leaflets.  This was then sewn to arteriotomy with running 6-0 Prolene suture and loupe magnification.  Prior to complete closure, the shunt was removed and flushed with heparinized saline solution.  Patch closure was completed and blood flow was sequentially allowed to go up the IC with an excellent pulse being noted, good hemostasis, and a stable EEG.  The artery had an excellent pulse following revascularization and did slightly dilate.  Our patch measured 4 cm in length.  A small amount of Surgicel was placed over this.  Neck was closed in layers with interrupted running 3-0 Vicryl suture.  Mandibular cutaneous nerve was quite minimal and not reanastomosed.  Wound was infiltrated with 0.5% Marcaine for post-analgesia along with Toradol 15 mg IV.  Skin was closed with running 4-0 Monocryl in subcuticular fashion, followed by Steri-Strips and gauze dressing.      The patient tolerated the procedure well.       ESTIMATED BLOOD LOSS:  Less than 25 mL.      COMPLICATIONS:  None.      The patient had extremely small arteries, particularly internal carotid artery.  Nearly occlusive calcified plaque was removed to reestablish good blood flow distally.  The patient has been on chronic aspirin, but will be placed on Plavix due to the very small vessels.         SHIVANI CANCHOLA MD             D: 2018   T: 2018   MT:       Name:     EDILMA TOBAR   MRN:      [id number]        Account:        [id number]   :      1950           Procedure Date: 2018      Document: [id number]       cc: [facility]

## 2018-05-12 NOTE — DISCHARGE SUMMARY
Admit Date:     05/11/2018   Discharge Date:           DISCHARGE SUMMARY       DATE OF ADMISSION:  5/11/2018      DATE OF DISCHARGE:  5/12/2018      HISTORY:  This 67-year-old patient had repeated episodes of dizziness, blurry vision, and instability associated with exercise.  Further workup revealed bilateral carotid disease.  By MRA, this was 80% on the right and 60% on the left.  No evidence of a stroke by MRI.  He had well-controlled hypertension and noninsulin-dependent diabetes.  It was initially unclear whether these were related to cerebrovascular issues since the stenoses were significant but not critical.  However, we did feel that this should be evaluated further.       The patient saw me in the office yesterday.  We did perform a carotid duplex ultrasound.  This revealed a much more significant stenosis than noted on the MRA, felt to be almost 99% on the right and 85% on the left.  Because of this, we decided to proceed with a carotid endarterectomy initially on the more critical right carotid stenosis.      HOSPITAL COURSE:  The patient was admitted.  He was taken to the operating room, where under general anesthetic he underwent a right carotid endarterectomy with EEG monitoring and intraoperative shunting.  He had extensive calcified plaque at the carotid bulb and proximal ICA and a very small distal ICA measuring no more than 2.5 mm, which is quite unusual for his body habitus.  The distal artery was completely free of disease.  An external jugular vein patch angioplasty was placed.  He tolerated the operative procedure quite well.      He was monitored in our IMC unit post procedure.  His blood pressure was slightly low, but he was completely asymptomatic.  He had no discomfort.  On the first postoperative day, he had some mild bruising at the surgical site but no neurological problems.  Blood pressure is 112/50 with a pulse of 64.      ADMISSION LABORATORY:  Serum creatinine 0.94, hemoglobin-A1c  6.7, total cholesterol 118, HDL 48, LDL 45, triglycerides 124.  Glucoses post-procedure were all less than 130.      The patient was doing very well and ready for discharge to home on the first postoperative day with no complications.  Due to the very small size of his arteries, he will be sent home on dual antiplatelet therapy with Plavix and aspirin.  No other change in his medications due to excellent risk factor control.      FINAL DIAGNOSES:   1.  Critical symptomatic right carotid stenosis.   a.  Status post right endarterectomy with external jugular vein patch angioplasty.   2.  High-grade left carotid stenosis.   3.  Well-controlled hypertension.   4.  Well-controlled hyperlipidemia with LDL less than goal of 70 on statin.   5.  Well-controlled noninsulin dependent diabetes.   6.  Former smoker, quit in 2007.      DISCHARGE MEDICATIONS:   1.  Aspirin 81 mg daily.   2.  Plavix 75 mg daily.   3.  Metformin 500 mg p.o. q.a.m. and 1000 mg p.o. q.p.m.   4.  Lisinopril 20 mg daily.   5.  Lipitor 80 mg daily.      PLAN:  The patient will be discharged to home.  He may gradually increase his activities with no specific restrictions.  I would expect that his symptoms should resolve now that we have opened up the right carotid artery, even though there is still disease within the left.  He will require a left carotid endarterectomy in the near future.  He will have a phone follow-up with me in 2 weeks and we will schedule his left carotid surgery at that time.  We will hold his Plavix for 5 days prior to the next procedure.  He should monitor his blood pressure at home to make sure that he is not over-medicated with the lisinopril, which could have been leading to his symptoms with the high-grade bilateral carotid stenosis, decreasing cerebral perfusion with hypotension.         SHIVANI CANCHOLA MD             D: 05/12/2018   T: 05/12/2018   MT: SAMINA      Name:     EDILMA TOBAR   MRN:      [id number]         Account:        [id number]   :      1950           Admit Date:     2018                                  Discharge Date:       Document: [id number]       cc: Mel Pappas NP

## 2018-05-12 NOTE — ANESTHESIA POSTPROCEDURE EVALUATION
Patient: Roscoe Jang    Procedure(s):  RIGHT CAROTID ENDARTERECTOMY WITH EEG - Wound Class: I-Clean    Diagnosis:SYMPTOMATIC RIGHT CAROTID STENOSIS  Diagnosis Additional Information: No value filed.    Anesthesia Type:  General, ETT    Note:  Anesthesia Post Evaluation    Patient location during evaluation: Bedside  Patient participation: Able to fully participate in evaluation  Level of consciousness: awake and alert  Pain management: adequate  Airway patency: patent  Cardiovascular status: acceptable  Respiratory status: acceptable  Hydration status: acceptable  PONV: none             Last vitals:  Vitals:    05/11/18 1930 05/11/18 1940 05/11/18 1950   BP:  116/60 119/60   Pulse:      Resp: 27 14 15   Temp: 36.8  C (98.2  F) 36.7  C (98.1  F) 36.7  C (98.1  F)   SpO2: 95% 97% 95%         Electronically Signed By: Ty Lorenzo MD  May 11, 2018  8:31 PM

## 2018-05-12 NOTE — PLAN OF CARE
Problem: Patient Care Overview  Goal: Plan of Care/Patient Progress Review  Outcome: Improving  AOx4, VSS on RA, LS diminished, +Flatus, +BS, Neuros intact.  R-neck incision JOSE MANUEL and CDI with steri strips with slight ecchymosis and bruising, dressing removed by RN, scant dried bloody drainage noted on bandage.  CHO diet tolerated well.  No corrective insulin given during day shift.  Pain well managed with PO tylenol.  Patient verbalized understanding of discharge instructions to RN, prescriptions given to patient including PO Plavix.  Patient escorted to hospital entrance per CNA via WC where they will be driven home by the patient's son.

## 2018-05-12 NOTE — PROVIDER NOTIFICATION
Patient with soft/low BP, lowest 83/56, pt asymptomatic. Neuro's intact. Dr. Bustos was notified. No new orders at this time. Will continue to monitor.

## 2018-05-12 NOTE — PLAN OF CARE
Problem: Patient Care Overview  Goal: Plan of Care/Patient Progress Review  Outcome: Improving  Patient progressing well per plan of care. A&O X 4, Neuro's intact, soft BP asymptomatic, denied dizziness. Ambulated to bathroom, voided. Denied pain. Dressing CDI. Taking oral fluids well. Saline locked.

## 2018-05-12 NOTE — DISCHARGE INSTRUCTIONS
Carotid Endartectomy  Post-Operative Instructions    Typical length of stay in the hospital is 1-2 days.  On occasion, an evening in the Intensive Care Unit may be required for blood pressure regulation.    Activity    Most people are able to resume normal activities 1-2 weeks after surgery.  The key is to gradually increase your level of activity as you are able.  It is not uncommon to feel easily fatigued - this will improve with time.      You should avoid heavy lifting more than 30 lbs for 1 week.      You may return to work when you feel able - typically 1-2 weeks after surgery, depending on the type of work you do.      You should not drive a car for 1 week after surgery.  In addition,  you can not be taking prescription strength pain medication and you must feel strong enough to drive safely.    Incision Care    You can shower or bathe 2 days after surgery - avoid rubbing and soaking your incision.      You may have strips of white tape called  steri-strips  across your incision; they should stay on for 5-7 days or until the ends start to curl up.  You can then remove the steri-strips, or we will remove them at your post-operative appointment.      Avoid shaving directly over the incision for 2-3 weeks.    Common Concerns    You may notice mild skin numbness on the side of your surgery in your neck, chin, face, and earlobe.  This is due to nerve  irritation  and will gradually resolve over the next several months.  Call our office if you experience any short-lasting episode of numbness or weakness affecting one side of your body.      Some bruising and firmness around you incision can be expected.  This will soften and resolve over the next few weeks.  You may notice that some discoloration spreads to an area lower than the incision, such as the shoulder, neck or upper chest.  Likewise, swelling can occur near the incision or below the chin.  Call our office if the swelling or bruising worsens, or if you have  difficulty swallowing.    Diet    You may resume a regular diet before you leave the hospital.  You may find that it is best to try smaller, more frequent meals until your appetite returns.    Medications    You may be started on a blood thinner after surgery - typically Aspirin and / or Plavix.      You may be given a prescription for pain medication after surgery.  If you need to have this refilled, please call your pharmacy where you had it filled.  They will then call us for approval.  Please do not call after hours for a refill on pain medication.    Post-Operative Appointments    You need to see your surgeon in the clinic approximately 1-2 weeks after surgery.  Post-operative appointment:   Date: _____________________________  Time: ____________________________  Dr. ______________________________      You will have an ultrasound test and evaluation with your surgeon 90 days (3 months) after surgery.      We will notify you by mail when you are due to schedule further ultrasound testing and evaluation; typically this is done annually.     When You Should Call Our Office    Body aches, chills or temperature greater than 101      Incision redness or drainage      Loss of vision in one eye      Loss of strength / weakness in one side of your body      Severe headache      Difficulty with speech      If you will be having an invasive procedure, such as a colonoscopy or dental work within one year of your surgery, you may need to take an antibiotic prior to the procedure if you had a Dacron patch with your surgery; please call us so we can assist you with this.    Call our main number, [phone] or [phone] for assistance with any concerns or questions.     Revised 5/2014

## 2018-05-12 NOTE — PROGRESS NOTES
Vascular Surgery Progress Note:  POD#1    S: Very comfortable through the night.  Taking only Tylenol.  No further dizziness.      O: Blood pressure is been somewhat lower through the night but completely asymptomatic.    Vitals:  BP  Min: 83/56  Max: 146/73  Temp  Av.1  F (36.7  C)  Min: 97.4  F (36.3  C)  Max: 98.4  F (36.9  C)  Pulse  Av.3  Min: 58  Max: 77  I/O last 3 completed shifts:  In: 1735 [P.O.:300; I.V.:935]  Out: 25 [Blood:25]    Physical Exam:  Blood pressure 112/50.  Pulse 64.                             Mild ecchymosis and swelling at right carotid incision.                             Neuro and cranial nerve XII= normal        Lab:   SCr= 0.94    Hemoglobin A1c = 6.7   LDL=45                                          Assessment/Plan: Doing very well.  Home today.  Due to very small arteries and significant contralateral disease will have him on aspirin and Plavix.  Phone call office follow-up in 2 weeks to discuss left CEA which is greater than 80% stenosis.  No specific restrictions to activities.                                    Good risk factor control with LDL less than 70 and hemoglobin A1c less than 7.0 continue with metformin, Lipitor, lisinopril.   Check blood pressure at home to make sure her lisinopril dosage is not excessive.                                         Wm. Akash MD

## 2018-05-15 ENCOUNTER — TELEPHONE (OUTPATIENT)
Dept: FAMILY MEDICINE | Facility: CLINIC | Age: 68
End: 2018-05-15

## 2018-05-15 LAB — INTERPRETATION ECG - MUSE: NORMAL

## 2018-05-15 NOTE — TELEPHONE ENCOUNTER
"Hospital/TCU/ED for chronic condition Discharge Protocol    \"Hi, my name is Rosemarie Huynh, a registered nurse, and I am calling from [facility].  I am calling to follow up and see how things are going for you after your recent emergency visit/hospital/TCU stay.\"    Tell me how you are doing now that you are home?\" States taking it easy, feeling well. Slight/ scant bleeding from neck incision, no s/sx infection, no fevers/ chills. Note bruising. Some discomfort. States BP this /?.  Advised to keep incision clean and dry, keep covered. Monitor for s/sx infection, further bleeding. Continue to monitor BP.        Discharge Instructions    \"Let's review your discharge instructions.  What is/are the follow-up recommendations?  Pt. Response: No specific F/U instructions. Schedule lt carotid endarterectomy in 2 wks    \"Has an appointment with your primary care provider been scheduled?\"   No (schedule appointment) Scheduled with Dr. Rivers 05-18-18    \"When you see the provider, I would recommend that you bring your medications with you.\"    Medications    \"Tell me what changed about your medicines when you discharged?\"    Changes to chronic meds?    0-1    \"What questions do you have about your medications?\"    None     New diagnoses of heart failure, COPD, diabetes, or MI?    No              Medication reconciliation completed? No  Was MTM referral placed (*Make sure to put transitions as reason for referral)?   No    Call Summary    \"What questions or concerns do you have about your recent visit and your follow-up care?\"     none    \"If you have questions or things don't continue to improve, we encourage you contact us through the main clinic number (give number).  Even if the clinic is not open, triage nurses are available 24/7 to help you.     We would like you to know that our clinic has extended hours (provide information).  We also have urgent care (provide details on closest location and hours/contact " "info)\"      \"Thank you for your time and take care!\"           "

## 2018-05-17 NOTE — DISCHARGE SUMMARY
Admit Date:     05/11/2018   Discharge Date:     05/12/2018      Revised      DISCHARGE SUMMARY          DATE OF ADMISSION:  5/11/2018         DATE OF DISCHARGE:  5/12/2018         HISTORY:  This 67-year-old patient had repeated episodes of dizziness, blurry vision, and instability associated with exercise.  Further workup revealed bilateral carotid disease.  By MRA, this was 80% on the right and 60% on the left.  No evidence of a stroke by MRI.  He had well-controlled hypertension and noninsulin-dependent diabetes.  It was initially unclear whether these were related to cerebrovascular issues since the stenoses were significant but not critical.  However, we did feel that this should be evaluated further.          The patient saw me in the office yesterday.  We did perform a carotid duplex ultrasound.  This revealed a much more significant stenosis than noted on the MRA, felt to be almost 99% on the right and 85% on the left.  Because of this, we decided to proceed with a carotid endarterectomy initially on the more critical right carotid stenosis.         HOSPITAL COURSE:  The patient was admitted.  He was taken to the operating room, where under general anesthetic he underwent a right carotid endarterectomy with EEG monitoring and intraoperative shunting.  He had extensive calcified plaque at the carotid bulb and proximal ICA and a very small distal ICA measuring no more than 2.5 mm, which is quite unusual for his body habitus.  The distal artery was completely free of disease.  An external jugular vein patch angioplasty was placed.  He tolerated the operative procedure quite well.         He was monitored in our IMC unit post procedure.  His blood pressure was slightly low, but he was completely asymptomatic.  He had no discomfort.  On the first postoperative day, he had some mild bruising at the surgical site but no neurological problems.  Blood pressure is 112/50 with a pulse of 64.         ADMISSION  LABORATORY:  Serum creatinine 0.94, hemoglobin-A1c 6.7, total cholesterol 118, HDL 48, LDL 45, triglycerides 124.  Glucoses post-procedure were all less than 130.         The patient was doing very well and ready for discharge to home on the first postoperative day with no complications.  Due to the very small size of his arteries, he will be sent home on dual antiplatelet therapy with Plavix and aspirin.  No other change in his medications due to excellent risk factor control.         FINAL DIAGNOSES:    1.  Critical symptomatic right carotid stenosis.    a.  Status post right endarterectomy with external jugular vein patch angioplasty.    2.  High-grade left carotid stenosis.    3.  Well-controlled hypertension.    4.  Well-controlled hyperlipidemia with LDL less than goal of 70 on statin.    5.  Well-controlled noninsulin dependent diabetes.    6.  Former smoker, quit in 2007.         DISCHARGE MEDICATIONS:    1.  Aspirin 81 mg daily.    2.  Plavix 75 mg daily.    3.  Metformin 500 mg p.o. q.a.m. and 1000 mg p.o. q.p.m.    4.  Lisinopril 20 mg daily.    5.  Lipitor 80 mg daily.         PLAN:  The patient will be discharged to home.  He may gradually increase his activities with no specific restrictions.  I would expect that his symptoms should resolve now that we have opened up the right carotid artery, even though there is still disease within the left.  He will require a left carotid endarterectomy in the near future.  He will have a phone follow-up with me in 2 weeks and we will schedule his left carotid surgery at that time.  We will hold his Plavix for 5 days prior to the next procedure.  He should monitor his blood pressure at home to make sure that he is not over-medicated with the lisinopril, which could have been leading to his symptoms with the high-grade bilateral carotid stenosis, decreasing cerebral perfusion with hypotension.   Revised encounter lg 5/17/18         SHIVANI CANCHOLA MD             D:  2018   T: 2018   MT: CG      Name:     EDILMA TOBAR   MRN:      [id number]        Account:        [id number]   :      1950           Admit Date:     2018                                  Discharge Date: 2018      Document: [id number]       cc: Mel Pappas NP

## 2018-05-18 ENCOUNTER — OFFICE VISIT (OUTPATIENT)
Dept: FAMILY MEDICINE | Facility: CLINIC | Age: 68
End: 2018-05-18
Payer: COMMERCIAL

## 2018-05-18 VITALS
DIASTOLIC BLOOD PRESSURE: 82 MMHG | RESPIRATION RATE: 18 BRPM | HEIGHT: 68 IN | WEIGHT: 241 LBS | TEMPERATURE: 98.2 F | OXYGEN SATURATION: 97 % | SYSTOLIC BLOOD PRESSURE: 140 MMHG | HEART RATE: 95 BPM | BODY MASS INDEX: 36.53 KG/M2

## 2018-05-18 DIAGNOSIS — E11.65 TYPE 2 DIABETES MELLITUS WITH HYPERGLYCEMIA, WITHOUT LONG-TERM CURRENT USE OF INSULIN (H): ICD-10-CM

## 2018-05-18 DIAGNOSIS — E66.01 MORBID OBESITY (H): ICD-10-CM

## 2018-05-18 DIAGNOSIS — I65.21 CAROTID ARTERY STENOSIS, SYMPTOMATIC, RIGHT: Primary | ICD-10-CM

## 2018-05-18 DIAGNOSIS — Z98.890 S/P CAROTID ENDARTERECTOMY: ICD-10-CM

## 2018-05-18 PROCEDURE — 99495 TRANSJ CARE MGMT MOD F2F 14D: CPT | Performed by: FAMILY MEDICINE

## 2018-05-18 RX ORDER — ATORVASTATIN CALCIUM 20 MG/1
20 TABLET, FILM COATED ORAL DAILY
Qty: 90 TABLET | Refills: 1 | Status: SHIPPED | OUTPATIENT
Start: 2018-05-18 | End: 2018-11-05

## 2018-05-18 NOTE — MR AVS SNAPSHOT
After Visit Summary   5/18/2018    Roscoe Jang    MRN: [id number]           Patient Information     Date Of Birth          1950        Visit Information        Provider Department      5/18/2018 10:00 AM Rell Rivers MD [facility]        Today's Diagnoses     Carotid artery stenosis, symptomatic, right    -  1    S/P carotid endarterectomy        Type 2 diabetes mellitus with hyperglycemia, without long-term current use of insulin (H)        Morbid obesity (H)          Care Instructions      Carotid Artery Problems: Blockage     A healthy carotid artery lets blood flow easily to the brain.   The blood carries oxygen and nutrients throughout the body. The carotid arteries are large blood vessels that carry blood to the brain. Certain health problems can make the inside of the carotid arteries narrow and rough. Over time, this damage increases the chances of having a stroke. A stroke is a sudden loss of brain function.   Open carotid arteries  In a healthy carotid artery, the inside of the artery is open. The lining of the artery wall is also smooth. This lets blood flow freely from the heart to the brain. The brain gets all the oxygen and nutrients it needs to function well.  Narrowed carotid arteries  Health factors, such as high blood pressure, high cholesterol, smoking, and diabetes, can damage artery walls and make them rough. This allows cholesterol and other particles in the blood to stick to the artery walls and form plaque or fatty deposits. As the plaque builds up, it can narrow the artery. Blood may also collect on the plaque and form blood clots.  How a stroke happens     Emboli can enter the bloodstream and travel to the brain. Brain tissue is damaged when emboli block arteries in the brain.   A stroke can happen when plaque in the carotid artery ruptures. This can allow small pieces of plaque to break off into the bloodstream. At the same time, rupture can make  "more blood clots. The pieces of plaque and tiny blood clots or emboli can flow in the blood until they get stuck in a small blood vessel in the brain. This blocks blood flow to part of the brain and causes a stroke.  Date Last Reviewed: 2015-2017 [employer]. [address] [place] [address]. All rights reserved. This information is not intended as a substitute for professional medical care. Always follow your healthcare professional's instructions.                Follow-ups after your visit        Who to contact     If you have questions or need follow up information about today's clinic visit or your schedule please contact [facility] directly at [phone].  Normal or non-critical lab and imaging results will be communicated to you by Adictizhart, letter or phone within 4 business days after the clinic has received the results. If you do not hear from us within 7 days, please contact the clinic through Adictizhart or phone. If you have a critical or abnormal lab result, we will notify you by phone as soon as possible.  Submit refill requests through [employer] or call your pharmacy and they will forward the refill request to us. Please allow 3 business days for your refill to be completed.          Additional Information About Your Visit        AdictizharOddslife Information     [employer] lets you send messages to your doctor, view your test results, renew your prescriptions, schedule appointments and more. To sign up, go to [email or web address] . Click on \"Log in\" on the left side of the screen, which will take you to the Welcome page. Then click on \"Sign up Now\" on the right side of the page.     You will be asked to enter the access code listed below, as well as some personal information. Please follow the directions to create your username and password.     Your access code is: [email or web address]  Expires: 2018  3:39 PM     Your access code will  in 90 days. If you need " "help or a new code, please call your [facility] clinic or [phone].        Care EveryWhere ID     This is your Care EveryWhere ID. This could be used by other organizations to access your [facility] medical records  [id number]        Your Vitals Were     Pulse Temperature Respirations Height Pulse Oximetry BMI (Body Mass Index)    95 98.2  F (36.8  C) (Tympanic) 18 5' 8\" (1.727 m) 97% 36.64 kg/m2       Blood Pressure from Last 3 Encounters:   05/18/18 140/82   05/12/18 112/50   05/11/18 128/80    Weight from Last 3 Encounters:   05/18/18 241 lb (109.3 kg)   05/12/18 239 lb 12.8 oz (108.8 kg)   05/11/18 238 lb (108 kg)              Today, you had the following     No orders found for display         Today's Medication Changes          These changes are accurate as of 5/18/18 10:28 AM.  If you have any questions, ask your nurse or doctor.               These medicines have changed or have updated prescriptions.        Dose/Directions    * atorvastatin 80 MG tablet   Commonly known as:  LIPITOR   This may have changed:  Another medication with the same name was added. Make sure you understand how and when to take each.   Used for:  Bilateral carotid artery disease (H)   Changed by:  Rell Rivers MD        Dose:  80 mg   Take 1 tablet (80 mg) by mouth daily   Quantity:  30 tablet   Refills:  1       * atorvastatin 20 MG tablet   Commonly known as:  LIPITOR   This may have changed:  You were already taking a medication with the same name, and this prescription was added. Make sure you understand how and when to take each.   Used for:  Carotid artery stenosis, symptomatic, right   Changed by:  Rell Rivers MD        Dose:  20 mg   Take 1 tablet (20 mg) by mouth daily   Quantity:  90 tablet   Refills:  1       * Notice:  This list has 2 medication(s) that are the same as other medications prescribed for you. Read the directions carefully, and ask your doctor or other care provider to review them with you.    "   Stop taking these medicines if you haven't already. Please contact your care team if you have questions.     KENALOG 40 MG/ML injection   Generic drug:  triamcinolone acetonide   Stopped by:  Rell Rivers MD                Where to get your medicines      These medications were sent to [id number] - [place], [place] - [address]   [address] , [facility] [address]     Phone:  [phone]     atorvastatin 20 MG tablet                Primary Care Provider Office Phone # Fax #    Mel Pappas, [phone]       [address]  [facility] [address]        Equal Access to Services     [facility]: Hadii aad ku hadasho Soomaali, waaxda luqadaha, qaybta kaalmada adeegyada, waxhailee ben nanci tellez . So [facility] [phone].    ATENCIÓN: Si habla español, tiene a tapia disposición servicios gratuitos de asistencia lingüística. Llame al [phone].    We comply with applicable federal civil rights laws and [place] laws. We do not discriminate on the basis of race, color, national origin, age, disability, sex, sexual orientation, or gender identity.            Thank you!     Thank you for choosing [facility]  for your care. Our goal is always to provide you with excellent care. Hearing back from our patients is one way we can continue to improve our services. Please take a few minutes to complete the written survey that you may receive in the mail after your visit with us. Thank you!             Your Updated Medication List - Protect others around you: Learn how to safely use, store and throw away your medicines at www.disposemymeds.org.          This list is accurate as of 5/18/18 10:28 AM.  Always use your most recent med list.                   Brand Name Dispense Instructions for use Diagnosis    acetaminophen 325 MG tablet    TYLENOL    100 tablet    Take 2 tablets (650 mg) by mouth every 4 hours as needed for other (multimodal surgical pain  management along with NSAIDS and opioid medication as indicated based on pain control and physical function.)    Carotid artery stenosis, symptomatic, right       ASPIRIN PO      Take 81 mg by mouth daily        * atorvastatin 80 MG tablet    LIPITOR    30 tablet    Take 1 tablet (80 mg) by mouth daily    Bilateral carotid artery disease (H)       * atorvastatin 20 MG tablet    LIPITOR    90 tablet    Take 1 tablet (20 mg) by mouth daily    Carotid artery stenosis, symptomatic, right       blood glucose monitoring lancets     100 each    Use to test blood sugar 1 times daily or as directed.    Type 2 diabetes mellitus (H)       * blood glucose monitoring test strip    RHYS CONTOUR NEXT    100 strip    Use to test blood sugar 1 times daily or as directed.    Type 2 diabetes mellitus (H)       * blood glucose monitoring test strip    RHYS CONTOUR NEXT    100 strip    Use to test blood sugar 1 times daily or as directed.  Ok to substitute alternative if insurance prefers.    Type 2 diabetes mellitus with hyperglycemia, without long-term current use of insulin (H)       clopidogrel 75 MG tablet    PLAVIX    30 tablet    Take 1 tablet (75 mg) by mouth daily    Carotid artery stenosis, symptomatic, right       IBUPROFEN PO      Take 200-800 mg by mouth daily as needed for moderate pain        lisinopril 20 MG tablet    PRINIVIL/ZESTRIL    90 tablet    TAKE ONE TABLET BY MOUTH ONCE DAILY    Benign essential hypertension       * METFORMIN HCL PO      Take 500 mg by mouth every morning        * METFORMIN HCL PO      Take 1,000 mg by mouth every evening (Takes 2 x 500mg tablet = 1000mg)        * Notice:  This list has 6 medication(s) that are the same as other medications prescribed for you. Read the directions carefully, and ask your doctor or other care provider to review them with you.

## 2018-05-18 NOTE — PROGRESS NOTES
SUBJECTIVE:   Roscoe Jang is a 67 year old male who presents to clinic today for the following health issues:          Hospital Follow-up Visit:    Hospital/Nursing Home/IP Rehab Facility: [facility]  Date of Admission: 5/11/18  Date of Discharge: 5/12/18  Reason(s) for Admission: RIGHT CAROTID ENDARTERECTOMY WITH EEG       Scar did bleed a little bit. Itches really bad. Bruising is getting better. Bp has been running on the higher end.            Problems taking medications regularly:  None       Medication changes since discharge: None       Problems adhering to non-medication therapy:  None    Summary of hospitalization:  [facility] discharge summary reviewed  Diagnostic Tests/Treatments reviewed.  Follow up needed: will have left carotid endarterectomy in 2 weeks    Other Healthcare Providers Involved in Patient s Care:         None  Update since discharge: improved.     Post Discharge Medication Reconciliation: discharge medications reconciled and changed, per note/orders (see AVS).  Plan of care communicated with patient     Coding guidelines for this visit:  Type of Medical   Decision Making Face-to-Face Visit       within 7 Days of discharge Face-to-Face Visit        within 14 days of discharge   Moderate Complexity [id number]   High Complexity 39907 [id number]              Problem list and histories reviewed & adjusted, as indicated.  Additional history: as documented    Patient Active Problem List   Diagnosis     Benign essential hypertension     Mixed hyperlipidemia     Obesity     Abdominal aortic aneurysm (H)     Fatty liver     Type 2 diabetes mellitus with hyperglycemia, without long-term current use of insulin (H)     Morbid obesity (H)     Bilateral carotid artery disease (H)     Carotid artery stenosis, symptomatic, right     Past Surgical History:   Procedure Laterality Date     ENDARTERECTOMY CAROTID Right 5/11/2018    Procedure: ENDARTERECTOMY CAROTID;  RIGHT CAROTID  ENDARTERECTOMY WITH EEG;  Surgeon: Gaurav Bustos MD;  Location:  OR       Social History   Substance Use Topics     Smoking status: Former Smoker     Quit date: 10/3/2007     Smokeless tobacco: Never Used     Alcohol use No     History reviewed. No pertinent family history.      Current Outpatient Prescriptions   Medication Sig Dispense Refill     acetaminophen (TYLENOL) 325 MG tablet Take 2 tablets (650 mg) by mouth every 4 hours as needed for other (multimodal surgical pain management along with NSAIDS and opioid medication as indicated based on pain control and physical function.) 100 tablet 0     ASPIRIN PO Take 81 mg by mouth daily       atorvastatin (LIPITOR) 20 MG tablet Take 1 tablet (20 mg) by mouth daily 90 tablet 1     blood glucose monitoring (RHYS CONTOUR NEXT) test strip Use to test blood sugar 1 times daily or as directed. 100 strip 6     blood glucose monitoring (RHYS CONTOUR NEXT) test strip Use to test blood sugar 1 times daily or as directed.  Ok to substitute alternative if insurance prefers. 100 strip 11     blood glucose monitoring (RHYS MICROLET) lancets Use to test blood sugar 1 times daily or as directed. 100 each 5     clopidogrel (PLAVIX) 75 MG tablet Take 1 tablet (75 mg) by mouth daily 30 tablet 1     IBUPROFEN PO Take 200-800 mg by mouth daily as needed for moderate pain       lisinopril (PRINIVIL/ZESTRIL) 20 MG tablet TAKE ONE TABLET BY MOUTH ONCE DAILY 90 tablet 0     METFORMIN HCL PO Take 500 mg by mouth every morning       METFORMIN HCL PO Take 1,000 mg by mouth every evening (Takes 2 x 500mg tablet = 1000mg)       atorvastatin (LIPITOR) 80 MG tablet Take 1 tablet (80 mg) by mouth daily (Patient not taking: Reported on 5/18/2018) 30 tablet 1     No Known Allergies  Recent Labs   Lab Test  05/11/18   1350  05/08/18   1929  03/27/18   1545  10/06/17   1000  07/07/17   1326   A1C  6.7*   --   6.3*  6.4*  8.4*   LDL  45   --    --    --   86   HDL  48   --    --    --     "--    TRIG  124   --    --    --    --    CR  0.94   --   1.08   --   1.00   GFRESTIMATED  80  67  68   --   75   GFRESTBLACK  >90  81  83   --   >90   GFR Calc     POTASSIUM  4.4   --   4.2   --   4.4   TSH   --    --    --   0.90   --       BP Readings from Last 3 Encounters:   05/18/18 140/82   05/12/18 112/50   05/11/18 128/80    Wt Readings from Last 3 Encounters:   05/18/18 241 lb (109.3 kg)   05/12/18 239 lb 12.8 oz (108.8 kg)   05/11/18 238 lb (108 kg)                    Reviewed and updated as needed this visit by clinical staff       Reviewed and updated as needed this visit by Provider         ROS:  10 point ROS of systems including Constitutional, Eyes, Respiratory, Cardiovascular, Gastroenterology, Genitourinary, Integumentary, Muscularskeletal, Psychiatric were all negative except for pertinent positives noted in my HPI.    OBJECTIVE:     /82 (Cuff Size: Adult Large)  Pulse 95  Temp 98.2  F (36.8  C) (Tympanic)  Resp 18  Ht 5' 8\" (1.727 m)  Wt 241 lb (109.3 kg)  SpO2 97%  BMI 36.64 kg/m2  Body mass index is 36.64 kg/(m^2).  GENERAL: alert, no distress and obese  EYES: Eyes grossly normal to inspection, PERRL and conjunctivae and sclerae normal  HENT: neck: s/p RCA endarterectomy, scar C/D/I, nose and mouth without ulcers or lesions, oropharynx clear, oral mucous membranes moist   NECK: no adenopathy, no asymmetry, masses, or scars and thyroid normal to palpation  RESP: lungs clear to auscultation - no rales, rhonchi or wheezes  CV: regular rate and rhythm, normal S1 S2, no S3 or S4, no murmur, click or rub, no peripheral edema and peripheral pulses strong  ABDOMEN: soft, nontender, no hepatosplenomegaly, no masses and bowel sounds normal  MS: no gross musculoskeletal defects noted, no edema  NEURO: Normal strength and tone, mentation intact and speech normal  PSYCH: mentation appears normal, affect normal/bright      ASSESSMENT/PLAN:         ICD-10-CM    1. Carotid artery " stenosis, symptomatic, right I65.21 atorvastatin (LIPITOR) 20 MG tablet   2. S/P carotid endarterectomy Z98.890    3. Type 2 diabetes mellitus with hyperglycemia, without long-term current use of insulin (H) E11.65    4. Morbid obesity (H) E66.01      Patient underwent right sided carotid endarterectomy recently, will have left carotid endarterectomy in about 2 weeks.  Denies any visual symptoms or dizziness anymore.  Lipitor 80 mg was giving him headaches, symptom resolved on stopping Lipitor.  Recommended to start taking Lipitor 20 mg, at least increase it up to 40 mg if continue tolerating it well.  Continue aspirin, Plavix, metformin and lisinopril.  Target blood pressure less than 150/90.  Written information provided.  All questions answered.      Patient Instructions       Carotid Artery Problems: Blockage     A healthy carotid artery lets blood flow easily to the brain.   The blood carries oxygen and nutrients throughout the body. The carotid arteries are large blood vessels that carry blood to the brain. Certain health problems can make the inside of the carotid arteries narrow and rough. Over time, this damage increases the chances of having a stroke. A stroke is a sudden loss of brain function.   Open carotid arteries  In a healthy carotid artery, the inside of the artery is open. The lining of the artery wall is also smooth. This lets blood flow freely from the heart to the brain. The brain gets all the oxygen and nutrients it needs to function well.  Narrowed carotid arteries  Health factors, such as high blood pressure, high cholesterol, smoking, and diabetes, can damage artery walls and make them rough. This allows cholesterol and other particles in the blood to stick to the artery walls and form plaque or fatty deposits. As the plaque builds up, it can narrow the artery. Blood may also collect on the plaque and form blood clots.  How a stroke happens     Emboli can enter the bloodstream and travel to  the brain. Brain tissue is damaged when emboli block arteries in the brain.   A stroke can happen when plaque in the carotid artery ruptures. This can allow small pieces of plaque to break off into the bloodstream. At the same time, rupture can make more blood clots. The pieces of plaque and tiny blood clots or emboli can flow in the blood until they get stuck in a small blood vessel in the brain. This blocks blood flow to part of the brain and causes a stroke.  Date Last Reviewed: 6/8/2015 2000-2017 [employer]. [address] [place] [id number]. All rights reserved. This information is not intended as a substitute for professional medical care. Always follow your healthcare professional's instructions.            Rell Rivers MD  [facility]

## 2018-05-18 NOTE — PATIENT INSTRUCTIONS
Carotid Artery Problems: Blockage     A healthy carotid artery lets blood flow easily to the brain.   The blood carries oxygen and nutrients throughout the body. The carotid arteries are large blood vessels that carry blood to the brain. Certain health problems can make the inside of the carotid arteries narrow and rough. Over time, this damage increases the chances of having a stroke. A stroke is a sudden loss of brain function.   Open carotid arteries  In a healthy carotid artery, the inside of the artery is open. The lining of the artery wall is also smooth. This lets blood flow freely from the heart to the brain. The brain gets all the oxygen and nutrients it needs to function well.  Narrowed carotid arteries  Health factors, such as high blood pressure, high cholesterol, smoking, and diabetes, can damage artery walls and make them rough. This allows cholesterol and other particles in the blood to stick to the artery walls and form plaque or fatty deposits. As the plaque builds up, it can narrow the artery. Blood may also collect on the plaque and form blood clots.  How a stroke happens     Emboli can enter the bloodstream and travel to the brain. Brain tissue is damaged when emboli block arteries in the brain.   A stroke can happen when plaque in the carotid artery ruptures. This can allow small pieces of plaque to break off into the bloodstream. At the same time, rupture can make more blood clots. The pieces of plaque and tiny blood clots or emboli can flow in the blood until they get stuck in a small blood vessel in the brain. This blocks blood flow to part of the brain and causes a stroke.  Date Last Reviewed: 6/8/2015 2000-2017 [employer]. [address] [place] [address], [place] [address]. All rights reserved. This information is not intended as a substitute for professional medical care. Always follow your healthcare professional's instructions.

## 2018-05-18 NOTE — NURSING NOTE
"Chief Complaint   Patient presents with     Hospital F/U       Initial /82 (Cuff Size: Adult Large)  Pulse 95  Temp 98.2  F (36.8  C) (Tympanic)  Resp 18  Ht 5' 8\" (1.727 m)  Wt 241 lb (109.3 kg)  SpO2 97%  BMI 36.64 kg/m2 Estimated body mass index is 36.64 kg/(m^2) as calculated from the following:    Height as of this encounter: 5' 8\" (1.727 m).    Weight as of this encounter: 241 lb (109.3 kg).      Health Maintenance that is potentially due pending provider review:  Colonoscopy/FIT    Pt will schedule appt.    Is there anyone who you would like to be able to receive your results? Not Applicable  If yes have patient fill out JARVIS    "

## 2018-05-21 ENCOUNTER — TELEPHONE (OUTPATIENT)
Dept: OTHER | Facility: CLINIC | Age: 68
End: 2018-05-21

## 2018-05-21 NOTE — TELEPHONE ENCOUNTER
Pt is status post Right CEA on 5-11-18.   I called pt, LVM noting if any vascular concerns arise to be sure to call us, and to schedule a f/u appt with Dr. Bustos.   Pt needs to an OV scheduled with Dr. Bustos 2 weeks post op.      Tyra Guerra, BONYN, RN

## 2018-05-22 ENCOUNTER — TELEPHONE (OUTPATIENT)
Dept: OTHER | Facility: CLINIC | Age: 68
End: 2018-05-22

## 2018-05-22 NOTE — TELEPHONE ENCOUNTER
"Pt is status post right CEA on 5/11/18 with Dr. Bustos.  Are you having any issues with pain? none  Do you have any concerns about your incision? no  Do you have fever, nausea or vomiting? no  Do you have any additional concerns? Pt saw PCP regarding hypertension, adjusting lisinopril.    Per Dr. Bustos's d/c note (5/12/18), \"He will require a left carotid endarterectomy in the near future.  He will have a phone follow-up with me in 2 weeks and we will schedule his left carotid surgery at that time.  We will hold his Plavix for 5 days prior to the next procedure.\"   Pt was already scheduled for post-op appt with Dr. Bustos for 5/31/18.  Will review with Dr. Bustos if post-op appt is needed and determine when left CEA should be scheduled.    Ivette Alcantar, BSN, RN      "

## 2018-05-22 NOTE — TELEPHONE ENCOUNTER
Roscoe called back from a post op call our nursing staff made.  Roscoe states everything is going well.  He states he had some blood pressures that he saw his PCP for after surgery.  Roscoe asked if he needs a post op appointment or if we are going to schedule his left carotid surgery he needs.  I told him that I will route a note to Dr. Bustos's nurse to see if patient needs a post op or if we just need to get him scheduled for the other surgery. Rosemarie Teajda,

## 2018-05-24 ENCOUNTER — TELEPHONE (OUTPATIENT)
Dept: OTHER | Facility: CLINIC | Age: 68
End: 2018-05-24

## 2018-05-25 NOTE — TELEPHONE ENCOUNTER
Dr. Bustos contacted pt via phone to discuss post-op recovery.  Post op appt for 5/31/18 cancelled.  Ivette Alcantar, BONYN, RN

## 2018-05-25 NOTE — TELEPHONE ENCOUNTER
[place] [facility]    I called Roscoe Jang about his right CEA performed on 5/12/2018.  He was discharged the following day is done very well.  He will need to take several Tylenol tablets for pain.  He has some firmness in the incision as expected but otherwise is doing fine.  No further headaches or dizzy episodes since surgery.  He was discharged on aspirin and Plavix.    Patient had been on simvastatin Dr. Pappas changed him to Lipitor 80 mg daily.  This was giving him some headaches and the dosage was decreased to 20 mg with resolution of the symptoms.  Patient hopes to increase this to 40 mg in the near future.      He does have extensive disease on the left side felt to be 85% by MRA.  We plan to do a left CEA once he feels he is recovered from the right surgery.  He will call to schedule this with Rosemarie Tejada this coming week to do within the next several weeks.  He will hold his Plavix for 5 days prior to the next procedure but stay on his other medications including aspirin.      Gaurav Bustos MD

## 2018-05-29 ENCOUNTER — TELEPHONE (OUTPATIENT)
Dept: OTHER | Facility: CLINIC | Age: 68
End: 2018-05-29

## 2018-05-29 NOTE — TELEPHONE ENCOUNTER
Spoke with Roscoe on Friday, May 25th.    Type of surgery: LEFT CEA WITH EEG  Location of surgery: [facility]  Date and time of surgery: 06/06/18 @ 3:10PM  Surgeon: Dr. Bustos  Pre-Op Appt Date: DONE 05/18/18 @ [facility]  Post-Op Appt Date: PT TO SCHEDULE   Packet sent out: Yes  Pre-cert/Authorization completed:  Yes  Date: 05/25/18  Rosemarie Tejada,

## 2018-06-05 ENCOUNTER — TELEPHONE (OUTPATIENT)
Dept: OTHER | Facility: CLINIC | Age: 68
End: 2018-06-05

## 2018-06-05 NOTE — TELEPHONE ENCOUNTER
[place] [facility]    I called Roscoe Jang about his carotid surgery tomorrow.  He had no further questions.       Gaurav Bustos MD

## 2018-06-05 NOTE — H&P (VIEW-ONLY)
SUBJECTIVE:   Roscoe Jang is a 67 year old male who presents to clinic today for the following health issues:          Hospital Follow-up Visit:    Hospital/Nursing Home/IP Rehab Facility: [facility]  Date of Admission: 5/11/18  Date of Discharge: 5/12/18  Reason(s) for Admission: RIGHT CAROTID ENDARTERECTOMY WITH EEG       Scar did bleed a little bit. Itches really bad. Bruising is getting better. Bp has been running on the higher end.            Problems taking medications regularly:  None       Medication changes since discharge: None       Problems adhering to non-medication therapy:  None    Summary of hospitalization:  [facility] discharge summary reviewed  Diagnostic Tests/Treatments reviewed.  Follow up needed: will have left carotid endarterectomy in 2 weeks    Other Healthcare Providers Involved in Patient s Care:         None  Update since discharge: improved.     Post Discharge Medication Reconciliation: discharge medications reconciled and changed, per note/orders (see AVS).  Plan of care communicated with patient     Coding guidelines for this visit:  Type of Medical   Decision Making Face-to-Face Visit       within 7 Days of discharge Face-to-Face Visit        within 14 days of discharge   Moderate Complexity 32030 [id number]   High Complexity 45344 14681              Problem list and histories reviewed & adjusted, as indicated.  Additional history: as documented    Patient Active Problem List   Diagnosis     Benign essential hypertension     Mixed hyperlipidemia     Obesity     Abdominal aortic aneurysm (H)     Fatty liver     Type 2 diabetes mellitus with hyperglycemia, without long-term current use of insulin (H)     Morbid obesity (H)     Bilateral carotid artery disease (H)     Carotid artery stenosis, symptomatic, right     Past Surgical History:   Procedure Laterality Date     ENDARTERECTOMY CAROTID Right 5/11/2018    Procedure: ENDARTERECTOMY CAROTID;  RIGHT CAROTID  ENDARTERECTOMY WITH EEG;  Surgeon: Gaurav Bustos MD;  Location:  OR       Social History   Substance Use Topics     Smoking status: Former Smoker     Quit date: 10/3/2007     Smokeless tobacco: Never Used     Alcohol use No     History reviewed. No pertinent family history.      Current Outpatient Prescriptions   Medication Sig Dispense Refill     acetaminophen (TYLENOL) 325 MG tablet Take 2 tablets (650 mg) by mouth every 4 hours as needed for other (multimodal surgical pain management along with NSAIDS and opioid medication as indicated based on pain control and physical function.) 100 tablet 0     ASPIRIN PO Take 81 mg by mouth daily       atorvastatin (LIPITOR) 20 MG tablet Take 1 tablet (20 mg) by mouth daily 90 tablet 1     blood glucose monitoring (RHYS CONTOUR NEXT) test strip Use to test blood sugar 1 times daily or as directed. 100 strip 6     blood glucose monitoring (RHYS CONTOUR NEXT) test strip Use to test blood sugar 1 times daily or as directed.  Ok to substitute alternative if insurance prefers. 100 strip 11     blood glucose monitoring (RHYS MICROLET) lancets Use to test blood sugar 1 times daily or as directed. 100 each 5     clopidogrel (PLAVIX) 75 MG tablet Take 1 tablet (75 mg) by mouth daily 30 tablet 1     IBUPROFEN PO Take 200-800 mg by mouth daily as needed for moderate pain       lisinopril (PRINIVIL/ZESTRIL) 20 MG tablet TAKE ONE TABLET BY MOUTH ONCE DAILY 90 tablet 0     METFORMIN HCL PO Take 500 mg by mouth every morning       METFORMIN HCL PO Take 1,000 mg by mouth every evening (Takes 2 x 500mg tablet = 1000mg)       atorvastatin (LIPITOR) 80 MG tablet Take 1 tablet (80 mg) by mouth daily (Patient not taking: Reported on 5/18/2018) 30 tablet 1     No Known Allergies  Recent Labs   Lab Test  05/11/18   1350  05/08/18   1929  03/27/18   1545  10/06/17   1000  07/07/17   1326   A1C  6.7*   --   6.3*  6.4*  8.4*   LDL  45   --    --    --   86   HDL  48   --    --    --     "--    TRIG  124   --    --    --    --    CR  0.94   --   1.08   --   1.00   GFRESTIMATED  80  67  68   --   75   GFRESTBLACK  >90  81  83   --   >90   GFR Calc     POTASSIUM  4.4   --   4.2   --   4.4   TSH   --    --    --   0.90   --       BP Readings from Last 3 Encounters:   05/18/18 140/82   05/12/18 112/50   05/11/18 128/80    Wt Readings from Last 3 Encounters:   05/18/18 241 lb (109.3 kg)   05/12/18 239 lb 12.8 oz (108.8 kg)   05/11/18 238 lb (108 kg)                    Reviewed and updated as needed this visit by clinical staff       Reviewed and updated as needed this visit by Provider         ROS:  10 point ROS of systems including Constitutional, Eyes, Respiratory, Cardiovascular, Gastroenterology, Genitourinary, Integumentary, Muscularskeletal, Psychiatric were all negative except for pertinent positives noted in my HPI.    OBJECTIVE:     /82 (Cuff Size: Adult Large)  Pulse 95  Temp 98.2  F (36.8  C) (Tympanic)  Resp 18  Ht 5' 8\" (1.727 m)  Wt 241 lb (109.3 kg)  SpO2 97%  BMI 36.64 kg/m2  Body mass index is 36.64 kg/(m^2).  GENERAL: alert, no distress and obese  EYES: Eyes grossly normal to inspection, PERRL and conjunctivae and sclerae normal  HENT: neck: s/p RCA endarterectomy, scar C/D/I, nose and mouth without ulcers or lesions, oropharynx clear, oral mucous membranes moist   NECK: no adenopathy, no asymmetry, masses, or scars and thyroid normal to palpation  RESP: lungs clear to auscultation - no rales, rhonchi or wheezes  CV: regular rate and rhythm, normal S1 S2, no S3 or S4, no murmur, click or rub, no peripheral edema and peripheral pulses strong  ABDOMEN: soft, nontender, no hepatosplenomegaly, no masses and bowel sounds normal  MS: no gross musculoskeletal defects noted, no edema  NEURO: Normal strength and tone, mentation intact and speech normal  PSYCH: mentation appears normal, affect normal/bright      ASSESSMENT/PLAN:         ICD-10-CM    1. Carotid artery " stenosis, symptomatic, right I65.21 atorvastatin (LIPITOR) 20 MG tablet   2. S/P carotid endarterectomy Z98.890    3. Type 2 diabetes mellitus with hyperglycemia, without long-term current use of insulin (H) E11.65    4. Morbid obesity (H) E66.01      Patient underwent right sided carotid endarterectomy recently, will have left carotid endarterectomy in about 2 weeks.  Denies any visual symptoms or dizziness anymore.  Lipitor 80 mg was giving him headaches, symptom resolved on stopping Lipitor.  Recommended to start taking Lipitor 20 mg, at least increase it up to 40 mg if continue tolerating it well.  Continue aspirin, Plavix, metformin and lisinopril.  Target blood pressure less than 150/90.  Written information provided.  All questions answered.      Patient Instructions       Carotid Artery Problems: Blockage     A healthy carotid artery lets blood flow easily to the brain.   The blood carries oxygen and nutrients throughout the body. The carotid arteries are large blood vessels that carry blood to the brain. Certain health problems can make the inside of the carotid arteries narrow and rough. Over time, this damage increases the chances of having a stroke. A stroke is a sudden loss of brain function.   Open carotid arteries  In a healthy carotid artery, the inside of the artery is open. The lining of the artery wall is also smooth. This lets blood flow freely from the heart to the brain. The brain gets all the oxygen and nutrients it needs to function well.  Narrowed carotid arteries  Health factors, such as high blood pressure, high cholesterol, smoking, and diabetes, can damage artery walls and make them rough. This allows cholesterol and other particles in the blood to stick to the artery walls and form plaque or fatty deposits. As the plaque builds up, it can narrow the artery. Blood may also collect on the plaque and form blood clots.  How a stroke happens     Emboli can enter the bloodstream and travel to  the brain. Brain tissue is damaged when emboli block arteries in the brain.   A stroke can happen when plaque in the carotid artery ruptures. This can allow small pieces of plaque to break off into the bloodstream. At the same time, rupture can make more blood clots. The pieces of plaque and tiny blood clots or emboli can flow in the blood until they get stuck in a small blood vessel in the brain. This blocks blood flow to part of the brain and causes a stroke.  Date Last Reviewed: 6/8/2015 2000-2017 [employer]. [address] [place] [address]. All rights reserved. This information is not intended as a substitute for professional medical care. Always follow your healthcare professional's instructions.            Rell Rivers MD  [facility]

## 2018-06-06 ENCOUNTER — OFFICE VISIT (OUTPATIENT)
Dept: SURGERY | Facility: PHYSICIAN GROUP | Age: 68
End: 2018-06-06
Payer: COMMERCIAL

## 2018-06-06 ENCOUNTER — ANESTHESIA EVENT (OUTPATIENT)
Dept: SURGERY | Facility: CLINIC | Age: 68
DRG: 027 | End: 2018-06-06
Payer: MEDICARE

## 2018-06-06 ENCOUNTER — HOSPITAL ENCOUNTER (INPATIENT)
Facility: CLINIC | Age: 68
LOS: 1 days | Discharge: HOME | DRG: 027 | End: 2018-06-07
Attending: SURGERY | Admitting: SURGERY
Payer: MEDICARE

## 2018-06-06 ENCOUNTER — ANESTHESIA (OUTPATIENT)
Dept: SURGERY | Facility: CLINIC | Age: 68
DRG: 027 | End: 2018-06-06
Payer: MEDICARE

## 2018-06-06 DIAGNOSIS — I65.21 CAROTID ARTERY STENOSIS, SYMPTOMATIC, RIGHT: ICD-10-CM

## 2018-06-06 PROBLEM — I65.29 CAROTID STENOSIS, ASYMPTOMATIC: Status: ACTIVE | Noted: 2018-06-06

## 2018-06-06 LAB
GLUCOSE BLDC GLUCOMTR-MCNC: 124 MG/DL (ref 70–99)
GLUCOSE SERPL-MCNC: 123 MG/DL (ref 70–99)
HGB BLD-MCNC: 14.2 G/DL (ref 13.3–17.7)
POTASSIUM SERPL-SCNC: 4.6 MMOL/L (ref 3.4–5.3)

## 2018-06-06 PROCEDURE — 25000128 H RX IP 250 OP 636: Performed by: REGISTERED NURSE

## 2018-06-06 PROCEDURE — 25000128 H RX IP 250 OP 636: Performed by: SURGERY

## 2018-06-06 PROCEDURE — 25000128 H RX IP 250 OP 636: Performed by: NURSE ANESTHETIST, CERTIFIED REGISTERED

## 2018-06-06 PROCEDURE — 37000009 ZZH ANESTHESIA TECHNICAL FEE, EACH ADDTL 15 MIN: Performed by: SURGERY

## 2018-06-06 PROCEDURE — 93005 ELECTROCARDIOGRAM TRACING: CPT

## 2018-06-06 PROCEDURE — 25000566 ZZH SEVOFLURANE, EA 15 MIN: Performed by: SURGERY

## 2018-06-06 PROCEDURE — 36415 COLL VENOUS BLD VENIPUNCTURE: CPT | Performed by: ANESTHESIOLOGY

## 2018-06-06 PROCEDURE — 25000125 ZZHC RX 250: Performed by: SURGERY

## 2018-06-06 PROCEDURE — 82947 ASSAY GLUCOSE BLOOD QUANT: CPT | Performed by: ANESTHESIOLOGY

## 2018-06-06 PROCEDURE — 05BQ0ZZ EXCISION OF LEFT EXTERNAL JUGULAR VEIN, OPEN APPROACH: ICD-10-PCS | Performed by: SURGERY

## 2018-06-06 PROCEDURE — 35301 RECHANNELING OF ARTERY: CPT | Mod: LT | Performed by: SURGERY

## 2018-06-06 PROCEDURE — 84132 ASSAY OF SERUM POTASSIUM: CPT | Performed by: ANESTHESIOLOGY

## 2018-06-06 PROCEDURE — 25000125 ZZHC RX 250: Performed by: ANESTHESIOLOGY

## 2018-06-06 PROCEDURE — 99223 1ST HOSP IP/OBS HIGH 75: CPT | Performed by: INTERNAL MEDICINE

## 2018-06-06 PROCEDURE — 12000007 ZZH R&B INTERMEDIATE

## 2018-06-06 PROCEDURE — 03CN3ZZ EXTIRPATION OF MATTER FROM LEFT EXTERNAL CAROTID ARTERY, PERCUTANEOUS APPROACH: ICD-10-PCS | Performed by: SURGERY

## 2018-06-06 PROCEDURE — 03UL37Z SUPPLEMENT LEFT INTERNAL CAROTID ARTERY WITH AUTOLOGOUS TISSUE SUBSTITUTE, PERCUTANEOUS APPROACH: ICD-10-PCS | Performed by: SURGERY

## 2018-06-06 PROCEDURE — 25000125 ZZHC RX 250: Performed by: NURSE ANESTHETIST, CERTIFIED REGISTERED

## 2018-06-06 PROCEDURE — 36000093 ZZH SURGERY LEVEL 4 1ST 30 MIN: Performed by: SURGERY

## 2018-06-06 PROCEDURE — A9270 NON-COVERED ITEM OR SERVICE: HCPCS | Mod: GY | Performed by: SURGERY

## 2018-06-06 PROCEDURE — 71000012 ZZH RECOVERY PHASE 1 LEVEL 1 FIRST HR: Performed by: SURGERY

## 2018-06-06 PROCEDURE — 40000171 ZZH STATISTIC PRE-PROCEDURE ASSESSMENT III: Performed by: SURGERY

## 2018-06-06 PROCEDURE — 25000132 ZZH RX MED GY IP 250 OP 250 PS 637: Mod: GY | Performed by: SURGERY

## 2018-06-06 PROCEDURE — 25000128 H RX IP 250 OP 636: Performed by: ANESTHESIOLOGY

## 2018-06-06 PROCEDURE — 85018 HEMOGLOBIN: CPT | Performed by: ANESTHESIOLOGY

## 2018-06-06 PROCEDURE — 03CL3ZZ EXTIRPATION OF MATTER FROM LEFT INTERNAL CAROTID ARTERY, PERCUTANEOUS APPROACH: ICD-10-PCS | Performed by: SURGERY

## 2018-06-06 PROCEDURE — 37000008 ZZH ANESTHESIA TECHNICAL FEE, 1ST 30 MIN: Performed by: SURGERY

## 2018-06-06 PROCEDURE — 00000146 ZZHCL STATISTIC GLUCOSE BY METER IP

## 2018-06-06 PROCEDURE — 36000063 ZZH SURGERY LEVEL 4 EA 15 ADDTL MIN: Performed by: SURGERY

## 2018-06-06 PROCEDURE — 27210995 ZZH RX 272: Performed by: SURGERY

## 2018-06-06 PROCEDURE — 25000125 ZZHC RX 250: Performed by: REGISTERED NURSE

## 2018-06-06 PROCEDURE — 27210794 ZZH OR GENERAL SUPPLY STERILE: Performed by: SURGERY

## 2018-06-06 PROCEDURE — 93010 ELECTROCARDIOGRAM REPORT: CPT | Performed by: INTERNAL MEDICINE

## 2018-06-06 RX ORDER — KETOROLAC TROMETHAMINE 30 MG/ML
INJECTION, SOLUTION INTRAMUSCULAR; INTRAVENOUS PRN
Status: DISCONTINUED | OUTPATIENT
Start: 2018-06-06 | End: 2018-06-06

## 2018-06-06 RX ORDER — NEOSTIGMINE METHYLSULFATE 1 MG/ML
VIAL (ML) INJECTION PRN
Status: DISCONTINUED | OUTPATIENT
Start: 2018-06-06 | End: 2018-06-06

## 2018-06-06 RX ORDER — ONDANSETRON 2 MG/ML
INJECTION INTRAMUSCULAR; INTRAVENOUS PRN
Status: DISCONTINUED | OUTPATIENT
Start: 2018-06-06 | End: 2018-06-06

## 2018-06-06 RX ORDER — HYDROMORPHONE HYDROCHLORIDE 1 MG/ML
.3-.5 INJECTION, SOLUTION INTRAMUSCULAR; INTRAVENOUS; SUBCUTANEOUS
Status: DISCONTINUED | OUTPATIENT
Start: 2018-06-06 | End: 2018-06-07 | Stop reason: HOSPADM

## 2018-06-06 RX ORDER — AMOXICILLIN 250 MG
1 CAPSULE ORAL 2 TIMES DAILY
Status: DISCONTINUED | OUTPATIENT
Start: 2018-06-06 | End: 2018-06-07 | Stop reason: HOSPADM

## 2018-06-06 RX ORDER — AMOXICILLIN 250 MG
2 CAPSULE ORAL 2 TIMES DAILY
Status: DISCONTINUED | OUTPATIENT
Start: 2018-06-06 | End: 2018-06-07 | Stop reason: HOSPADM

## 2018-06-06 RX ORDER — LIDOCAINE HYDROCHLORIDE 20 MG/ML
INJECTION, SOLUTION INFILTRATION; PERINEURAL PRN
Status: DISCONTINUED | OUTPATIENT
Start: 2018-06-06 | End: 2018-06-06

## 2018-06-06 RX ORDER — ONDANSETRON 2 MG/ML
4 INJECTION INTRAMUSCULAR; INTRAVENOUS EVERY 6 HOURS PRN
Status: DISCONTINUED | OUTPATIENT
Start: 2018-06-06 | End: 2018-06-07 | Stop reason: HOSPADM

## 2018-06-06 RX ORDER — SODIUM CHLORIDE, SODIUM LACTATE, POTASSIUM CHLORIDE, CALCIUM CHLORIDE 600; 310; 30; 20 MG/100ML; MG/100ML; MG/100ML; MG/100ML
INJECTION, SOLUTION INTRAVENOUS CONTINUOUS
Status: DISCONTINUED | OUTPATIENT
Start: 2018-06-06 | End: 2018-06-06 | Stop reason: HOSPADM

## 2018-06-06 RX ORDER — CLOPIDOGREL BISULFATE 75 MG/1
75 TABLET ORAL DAILY
Qty: 30 TABLET | Refills: 0 | Status: SHIPPED | OUTPATIENT
Start: 2018-06-06 | End: 2018-10-02

## 2018-06-06 RX ORDER — FENTANYL CITRATE 50 UG/ML
INJECTION, SOLUTION INTRAMUSCULAR; INTRAVENOUS PRN
Status: DISCONTINUED | OUTPATIENT
Start: 2018-06-06 | End: 2018-06-06

## 2018-06-06 RX ORDER — CEFAZOLIN SODIUM 1 G/3ML
INJECTION, POWDER, FOR SOLUTION INTRAMUSCULAR; INTRAVENOUS PRN
Status: DISCONTINUED | OUTPATIENT
Start: 2018-06-06 | End: 2018-06-06

## 2018-06-06 RX ORDER — NICOTINE POLACRILEX 4 MG
15-30 LOZENGE BUCCAL
Status: DISCONTINUED | OUTPATIENT
Start: 2018-06-06 | End: 2018-06-07 | Stop reason: HOSPADM

## 2018-06-06 RX ORDER — DIPHENHYDRAMINE HCL 12.5MG/5ML
12.5 LIQUID (ML) ORAL EVERY 6 HOURS PRN
Status: DISCONTINUED | OUTPATIENT
Start: 2018-06-06 | End: 2018-06-07 | Stop reason: HOSPADM

## 2018-06-06 RX ORDER — CEFAZOLIN SODIUM 2 G/100ML
2 INJECTION, SOLUTION INTRAVENOUS
Status: COMPLETED | OUTPATIENT
Start: 2018-06-06 | End: 2018-06-06

## 2018-06-06 RX ORDER — EPHEDRINE SULFATE 50 MG/ML
INJECTION, SOLUTION INTRAMUSCULAR; INTRAVENOUS; SUBCUTANEOUS PRN
Status: DISCONTINUED | OUTPATIENT
Start: 2018-06-06 | End: 2018-06-06

## 2018-06-06 RX ORDER — MAGNESIUM HYDROXIDE 1200 MG/15ML
LIQUID ORAL PRN
Status: DISCONTINUED | OUTPATIENT
Start: 2018-06-06 | End: 2018-06-06 | Stop reason: HOSPADM

## 2018-06-06 RX ORDER — HEPARIN SODIUM 1000 [USP'U]/ML
INJECTION, SOLUTION INTRAVENOUS; SUBCUTANEOUS PRN
Status: DISCONTINUED | OUTPATIENT
Start: 2018-06-06 | End: 2018-06-06 | Stop reason: HOSPADM

## 2018-06-06 RX ORDER — LIDOCAINE 40 MG/G
CREAM TOPICAL
Status: DISCONTINUED | OUTPATIENT
Start: 2018-06-06 | End: 2018-06-07 | Stop reason: HOSPADM

## 2018-06-06 RX ORDER — ATORVASTATIN CALCIUM 10 MG/1
20 TABLET, FILM COATED ORAL DAILY
Status: DISCONTINUED | OUTPATIENT
Start: 2018-06-07 | End: 2018-06-07 | Stop reason: HOSPADM

## 2018-06-06 RX ORDER — HEPARIN SODIUM 1000 [USP'U]/ML
INJECTION, SOLUTION INTRAVENOUS; SUBCUTANEOUS PRN
Status: DISCONTINUED | OUTPATIENT
Start: 2018-06-06 | End: 2018-06-06

## 2018-06-06 RX ORDER — LABETALOL HYDROCHLORIDE 5 MG/ML
10 INJECTION, SOLUTION INTRAVENOUS EVERY 10 MIN PRN
Status: DISCONTINUED | OUTPATIENT
Start: 2018-06-06 | End: 2018-06-07 | Stop reason: HOSPADM

## 2018-06-06 RX ORDER — SODIUM CHLORIDE 9 MG/ML
INJECTION, SOLUTION INTRAVENOUS CONTINUOUS
Status: DISCONTINUED | OUTPATIENT
Start: 2018-06-06 | End: 2018-06-07 | Stop reason: HOSPADM

## 2018-06-06 RX ORDER — GLYCOPYRROLATE 0.2 MG/ML
INJECTION, SOLUTION INTRAMUSCULAR; INTRAVENOUS PRN
Status: DISCONTINUED | OUTPATIENT
Start: 2018-06-06 | End: 2018-06-06

## 2018-06-06 RX ORDER — CLOPIDOGREL BISULFATE 75 MG/1
75 TABLET ORAL DAILY
Status: DISCONTINUED | OUTPATIENT
Start: 2018-06-07 | End: 2018-06-07 | Stop reason: HOSPADM

## 2018-06-06 RX ORDER — NALOXONE HYDROCHLORIDE 0.4 MG/ML
.1-.4 INJECTION, SOLUTION INTRAMUSCULAR; INTRAVENOUS; SUBCUTANEOUS
Status: DISCONTINUED | OUTPATIENT
Start: 2018-06-06 | End: 2018-06-07 | Stop reason: HOSPADM

## 2018-06-06 RX ORDER — ASPIRIN 81 MG/1
81 TABLET, CHEWABLE ORAL EVERY MORNING
Status: DISCONTINUED | OUTPATIENT
Start: 2018-06-07 | End: 2018-06-07 | Stop reason: HOSPADM

## 2018-06-06 RX ORDER — ASPIRIN 600 MG/1
600 SUPPOSITORY RECTAL ONCE
Status: COMPLETED | OUTPATIENT
Start: 2018-06-06 | End: 2018-06-06

## 2018-06-06 RX ORDER — VECURONIUM BROMIDE 1 MG/ML
INJECTION, POWDER, LYOPHILIZED, FOR SOLUTION INTRAVENOUS PRN
Status: DISCONTINUED | OUTPATIENT
Start: 2018-06-06 | End: 2018-06-06

## 2018-06-06 RX ORDER — ONDANSETRON 4 MG/1
4 TABLET, ORALLY DISINTEGRATING ORAL EVERY 6 HOURS PRN
Status: DISCONTINUED | OUTPATIENT
Start: 2018-06-06 | End: 2018-06-07 | Stop reason: HOSPADM

## 2018-06-06 RX ORDER — BUPIVACAINE HYDROCHLORIDE 5 MG/ML
INJECTION, SOLUTION PERINEURAL PRN
Status: DISCONTINUED | OUTPATIENT
Start: 2018-06-06 | End: 2018-06-06 | Stop reason: HOSPADM

## 2018-06-06 RX ORDER — FENTANYL CITRATE 50 UG/ML
25 INJECTION, SOLUTION INTRAMUSCULAR; INTRAVENOUS
Status: COMPLETED | OUTPATIENT
Start: 2018-06-06 | End: 2018-06-06

## 2018-06-06 RX ORDER — ACETAMINOPHEN 325 MG/1
650 TABLET ORAL EVERY 4 HOURS PRN
Status: DISCONTINUED | OUTPATIENT
Start: 2018-06-09 | End: 2018-06-07 | Stop reason: HOSPADM

## 2018-06-06 RX ORDER — LISINOPRIL 20 MG/1
20 TABLET ORAL DAILY
Status: DISCONTINUED | OUTPATIENT
Start: 2018-06-07 | End: 2018-06-07 | Stop reason: HOSPADM

## 2018-06-06 RX ORDER — DIPHENHYDRAMINE HYDROCHLORIDE 50 MG/ML
12.5 INJECTION INTRAMUSCULAR; INTRAVENOUS EVERY 6 HOURS PRN
Status: DISCONTINUED | OUTPATIENT
Start: 2018-06-06 | End: 2018-06-07 | Stop reason: HOSPADM

## 2018-06-06 RX ORDER — ACETAMINOPHEN 325 MG/1
975 TABLET ORAL EVERY 8 HOURS
Status: DISCONTINUED | OUTPATIENT
Start: 2018-06-06 | End: 2018-06-07 | Stop reason: HOSPADM

## 2018-06-06 RX ORDER — OXYCODONE HYDROCHLORIDE 5 MG/1
5-10 TABLET ORAL EVERY 4 HOURS PRN
Status: DISCONTINUED | OUTPATIENT
Start: 2018-06-06 | End: 2018-06-07 | Stop reason: HOSPADM

## 2018-06-06 RX ORDER — DEXTROSE MONOHYDRATE 25 G/50ML
25-50 INJECTION, SOLUTION INTRAVENOUS
Status: DISCONTINUED | OUTPATIENT
Start: 2018-06-06 | End: 2018-06-07 | Stop reason: HOSPADM

## 2018-06-06 RX ORDER — PROPOFOL 10 MG/ML
INJECTION, EMULSION INTRAVENOUS PRN
Status: DISCONTINUED | OUTPATIENT
Start: 2018-06-06 | End: 2018-06-06

## 2018-06-06 RX ORDER — DEXAMETHASONE SODIUM PHOSPHATE 4 MG/ML
INJECTION, SOLUTION INTRA-ARTICULAR; INTRALESIONAL; INTRAMUSCULAR; INTRAVENOUS; SOFT TISSUE PRN
Status: DISCONTINUED | OUTPATIENT
Start: 2018-06-06 | End: 2018-06-06

## 2018-06-06 RX ADMIN — DEXMEDETOMIDINE HYDROCHLORIDE 0.5 MCG/KG/HR: 100 INJECTION, SOLUTION INTRAVENOUS at 18:31

## 2018-06-06 RX ADMIN — PHENYLEPHRINE HYDROCHLORIDE 50 MCG: 10 INJECTION, SOLUTION INTRAMUSCULAR; INTRAVENOUS; SUBCUTANEOUS at 19:17

## 2018-06-06 RX ADMIN — SODIUM CHLORIDE, POTASSIUM CHLORIDE, SODIUM LACTATE AND CALCIUM CHLORIDE: 600; 310; 30; 20 INJECTION, SOLUTION INTRAVENOUS at 20:41

## 2018-06-06 RX ADMIN — LIDOCAINE HYDROCHLORIDE 1 ML: 10 INJECTION, SOLUTION INFILTRATION; PERINEURAL at 13:47

## 2018-06-06 RX ADMIN — VECURONIUM BROMIDE 2 MG: 1 INJECTION, POWDER, LYOPHILIZED, FOR SOLUTION INTRAVENOUS at 19:47

## 2018-06-06 RX ADMIN — ONDANSETRON 4 MG: 2 INJECTION INTRAMUSCULAR; INTRAVENOUS at 20:33

## 2018-06-06 RX ADMIN — MIDAZOLAM 2 MG: 1 INJECTION INTRAMUSCULAR; INTRAVENOUS at 18:21

## 2018-06-06 RX ADMIN — Medication 10 MG: at 19:07

## 2018-06-06 RX ADMIN — KETOROLAC TROMETHAMINE 15 MG: 30 INJECTION, SOLUTION INTRAMUSCULAR at 19:55

## 2018-06-06 RX ADMIN — FENTANYL CITRATE 100 MCG: 50 INJECTION, SOLUTION INTRAMUSCULAR; INTRAVENOUS at 18:41

## 2018-06-06 RX ADMIN — ROCURONIUM BROMIDE 50 MG: 10 INJECTION INTRAVENOUS at 18:25

## 2018-06-06 RX ADMIN — VECURONIUM BROMIDE 2 MG: 1 INJECTION, POWDER, LYOPHILIZED, FOR SOLUTION INTRAVENOUS at 19:14

## 2018-06-06 RX ADMIN — FENTANYL CITRATE 50 MCG: 50 INJECTION, SOLUTION INTRAMUSCULAR; INTRAVENOUS at 19:48

## 2018-06-06 RX ADMIN — HEPARIN SODIUM 5000 UNITS: 1000 INJECTION, SOLUTION INTRAVENOUS; SUBCUTANEOUS at 19:11

## 2018-06-06 RX ADMIN — SODIUM CHLORIDE, POTASSIUM CHLORIDE, SODIUM LACTATE AND CALCIUM CHLORIDE: 600; 310; 30; 20 INJECTION, SOLUTION INTRAVENOUS at 13:47

## 2018-06-06 RX ADMIN — PHENYLEPHRINE HYDROCHLORIDE 0.25 MCG/KG/MIN: 10 INJECTION, SOLUTION INTRAMUSCULAR; INTRAVENOUS; SUBCUTANEOUS at 18:34

## 2018-06-06 RX ADMIN — CEFAZOLIN SODIUM 2 G: 2 INJECTION, SOLUTION INTRAVENOUS at 18:33

## 2018-06-06 RX ADMIN — VECURONIUM BROMIDE 2 MG: 1 INJECTION, POWDER, LYOPHILIZED, FOR SOLUTION INTRAVENOUS at 20:09

## 2018-06-06 RX ADMIN — ASPIRIN 600 MG: 600 SUPPOSITORY RECTAL at 21:17

## 2018-06-06 RX ADMIN — PHENYLEPHRINE HYDROCHLORIDE 50 MCG: 10 INJECTION, SOLUTION INTRAMUSCULAR; INTRAVENOUS; SUBCUTANEOUS at 18:47

## 2018-06-06 RX ADMIN — SODIUM CHLORIDE, POTASSIUM CHLORIDE, SODIUM LACTATE AND CALCIUM CHLORIDE: 600; 310; 30; 20 INJECTION, SOLUTION INTRAVENOUS at 18:39

## 2018-06-06 RX ADMIN — FENTANYL CITRATE 50 MCG: 50 INJECTION, SOLUTION INTRAMUSCULAR; INTRAVENOUS at 18:25

## 2018-06-06 RX ADMIN — GLYCOPYRROLATE 0.8 MG: 0.2 INJECTION, SOLUTION INTRAMUSCULAR; INTRAVENOUS at 20:50

## 2018-06-06 RX ADMIN — MIDAZOLAM HYDROCHLORIDE 1 MG: 1 INJECTION, SOLUTION INTRAMUSCULAR; INTRAVENOUS at 14:49

## 2018-06-06 RX ADMIN — CEFAZOLIN 1 G: 1 INJECTION, POWDER, FOR SOLUTION INTRAMUSCULAR; INTRAVENOUS at 20:33

## 2018-06-06 RX ADMIN — DEXAMETHASONE SODIUM PHOSPHATE 4 MG: 4 INJECTION, SOLUTION INTRA-ARTICULAR; INTRALESIONAL; INTRAMUSCULAR; INTRAVENOUS; SOFT TISSUE at 18:38

## 2018-06-06 RX ADMIN — Medication 5 MG: at 19:53

## 2018-06-06 RX ADMIN — LIDOCAINE HYDROCHLORIDE 100 MG: 20 INJECTION, SOLUTION INFILTRATION; PERINEURAL at 18:25

## 2018-06-06 RX ADMIN — PROPOFOL 200 MG: 10 INJECTION, EMULSION INTRAVENOUS at 18:25

## 2018-06-06 RX ADMIN — FENTANYL CITRATE 50 MCG: 50 INJECTION, SOLUTION INTRAMUSCULAR; INTRAVENOUS at 14:37

## 2018-06-06 RX ADMIN — NEOSTIGMINE METHYLSULFATE 5 MG: 1 INJECTION, SOLUTION INTRAVENOUS at 20:50

## 2018-06-06 ASSESSMENT — LIFESTYLE VARIABLES: TOBACCO_USE: 1

## 2018-06-06 ASSESSMENT — COPD QUESTIONNAIRES: COPD: 0

## 2018-06-06 NOTE — PROGRESS NOTES
Admission medication history interview status for the 6/6/2018  admission is complete. See EPIC admission navigator for prior to admission medications     Medication history source reliability:Good    Medication history interview source(s):Patient    Medication history resources (including written lists, pill bottles, clinic record):mailed in list    Primary pharmacy.[email or web address]    Additional medication history information not noted on PTA med list :None    Time spent in this activity: 35 minutes  Prior to Admission medications    Medication Sig Last Dose Taking? Auth Provider   acetaminophen (TYLENOL) 325 MG tablet Take 2 tablets (650 mg) by mouth every 4 hours as needed for other (multimodal surgical pain management along with NSAIDS and opioid medication as indicated based on pain control and physical function.)  Patient taking differently: Take 975 mg by mouth 2 times daily as needed for other (multimodal surgical pain management along with NSAIDS and opioid medication as indicated based on pain control and physical function.)  6/4/2018 at AM Yes Gaurav Bustos MD   ASPIRIN PO Take 81 mg by mouth every morning  6/5/2018 at AM Yes Reported, Patient   atorvastatin (LIPITOR) 20 MG tablet Take 1 tablet (20 mg) by mouth daily 6/6/2018 at 0730 Yes Rell Rivers MD   clopidogrel (PLAVIX) 75 MG tablet Take 1 tablet (75 mg) by mouth daily 5/31/2018 at AM Yes Gaurav Bustos MD   lisinopril (PRINIVIL/ZESTRIL) 20 MG tablet TAKE ONE TABLET BY MOUTH ONCE DAILY 6/6/2018 at 0730 Yes Mel Pappas NP   METFORMIN HCL PO Take 500 mg by mouth every morning 6/5/2018 at AM Yes Reported, Patient   METFORMIN HCL PO Take 1,000 mg by mouth every evening (Takes 2 x 500mg tablet = 1000mg) 6/4/2018 at PM Yes Reported, Patient   blood glucose monitoring (RHYS CONTOUR NEXT) test strip Use to test blood sugar 1 times daily or as directed.  Ok to substitute alternative if insurance prefers.   Rell Rivers MD   blood  glucose monitoring (RHYS CONTOUR NEXT) test strip Use to test blood sugar 1 times daily or as directed.   Rell Rivers MD   blood glucose monitoring (RHYS MICROLET) lancets Use to test blood sugar 1 times daily or as directed.   Rell Rivers MD

## 2018-06-06 NOTE — CONSULTS
[facility]    Vascular Medicine Consultation     Date of Admission:  6/6/2018  Date of Consult (When I saw the patient): 06/06/18         Physician Supervisory Attestation:   I have reviewed and discussed with the physician assistant their history, physical and plan and independently interviewed and examined Roscoe Jang and agree with the plan as stated in the physician assistant note.    Roscoe Jang is a 67 year old male former heavy smoker with a history of diabetes, hyperlipidemia, and hypertension who developed dizziness and blurry vision transiently. Work-up of this revealed significant bilateral carotid artery stenosis on MRI. He was referred on to Dr. Bustos of Vascular Surgery who saw him in clinic the next day. Ultrasound confirmed greater than 90% stenosis on the right and 80% stenosis on the left. He subsequently underwent a right carotid endarterectomy on 5/11/18. He has done well post-operatively. He denies any neurological symptoms since. He now presents for a left carotid endarterectomy.     He was seen and evaluated before surgery   Heart: RRR, lungs: CTA  Left carotid bruit  Rt sided well healed scar.  He stopped plavix 5 days ago    Post op cares , antiplatelet  meds per [facility] surgery  Follow neuro status  Keep SBP under 140, IV Labetolol PRN  Restart atorvastatin 20 mg daily and eventually increase to 40 mg daily in few weeks once recovers from surgery   ( previously headache with high dose atorva)  Insulin SS    Cc: Dr. Bustos  Primary MD Juana Perez MD ,[facility]  Vascular Medicine  6/6/2018      Assessment & Plan   1. Symptomatic bilateral carotid artery stenosis s/p right carotid endarterectomy 5/11/18 and now undergoing a left carotid endarterectomy 6/6/18    Post-operative cares per Dr. Bustos. He had been on Plavix and aspirin as an outpatient, although holding his Plavix the past 5 days. These will be resumed post-operatively.     2. Hyperlipidemia    His  lipid panel on 5/11/18 was significant for an LDL of 45 while on Simvastatin. However, given his severe carotid disease, he was changed over to Atorvastatin 80 mg daily. Shortly after starting this, he developed a severe headache, which lasted for a few days. He was unsure if this was due to his surgery or the new higher dose of Atorvastatin. He stopped the Atorvastatin and his headache resolved. Therefore, he was switched over to Atorvastatin 20 mg daily. He appears to be tolerating this dose well. He should continue on this for now, but ultimately he would get the most benefit from increasing this eventually to 40 mg daily if he can tolerate.     3. Type 2 diabetes    His diabetes has been well controlled with metformin with a recent A1C of 6.7%. His metformin can be resumed post-operatively once his diet returns to normal. His sugars will be monitored closely and sliding scale insulin can be provided as needed.     4. Hypertension    He will be continued on his prior to admission lisinopril. His blood pressure will be monitored closely.       Reason for Consult   Reason for consult: Asked by Dr. Bustos to evaluate vascular risk factors and assist with medical management in this 67 year old former heavy smoking male with hyperlipidemia, hypertension, and diabetes who recently underwent a right carotid endarterectomy and is now undergoing a left carotid endarterectomy for symptomatic carotid stenosis.     Primary Care Physician   Mel Pappas      History of Present Illness   Roscoe Jang is a 67 year old male former heavy smoker with a history of diabetes, hyperlipidemia, and hypertension who developed dizziness and blurry vision transiently. Work-up of this revealed significant bilateral carotid artery stenosis on MRI. He was referred on to Dr. Bustos of Vascular Surgery who saw him in clinic the next day. Ultrasound confirmed greater than 90% stenosis on the right and 80% stenosis on the left. He subsequently  underwent a right carotid endarterectomy on 5/11/18. He has done well post-operatively. He denies any neurological symptoms since. He now presents for a left carotid endarterectomy.     Past Medical History   Past Medical History:   Diagnosis Date     Arthritis     neck and hip and generalized     Diabetes (H)      Hyperlipidemia      Hypertension        Past Surgical History   Past Surgical History:   Procedure Laterality Date     ENDARTERECTOMY CAROTID Right 5/11/2018    Procedure: ENDARTERECTOMY CAROTID;  RIGHT CAROTID ENDARTERECTOMY WITH EEG;  Surgeon: Gaurav Bustos MD;  Location:  OR       Prior to Admission Medications   Prior to Admission Medications   Prescriptions Last Dose Informant Patient Reported? Taking?   ASPIRIN PO 6/5/2018 at AM Self Yes Yes   Sig: Take 81 mg by mouth every morning    METFORMIN HCL PO 6/5/2018 at AM Self Yes Yes   Sig: Take 500 mg by mouth every morning   METFORMIN HCL PO 6/4/2018 at PM Self Yes Yes   Sig: Take 1,000 mg by mouth every evening (Takes 2 x 500mg tablet = 1000mg)   acetaminophen (TYLENOL) 325 MG tablet 6/4/2018 at AM Self No Yes   Sig: Take 2 tablets (650 mg) by mouth every 4 hours as needed for other (multimodal surgical pain management along with NSAIDS and opioid medication as indicated based on pain control and physical function.)   Patient taking differently: Take 975 mg by mouth 2 times daily as needed for other (multimodal surgical pain management along with NSAIDS and opioid medication as indicated based on pain control and physical function.)    atorvastatin (LIPITOR) 20 MG tablet 6/6/2018 at 0730 Self No Yes   Sig: Take 1 tablet (20 mg) by mouth daily   blood glucose monitoring (RHYS CONTOUR NEXT) test strip  Self No No   Sig: Use to test blood sugar 1 times daily or as directed.   blood glucose monitoring (RHYS CONTOUR NEXT) test strip  Self No No   Sig: Use to test blood sugar 1 times daily or as directed.  Ok to substitute alternative if  insurance prefers.   blood glucose monitoring (RHYS MICROLET) lancets  Self No No   Sig: Use to test blood sugar 1 times daily or as directed.   clopidogrel (PLAVIX) 75 MG tablet 5/31/2018 at AM Self No Yes   Sig: Take 1 tablet (75 mg) by mouth daily   lisinopril (PRINIVIL/ZESTRIL) 20 MG tablet 6/6/2018 at 0730 Self No Yes   Sig: TAKE ONE TABLET BY MOUTH ONCE DAILY      Facility-Administered Medications: None     Allergies   No Known Allergies    Social History   Roscoe Jang  reports that he quit smoking about 10 years ago. At times he would smoke up to 7 packs per day! He has never used smokeless tobacco. He reports that he does not drink alcohol or use illicit drugs. Lives in [place], [place]. He is a .     Family History   No vascular or cardiac problems.     Review of Systems   The 10 point Review of Systems is negative other than noted in the HPI or here.     Physical Exam   Vital Signs with Ranges  Temp:  [97.8  F (36.6  C)] 97.8  F (36.6  C)  Pulse:  [70] 70  Heart Rate:  [70] 70  Resp:  [16] 16  BP: (155)/(79) 155/79  SpO2:  [98 %] 98 %  240 lbs 0 oz    Constitutional: awake, alert, cooperative, no apparent distress, and appears stated age  Eyes: Lids and lashes normal, pupils equal, round and reactive to light, extra ocular muscles intact, sclera clear, conjunctiva normal  ENT: normocepalic, without obvious abnormality, oropharynx pink and moist  Hematologic / Lymphatic: no lymphadenopathy  Respiratory: No increased work of breathing, good air exchange, clear to auscultation bilaterally, no crackles or wheezing  Cardiovascular: regular rate and rhythm, normal S1 and S2 and no murmur noted  GI: Normal bowel sounds, soft, non-distended, non-tender  Skin: no redness, warmth, or swelling, no rashes. Surgical site right neck healing well.   Musculoskeletal: There is no redness, warmth, or swelling of the joints.  Full range of motion noted.  Motor strength is 5 out of 5 all extremities  bilaterally.  Tone is normal.  Neurologic: Awake, alert, oriented to name, place and time.  Cranial nerves II-XII are grossly intact.  Motor is 5 out of 5 bilaterally.    Neuropsychiatric:  Normal affect, memory, insight.  Pulses: Left carotid bruit appreciated.     Data   Most Recent 3 CBC's:  Recent Labs   Lab Test  06/06/18   1315   HGB  14.2     Most Recent 3 BMP's:  Recent Labs   Lab Test  06/06/18   1315  05/11/18   1350  03/27/18   1545  07/07/17   1326   NA   --   139  141  142   POTASSIUM  4.6  4.4  4.2  4.4   CHLORIDE   --   107  106  107   CO2   --   28  28  29   BUN   --   22  22  14   CR   --   0.94  1.08  1.00   ANIONGAP   --   4  7  6   ROWENA   --   9.2  8.3*  8.9   GLC  123*  115*  97  224*     Most Recent Cholesterol Panel:  Recent Labs   Lab Test  05/11/18   1350   CHOL  118   LDL  45   HDL  48   TRIG  124     Most Recent Hemoglobin A1c:  Recent Labs   Lab Test  05/11/18   1350   A1C  6.7*

## 2018-06-06 NOTE — IP AVS SNAPSHOT
[place] [address] [facility]    [facility] [address] [place] [address]    Phone:  [phone]                                       After Visit Summary   6/6/2018    Roscoe Jang    MRN: [id number]           After Visit Summary Signature Page     I have received my discharge instructions, and my questions have been answered. I have discussed any challenges I see with this plan with the nurse or doctor.    ..........................................................................................................................................  Patient/Patient Representative Signature      ..........................................................................................................................................  Patient Representative Print Name and Relationship to Patient    ..................................................               ................................................  Date                                            Time    ..........................................................................................................................................  Reviewed by Signature/Title    ...................................................              ..............................................  Date                                                            Time

## 2018-06-06 NOTE — ANESTHESIA PREPROCEDURE EVALUATION
Anesthesia Evaluation     . Pt has not had prior anesthetic            ROS/MED HX    ENT/Pulmonary:     (+)tobacco use (Very significant smoking history, was 7 packs a day for a long time.  Quit about 10yrs ago), Past use , . .   (-) COPD and sleep apnea   Neurologic:     (+)TIA date: 5/2018     Cardiovascular:     (+) Dyslipidemia, hypertension-range: typically well controlled per patient, Peripheral Vascular Disease (symptomatic, 80% right, 60% left)-- Carotid Stenosis, --. : . . . :. .       METS/Exercise Tolerance:     Hematologic:         Musculoskeletal:   (+) arthritis, , , -       GI/Hepatic:     (+) liver disease (fatty liver),      (-) GERD   Renal/Genitourinary:         Endo:     (+) type II DM Not using insulin Obesity (BMI 36), .      Psychiatric:         Infectious Disease:         Malignancy:         Other:                     Physical Exam  Normal systems: cardiovascular and pulmonary    Airway   Mallampati: III  TM distance: >3 FB  Neck ROM: full    Dental   (+) missing    Cardiovascular   Rhythm and rate: regular      Pulmonary    breath sounds clear to auscultation                        Anesthesia Plan      History & Physical Review  History and physical reviewed and following examination; no interval change.    ASA Status:  4 .    NPO Status:  > 8 hours    Plan for General and ETT with Intravenous induction. Maintenance will be Balanced.    PONV prophylaxis:  Ondansetron (or other 5HT-3)  Additional equipment: Videolaryngoscope and Arterial Line      Postoperative Care  Postoperative pain management:  IV analgesics.      Consents  Anesthetic plan, risks, benefits and alternatives discussed with:  Patient..                          .

## 2018-06-07 ENCOUNTER — CARE COORDINATION (OUTPATIENT)
Dept: OTHER | Facility: CLINIC | Age: 68
End: 2018-06-07

## 2018-06-07 VITALS
WEIGHT: 239.64 LBS | HEIGHT: 68 IN | BODY MASS INDEX: 36.32 KG/M2 | HEART RATE: 87 BPM | RESPIRATION RATE: 16 BRPM | TEMPERATURE: 97.8 F | SYSTOLIC BLOOD PRESSURE: 131 MMHG | DIASTOLIC BLOOD PRESSURE: 67 MMHG | OXYGEN SATURATION: 94 %

## 2018-06-07 LAB
GLUCOSE BLDC GLUCOMTR-MCNC: 147 MG/DL (ref 70–99)
GLUCOSE BLDC GLUCOMTR-MCNC: 185 MG/DL (ref 70–99)
GLUCOSE BLDC GLUCOMTR-MCNC: 191 MG/DL (ref 70–99)

## 2018-06-07 PROCEDURE — A9270 NON-COVERED ITEM OR SERVICE: HCPCS | Mod: GY | Performed by: SURGERY

## 2018-06-07 PROCEDURE — 99233 SBSQ HOSP IP/OBS HIGH 50: CPT | Performed by: INTERNAL MEDICINE

## 2018-06-07 PROCEDURE — 25000132 ZZH RX MED GY IP 250 OP 250 PS 637: Mod: GY | Performed by: SURGERY

## 2018-06-07 PROCEDURE — 00000146 ZZHCL STATISTIC GLUCOSE BY METER IP

## 2018-06-07 PROCEDURE — A9270 NON-COVERED ITEM OR SERVICE: HCPCS | Mod: GY | Performed by: PHYSICIAN ASSISTANT

## 2018-06-07 PROCEDURE — 25000132 ZZH RX MED GY IP 250 OP 250 PS 637: Mod: GY | Performed by: PHYSICIAN ASSISTANT

## 2018-06-07 RX ORDER — CLOPIDOGREL BISULFATE 75 MG/1
75 TABLET ORAL DAILY
Qty: 30 TABLET | Refills: 0 | Status: SHIPPED | OUTPATIENT
Start: 2018-06-07 | End: 2018-10-02

## 2018-06-07 RX ADMIN — ACETAMINOPHEN 975 MG: 325 TABLET, FILM COATED ORAL at 00:19

## 2018-06-07 RX ADMIN — ACETAMINOPHEN 975 MG: 325 TABLET, FILM COATED ORAL at 07:02

## 2018-06-07 RX ADMIN — ASPIRIN 81 MG 81 MG: 81 TABLET ORAL at 08:31

## 2018-06-07 RX ADMIN — CLOPIDOGREL 75 MG: 75 TABLET, FILM COATED ORAL at 08:31

## 2018-06-07 RX ADMIN — SENNOSIDES AND DOCUSATE SODIUM 1 TABLET: 8.6; 5 TABLET ORAL at 08:31

## 2018-06-07 RX ADMIN — LISINOPRIL 20 MG: 20 TABLET ORAL at 08:31

## 2018-06-07 RX ADMIN — METFORMIN HYDROCHLORIDE 500 MG: 500 TABLET, FILM COATED ORAL at 08:31

## 2018-06-07 RX ADMIN — Medication 2 LOZENGE: at 00:19

## 2018-06-07 RX ADMIN — ATORVASTATIN CALCIUM 20 MG: 10 TABLET, FILM COATED ORAL at 08:31

## 2018-06-07 NOTE — BRIEF OP NOTE
[facility] Brief Operative Note    Pre-operative diagnosis: LEFT CAROTID STENOSIS    Post-operative diagnosis same   Procedure: Procedure(s):  LEFT CAROTID ENDARTERECTOMY with EXTERNAL JUGULAR VEIN PATCH - Wound Class: I-Clean   Surgeon(s): Surgeon(s) and Role:     * Gaurav Bustos MD - Primary     * Roma Willis MD - Assisting   Estimated blood loss: 21 mL    Specimens: * No specimens in log *   Findings: Stenosis of left carotid, repaired over shunt with vein patch. Patient moving all extremities at the conclusion of the procedure.     Roma Willis MD  General Surgery Resident- PGY5

## 2018-06-07 NOTE — OP NOTE
Procedure Date: 06/06/2018      PREOPERATIVE DIAGNOSIS:  High-grade asymptomatic left carotid stenosis.      POSTOPERATIVE DIAGNOSIS:  High-grade asymptomatic left carotid stenosis.      PROCEDURES:   1.  Left carotid endarterectomy with external jugular vein patch angioplasty.   a.  Intraoperative shunting.      SURGEON:  Gaurav Bustos MD      :  Roma Willis MD ( [facility] Surgery resident)      ANESTHESIA:  General.      PREOPERATIVE MEDICATIONS:  Ancef 2 grams IV.      INDICATIONS: A 67-year-old patient who presented with vague neurological symptoms.  He was found to have a greater than 98% right and 85% left internal carotid artery stenosis.  He has undergone a successful right carotid endarterectomy.  He presents for a similar procedure on the left side under informed consent.      DESCRIPTION OF PROCEDURE:  The patient was brought to operating room, induced under general anesthesia, orally intubated without difficulty.  Calf pneumatic compression boots were used and a pillow was placed under the knees.  A rolled towel was placed under his shoulders.  We identified an excellent external jugular vein, which was marked on the neck.  The left neck area was prepped and draped in the usual fashion.  A timeout was called and the sites were identified.      VASCULAR EXPOSURE:  A standard left carotid incision was made.  Dissection was carried down to the platysma.  We identified an excellent external jugular vein and this was dissected free, ligating two side branches with 7-0 Prolene suture and mobilizing the vein, but leaving it intact.  Sternocleidomastoid muscle was retracted laterally.  Deeper retractors were placed.  The facial venous branches were divided between 3-0 silk suture.  We identified and preserved the ansa cervicalis nerve.  The vagus nerve was visualized.  He had a relatively low bifurcation; therefore, the hypoglossal nerve was not exposed.  The patient had very small arteries  including ICA measuring less than 3 mm in diameter.  There was extensive plaque at the carotid bulb and proximal ICA, but no disease distally.  The more proximal CCA had only minimal changes.  We cautiously dissected free the common carotid, internal carotid and external carotid, which were encircled with Silastic vessel loops.      CAROTID ENDARTERECTOMY:  Heparin 5000 units intravenous were given.  After 5 minutes, the vessel was sequentially tightened.  An 11 blade was used to enter the common carotid and Bose scissors to extend the arteriotomy up to the ICA for a length of 2 cm distally.  He had significant calcified plaque that was nearly occlusive at the proximal ICA, but the distal vessel was completely free of disease.  A preheparinized Sundt shunt was inserted and secured with vessel loop proximally and distally.      ENDARTERECTOMY:  With the aid of a Alexandria blade, the distal endpoint in the ICA was made with loupe magnification with excellent transition.  This was tacked down with several interrupted 7-0 Prolene sutures.  An endarterectomy was performed down to the deep media.  The CC endpoint measured less than 1 mm in thickness.  An excellent eversion endarterectomy was performed in the external carotid artery with good backbleeding.  All loose medial fibers were removed down to a very smooth plane.      EXTERNAL JUGULAR VEIN PATCH: Due to the very small size of the arteries, we felt a patch would be beneficial.  We then harvested the external jugular vein from the neck, ligating this proximally and distally with 3-0 silk suture.  This was gently dilated with 0.5% Marcaine.  The vein was everted and left double-thickness in the normal direction of flow.  This was sewn to arteriotomy with running 6-0 Prolene suture and loupe magnification.  Prior to complete closure, the shunt was removed and the vessel was flushed with heparinized saline solution.  Closure was completed and blood flow was sequentially  allowed to go up the ICA with an excellent pulse being noted and excellent hemostasis.      Patch measured 4 cm in length with a maximum width of 4 mm.  A small amount of Surgicel was placed over this.  The neck was closed in layers with interrupted running 3-0 Vicryl suture.  The mandibular cutaneous nerve which had been transected for exposure was reapproximated with 7-0 Prolene suture.  Wounds were infiltrated with 0.5% Marcaine for postop analgesia along with Toradol 15 mg IV.  Skin was closed with 4-0 Monocryl in subcuticular fashion.  Steri-Strips and a gauze dressing were applied.      The patient awoke upon the operating table, was extubated and returned to the recovery area in satisfactory condition.  Needle and sponge count correct x 2.      COMPLICATIONS:  None.      ESTIMATED BLOOD LOSS:  Less than 25 mL.        The patient was neurologically intact in recovery.         SHIVANI CANCHOLA MD             D: 2018   T: 2018   MT:       Name:     EDILMA TOBAR   MRN:      [id number]        Account:        [id number]   :      1950           Procedure Date: 2018      Document: [id number]       cc: Surgical [facility]

## 2018-06-07 NOTE — ANESTHESIA POSTPROCEDURE EVALUATION
Patient: Roscoe Jang    Procedure(s):  LEFT CAROTID ENDARTERECTOMY with EXTERNAL JUGULAR VEIN PATCH - Wound Class: I-Clean    Diagnosis:LEFT CAROTID STENOSIS   Diagnosis Additional Information: No value filed.    Anesthesia Type:  General, ETT    Note:  Anesthesia Post Evaluation    Patient location during evaluation: PACU  Patient participation: Able to fully participate in evaluation  Level of consciousness: awake  Pain management: adequate  Airway patency: patent  Cardiovascular status: acceptable  Respiratory status: acceptable  Hydration status: acceptable  PONV: none     Anesthetic complications: None          Last vitals:  Vitals:    06/06/18 2245 06/06/18 2320 06/06/18 2340   BP: 109/62 115/68    Pulse:      Resp: 20 20    Temp:   36.6  C (97.9  F)   SpO2: 98% 96%          Electronically Signed By: SAMAN OLSON MD  June 6, 2018  11:57 PM

## 2018-06-07 NOTE — PROGRESS NOTES
Vascular Surgery Progress Note:  POD#1   Left CEA    S: Very comfortable.  Slept well.  Minimal pain.    O:   Vitals:  BP  Min: 109/62  Max: 155/79  Temp  Av.3  F (36.8  C)  Min: 97.8  F (36.6  C)  Max: 99.1  F (37.3  C)  Pulse  Av  Min: 70  Max: 87  I/O last 3 completed shifts:  In:  [I.V.:]  Out: 871 [Urine:850; Blood:21]    Physical Exam: Alert and appropriate.  Voiding well.                           No swelling or ecchymosis. Wd=A                             Cranial nerve XII and neuro= normal          Blood sugars somewhat elevated otherwise unremarkable      Assessment/Plan: Doing very well.  Plan home today on aspirin and Plavix                                 Non-smoker.  On Lipitor.                                  Phone follow-up in 1 week with duplex of both CEA 3 months.      Wm. Akash MD

## 2018-06-07 NOTE — PROGRESS NOTES
[facility]    Vascular Medicine Progress Note    Date of Service (when I saw the patient): 06/07/2018          Physician Supervisory Attestation:   I have reviewed and discussed with the physician assistant their history, physical and plan and independently interviewed and examined Roscoe Jang and agree with the plan as stated in the physician assistant note.    POD 1   Surgical site looks good.  No neuro deficits  Ambulating, voided.  Restart asa and plavix  Continue atorvastatin 20 mg daily and suggested to discuss with primary and increase to 40 mg daily  in a month.  Restart metformin  Follow up with Dr. Bustos ( see below)    Patient care time spent 35 minutes today    Juana Perez MD ,[facility] 6/7/2018  Vascular Medicine         Assessment & Plan   1. Symptomatic bilateral carotid artery stenosis s/p right carotid endarterectomy 5/11/18 and now s/p left carotid endarterectomy 6/6/18     Assessment: Neurologically intact. Pain controlled. Voiding. Eating. Ambulating.   Plan: To discharge home on Plavix and aspirin. Follow-up with Dr. Bustos via phone in 1 week and then US and clinic visit in 3 weeks.      2. Hyperlipidemia     Assessment: LDL 45 while on Simvastatin on 5/11/18. Changed to Lipitor 80 mg daily due to severe carotid disease. Developed headache and switched to Lipitor 20 mg daily. Tolerating this.     Plan: Continue Lipitor 20 mg daily. If continues to tolerate this, increase to 40 mg daily in one month.      3. Type 2 diabetes     Assessment: Diabetes has been well controlled with metformin with a recent A1C of 6.7%.   Plan: Resume metformin. SSI provided as needed while in hospital. Monitor diet.     4. Hypertension     Assessment: BP decently controlled.   Plan: Continue prior to admission lisinopril.        Interval History   Doing well. No neurological issues. Dressed and wanting to go home. Pain controlled. Eating ok. Voided after meadows removal.     Physical Exam    Temp: 97.8  F (36.6  C) Temp src: Oral BP: 131/67 Pulse: 87 Heart Rate: 75 Resp: 16 SpO2: 94 % O2 Device: None (Room air) Oxygen Delivery: 2 LPM  Vitals:    06/06/18 1311 06/07/18 0318   Weight: 108.9 kg (240 lb) 108.7 kg (239 lb 10.2 oz)     Vital Signs with Ranges  Temp:  [97.8  F (36.6  C)-99.1  F (37.3  C)] 97.8  F (36.6  C)  Pulse:  [70-87] 87  Heart Rate:  [] 75  Resp:  [10-20] 16  BP: (109-155)/() 131/67  MAP:  [32 mmHg-93 mmHg] 80 mmHg  Arterial Line BP: (104-133)/(52-70) 116/59  SpO2:  [94 %-100 %] 94 %  I/O last 3 completed shifts:  In: 2000 [I.V.:2000]  Out: 871 [Urine:850; Blood:21]    Constitutional: Awake, alert, cooperative, no apparent distress, and appears stated age.  Eyes: Lids and lashes normal, sclera clear, conjunctiva normal.  ENT: Normocephalic, without obvious abnormality, atraumatic, oral pharynx with moist mucus membranes  Respiratory: No increased work of breathing, good air exchange, clear to auscultation bilaterally, no crackles or wheezing.  Cardiovascular: Regular rate and rhythm, normal S1 and S2, no S3 or S4, and no murmur noted.  GI: Normal bowel sounds, soft, non-distended, non-tender  Genitourinary:  Guerrero out, voiding.   Skin: No bruising or bleeding, normal skin color, texture, turgor, no redness, warmth, or swelling, no rashes, surgical site left neck c/d/i.   Musculoskeletal: There is no redness, warmth, or swelling of the joints.    Neurologic: Awake, alert, oriented to name, place and time.  Cranial nerves II-XII are grossly intact.    Neuropsychiatric: Calm, normal eye contact, alert, normal affect, oriented to self, place, time and situation, memory for past and recent events intact and thought process normal.    Medications     nitroPRUsside (NIPRIDE) IV infusion ADULT/PEDS GREATER than or EQUAL to 45 kg std conc       Reason beta blocker order not selected       sodium chloride         acetaminophen  975 mg Oral Q8H     aspirin chewable tablet 81 mg  81 mg  Oral QAM     atorvastatin  20 mg Oral Daily     clopidogrel  75 mg Oral Daily     insulin aspart  1-3 Units Subcutaneous TID AC     insulin aspart  1-3 Units Subcutaneous At Bedtime     lisinopril  20 mg Oral Daily     metFORMIN (GLUCOPHAGE) tablet 1,000 mg  1,000 mg Oral QPM     metFORMIN (GLUCOPHAGE) tablet 500 mg  500 mg Oral QAM     senna-docusate  1 tablet Oral BID    Or     senna-docusate  2 tablet Oral BID     sodium chloride (PF)  3 mL Intracatheter Q8H       Data     Recent Labs  Lab 06/06/18  1315   HGB 14.2   POTASSIUM 4.6   *     No results found for this or any previous visit (from the past 24 hour(s)).

## 2018-06-07 NOTE — DISCHARGE INSTRUCTIONS
Carotid Endartectomy  Post-Operative Instructions    Typical length of stay in the hospital is 1-2 days.  On occasion, an evening in the Intensive Care Unit may be required for blood pressure regulation.    Activity    Most people are able to resume normal activities 1-2 weeks after surgery.  The key is to gradually increase your level of activity as you are able.  It is not uncommon to feel easily fatigued - this will improve with time.      You should avoid heavy lifting more than 30 lbs for 1 week.      You may return to work when you feel able - typically 1-2 weeks after surgery, depending on the type of work you do.      You should not drive a car for 1 week after surgery.  In addition,  you can not be taking prescription strength pain medication and you must feel strong enough to drive safely.    Incision Care    You can shower or bathe 2 days after surgery - avoid rubbing and soaking your incision.      You may have strips of white tape called  steri-strips  across your incision; they should stay on for 5-7 days or until the ends start to curl up.  You can then remove the steri-strips, or we will remove them at your post-operative appointment.      Avoid shaving directly over the incision for 2-3 weeks.    Common Concerns    You may notice mild skin numbness on the side of your surgery in your neck, chin, face, and earlobe.  This is due to nerve  irritation  and will gradually resolve over the next several months.  Call our office if you experience any short-lasting episode of numbness or weakness affecting one side of your body.      Some bruising and firmness around you incision can be expected.  This will soften and resolve over the next few weeks.  You may notice that some discoloration spreads to an area lower than the incision, such as the shoulder, neck or upper chest.  Likewise, swelling can occur near the incision or below the chin.  Call our office if the swelling or bruising worsens, or if you have  difficulty swallowing.    Diet    You may resume a regular diet before you leave the hospital.  You may find that it is best to try smaller, more frequent meals until your appetite returns.    Medications    You may be started on a blood thinner after surgery - typically Aspirin and / or Plavix.      You may be given a prescription for pain medication after surgery.  If you need to have this refilled, please call your pharmacy where you had it filled.  They will then call us for approval.  Please do not call after hours for a refill on pain medication.    Post-Operative Appointments    You need to see your surgeon in the clinic approximately 1-2 weeks after surgery.  Post-operative appointment:   Date: _____________________________  Time: ____________________________  Dr. ______________________________      You will have an ultrasound test and evaluation with your surgeon 90 days (3 months) after surgery.      We will notify you by mail when you are due to schedule further ultrasound testing and evaluation; typically this is done annually.     When You Should Call Our Office    Body aches, chills or temperature greater than 101      Incision redness or drainage      Loss of vision in one eye      Loss of strength / weakness in one side of your body      Severe headache      Difficulty with speech      If you will be having an invasive procedure, such as a colonoscopy or dental work within one year of your surgery, you may need to take an antibiotic prior to the procedure if you had a Dacron patch with your surgery; please call us so we can assist you with this.    Call our main number, [phone], for assistance with any concerns or questions.     Revised 5/2016

## 2018-06-07 NOTE — ANESTHESIA CARE TRANSFER NOTE
Patient: Roscoe Jang    Procedure(s):  LEFT CAROTID ENDARTERECTOMY with EXTERNAL JUGULAR VEIN PATCH - Wound Class: I-Clean    Diagnosis: LEFT CAROTID STENOSIS   Diagnosis Additional Information: No value filed.    Anesthesia Type:   General, ETT     Note:  Airway :Face Mask  Patient transferred to:PACU  Comments: Transferred to PACU, spontaneous respirations, 10L oxygen via facemask.  All monitors and alarms on and functioning, VSS.  Patient awake, comfortable.  Report to PACU RN.Handoff Report: Identifed the Patient, Identified the Reponsible Provider, Reviewed the pertinent medical history, Discussed the surgical course, Reviewed Intra-OP anesthesia mangement and issues during anesthesia, Set expectations for post-procedure period and Allowed opportunity for questions and acknowledgement of understanding      Vitals: (Last set prior to Anesthesia Care Transfer)    CRNA VITALS  6/6/2018 2036 - 6/6/2018 2113 6/6/2018             Resp Rate (set): 10                Electronically Signed By: ANITRA Henderson CRNA  June 6, 2018  9:13 PM

## 2018-06-07 NOTE — PROGRESS NOTES
Dr. Bustos stopped by this morning and said that we should get Roscoe in to have a bilateral carotid ultrasound in 3 months.  He states that patient does not need a post op appointment. I will route to the nurse to order imaging. Rosemarie Tejada,      Writer received a VM from Pt stating that he would not be present for programming today (03/01/2018).    Pt reported that he is not feeling well and believes that he has the \"flu\". Pt requested a phone call in return and provided a new telephone number to call.     Writer attempted to reach out to Pt but was unable to reach him. Writer left a VM requesting a phone call in return.    Shae Franke, MSW, APSW, SAC-IT  3/1/2018

## 2018-06-07 NOTE — PROGRESS NOTES
Report called to station 33 RN -- room 325 dirty. Awaiting call from Station 33 when ready to receive patient.

## 2018-06-07 NOTE — PLAN OF CARE
Problem: Patient Care Overview  Goal: Plan of Care/Patient Progress Review  CR: Smoking cessation orders received. Per chart review pt is a former smoker with a quit date of 10/3/2007. No IP smoking cessation needs identified. Orders completed.

## 2018-06-07 NOTE — PROGRESS NOTES
HOSPITALIST CONSULT CHART CHECK:     Hospitalist service was consulted for cross coverage only. We will peripherally follow and chart check throughout the week.      - For vascular medical concerns during business hours M-F, call the [facility] at [phone] to have the rounding/on call Vascular Medicine (NOT VASCULAR SURGERY) MD paged.     - After business hours M-F, for medical concerns on this patient, please page hospitalist staff.     - For vascular surgical questions, please page the appropriate surgeon (primary vascular surgeon or on call vascular surgeon) based upon the time of day.

## 2018-06-07 NOTE — PLAN OF CARE
Problem: Patient Care Overview  Goal: Plan of Care/Patient Progress Review  Outcome: Improving  Patient arrived to floor at 11pm A&O. VSS on 2L O2. IMC, capno, and IV fluids d/c'd this morning. Pt denies pain, only minor discomfort. Discomfort controlled with scheduled tylenol. Dangled and up to bathroom with SBA. BS hypo; no flatus noted. LS clear. Possible discharge today.

## 2018-06-07 NOTE — DISCHARGE SUMMARY
Admit Date:     06/06/2018   Discharge Date:           PRIMARY CARE:  Mel Pappas NP, phone [phone].      HISTORY:  A 67-year-old patient from [place], [place], has a history of noninsulin-dependent diabetes mellitus, on oral metformin, and well controlled hypertension.  He had intermittent episodes of lightheadedness, dizziness with some mild visual changes but no focal neurological changes.  Evaluation revealed bilateral critical carotid artery stenoses.  It was felt that with activity with the high-grade stenosis he was experiencing cerebral hypoperfusion causing his more global cerebral symptoms.  The patient underwent a right carotid endarterectomy on 05/11/2018 to treat the more critical 90% right carotid stenosis.  He did very well following this surgery with resolution of his headaches, vision changes, and dizziness.  The patient has a greater than 80% stenosis of left carotid bifurcation and presents for similar surgery.      He quit smoking in approximately 2007.  He has a history of mixed hyperlipidemia.  He had some problems with Lipitor and his dosage has been decreased, and he is tolerating the lower dosage of 20 mg well.      HOSPITAL COURSE:  The patient was admitted on 06/06 where under general anesthetic, he underwent a left carotid endarterectomy with external jugular vein patch angioplasty.  Intraoperative shunting was performed.  He had a calcified high-grade stenosis at the carotid bulb and proximal ICA, but essentially no disease distally.  He had again, very small arteries that we found on the contralateral side.      The patient tolerated the procedure quite well.  He was monitored in our step-down unit overnight.  Vital signs remained stable.  Blood sugars were somewhat elevated with a maximum 185, since we have been holding his metformin.      Aspirin and Plavix were restarted post-procedure.  He was ready for discharge to home on the first postoperative day with no evidence  of any complications.      DISCHARGE MEDICATIONS:   1.  Aspirin 81 mg daily.   2.  Plavix 75 mg daily for 1 month.   3.  Metformin 500 mg p.o. q.a.m. and 1000 mg p.o. q.p.m.   4.  Lisinopril 20 mg daily.   5.  Lipitor 20 mg daily.    6.  Tylenol p.r.n. pain.      FINAL DIAGNOSES:   1.  High-grade calcified left carotid stenosis.   a.  Status post left carotid endarterectomy with external jugular vein patch angioplasty.   2.  History of recent right carotid endarterectomy.   3.  Hyperlipidemia, on statin.   4.  Well controlled hypertension.   5.  Type 2 noninsulin-dependent diabetes mellitus.      PLAN:  The patient will discharge to home.  He was instructed on his postoperative care.  Since he lives so far from the office we will have phone followup in 1 week. Plavix will be used for a month. He will see me in the office in 3 months for bilateral carotid duplex ultrasound, and again in one year.         SHIVANI CANCHOLA MD             D: 2018   T: 2018   MT: CC      Name:     EDILMA TOBAR   MRN:      [id number]        Account:        [id number]   :      1950           Admit Date:     2018                                  Discharge Date:       Document: [id number]       cc: Mel Pappas NP

## 2018-06-07 NOTE — PLAN OF CARE
Problem: Patient Care Overview  Goal: Plan of Care/Patient Progress Review  Outcome: Completed Date Met: 06/07/18  A/Ox4, VSS, tolerating reg diet, IND. Left incisions to neck with steri strips CDI/JOSE MANUEL. No c/o pain. LS diminished. BS active, passing flatus. Adequate UOP. Patient discharging to home. Patient verbalized understanding of all discharge instructions, questions answered.

## 2018-06-08 DIAGNOSIS — I65.23 BILATERAL CAROTID ARTERY STENOSIS: Primary | ICD-10-CM

## 2018-06-12 ENCOUNTER — ALLIED HEALTH/NURSE VISIT (OUTPATIENT)
Dept: FAMILY MEDICINE | Facility: CLINIC | Age: 68
End: 2018-06-12
Payer: COMMERCIAL

## 2018-06-12 VITALS — SYSTOLIC BLOOD PRESSURE: 131 MMHG | DIASTOLIC BLOOD PRESSURE: 67 MMHG

## 2018-06-12 DIAGNOSIS — I10 BENIGN ESSENTIAL HYPERTENSION: Primary | ICD-10-CM

## 2018-06-12 LAB — INTERPRETATION ECG - MUSE: NORMAL

## 2018-06-12 PROCEDURE — 99207 ZZC NO CHARGE NURSE ONLY: CPT | Performed by: NURSE PRACTITIONER

## 2018-06-12 NOTE — MR AVS SNAPSHOT
"              After Visit Summary   2018    Roscoe Jang    MRN: [id number]           Patient Information     Date Of Birth          1950        Visit Information        Provider Department      2018 1:32 PM Mel Pappas NP [facility]        Today's Diagnoses     Benign essential hypertension    -  1       Follow-ups after your visit        Who to contact     If you have questions or need follow up information about today's clinic visit or your schedule please contact [facility] directly at [phone].  Normal or non-critical lab and imaging results will be communicated to you by Fleephart, letter or phone within 4 business days after the clinic has received the results. If you do not hear from us within 7 days, please contact the clinic through Fleephart or phone. If you have a critical or abnormal lab result, we will notify you by phone as soon as possible.  Submit refill requests through Vdolg or call your pharmacy and they will forward the refill request to us. Please allow 3 business days for your refill to be completed.          Additional Information About Your Visit        MyChart Information     Vdolg lets you send messages to your doctor, view your test results, renew your prescriptions, schedule appointments and more. To sign up, go to [email or web address] . Click on \"Log in\" on the left side of the screen, which will take you to the Welcome page. Then click on \"Sign up Now\" on the right side of the page.     You will be asked to enter the access code listed below, as well as some personal information. Please follow the directions to create your username and password.     Your access code is: [email or web address]  Expires: 2018  3:39 PM     Your access code will  in 90 days. If you need help or a new code, please call your [facility] or [phone].        Care EveryWhere ID     This is your Care EveryWhere ID. This could be used by other " organizations to access your [facility] medical records  [id number]         Blood Pressure from Last 3 Encounters:   06/12/18 131/67   06/07/18 131/67   05/18/18 140/82    Weight from Last 3 Encounters:   06/07/18 239 lb 10.2 oz (108.7 kg)   05/18/18 241 lb (109.3 kg)   05/12/18 239 lb 12.8 oz (108.8 kg)              Today, you had the following     No orders found for display         Today's Medication Changes          These changes are accurate as of 6/12/18  1:33 PM.  If you have any questions, ask your nurse or doctor.               These medicines have changed or have updated prescriptions.        Dose/Directions    acetaminophen 325 MG tablet   Commonly known as:  TYLENOL   This may have changed:    - how much to take  - when to take this   Used for:  Carotid artery stenosis, symptomatic, right        Dose:  650 mg   Take 2 tablets (650 mg) by mouth every 4 hours as needed for other (multimodal surgical pain management along with NSAIDS and opioid medication as indicated based on pain control and physical function.)   Quantity:  100 tablet   Refills:  0                Primary Care Provider Office Phone # Fax #    Mel Pappas [phone]       [address] [facility] [address]        Equal Access to Services     SHRUTHI MARIANO AH: Funmilayo evangelista Soobi, wababitada luangeladaha, qaybta kaalmada ademarcellayada, letitia flores. So [facility] [phone].    ATENCIÓN: Si habla español, tiene a tapia disposición servicios gratuitos de asistencia lingüística. Llame al [phone].    We comply with applicable federal civil rights laws and [place] laws. We do not discriminate on the basis of race, color, national origin, age, disability, sex, sexual orientation, or gender identity.            Thank you!     Thank you for choosing [facility]  for your care. Our goal is always to provide you with excellent care. Hearing back from our patients is one way we can  continue to improve our services. Please take a few minutes to complete the written survey that you may receive in the mail after your visit with us. Thank you!             Your Updated Medication List - Protect others around you: Learn how to safely use, store and throw away your medicines at www.disposemymeds.org.          This list is accurate as of 6/12/18  1:33 PM.  Always use your most recent med list.                   Brand Name Dispense Instructions for use Diagnosis    acetaminophen 325 MG tablet    TYLENOL    100 tablet    Take 2 tablets (650 mg) by mouth every 4 hours as needed for other (multimodal surgical pain management along with NSAIDS and opioid medication as indicated based on pain control and physical function.)    Carotid artery stenosis, symptomatic, right       ASPIRIN PO      Take 81 mg by mouth every morning        atorvastatin 20 MG tablet    LIPITOR    90 tablet    Take 1 tablet (20 mg) by mouth daily    Carotid artery stenosis, symptomatic, right       blood glucose monitoring lancets     100 each    Use to test blood sugar 1 times daily or as directed.    Type 2 diabetes mellitus (H)       * blood glucose monitoring test strip    RHYS CONTOUR NEXT    100 strip    Use to test blood sugar 1 times daily or as directed.    Type 2 diabetes mellitus (H)       * blood glucose monitoring test strip    RHYS CONTOUR NEXT    100 strip    Use to test blood sugar 1 times daily or as directed.  Ok to substitute alternative if insurance prefers.    Type 2 diabetes mellitus with hyperglycemia, without long-term current use of insulin (H)       * clopidogrel 75 MG tablet    PLAVIX    30 tablet    Take 1 tablet (75 mg) by mouth daily    Carotid artery stenosis, symptomatic, right       * clopidogrel 75 MG tablet    PLAVIX    30 tablet    Take 1 tablet (75 mg) by mouth daily    Carotid artery stenosis, symptomatic, right       lisinopril 20 MG tablet    PRINIVIL/ZESTRIL    90 tablet    TAKE ONE TABLET  BY MOUTH ONCE DAILY    Benign essential hypertension       * METFORMIN HCL PO      Take 500 mg by mouth every morning        * METFORMIN HCL PO      Take 1,000 mg by mouth every evening (Takes 2 x 500mg tablet = 1000mg)        * Notice:  This list has 6 medication(s) that are the same as other medications prescribed for you. Read the directions carefully, and ask your doctor or other care provider to review them with you.

## 2018-06-13 ENCOUNTER — TELEPHONE (OUTPATIENT)
Dept: OTHER | Facility: CLINIC | Age: 68
End: 2018-06-13

## 2018-06-13 NOTE — TELEPHONE ENCOUNTER
Pt is status post LEFT CAROTID ENDARTERECTOMY with EXTERNAL JUGULAR VEIN PATCH on 6/6/18 with Dr. Bustos.  Are you having any issues with pain? none  Do you have any concerns about your incision? none  Do you have fever, nausea or vomiting? none  Do you have any additional concerns? none    Per Dr. Bustos, pt does not need a post-op appointment.  Pt will be seen in clinic for bilateral carotid US and OV with Dr. Bustos.    Ivette Alcantar, BONYN, RN

## 2018-07-06 DIAGNOSIS — I10 BENIGN ESSENTIAL HYPERTENSION: ICD-10-CM

## 2018-07-06 RX ORDER — LISINOPRIL 20 MG/1
20 TABLET ORAL DAILY
Qty: 90 TABLET | Refills: 1 | Status: SHIPPED | OUTPATIENT
Start: 2018-07-06 | End: 2018-11-05

## 2018-07-06 NOTE — TELEPHONE ENCOUNTER
"Requested Prescriptions   Pending Prescriptions Disp Refills     lisinopril (PRINIVIL/ZESTRIL) 20 MG tablet [Pharmacy Med Name: LISINOPRIL 20MG     TAB] 90 tablet 0     Sig: TAKE 1 TABLET BY MOUTH ONCE DAILY NEEDS  BLOOD  PRESSURE  CHECK  FOR  FURTHER  REFILLS    ACE Inhibitors (Including Combos) Protocol Passed    7/6/2018  6:06 AM       Passed - Blood pressure under 140/90 in past 12 months    BP Readings from Last 3 Encounters:   06/12/18 131/67   06/07/18 131/67   05/18/18 140/82                Passed - Recent (12 mo) or future (30 days) visit within the authorizing provider's specialty    Patient had office visit in the last 12 months or has a visit in the next 30 days with authorizing provider or within the authorizing provider's specialty.  See \"Patient Info\" tab in inbasket, or \"Choose Columns\" in Meds & Orders section of the refill encounter.           Passed - Patient is age 18 or older       Passed - Normal serum creatinine on file in past 12 months    Recent Labs   Lab Test  05/11/18   1350   CR  0.94            Passed - Normal serum potassium on file in past 12 months    Recent Labs   Lab Test  06/06/18   1315   POTASSIUM  4.6             lisinopril (PRINIVIL/ZESTRIL) 20 MG tablet  Last Written Prescription Date:  02/12/2018  Last Fill Quantity: 90 tablet,  # refills: 0   Last office visit: 5/18/2018 with prescribing provider:  ERIKA Rivers   Future Office Visit:      Halle DAY (ROSSANA) (M)    "

## 2018-07-25 ENCOUNTER — TELEPHONE (OUTPATIENT)
Dept: OTHER | Facility: CLINIC | Age: 68
End: 2018-07-25

## 2018-07-25 NOTE — TELEPHONE ENCOUNTER
[place] [facility]    I called Roscoe Jang today.  He is undergone bilateral carotid endarterectomies with the most recent on 6/6/2018.  We planned for a phone follow-up in a 3 month carotid duplex ultrasound since he lives so far from the [facility].  I missed him on the phone several times but was able to talk to him today.    He reports that he is doing fine with no wound complications or neurological problems.  He is finished his Plavix and is continuing chronically on the baby aspirin.    He was on Lipitor 20 mg daily and we recommended to increase this to 40 mg daily.  However, upon doing so he is noted more headaches which was a problem in the past with the higher statin dose.  It is hard to know whether this is the exact etiology but he will decrease down to 20 mg of Lipitor and see if his headaches resolve.    He has a scheduled follow-up appointment with me in the office with bilateral carotid duplex ultrasounds in early September.    Gaurav Bustos MD

## 2018-08-13 DIAGNOSIS — E11.65 TYPE 2 DIABETES MELLITUS WITH HYPERGLYCEMIA, WITHOUT LONG-TERM CURRENT USE OF INSULIN (H): ICD-10-CM

## 2018-08-13 NOTE — TELEPHONE ENCOUNTER
"Requested Prescriptions   Pending Prescriptions Disp Refills     metFORMIN (GLUCOPHAGE) 500 MG tablet [Pharmacy Med Name: METFORMIN 500MG TAB] 270 tablet 1     Sig: TAKE ONE TABLET BY MOUTH WITH BREAKFAST AND TWO TABLETS WITH LUNCH    Biguanide Agents Passed    8/13/2018  7:03 AM       Passed - Blood pressure less than 140/90 in past 6 months    BP Readings from Last 3 Encounters:   06/12/18 131/67   06/07/18 131/67   05/18/18 140/82                Passed - Patient has documented LDL within the past 12 mos.    Recent Labs   Lab Test  05/11/18   1350   LDL  45            Passed - Patient has had a Microalbumin in the past 12 mos.    Recent Labs   Lab Test  07/07/17   1245   MICROL  23   UMALCR  14.36            Passed - Patient is age 10 or older       Passed - Patient has documented A1c within the specified period of time.    If HgbA1C is 8 or greater, it needs to be on file within the past 3 months.  If less than 8, must be on file within the past 6 months.     Recent Labs   Lab Test  05/11/18   1350   A1C  6.7*            Passed - Patient's CR is NOT>1.4 OR Patient's EGFR is NOT<45 within past 12 mos.    Recent Labs   Lab Test  05/11/18   1350   GFRESTIMATED  80   GFRESTBLACK  >90       Recent Labs   Lab Test  05/11/18   1350   CR  0.94            Passed - Patient does NOT have a diagnosis of CHF.       Passed - Recent (6 mo) or future (30 days) visit within the authorizing provider's specialty    Patient had office visit in the last 6 months or has a visit in the next 30 days with authorizing provider or within the authorizing provider's specialty.  See \"Patient Info\" tab in inbasket, or \"Choose Columns\" in Meds & Orders section of the refill encounter.            This patient wants a new prescription for METFORMIN HCL PO    "

## 2018-09-26 NOTE — PROGRESS NOTES
[place] [facility]    Roscoe Jang returns for vascular follow-up.  He was found to have multiple somewhat vague cerebrovascular symptoms with intermittent episodes of dizziness, lightheadedness, and visual changes but no otherwise focal neurological problems.  He was found to have high-grade bilateral carotid stenoses was felt that he was experiencing cerebral hyperperfusion causing these more global cerebral symptoms.      He underwent a right CEA on 5/11/2018 with no complications.  This was followed by a left CEA on 6/6/2018 again with no complications.    He had a history of hyperlipidemia but had troubles with higher dose Lipitor but tolerated 20 mg well.  He was treated with Plavix for 1 month following each surgery along with chronic aspirin, metformin, lisinopril, Tylenol.    He quit smoking in 2007.  Does live independently with his wife.    He is resumed his normal active lifestyle.  He no longer has the dizziness and lightheaded numbness and visual changes that he noted preoperatively implying this is likely due to his bilateral high-grade carotid stenosis.    Exam: Alert and appropriate.  Blood pressure 126/70.  Pulse 86              Normal affect.              Well-healed bilateral carotid incisions with no swelling.              Still with some mild mandibular numbness from the cutaneous nerve                    transection but improving.      Bilateral carotid duplex today does show somewhat elevated velocities but no evidence of narrowing of either carotid artery consistent with a successful widely patent CEA.              Impression: #1.  Widely patent bilateral carotid endarterectomy.  Elevated velocities but no narrowing probably due to smaller arteries.                        #2.  Good risk factor control.  Diabetes is under good control.  Does not smoke.  Will follow up with his physicians to check his LDL on the lower Lipitor dose which she is tolerating well.    Return to see  me in 1 year for follow-up bilateral carotid duplex.      Gaurav Bustos MD     Please route or send letter to:  Primary Care Provider (PCP) Mel Pappas NP

## 2018-09-27 ENCOUNTER — HOSPITAL ENCOUNTER (OUTPATIENT)
Dept: ULTRASOUND IMAGING | Facility: CLINIC | Age: 68
Discharge: HOME | End: 2018-09-27
Attending: SURGERY | Admitting: SURGERY
Payer: MEDICARE

## 2018-09-27 ENCOUNTER — OFFICE VISIT (OUTPATIENT)
Dept: OTHER | Facility: CLINIC | Age: 68
End: 2018-09-27
Attending: SURGERY
Payer: MEDICARE

## 2018-09-27 VITALS — SYSTOLIC BLOOD PRESSURE: 126 MMHG | HEART RATE: 86 BPM | DIASTOLIC BLOOD PRESSURE: 70 MMHG

## 2018-09-27 DIAGNOSIS — I65.23 CAROTID STENOSIS, SYMPTOMATIC W/O INFARCT, BILATERAL: Primary | ICD-10-CM

## 2018-09-27 DIAGNOSIS — I65.23 BILATERAL CAROTID ARTERY STENOSIS: ICD-10-CM

## 2018-09-27 PROCEDURE — 93880 EXTRACRANIAL BILAT STUDY: CPT

## 2018-09-27 PROCEDURE — 99213 OFFICE O/P EST LOW 20 MIN: CPT | Mod: ZP | Performed by: SURGERY

## 2018-09-27 PROCEDURE — G0463 HOSPITAL OUTPT CLINIC VISIT: HCPCS

## 2018-09-27 NOTE — LETTER
[facility] [address]  [place] [address]  Phone: [phone]  Fax: [phone]      2018    Re: Roscoe Jang - 1950    Roscoe Jang returns for vascular follow-up.  He was found to have multiple somewhat vague cerebrovascular symptoms with intermittent episodes of dizziness, lightheadedness, and visual changes but no otherwise focal neurological problems.  He was found to have high-grade bilateral carotid stenoses was felt that he was experiencing cerebral hyperperfusion causing these more global cerebral symptoms.      He underwent a right CEA on 2018 with no complications.  This was followed by a left CEA on 2018 again with no complications.     He had a history of hyperlipidemia but had troubles with higher dose Lipitor but tolerated 20 mg well.  He was treated with Plavix for 1 month following each surgery along with chronic aspirin, metformin, lisinopril, Tylenol.     He quit smoking in .  Does live independently with his wife.     He is resumed his normal active lifestyle.  He no longer has the dizziness and lightheaded numbness and visual changes that he noted preoperatively implying this is likely due to his bilateral high-grade carotid stenosis.     Exam: Alert and appropriate.  Blood pressure 126/70.  Pulse 86  Normal affect.  Well-healed bilateral carotid incisions with no swelling.  Still with some mild mandibular numbness from the cutaneous nerve transection but improving.     Bilateral carotid duplex today does show somewhat elevated velocities but no evidence of narrowing of either carotid artery consistent with a successful widely patent CEA.      Impression: #1.  Widely patent bilateral carotid endarterectomy.  Elevated velocities but no narrowing probably due to smaller arteries.                         #2.  Good risk factor control.  Diabetes is under good control.  Does not smoke. Will follow up with his  physicians to check his LDL on the lower Lipitor dose which she is tolerating well.     Return to see me in 1 year for follow-up bilateral carotid duplex.       Gaurav Bustos MD

## 2018-09-27 NOTE — NURSING NOTE
"Roscoe Jang is a 68 year old male who presents for:  Chief Complaint   Patient presents with     RECHECK     [facility] Carotid US (10:00 [facility], 10:45 O) History of right carotid endarterectomy on 5/11/18 and left carotid endarterectomy on 6/6/18; 3 month follow up to 6/6/18 surgery with Dr. Bustos.         Vitals:    Vitals:    09/27/18 1044   BP: 146/81   BP Location: Right arm   Patient Position: Chair   Cuff Size: Adult Large   Pulse: 86       BMI:  Estimated body mass index is 36.44 kg/(m^2) as calculated from the following:    Height as of 6/6/18: 5' 8\" (1.727 m).    Weight as of 6/7/18: 239 lb 10.2 oz (108.7 kg).    Pain Score:  Data Unavailable        Shweta Lan"

## 2018-09-27 NOTE — MR AVS SNAPSHOT
After Visit Summary   9/27/2018    Roscoe Jang    MRN: [id number]           Patient Information     Date Of Birth          1950        Visit Information        Provider Department      9/27/2018 10:45 AM Gaurav Bustos MD [facility]      Today's Diagnoses     Carotid stenosis, symptomatic w/o infarct, bilateral    -  1       Follow-ups after your visit        Follow-up notes from your care team     Return in about 1 year (around 9/27/2019).      Your next 10 appointments already scheduled     Oct 02, 2018  2:00 PM CDT   PHYSICAL with Mel Pappas NP   [facility]    [address] [id number] [phone]              Who to contact     If you have questions or need follow up information about today's clinic visit or your schedule please contact [facility] directly at [phone].  Normal or non-critical lab and imaging results will be communicated to you by MyChart, letter or phone within 4 business days after the clinic has received the results. If you do not hear from us within 7 days, please contact the clinic through MyChart or phone. If you have a critical or abnormal lab result, we will notify you by phone as soon as possible.  Submit refill requests through [employer] or call your pharmacy and they will forward the refill request to us. Please allow 3 business days for your refill to be completed.          Additional Information About Your Visit        Care EveryWhere ID     This is your Care EveryWhere ID. This could be used by other organizations to access your [place] medical records  [id number]        Your Vitals Were     Pulse                   86            Blood Pressure from Last 3 Encounters:   09/27/18 126/70   06/12/18 131/67   06/07/18 131/67    Weight from Last 3 Encounters:   06/07/18 239 lb 10.2 oz  (108.7 kg)   05/18/18 241 lb (109.3 kg)   05/12/18 239 lb 12.8 oz (108.8 kg)              Today, you had the following     No orders found for display         Today's Medication Changes          These changes are accurate as of 9/27/18 11:50 AM.  If you have any questions, ask your nurse or doctor.               These medicines have changed or have updated prescriptions.        Dose/Directions    acetaminophen 325 MG tablet   Commonly known as:  TYLENOL   This may have changed:    - how much to take  - when to take this   Used for:  Carotid artery stenosis, symptomatic, right        Dose:  650 mg   Take 2 tablets (650 mg) by mouth every 4 hours as needed for other (multimodal surgical pain management along with NSAIDS and opioid medication as indicated based on pain control and physical function.)   Quantity:  100 tablet   Refills:  0                Primary Care Provider Office Phone # Fax Raciel PappasNOAH [phone]       [address] [facility] [facility] [address]        Equal Access to Services     SHRUTHI MARIANO AH: Hadii brittaney browne hadasho Soomaali, waaxda luqadaha, qaybta kaalmada adeegyada, letitia tellez . So [facility] [phone].    ATENCIÓN: Si habla español, tiene a tapia disposición servicios gratuitos de asistencia lingüística. Llame al [phone].    We comply with applicable federal civil rights laws and [place] laws. We do not discriminate on the basis of race, color, national origin, age, disability, sex, sexual orientation, or gender identity.            Thank you!     Thank you for choosing [facility] [place]  for your care. Our goal is always to provide you with excellent care. Hearing back from our patients is one way we can continue to improve our services. Please take a few minutes to complete the written survey that you may receive in the mail after your visit with us. Thank you!             Your Updated Medication List - Protect others around  you: Learn how to safely use, store and throw away your medicines at www.disposemymeds.org.          This list is accurate as of 9/27/18 11:50 AM.  Always use your most recent med list.                   Brand Name Dispense Instructions for use Diagnosis    acetaminophen 325 MG tablet    TYLENOL    100 tablet    Take 2 tablets (650 mg) by mouth every 4 hours as needed for other (multimodal surgical pain management along with NSAIDS and opioid medication as indicated based on pain control and physical function.)    Carotid artery stenosis, symptomatic, right       ASPIRIN PO      Take 81 mg by mouth every morning        atorvastatin 20 MG tablet    LIPITOR    90 tablet    Take 1 tablet (20 mg) by mouth daily    Carotid artery stenosis, symptomatic, right       blood glucose monitoring lancets     100 each    Use to test blood sugar 1 times daily or as directed.    Type 2 diabetes mellitus (H)       * blood glucose monitoring test strip    RHYS CONTOUR NEXT    100 strip    Use to test blood sugar 1 times daily or as directed.    Type 2 diabetes mellitus (H)       * blood glucose monitoring test strip    RHYS CONTOUR NEXT    100 strip    Use to test blood sugar 1 times daily or as directed.  Ok to substitute alternative if insurance prefers.    Type 2 diabetes mellitus with hyperglycemia, without long-term current use of insulin (H)       * clopidogrel 75 MG tablet    PLAVIX    30 tablet    Take 1 tablet (75 mg) by mouth daily    Carotid artery stenosis, symptomatic, right       * clopidogrel 75 MG tablet    PLAVIX    30 tablet    Take 1 tablet (75 mg) by mouth daily    Carotid artery stenosis, symptomatic, right       lisinopril 20 MG tablet    PRINIVIL/ZESTRIL    90 tablet    Take 1 tablet (20 mg) by mouth daily    Benign essential hypertension       * METFORMIN HCL PO      Take 500 mg by mouth every morning        * METFORMIN HCL PO      Take 1,000 mg by mouth every evening (Takes 2 x 500mg tablet = 1000mg)         * metFORMIN 500 MG tablet    GLUCOPHAGE    270 tablet    TAKE ONE TABLET BY MOUTH WITH BREAKFAST AND TWO TABLETS WITH LUNCH    Type 2 diabetes mellitus with hyperglycemia, without long-term current use of insulin (H)       * Notice:  This list has 7 medication(s) that are the same as other medications prescribed for you. Read the directions carefully, and ask your doctor or other care provider to review them with you.

## 2018-10-02 ENCOUNTER — OFFICE VISIT (OUTPATIENT)
Dept: FAMILY MEDICINE | Facility: CLINIC | Age: 68
End: 2018-10-02
Payer: COMMERCIAL

## 2018-10-02 ENCOUNTER — RADIANT APPOINTMENT (OUTPATIENT)
Dept: GENERAL RADIOLOGY | Facility: CLINIC | Age: 68
End: 2018-10-02
Attending: NURSE PRACTITIONER
Payer: COMMERCIAL

## 2018-10-02 VITALS
DIASTOLIC BLOOD PRESSURE: 70 MMHG | BODY MASS INDEX: 37.35 KG/M2 | HEIGHT: 68 IN | HEART RATE: 80 BPM | TEMPERATURE: 98.2 F | SYSTOLIC BLOOD PRESSURE: 130 MMHG | WEIGHT: 246.4 LBS

## 2018-10-02 DIAGNOSIS — E11.65 TYPE 2 DIABETES MELLITUS WITH HYPERGLYCEMIA, WITHOUT LONG-TERM CURRENT USE OF INSULIN (H): ICD-10-CM

## 2018-10-02 DIAGNOSIS — I65.23 BILATERAL CAROTID ARTERY STENOSIS: ICD-10-CM

## 2018-10-02 DIAGNOSIS — M25.511 CHRONIC RIGHT SHOULDER PAIN: ICD-10-CM

## 2018-10-02 DIAGNOSIS — Z23 NEED FOR PROPHYLACTIC VACCINATION AND INOCULATION AGAINST INFLUENZA: ICD-10-CM

## 2018-10-02 DIAGNOSIS — Z23 NEED FOR VACCINATION: ICD-10-CM

## 2018-10-02 DIAGNOSIS — I10 BENIGN ESSENTIAL HYPERTENSION: ICD-10-CM

## 2018-10-02 DIAGNOSIS — Z98.890 S/P CAROTID ENDARTERECTOMY: ICD-10-CM

## 2018-10-02 DIAGNOSIS — Z00.00 PREVENTATIVE HEALTH CARE: Primary | ICD-10-CM

## 2018-10-02 DIAGNOSIS — G89.29 CHRONIC RIGHT SHOULDER PAIN: ICD-10-CM

## 2018-10-02 DIAGNOSIS — Z12.11 SPECIAL SCREENING FOR MALIGNANT NEOPLASMS, COLON: ICD-10-CM

## 2018-10-02 LAB
CREAT UR-MCNC: 128 MG/DL
HBA1C MFR BLD: 6.8 % (ref 0–5.6)
MICROALBUMIN UR-MCNC: 8 MG/L
MICROALBUMIN/CREAT UR: 6.5 MG/G CR (ref 0–17)

## 2018-10-02 PROCEDURE — 99397 PER PM REEVAL EST PAT 65+ YR: CPT | Mod: 25 | Performed by: NURSE PRACTITIONER

## 2018-10-02 PROCEDURE — 83721 ASSAY OF BLOOD LIPOPROTEIN: CPT | Performed by: NURSE PRACTITIONER

## 2018-10-02 PROCEDURE — 90662 IIV NO PRSV INCREASED AG IM: CPT | Performed by: NURSE PRACTITIONER

## 2018-10-02 PROCEDURE — 73030 X-RAY EXAM OF SHOULDER: CPT | Mod: RT

## 2018-10-02 PROCEDURE — G0009 ADMIN PNEUMOCOCCAL VACCINE: HCPCS | Performed by: NURSE PRACTITIONER

## 2018-10-02 PROCEDURE — 80053 COMPREHEN METABOLIC PANEL: CPT | Performed by: NURSE PRACTITIONER

## 2018-10-02 PROCEDURE — 36415 COLL VENOUS BLD VENIPUNCTURE: CPT | Performed by: NURSE PRACTITIONER

## 2018-10-02 PROCEDURE — 82043 UR ALBUMIN QUANTITATIVE: CPT | Performed by: NURSE PRACTITIONER

## 2018-10-02 PROCEDURE — 83036 HEMOGLOBIN GLYCOSYLATED A1C: CPT | Performed by: NURSE PRACTITIONER

## 2018-10-02 PROCEDURE — 90732 PPSV23 VACC 2 YRS+ SUBQ/IM: CPT | Performed by: NURSE PRACTITIONER

## 2018-10-02 PROCEDURE — G0008 ADMIN INFLUENZA VIRUS VAC: HCPCS | Performed by: NURSE PRACTITIONER

## 2018-10-02 NOTE — PATIENT INSTRUCTIONS
Will get an xray of right shoulder   Labs today and urine   Mail in FIT test for colon cancer screening   Pneumonia and flu shot given     See me back on FRiday for a DOT will need to call [facility] to this schedule     Preventive Health Recommendations:   Male Ages 65 and over    Yearly exam:             See your health care provider every year in order to  o   Review health changes.   o   Discuss preventive care.    o   Review your medicines if your doctor has prescribed any.    Talk with your health care provider about whether you should have a test to screen for prostate cancer (PSA).    Every 3 years, have a diabetes test (fasting glucose). If you are at risk for diabetes, you should have this test more often.    Every 5 years, have a cholesterol test. Have this test more often if you are at risk for high cholesterol or heart disease.     Every 10 years, have a colonoscopy. Or, have a yearly FIT test (stool test). These exams will check for colon cancer.    Talk to with your health care provider about screening for Abdominal Aortic Aneurysm if you have a family history of AAA or have a history of smoking.    Shots:     Get a flu shot each year.     Get a tetanus shot every 10 years.     Talk to your doctor about your pneumonia vaccines. There are now two you should receive - Pneumovax (PPSV 23) and Prevnar (PCV 13).     Talk to your pharmacist about a shingles vaccine.     Talk to your doctor about the hepatitis B vaccine.  Nutrition:     Eat at least 5 servings of fruits and vegetables each day.     Eat whole-grain bread, whole-wheat pasta and brown rice instead of white grains and rice.     Get adequate Calcium and Vitamin D.   Lifestyle    Exercise for at least 150 minutes a week (30 minutes a day, 5 days a week). This will help you control your weight and prevent disease.     Limit alcohol to one drink per day.     No smoking.     Wear sunscreen to prevent skin cancer.     See your dentist every six months  for an exam and cleaning.     See your eye doctor every 1 to 2 years to screen for conditions such as glaucoma, macular degeneration, cataracts, etc

## 2018-10-02 NOTE — MR AVS SNAPSHOT
After Visit Summary   10/2/2018    Roscoe Jang    MRN: [id number]           Patient Information     Date Of Birth          1950        Visit Information        Provider Department      10/2/2018 2:00 PM Mel Pappas NP [facility]        Today's Diagnoses     Special screening for malignant neoplasms, colon    -  1    Need for vaccination        Need for prophylactic vaccination and inoculation against influenza        Bilateral carotid artery stenosis        Type 2 diabetes mellitus with hyperglycemia, without long-term current use of insulin (H)        Benign essential hypertension        S/P carotid endarterectomy        Chronic right shoulder pain          Care Instructions    Will get an xray of right shoulder   Labs today and urine   Mail in FIT test for colon cancer screening   Pneumonia and flu shot given     See me back on FRiday for a DOT will need to call [facility] to this schedule     Preventive Health Recommendations:   Male Ages 65 and over    Yearly exam:             See your health care provider every year in order to  o   Review health changes.   o   Discuss preventive care.    o   Review your medicines if your doctor has prescribed any.    Talk with your health care provider about whether you should have a test to screen for prostate cancer (PSA).    Every 3 years, have a diabetes test (fasting glucose). If you are at risk for diabetes, you should have this test more often.    Every 5 years, have a cholesterol test. Have this test more often if you are at risk for high cholesterol or heart disease.     Every 10 years, have a colonoscopy. Or, have a yearly FIT test (stool test). These exams will check for colon cancer.    Talk to with your health care provider about screening for Abdominal Aortic Aneurysm if you have a family history of AAA or have a history of smoking.    Shots:     Get a flu shot each year.     Get a tetanus shot every 10 years.     Talk  to your doctor about your pneumonia vaccines. There are now two you should receive - Pneumovax (PPSV 23) and Prevnar (PCV 13).     Talk to your pharmacist about a shingles vaccine.     Talk to your doctor about the hepatitis B vaccine.  Nutrition:     Eat at least 5 servings of fruits and vegetables each day.     Eat whole-grain bread, whole-wheat pasta and brown rice instead of white grains and rice.     Get adequate Calcium and Vitamin D.   Lifestyle    Exercise for at least 150 minutes a week (30 minutes a day, 5 days a week). This will help you control your weight and prevent disease.     Limit alcohol to one drink per day.     No smoking.     Wear sunscreen to prevent skin cancer.     See your dentist every six months for an exam and cleaning.     See your eye doctor every 1 to 2 years to screen for conditions such as glaucoma, macular degeneration, cataracts, etc           Follow-ups after your visit        Future tests that were ordered for you today     Open Future Orders        Priority Expected Expires Ordered    XR Shoulder Right 2 Views Routine 10/2/2018 10/2/2019 10/2/2018    Fecal colorectal cancer screen FIT Routine 10/23/2018 12/25/2018 10/2/2018            Who to contact     If you have questions or need follow up information about today's clinic visit or your schedule please contact [facility] directly at [phone].  Normal or non-critical lab and imaging results will be communicated to you by MyChart, letter or phone within 4 business days after the clinic has received the results. If you do not hear from us within 7 days, please contact the clinic through MyChart or phone. If you have a critical or abnormal lab result, we will notify you by phone as soon as possible.  Submit refill requests through [facility] or call your pharmacy and they will forward the refill request to us. Please allow 3 business days for your refill to be completed.          Additional Information About Your  "Visit        Care EveryWhere ID     This is your Care EveryWhere ID. This could be used by other organizations to access your [facility] records  [id number]        Your Vitals Were     Pulse Temperature Height BMI (Body Mass Index)          80 98.2  F (36.8  C) (Tympanic) 5' 8\" (1.727 m) 37.46 kg/m2         Blood Pressure from Last 3 Encounters:   10/02/18 130/70   09/27/18 126/70   06/12/18 131/67    Weight from Last 3 Encounters:   10/02/18 246 lb 6.4 oz (111.8 kg)   06/07/18 239 lb 10.2 oz (108.7 kg)   05/18/18 241 lb (109.3 kg)              We Performed the Following     Comprehensive metabolic panel     FLU VACCINE, INCREASED ANTIGEN, PRESV FREE, AGE 65+ [15094]     Hemoglobin A1c     LDL cholesterol direct     Pneumococcal vaccine 23 valent PPSV23  (Pneumovax) [28680]     Vaccine Administration, Each Additional [67023]     Vaccine Administration, Initial [19448]          Today's Medication Changes          These changes are accurate as of 10/2/18  2:40 PM.  If you have any questions, ask your nurse or doctor.               These medicines have changed or have updated prescriptions.        Dose/Directions    acetaminophen 325 MG tablet   Commonly known as:  TYLENOL   This may have changed:    - how much to take  - when to take this   Used for:  Carotid artery stenosis, symptomatic, right        Dose:  650 mg   Take 2 tablets (650 mg) by mouth every 4 hours as needed for other (multimodal surgical pain management along with NSAIDS and opioid medication as indicated based on pain control and physical function.)   Quantity:  100 tablet   Refills:  0       metFORMIN 500 MG tablet   Commonly known as:  GLUCOPHAGE   This may have changed:  Another medication with the same name was removed. Continue taking this medication, and follow the directions you see here.   Used for:  Type 2 diabetes mellitus with hyperglycemia, without long-term current use of insulin (H)   Changed by:  Mel Pappas NP        TAKE " ONE TABLET BY MOUTH WITH BREAKFAST AND TWO TABLETS WITH LUNCH   Quantity:  270 tablet   Refills:  0                Primary Care Provider Office Phone # Fax #    Mel Pappas, [phone]       [address] [facility] [address]        Equal Access to Services     SHRUTHI MARIANO AH: Hadii brittaney browne hadjocelyno Soomaali, waaxda luqadaha, qaybta kaalmada adeegyada, waxay idiin haymisbahn ademarcella quezada rosalinda flores. So [facility] [phone].    ATENCIÓN: Si habla español, tiene a tapia disposición servicios gratuitos de asistencia lingüística. Llame al [phone].    We comply with applicable federal civil rights laws and [place] laws. We do not discriminate on the basis of race, color, national origin, age, disability, sex, sexual orientation, or gender identity.            Thank you!     Thank you for choosing [facility]  for your care. Our goal is always to provide you with excellent care. Hearing back from our patients is one way we can continue to improve our services. Please take a few minutes to complete the written survey that you may receive in the mail after your visit with us. Thank you!             Your Updated Medication List - Protect others around you: Learn how to safely use, store and throw away your medicines at www.disposemymeds.org.          This list is accurate as of 10/2/18  2:40 PM.  Always use your most recent med list.                   Brand Name Dispense Instructions for use Diagnosis    acetaminophen 325 MG tablet    TYLENOL    100 tablet    Take 2 tablets (650 mg) by mouth every 4 hours as needed for other (multimodal surgical pain management along with NSAIDS and opioid medication as indicated based on pain control and physical function.)    Carotid artery stenosis, symptomatic, right       ASPIRIN PO      Take 81 mg by mouth every morning        atorvastatin 20 MG tablet    LIPITOR    90 tablet    Take 1 tablet (20 mg) by mouth daily    Carotid artery stenosis,  symptomatic, right       blood glucose monitoring lancets     100 each    Use to test blood sugar 1 times daily or as directed.    Type 2 diabetes mellitus (H)       * blood glucose monitoring test strip    RHYS CONTOUR NEXT    100 strip    Use to test blood sugar 1 times daily or as directed.    Type 2 diabetes mellitus (H)       * blood glucose monitoring test strip    RHYS CONTOUR NEXT    100 strip    Use to test blood sugar 1 times daily or as directed.  Ok to substitute alternative if insurance prefers.    Type 2 diabetes mellitus with hyperglycemia, without long-term current use of insulin (H)       lisinopril 20 MG tablet    PRINIVIL/ZESTRIL    90 tablet    Take 1 tablet (20 mg) by mouth daily    Benign essential hypertension       metFORMIN 500 MG tablet    GLUCOPHAGE    270 tablet    TAKE ONE TABLET BY MOUTH WITH BREAKFAST AND TWO TABLETS WITH LUNCH    Type 2 diabetes mellitus with hyperglycemia, without long-term current use of insulin (H)       * Notice:  This list has 2 medication(s) that are the same as other medications prescribed for you. Read the directions carefully, and ask your doctor or other care provider to review them with you.

## 2018-10-02 NOTE — PROGRESS NOTES

## 2018-10-02 NOTE — NURSING NOTE
Screening Questionnaire for Adult Immunization    Are you sick today?   No   Do you have allergies to medications, food, a vaccine component or latex?   No   Have you ever had a serious reaction after receiving a vaccination?   No   Do you have a long-term health problem with heart disease, lung disease, asthma, kidney disease, metabolic disease (e.g. diabetes), anemia, or other blood disorder?   No   Do you have cancer, leukemia, HIV/AIDS, or any other immune system problem?   No   In the past 3 months, have you taken medications that affect  your immune system, such as prednisone, other steroids, or anticancer drugs; drugs for the treatment of rheumatoid arthritis, Crohn s disease, or psoriasis; or have you had radiation treatments?   No   Have you had a seizure, or a brain or other nervous system problem?   No   During the past year, have you received a transfusion of blood or blood     products, or been given immune (gamma) globulin or antiviral drug?   No   For women: Are you pregnant or is there a chance you could become        pregnant during the next month?   No   Have you received any vaccinations in the past 4 weeks?   No     Immunization questionnaire answers were all negative.      Given by Leah Ziegler. Patient instructed to remain in clinic for 15 minutes afterwards, and to report any adverse reaction to me immediately.       Screening performed by Leah Ziegler on 10/2/2018 at 2:08 PM.

## 2018-10-02 NOTE — PROGRESS NOTES
SUBJECTIVE:   CC: Roscoe Jang is an 68 year old male who presents for preventative health visit.     Healthy Habits:    Do you get at least three servings of calcium containing foods daily (dairy, green leafy vegetables, etc.)? yes    Amount of exercise or daily activities, outside of work: 1-2 hour(s) per day    Problems taking medications regularly No    Medication side effects: No    Have you had an eye exam in the past two years? yes    Do you see a dentist twice per year? yes    Do you have sleep apnea, excessive snoring or daytime drowsiness?no    Today's PHQ-2 Score:   PHQ-2 ( 1999 Pf[employer]) 10/2/2018 5/7/2018   Q1: Little interest or pleasure in doing things 0 0   Q2: Feeling down, depressed or hopeless 0 0   PHQ-2 Score 0 0     Abuse: Current or Past(Physical, Sexual or Emotional)- No  Do you feel safe in your environment - Yes    Social History   Substance Use Topics     Smoking status: Former Smoker     Quit date: 10/3/2007     Smokeless tobacco: Never Used     Alcohol use No      If you drink alcohol do you typically have >3 drinks per day or >7 drinks per week? No                      Last PSA: No results found for: PSA    Reviewed orders with patient. Reviewed health maintenance and updated orders accordingly - Yes  Labs reviewed in EPIC    Reviewed and updated as needed this visit by clinical staff  Tobacco  Allergies  Med Hx  Surg Hx  Fam Hx  Soc Hx            Had bilateral endarterectomy   Right carotid in May and the left in June   He had a history of hyperlipidemia but had troubles with headaches with higher dose Lipitor but tolerated 20 mg well.  He was treated with Plavix for 1 month following each surgery along with chronic aspirin, metformin, lisinopril, Tylenol.  Had follow up with vascular specialist in September     Clement chronic shoulder pain   Injected in March-without relief       Reviewed and updated as needed this visit by Provider            ROS:  CONSTITUTIONAL: NEGATIVE for  "fever, chills, change in weight  INTEGUMENTARY/SKIN: NEGATIVE for worrisome rashes, moles or lesions  EYES: NEGATIVE for vision changes or irritation  ENT: NEGATIVE for ear, mouth and throat problems  RESP: NEGATIVE for significant cough or SOB  CV: NEGATIVE for chest pain, palpitations or peripheral edema  GI: NEGATIVE for nausea, abdominal pain, heartburn, or change in bowel habits   male: negative for dysuria, hematuria, decreased urinary stream, erectile dysfunction, urethral discharge  MUSCULOSKELETAL: NEGATIVE for significant arthralgias or myalgia  NEURO: NEGATIVE for weakness, dizziness or paresthesias  PSYCHIATRIC: NEGATIVE for changes in mood or affect    OBJECTIVE:   /70  Pulse 80  Temp 98.2  F (36.8  C) (Tympanic)  Ht 5' 8\" (1.727 m)  Wt 246 lb 6.4 oz (111.8 kg)  BMI 37.46 kg/m2  EXAM:  GENERAL: healthy, alert and no distress  EYES: Eyes grossly normal to inspection, PERRL and conjunctivae and sclerae normal  HENT: ear canals and TM's normal, nose and mouth without ulcers or lesions  NECK: no adenopathy, no asymmetry, masses, or scars and thyroid normal to palpation  RESP: lungs clear to auscultation - no rales, rhonchi or wheezes  CV: regular rate and rhythm, normal S1 S2, no S3 or S4, no murmur, click or rub, no peripheral edema and peripheral pulses strong  ABDOMEN: soft, nontender, no hepatosplenomegaly, no masses and bowel sounds normal  MS: no gross musculoskeletal defects noted, no edema  SKIN: no suspicious lesions or rashes  NEURO: Normal strength and tone, mentation intact and speech normal  Right Shoulder: Full ROM, non tender      Diagnostic Test Results:  Results for orders placed or performed in visit on 10/02/18   LDL cholesterol direct   Result Value Ref Range    LDL Cholesterol Direct 48 <100 mg/dL   Comprehensive metabolic panel   Result Value Ref Range    Sodium 140 133 - 144 mmol/L    Potassium 4.1 3.4 - 5.3 mmol/L    Chloride 105 94 - 109 mmol/L    Carbon Dioxide 29 20 - " 32 mmol/L    Anion Gap 6 3 - 14 mmol/L    Glucose 134 (H) 70 - 99 mg/dL    Urea Nitrogen 20 7 - 30 mg/dL    Creatinine 0.96 0.66 - 1.25 mg/dL    GFR Estimate 78 >60 mL/min/1.7m2    GFR Estimate If Black >90 >60 mL/min/1.7m2    Calcium 8.6 8.5 - 10.1 mg/dL    Bilirubin Total 0.9 0.2 - 1.3 mg/dL    Albumin 3.8 3.4 - 5.0 g/dL    Protein Total 7.2 6.8 - 8.8 g/dL    Alkaline Phosphatase 55 40 - 150 U/L    ALT 34 0 - 70 U/L    AST 25 0 - 45 U/L   Hemoglobin A1c   Result Value Ref Range    Hemoglobin A1C 6.8 (H) 0 - 5.6 %   Albumin Random Urine Quantitative with Creat Ratio   Result Value Ref Range    Creatinine Urine 128 mg/dL    Albumin Urine mg/L 8 mg/L    Albumin Urine mg/g Cr 6.50 0 - 17 mg/g Cr     SHOULDER TWO VIEWS RIGHT  10/2/2018 2:55 PM      HISTORY: Chronic right shoulder pain.     COMPARISON: None.     FINDINGS: Minimal degenerative spurring. The acromioclavicular and  coracoclavicular distances appear within normal limits. The  subacromial space is maintained. There is no acute fracture or  demonstrated dislocation. There are no worrisome bony lesions.  Radiopaque foreign body in the upper arm.         IMPRESSION: No acute osseous abnormality demonstrated.     MICHAEL VACA MD  ASSESSMENT/PLAN:       ICD-10-CM    1. Preventative health care Z00.00    2. Type 2 diabetes mellitus with hyperglycemia, without long-term current use of insulin (H) E11.65 LDL cholesterol direct     Comprehensive metabolic panel     Hemoglobin A1c     Albumin Random Urine Quantitative with Creat Ratio   3. Bilateral carotid artery stenosis I65.23    4. Benign essential hypertension I10    5. Chronic right shoulder pain M25.511 XR Shoulder Right 2 Views    G89.29    6. Need for vaccination Z23    7. S/P carotid endarterectomy Z98.890    8. Need for prophylactic vaccination and inoculation against influenza Z23 Pneumococcal vaccine 23 valent PPSV23  (Pneumovax) [41955]     FLU VACCINE, INCREASED ANTIGEN, PRESV FREE, AGE 65+ [47558]      "Vaccine Administration, Initial [59896]     Vaccine Administration, Each Additional [13340]   9. Special screening for malignant neoplasms, colon Z12.11 Fecal colorectal cancer screen FIT       COUNSELING:  Reviewed preventive health counseling, as reflected in patient instructions       Regular exercise       Healthy diet/nutrition       Immunizations    Vaccinated for: Influenza and Pneumococcal          BP Readings from Last 1 Encounters:   10/05/18 135/80     Estimated body mass index is 37.46 kg/(m^2) as calculated from the following:    Height as of this encounter: 5' 8\" (1.727 m).    Weight as of this encounter: 246 lb 6.4 oz (111.8 kg).      Weight management plan: Discussed healthy diet and exercise guidelines and patient will follow up in 12 months in clinic to re-evaluate.     reports that he quit smoking about 11 years ago. He has never used smokeless tobacco.      Counseling Resources:  ATP IV Guidelines  Pooled Cohorts Equation Calculator  FRAX Risk Assessment  ICSI Preventive Guidelines  Dietary Guidelines for Americans, 2010  USDA's MyPlate  ASA Prophylaxis  Lung CA Screening    Mel Pappas NP  [facility]  "

## 2018-10-03 LAB
ALBUMIN SERPL-MCNC: 3.8 G/DL (ref 3.4–5)
ALP SERPL-CCNC: 55 U/L (ref 40–150)
ALT SERPL W P-5'-P-CCNC: 34 U/L (ref 0–70)
ANION GAP SERPL CALCULATED.3IONS-SCNC: 6 MMOL/L (ref 3–14)
AST SERPL W P-5'-P-CCNC: 25 U/L (ref 0–45)
BILIRUB SERPL-MCNC: 0.9 MG/DL (ref 0.2–1.3)
BUN SERPL-MCNC: 20 MG/DL (ref 7–30)
CALCIUM SERPL-MCNC: 8.6 MG/DL (ref 8.5–10.1)
CHLORIDE SERPL-SCNC: 105 MMOL/L (ref 94–109)
CO2 SERPL-SCNC: 29 MMOL/L (ref 20–32)
CREAT SERPL-MCNC: 0.96 MG/DL (ref 0.66–1.25)
GFR SERPL CREATININE-BSD FRML MDRD: 78 ML/MIN/1.7M2
GLUCOSE SERPL-MCNC: 134 MG/DL (ref 70–99)
LDLC SERPL DIRECT ASSAY-MCNC: 48 MG/DL
POTASSIUM SERPL-SCNC: 4.1 MMOL/L (ref 3.4–5.3)
PROT SERPL-MCNC: 7.2 G/DL (ref 6.8–8.8)
SODIUM SERPL-SCNC: 140 MMOL/L (ref 133–144)

## 2018-10-05 ENCOUNTER — OFFICE VISIT (OUTPATIENT)
Dept: FAMILY MEDICINE | Facility: CLINIC | Age: 68
End: 2018-10-05
Payer: COMMERCIAL

## 2018-10-05 VITALS
WEIGHT: 245.6 LBS | HEIGHT: 68 IN | DIASTOLIC BLOOD PRESSURE: 80 MMHG | RESPIRATION RATE: 16 BRPM | HEART RATE: 66 BPM | TEMPERATURE: 98.2 F | SYSTOLIC BLOOD PRESSURE: 135 MMHG | BODY MASS INDEX: 37.22 KG/M2

## 2018-10-05 DIAGNOSIS — G89.29 CHRONIC RIGHT SHOULDER PAIN: Primary | ICD-10-CM

## 2018-10-05 DIAGNOSIS — M25.511 CHRONIC RIGHT SHOULDER PAIN: Primary | ICD-10-CM

## 2018-10-05 DIAGNOSIS — M19.011 PRIMARY OSTEOARTHRITIS OF RIGHT SHOULDER: ICD-10-CM

## 2018-10-05 PROCEDURE — 99213 OFFICE O/P EST LOW 20 MIN: CPT | Mod: 25 | Performed by: NURSE PRACTITIONER

## 2018-10-05 PROCEDURE — 20610 DRAIN/INJ JOINT/BURSA W/O US: CPT | Mod: RT | Performed by: NURSE PRACTITIONER

## 2018-10-05 RX ORDER — TRIAMCINOLONE ACETONIDE 40 MG/ML
40 INJECTION, SUSPENSION INTRA-ARTICULAR; INTRAMUSCULAR ONCE
Qty: 1 ML | Refills: 0 | OUTPATIENT
Start: 2018-10-05 | End: 2018-10-05

## 2018-10-05 RX ORDER — LIDOCAINE HYDROCHLORIDE 10 MG/ML
2 INJECTION, SOLUTION INFILTRATION; PERINEURAL ONCE
Qty: 2 ML | Refills: 0 | OUTPATIENT
Start: 2018-10-05 | End: 2018-10-05

## 2018-10-05 NOTE — PROGRESS NOTES
"  SUBJECTIVE:   Roscoe Jang is a 68 year old male who presents to clinic today for the following health issues:      Chronic Shoulder pain  Had injection in March with no relief    Results for orders placed or performed in visit on 10/02/18   XR Shoulder Right 2 Views    Narrative    SHOULDER TWO VIEWS RIGHT  10/2/2018 2:55 PM     HISTORY: Chronic right shoulder pain.    COMPARISON: None.    FINDINGS: Minimal degenerative spurring. The acromioclavicular and  coracoclavicular distances appear within normal limits. The  subacromial space is maintained. There is no acute fracture or  demonstrated dislocation. There are no worrisome bony lesions.  Radiopaque foreign body in the upper arm.      Impression    IMPRESSION: No acute osseous abnormality demonstrated.    MICHAEL VACA MD     Was injected about a year ago and in the distance past prior to that   would like to try repeat injection today        Reviewed and updated as needed this visit by Provider         ROS:  MUSCULOSKELETAL: POSITIVE  for arthralgias right shoulder     OBJECTIVE:                                                    /80  Pulse 66  Temp 98.2  F (36.8  C) (Tympanic)  Resp 16  Ht 5' 7.75\" (1.721 m)  Wt 245 lb 9.6 oz (111.4 kg)  BMI 37.62 kg/m2  Body mass index is 37.62 kg/(m^2).  GENERAL APPEARANCE: healthy, alert and no distress    After informed consent, right shoulder was prepped and a steroid injection was performed in the right shoulder using 1% plain Lidocaine and 40 mg of Kenalog. This was well tolerated.      Diagnostic test results:  Diagnostic Test Results:  none      ASSESSMENT/PLAN:                                                      1. Chronic right shoulder pain  2. Primary osteoarthritis of right shoulder  - Kenalog 40 MG  []  - triamcinolone acetonide (KENALOG) 40 MG/ML injection; 1 mL (40 mg) by INTRA-ARTICULAR route once for 1 dose  Dispense: 1 mL; Refill: 0  - lidocaine 1 % injection; 2 mLs by " INTRA-ARTICULAR route once for 1 dose  Dispense: 2 mL; Refill: 0  - Large Joint/Bursa injection and/or drainage (Shoulder, Knee)    Patient Instructions     Component      Latest Ref Rng & Units 10/2/2018   Sodium      133 - 144 mmol/L 140   Potassium      3.4 - 5.3 mmol/L 4.1   Chloride      94 - 109 mmol/L 105   Carbon Dioxide      20 - 32 mmol/L 29   Anion Gap      3 - 14 mmol/L 6   Glucose      70 - 99 mg/dL 134 (H)   Urea Nitrogen      7 - 30 mg/dL 20   Creatinine      0.66 - 1.25 mg/dL 0.96   GFR Estimate      >60 mL/min/1.7m2 78   GFR Estimate If Black      >60 mL/min/1.7m2 >90   Calcium      8.5 - 10.1 mg/dL 8.6   Bilirubin Total      0.2 - 1.3 mg/dL 0.9   Albumin      3.4 - 5.0 g/dL 3.8   Protein Total      6.8 - 8.8 g/dL 7.2   Alkaline Phosphatase      40 - 150 U/L 55   ALT      0 - 70 U/L 34   AST      0 - 45 U/L 25   Creatinine Urine      mg/dL 128   Albumin Urine mg/L      mg/L 8   Albumin Urine mg/g Cr      0 - 17 mg/g Cr 6.50   LDL Cholesterol Direct      <100 mg/dL 48   Hemoglobin A1C      0 - 5.6 % 6.8 (H)       Labs looked good from last visit  If shoulder pain persist could consider physical therapy referral or referral to see shoulder specialist   Follow up in 6 months       Mel Pappas NP  [facility]

## 2018-10-05 NOTE — MR AVS SNAPSHOT
After Visit Summary   10/5/2018    Roscoe Jang    MRN: [id number]           Patient Information     Date Of Birth          1950        Visit Information        Provider Department      10/5/2018 8:40 AM Mel Pappas NP [facility]        Today's Diagnoses     Chronic right shoulder pain    -  1    Primary osteoarthritis of right shoulder          Care Instructions    Component      Latest Ref Rng & Units 10/2/2018   Sodium      133 - 144 mmol/L 140   Potassium      3.4 - 5.3 mmol/L 4.1   Chloride      94 - 109 mmol/L 105   Carbon Dioxide      20 - 32 mmol/L 29   Anion Gap      3 - 14 mmol/L 6   Glucose      70 - 99 mg/dL 134 (H)   Urea Nitrogen      7 - 30 mg/dL 20   Creatinine      0.66 - 1.25 mg/dL 0.96   GFR Estimate      >60 mL/min/1.7m2 78   GFR Estimate If Black      >60 mL/min/1.7m2 >90   Calcium      8.5 - 10.1 mg/dL 8.6   Bilirubin Total      0.2 - 1.3 mg/dL 0.9   Albumin      3.4 - 5.0 g/dL 3.8   Protein Total      6.8 - 8.8 g/dL 7.2   Alkaline Phosphatase      40 - 150 U/L 55   ALT      0 - 70 U/L 34   AST      0 - 45 U/L 25   Creatinine Urine      mg/dL 128   Albumin Urine mg/L      mg/L 8   Albumin Urine mg/g Cr      0 - 17 mg/g Cr 6.50   LDL Cholesterol Direct      <100 mg/dL 48   Hemoglobin A1C      0 - 5.6 % 6.8 (H)       Labs looked good from last visit  If shoulder pain persist could consider physical therapy referral or referral to see shoulder specialist   Follow up in 6 months           Follow-ups after your visit        Follow-up notes from your care team     Return in about 6 months (around 4/5/2019) for Routine Visit.      Who to contact     If you have questions or need follow up information about today's clinic visit or your schedule please contact [facility] directly at [phone].  Normal or non-critical lab and imaging results will be communicated to you by MyChart, letter or phone within 4 business days after the clinic has  "received the results. If you do not hear from us within 7 days, please contact the clinic through [employer] or phone. If you have a critical or abnormal lab result, we will notify you by phone as soon as possible.  Submit refill requests through [employer] or call your pharmacy and they will forward the refill request to us. Please allow 3 business days for your refill to be completed.          Additional Information About Your Visit        Care EveryWhere ID     This is your Care EveryWhere ID. This could be used by other organizations to access your [facility] medical records  [id number]        Your Vitals Were     Pulse Temperature Respirations Height BMI (Body Mass Index)       66 98.2  F (36.8  C) (Tympanic) 16 5' 7.75\" (1.721 m) 37.62 kg/m2        Blood Pressure from Last 3 Encounters:   10/05/18 135/80   10/02/18 130/70   09/27/18 126/70    Weight from Last 3 Encounters:   10/05/18 245 lb 9.6 oz (111.4 kg)   10/02/18 246 lb 6.4 oz (111.8 kg)   06/07/18 239 lb 10.2 oz (108.7 kg)              Today, you had the following     No orders found for display         Today's Medication Changes          These changes are accurate as of 10/5/18  9:37 AM.  If you have any questions, ask your nurse or doctor.               These medicines have changed or have updated prescriptions.        Dose/Directions    acetaminophen 325 MG tablet   Commonly known as:  TYLENOL   This may have changed:    - how much to take  - when to take this   Used for:  Carotid artery stenosis, symptomatic, right        Dose:  650 mg   Take 2 tablets (650 mg) by mouth every 4 hours as needed for other (multimodal surgical pain management along with NSAIDS and opioid medication as indicated based on pain control and physical function.)   Quantity:  100 tablet   Refills:  0                Primary Care Provider Office Phone # Fax #    Mel Pappas [phone]       [address] [address]        Equal Access to Services  "    SHRUTHI MARIANO : Hadii aad hollie jean carlos Murillo, waaxda luqadaha, qaybta kaalmada samkleverbella, letitia del castilloleonardolisandra tellez . So [facility] [phone].    ATENCIÓN: Si habla español, tiene a tapia disposición servicios gratuitos de asistencia lingüística. Llame al [phone].    We comply with applicable federal civil rights laws and [place] laws. We do not discriminate on the basis of race, color, national origin, age, disability, sex, sexual orientation, or gender identity.            Thank you!     Thank you for choosing [facility]  for your care. Our goal is always to provide you with excellent care. Hearing back from our patients is one way we can continue to improve our services. Please take a few minutes to complete the written survey that you may receive in the mail after your visit with us. Thank you!             Your Updated Medication List - Protect others around you: Learn how to safely use, store and throw away your medicines at www.disposemymeds.org.          This list is accurate as of 10/5/18  9:37 AM.  Always use your most recent med list.                   Brand Name Dispense Instructions for use Diagnosis    acetaminophen 325 MG tablet    TYLENOL    100 tablet    Take 2 tablets (650 mg) by mouth every 4 hours as needed for other (multimodal surgical pain management along with NSAIDS and opioid medication as indicated based on pain control and physical function.)    Carotid artery stenosis, symptomatic, right       ASPIRIN PO      Take 81 mg by mouth every morning        atorvastatin 20 MG tablet    LIPITOR    90 tablet    Take 1 tablet (20 mg) by mouth daily    Carotid artery stenosis, symptomatic, right       blood glucose monitoring lancets     100 each    Use to test blood sugar 1 times daily or as directed.    Type 2 diabetes mellitus (H)       * blood glucose monitoring test strip    RHYS CONTOUR NEXT    100 strip    Use to test blood sugar 1 times daily or as  directed.    Type 2 diabetes mellitus (H)       * blood glucose monitoring test strip    RHYS CONTOUR NEXT    100 strip    Use to test blood sugar 1 times daily or as directed.  Ok to substitute alternative if insurance prefers.    Type 2 diabetes mellitus with hyperglycemia, without long-term current use of insulin (H)       lisinopril 20 MG tablet    PRINIVIL/ZESTRIL    90 tablet    Take 1 tablet (20 mg) by mouth daily    Benign essential hypertension       metFORMIN 500 MG tablet    GLUCOPHAGE    270 tablet    TAKE ONE TABLET BY MOUTH WITH BREAKFAST AND TWO TABLETS WITH LUNCH    Type 2 diabetes mellitus with hyperglycemia, without long-term current use of insulin (H)       * Notice:  This list has 2 medication(s) that are the same as other medications prescribed for you. Read the directions carefully, and ask your doctor or other care provider to review them with you.

## 2018-10-05 NOTE — PATIENT INSTRUCTIONS
Component      Latest Ref Rng & Units 10/2/2018   Sodium      133 - 144 mmol/L 140   Potassium      3.4 - 5.3 mmol/L 4.1   Chloride      94 - 109 mmol/L 105   Carbon Dioxide      20 - 32 mmol/L 29   Anion Gap      3 - 14 mmol/L 6   Glucose      70 - 99 mg/dL 134 (H)   Urea Nitrogen      7 - 30 mg/dL 20   Creatinine      0.66 - 1.25 mg/dL 0.96   GFR Estimate      >60 mL/min/1.7m2 78   GFR Estimate If Black      >60 mL/min/1.7m2 >90   Calcium      8.5 - 10.1 mg/dL 8.6   Bilirubin Total      0.2 - 1.3 mg/dL 0.9   Albumin      3.4 - 5.0 g/dL 3.8   Protein Total      6.8 - 8.8 g/dL 7.2   Alkaline Phosphatase      40 - 150 U/L 55   ALT      0 - 70 U/L 34   AST      0 - 45 U/L 25   Creatinine Urine      mg/dL 128   Albumin Urine mg/L      mg/L 8   Albumin Urine mg/g Cr      0 - 17 mg/g Cr 6.50   LDL Cholesterol Direct      <100 mg/dL 48   Hemoglobin A1C      0 - 5.6 % 6.8 (H)       Labs looked good from last visit  If shoulder pain persist could consider physical therapy referral or referral to see shoulder specialist   Follow up in 6 months

## 2018-10-05 NOTE — NURSING NOTE
"Chief Complaint   Patient presents with     Shoulder Pain       Initial /82 (BP Location: Right arm, Patient Position: Chair, Cuff Size: Adult Large)  Pulse 66  Temp 98.2  F (36.8  C) (Tympanic)  Resp 16  Ht 5' 7.75\" (1.721 m)  Wt 245 lb 9.6 oz (111.4 kg)  BMI 37.62 kg/m2 Estimated body mass index is 37.62 kg/(m^2) as calculated from the following:    Height as of this encounter: 5' 7.75\" (1.721 m).    Weight as of this encounter: 245 lb 9.6 oz (111.4 kg).    Patient presents to the clinic using No DME    Health Maintenance that is potentially due pending provider review:  NONE        Is there anyone who you would like to be able to receive your results? No  If yes have patient fill out JARVIS      "

## 2018-11-05 DIAGNOSIS — I65.21 CAROTID ARTERY STENOSIS, SYMPTOMATIC, RIGHT: ICD-10-CM

## 2018-11-05 DIAGNOSIS — E11.65 TYPE 2 DIABETES MELLITUS WITH HYPERGLYCEMIA, WITHOUT LONG-TERM CURRENT USE OF INSULIN (H): ICD-10-CM

## 2018-11-05 DIAGNOSIS — I10 BENIGN ESSENTIAL HYPERTENSION: ICD-10-CM

## 2018-11-05 RX ORDER — ATORVASTATIN CALCIUM 20 MG/1
TABLET, FILM COATED ORAL
Qty: 90 TABLET | Refills: 3 | Status: ON HOLD | OUTPATIENT
Start: 2018-11-05 | End: 2019-06-06

## 2018-11-05 RX ORDER — LISINOPRIL 20 MG/1
TABLET ORAL
Qty: 90 TABLET | Refills: 1 | Status: SHIPPED | OUTPATIENT
Start: 2018-11-05 | End: 2019-05-12

## 2018-11-05 NOTE — TELEPHONE ENCOUNTER
"Requested Prescriptions   Pending Prescriptions Disp Refills     atorvastatin (LIPITOR) 20 MG tablet [Pharmacy Med Name: ATORVASTATIN 20MG   TAB] 90 tablet 1     Sig: TAKE 1 TABLET BY MOUTH ONCE DAILY    Statins Protocol Passed    11/5/2018  7:56 AM       Passed - LDL on file in past 12 months    Recent Labs   Lab Test  10/02/18   1453   LDL  48            Passed - No abnormal creatine kinase in past 12 months    No lab results found.            Passed - Recent (12 mo) or future (30 days) visit within the authorizing provider's specialty    Patient had office visit in the last 12 months or has a visit in the next 30 days with authorizing provider or within the authorizing provider's specialty.  See \"Patient Info\" tab in inbasket, or \"Choose Columns\" in Meds & Orders section of the refill encounter.             Passed - Patient is age 18 or older        Last Written Prescription Date:  5/18/18  Last Fill Quantity: 90,  # refills: 1   Last office visit: 10/5/2018 with prescribing provider:     Future Office Visit:      "

## 2018-11-05 NOTE — TELEPHONE ENCOUNTER
"Requested Prescriptions   Pending Prescriptions Disp Refills     metFORMIN (GLUCOPHAGE) 500 MG tablet [Pharmacy Med Name: METFORMIN 500MG TAB] 270 tablet 0     Sig: TAKE ONE TABLET BY MOUTH WITH BREAKFAST AND TWO TABLETS WITH LUNCH    Biguanide Agents Passed    11/5/2018  7:56 AM       Passed - Blood pressure less than 140/90 in past 6 months    BP Readings from Last 3 Encounters:   10/05/18 135/80   10/02/18 130/70   09/27/18 126/70                Passed - Patient has documented LDL within the past 12 mos.    Recent Labs   Lab Test  10/02/18   1453   LDL  48            Passed - Patient has had a Microalbumin in the past 15 mos.    Recent Labs   Lab Test  10/02/18   1458   MICROL  8   UMALCR  6.50            Passed - Patient is age 10 or older       Passed - Patient has documented A1c within the specified period of time.    If HgbA1C is 8 or greater, it needs to be on file within the past 3 months.  If less than 8, must be on file within the past 6 months.     Recent Labs   Lab Test  10/02/18   1453   A1C  6.8*            Passed - Patient's CR is NOT>1.4 OR Patient's EGFR is NOT<45 within past 12 mos.    Recent Labs   Lab Test  10/02/18   1453   GFRESTIMATED  78   GFRESTBLACK  >90       Recent Labs   Lab Test  10/02/18   1453   CR  0.96            Passed - Patient does NOT have a diagnosis of CHF.       Passed - Recent (6 mo) or future (30 days) visit within the authorizing provider's specialty    Patient had office visit in the last 6 months or has a visit in the next 30 days with authorizing provider or within the authorizing provider's specialty.  See \"Patient Info\" tab in inbasket, or \"Choose Columns\" in Meds & Orders section of the refill encounter.      Last Written Prescription Date:  8/13/18  Last Fill Quantity: 270,  # refills: 0   Last office visit: 10/5/2018 with prescribing provider:     Future Office Visit:              lisinopril (PRINIVIL/ZESTRIL) 20 MG tablet [Pharmacy Med Name: LISINOPRIL 20MG     " "TAB] 90 tablet 1     Sig: TAKE 1 TABLET BY MOUTH ONCE DAILY    ACE Inhibitors (Including Combos) Protocol Passed    11/5/2018  7:56 AM       Passed - Blood pressure under 140/90 in past 12 months    BP Readings from Last 3 Encounters:   10/05/18 135/80   10/02/18 130/70   09/27/18 126/70                Passed - Recent (12 mo) or future (30 days) visit within the authorizing provider's specialty    Patient had office visit in the last 12 months or has a visit in the next 30 days with authorizing provider or within the authorizing provider's specialty.  See \"Patient Info\" tab in inbasket, or \"Choose Columns\" in Meds & Orders section of the refill encounter.      Last Written Prescription Date:  7/6/18  Last Fill Quantity: 90,  # refills: 1   Last office visit: 10/5/2018 with prescribing provider:     Future Office Visit:               Passed - Patient is age 18 or older       Passed - Normal serum creatinine on file in past 12 months    Recent Labs   Lab Test  10/02/18   1453   05/08/18   1929   CR  0.96   < >   --    CREAT   --    --   1.1    < > = values in this interval not displayed.            Passed - Normal serum potassium on file in past 12 months    Recent Labs   Lab Test  10/02/18   1453   POTASSIUM  4.1               "

## 2018-11-11 ENCOUNTER — APPOINTMENT (OUTPATIENT)
Dept: CT IMAGING | Facility: CLINIC | Age: 68
End: 2018-11-11
Attending: EMERGENCY MEDICINE
Payer: MEDICARE

## 2018-11-11 ENCOUNTER — HOSPITAL ENCOUNTER (EMERGENCY)
Facility: CLINIC | Age: 68
Discharge: HOME | End: 2018-11-11
Attending: EMERGENCY MEDICINE | Admitting: EMERGENCY MEDICINE
Payer: MEDICARE

## 2018-11-11 VITALS
TEMPERATURE: 97.3 F | HEIGHT: 68 IN | SYSTOLIC BLOOD PRESSURE: 163 MMHG | HEART RATE: 90 BPM | BODY MASS INDEX: 36.37 KG/M2 | RESPIRATION RATE: 18 BRPM | WEIGHT: 240 LBS | DIASTOLIC BLOOD PRESSURE: 82 MMHG | OXYGEN SATURATION: 97 %

## 2018-11-11 DIAGNOSIS — R91.8 PULMONARY NODULES: ICD-10-CM

## 2018-11-11 DIAGNOSIS — K85.90 ACUTE PANCREATITIS WITHOUT INFECTION OR NECROSIS, UNSPECIFIED PANCREATITIS TYPE: ICD-10-CM

## 2018-11-11 LAB
ALBUMIN SERPL-MCNC: 4 G/DL (ref 3.4–5)
ALP SERPL-CCNC: 72 U/L (ref 40–150)
ALT SERPL W P-5'-P-CCNC: 34 U/L (ref 0–70)
ANION GAP SERPL CALCULATED.3IONS-SCNC: 9 MMOL/L (ref 3–14)
AST SERPL W P-5'-P-CCNC: 15 U/L (ref 0–45)
BASOPHILS # BLD AUTO: 0 10E9/L (ref 0–0.2)
BASOPHILS NFR BLD AUTO: 0.1 %
BILIRUB SERPL-MCNC: 1.7 MG/DL (ref 0.2–1.3)
BUN SERPL-MCNC: 18 MG/DL (ref 7–30)
CALCIUM SERPL-MCNC: 8.7 MG/DL (ref 8.5–10.1)
CHLORIDE SERPL-SCNC: 101 MMOL/L (ref 94–109)
CO2 SERPL-SCNC: 27 MMOL/L (ref 20–32)
CREAT SERPL-MCNC: 0.96 MG/DL (ref 0.66–1.25)
DIFFERENTIAL METHOD BLD: ABNORMAL
EOSINOPHIL # BLD AUTO: 0 10E9/L (ref 0–0.7)
EOSINOPHIL NFR BLD AUTO: 0.2 %
ERYTHROCYTE [DISTWIDTH] IN BLOOD BY AUTOMATED COUNT: 12.4 % (ref 10–15)
GFR SERPL CREATININE-BSD FRML MDRD: 78 ML/MIN/1.7M2
GLUCOSE SERPL-MCNC: 169 MG/DL (ref 70–99)
HCT VFR BLD AUTO: 45.6 % (ref 40–53)
HGB BLD-MCNC: 15.1 G/DL (ref 13.3–17.7)
IMM GRANULOCYTES # BLD: 0.1 10E9/L (ref 0–0.4)
IMM GRANULOCYTES NFR BLD: 0.7 %
LIPASE SERPL-CCNC: 468 U/L (ref 73–393)
LYMPHOCYTES # BLD AUTO: 1.4 10E9/L (ref 0.8–5.3)
LYMPHOCYTES NFR BLD AUTO: 8.6 %
MCH RBC QN AUTO: 29.6 PG (ref 26.5–33)
MCHC RBC AUTO-ENTMCNC: 33.1 G/DL (ref 31.5–36.5)
MCV RBC AUTO: 89 FL (ref 78–100)
MONOCYTES # BLD AUTO: 1.4 10E9/L (ref 0–1.3)
MONOCYTES NFR BLD AUTO: 8.4 %
NEUTROPHILS # BLD AUTO: 13.7 10E9/L (ref 1.6–8.3)
NEUTROPHILS NFR BLD AUTO: 82 %
NRBC # BLD AUTO: 0 10*3/UL
NRBC BLD AUTO-RTO: 0 /100
PLATELET # BLD AUTO: 221 10E9/L (ref 150–450)
POTASSIUM SERPL-SCNC: 4.2 MMOL/L (ref 3.4–5.3)
PROT SERPL-MCNC: 8.2 G/DL (ref 6.8–8.8)
RBC # BLD AUTO: 5.1 10E12/L (ref 4.4–5.9)
SODIUM SERPL-SCNC: 137 MMOL/L (ref 133–144)
WBC # BLD AUTO: 16.7 10E9/L (ref 4–11)

## 2018-11-11 PROCEDURE — 74177 CT ABD & PELVIS W/CONTRAST: CPT

## 2018-11-11 PROCEDURE — 76705 ECHO EXAM OF ABDOMEN: CPT | Performed by: EMERGENCY MEDICINE

## 2018-11-11 PROCEDURE — 96375 TX/PRO/DX INJ NEW DRUG ADDON: CPT | Performed by: EMERGENCY MEDICINE

## 2018-11-11 PROCEDURE — 99284 EMERGENCY DEPT VISIT MOD MDM: CPT | Mod: 25 | Performed by: EMERGENCY MEDICINE

## 2018-11-11 PROCEDURE — 76705 ECHO EXAM OF ABDOMEN: CPT | Mod: 26 | Performed by: EMERGENCY MEDICINE

## 2018-11-11 PROCEDURE — 25000132 ZZH RX MED GY IP 250 OP 250 PS 637: Mod: GY | Performed by: EMERGENCY MEDICINE

## 2018-11-11 PROCEDURE — 80053 COMPREHEN METABOLIC PANEL: CPT | Performed by: EMERGENCY MEDICINE

## 2018-11-11 PROCEDURE — 85025 COMPLETE CBC W/AUTO DIFF WBC: CPT | Performed by: EMERGENCY MEDICINE

## 2018-11-11 PROCEDURE — 83690 ASSAY OF LIPASE: CPT | Performed by: EMERGENCY MEDICINE

## 2018-11-11 PROCEDURE — 99285 EMERGENCY DEPT VISIT HI MDM: CPT | Mod: 25 | Performed by: EMERGENCY MEDICINE

## 2018-11-11 PROCEDURE — 25000125 ZZHC RX 250: Performed by: EMERGENCY MEDICINE

## 2018-11-11 PROCEDURE — A9270 NON-COVERED ITEM OR SERVICE: HCPCS | Mod: GY | Performed by: EMERGENCY MEDICINE

## 2018-11-11 PROCEDURE — 25000128 H RX IP 250 OP 636: Performed by: EMERGENCY MEDICINE

## 2018-11-11 PROCEDURE — 96365 THER/PROPH/DIAG IV INF INIT: CPT | Mod: 59 | Performed by: EMERGENCY MEDICINE

## 2018-11-11 RX ORDER — ONDANSETRON 2 MG/ML
8 INJECTION INTRAMUSCULAR; INTRAVENOUS ONCE
Status: COMPLETED | OUTPATIENT
Start: 2018-11-11 | End: 2018-11-11

## 2018-11-11 RX ORDER — IOPAMIDOL 755 MG/ML
100 INJECTION, SOLUTION INTRAVASCULAR ONCE
Status: COMPLETED | OUTPATIENT
Start: 2018-11-11 | End: 2018-11-11

## 2018-11-11 RX ORDER — ACETAMINOPHEN 500 MG
1000 TABLET ORAL EVERY 8 HOURS PRN
Qty: 30 TABLET | Refills: 0 | Status: ON HOLD | COMMUNITY
Start: 2018-11-11 | End: 2019-06-05

## 2018-11-11 RX ORDER — IBUPROFEN 200 MG
600-800 TABLET ORAL EVERY 8 HOURS PRN
Qty: 60 TABLET | Refills: 0 | COMMUNITY
Start: 2018-11-11 | End: 2018-11-16

## 2018-11-11 RX ADMIN — IOPAMIDOL 100 ML: 755 INJECTION, SOLUTION INTRAVENOUS at 03:49

## 2018-11-11 RX ADMIN — ONDANSETRON 8 MG: 2 INJECTION INTRAMUSCULAR; INTRAVENOUS at 03:27

## 2018-11-11 RX ADMIN — FAMOTIDINE 20 MG: 20 INJECTION, SOLUTION INTRAVENOUS at 03:34

## 2018-11-11 RX ADMIN — SODIUM CHLORIDE 69 ML: 9 INJECTION, SOLUTION INTRAVENOUS at 03:49

## 2018-11-11 RX ADMIN — LIDOCAINE HYDROCHLORIDE 30 ML: 20 SOLUTION ORAL; TOPICAL at 03:31

## 2018-11-11 ASSESSMENT — ENCOUNTER SYMPTOMS
BACK PAIN: 0
VOMITING: 1
DIARRHEA: 0
FEVER: 0
CONSTIPATION: 0
NAUSEA: 1
DYSURIA: 0
ABDOMINAL PAIN: 1
HEMATURIA: 0
SHORTNESS OF BREATH: 0
FLANK PAIN: 0
BLOOD IN STOOL: 0
LIGHT-HEADEDNESS: 0
HEADACHES: 0
APPETITE CHANGE: 1
FATIGUE: 0
CHEST TIGHTNESS: 0
ABDOMINAL DISTENTION: 1

## 2018-11-11 NOTE — ED AVS SNAPSHOT
[facility]    [address] [facility] [address]    Phone:  [phone]    Fax:  [phone]                                       Roscoe Jang   MRN: [id number]    Department:  [facility]   Date of Visit:  11/11/2018           Patient Information     Date Of Birth          1950        Your diagnoses for this visit were:     Acute pancreatitis without infection or necrosis, unspecified pancreatitis type     Pulmonary nodules Incidentally found on CT.  Repeat imaging recommended for follow-up in 6 months.  You will need to contact your primary care provider to schedule this.       You were seen by Kalpesh Zaman MD.      Follow-up Information     Follow up with Mel Pappas NP. Schedule an appointment as soon as possible for a visit in 5 days.    Specialty:  Nurse Practitioner - Adult Health    Why:  For follow up    Contact information:    [address] [facility] [address]  [phone]          Go to [facility].    Specialty:  EMERGENCY MEDICINE    Why:  As needed, If symptoms worsen    Contact information:    [address] [place] [id number] [phone]    Additional information:    The medical center is located at   [address] [place] [address]. (between [address] in [place], [place] miles north   of [place]).        Discharge Instructions       Hold your metformin for the next 2 days.    Follow a clear liquid diet over the next 2 days and slowly advance back to normal diet as tolerated.  If your pain significantly worsens, please return to the ED for repeat evaluation.    Discharge Instructions for Acute Pancreatitis  You have been diagnosed with acute pancreatitis. Your pancreas is inflamed or swollen. The pancreas is an organ that makes digestive juices and hormones. Gallstones are a common cause of pancreatitis. These hard stones form in the gallbladder. The gallbladder shares  a tube with the pancreas into the small intestine. If gallstones block this tube, fluid can t leave the pancreas. The fluid backs up and causes redness and swelling (inflammation). There are other causes of pancreatitis. Make sure you understand the cause of your pancreatitis. Then you can try to stop it from happening again.  Immediate home care    Find someone to drive you to appointments. Acute pancreatitis is a serious condition, and you should never drive if you are experiencing symptoms.    Stop drinking if your illness was caused by alcohol.  ? Ask your healthcare provider about alcohol abuse programs and support groups such as Alcoholics Anonymous.  ? Ask your provider about prescription medicines that can help you stop drinking.  ? Tell your provider about the alcohol withdrawal symptoms you have when you stop drinking. This is very important. You may need close medical supervision and special medicines when you stop drinking. This will depend on your alcohol withdrawal history.     Take your medicines exactly as directed. Don t skip doses.    Eat a low-fat diet. Ask your provider for menus and other diet information.    Learn to take your own pulse. Keep a record of your results. Ask your provider which readings mean that you need medical attention.  Ongoing care    Tell your provider about any medicines you are taking. Some medicines can cause this condition.    Before starting any new medicine, ask your provider if it will harm your pancreas. This includes any new over-the-counter medicines, vitamins, or herbal supplements.      Tell your provider if you lose weight without dieting.    Be aware of symptoms that may mean your pancreatitis has come back. These symptoms include belly pain, nausea and vomiting, and fever.    Keep all follow-up appointments with your provider. Problems can often show up later.  Follow-up  Follow up with your healthcare provider, or as advised.  When to call your  provider  Call your healthcare provider right away if you have any of the following:    Fever of 100.4 F (38.0 C) or higher, or as advised by your provider    Severe pain from your upper belly to your back    Nausea and vomiting    Feely dizzy or lightheaded    Yellowing of your skin or eyes (jaundice)    Bruises on your belly or back    Belly swelling and tenderness    Rapid pulse    Shallow, fast breathing   Date Last Reviewed: 8/1/2016 2000-2018 [employer]. [address] [place] [address]. All rights reserved. This information is not intended as a substitute for professional medical care. Always follow your healthcare professional's instructions.          Discharge References/Attachments     PANCREATITIS, UNDERSTANDING (ENGLISH)    DIET, DISCHARGE INSTRUCTIONS FOR HAVING A CLEAR LIQUID  (ENGLISH)      24 Hour Appointment Hotline       To make an appointment at any [facility], call [phone] ([phone]). If you don't have a family doctor or clinic, we will help you find one. Early clinics are conveniently located to serve the needs of you and your family.             Review of your medicines      START taking        Dose / Directions Last dose taken    ibuprofen 200 MG tablet   Commonly known as:  ADVIL/MOTRIN   Dose:  600-800 mg   Quantity:  60 tablet        Take 3-4 tablets (600-800 mg) by mouth every 8 hours as needed for mild pain   Refills:  0          CONTINUE these medicines which may have CHANGED, or have new prescriptions. If we are uncertain of the size of tablets/capsules you have at home, strength may be listed as something that might have changed.        Dose / Directions Last dose taken    acetaminophen 500 MG tablet   Commonly known as:  TYLENOL   Dose:  1000 mg   What changed:    - medication strength  - how much to take  - when to take this  - reasons to take this   Quantity:  30 tablet        Take 2 tablets (1,000 mg) by mouth every 8 hours as needed  for mild pain   Refills:  0          Our records show that you are taking the medicines listed below. If these are incorrect, please call your family doctor or clinic.        Dose / Directions Last dose taken    ASPIRIN PO   Dose:  81 mg        Take 81 mg by mouth every morning   Refills:  0        atorvastatin 20 MG tablet   Commonly known as:  LIPITOR   Quantity:  90 tablet        TAKE 1 TABLET BY MOUTH ONCE DAILY   Refills:  3        blood glucose monitoring lancets   Quantity:  100 each        Use to test blood sugar 1 times daily or as directed.   Refills:  5        * blood glucose monitoring test strip   Commonly known as:  RHYS CONTOUR NEXT   Quantity:  100 strip        Use to test blood sugar 1 times daily or as directed.   Refills:  6        * blood glucose monitoring test strip   Commonly known as:  RHYS CONTOUR NEXT   Quantity:  100 strip        Use to test blood sugar 1 times daily or as directed.  Ok to substitute alternative if insurance prefers.   Refills:  11        lisinopril 20 MG tablet   Commonly known as:  PRINIVIL/ZESTRIL   Quantity:  90 tablet        TAKE 1 TABLET BY MOUTH ONCE DAILY   Refills:  1        metFORMIN 500 MG tablet   Commonly known as:  GLUCOPHAGE   Quantity:  270 tablet        TAKE ONE TABLET BY MOUTH WITH BREAKFAST AND TWO TABLETS WITH LUNCH   Refills:  1        * Notice:  This list has 2 medication(s) that are the same as other medications prescribed for you. Read the directions carefully, and ask your doctor or other care provider to review them with you.            Prescriptions were sent or printed at these locations (2 Prescriptions)                   Other Prescriptions                Not Printed or Sent (2 of 2)         acetaminophen (TYLENOL) 500 MG tablet               ibuprofen (ADVIL/MOTRIN) 200 MG tablet                Procedures and tests performed during your visit     CBC with platelets differential    CT Abdomen Pelvis w Contrast    Comprehensive metabolic  panel    Lipase    POC US ABDOMEN LIMITED      Orders Needing Specimen Collection     Ordered          11/11/18 0328  UA reflex to Microscopic - STAT, Prio: STAT, Status: Sent     Scheduled Task Status   11/11/18 0328 [employer] CC Reminder: Open   Order Class:  PCU Collect                  Pending Results     Date and Time Order Name Status Description    11/11/2018 0318 CT Abdomen Pelvis w Contrast Preliminary             Pending Culture Results     No orders found from 11/9/2018 to 11/12/2018.            Pending Results Instructions     If you had any lab results that were not finalized at the time of your Discharge, you can call the ED Lab Result RN at [phone]. You will be contacted by this team for any positive Lab results or changes in treatment. The nurses are available 7 days a week from 10A to 6:30P.  You can leave a message 24 hours per day and they will return your call.        Test Results From Your Hospital Stay        11/11/2018  3:21 AM      Component Results     Component Value Ref Range & Units Status    WBC 16.7 (H) 4.0 - 11.0 10e9/L Final    RBC Count 5.10 4.4 - 5.9 10e12/L Final    Hemoglobin 15.1 13.3 - 17.7 g/dL Final    Hematocrit 45.6 40.0 - 53.0 % Final    MCV 89 78 - 100 fl Final    MCH 29.6 26.5 - 33.0 pg Final    MCHC 33.1 31.5 - 36.5 g/dL Final    RDW 12.4 10.0 - 15.0 % Final    Platelet Count 221 150 - 450 10e9/L Final    Diff Method Automated Method  Final    % Neutrophils 82.0 % Final    % Lymphocytes 8.6 % Final    % Monocytes 8.4 % Final    % Eosinophils 0.2 % Final    % Basophils 0.1 % Final    % Immature Granulocytes 0.7 % Final    Nucleated RBCs 0 0 /100 Final    Absolute Neutrophil 13.7 (H) 1.6 - 8.3 10e9/L Final    Absolute Lymphocytes 1.4 0.8 - 5.3 10e9/L Final    Absolute Monocytes 1.4 (H) 0.0 - 1.3 10e9/L Final    Absolute Eosinophils 0.0 0.0 - 0.7 10e9/L Final    Absolute Basophils 0.0 0.0 - 0.2 10e9/L Final    Abs Immature Granulocytes 0.1 0 - 0.4 10e9/L Final     Absolute Nucleated RBC 0.0  Final         11/11/2018  3:42 AM      Component Results     Component Value Ref Range & Units Status    Sodium 137 133 - 144 mmol/L Final    Potassium 4.2 3.4 - 5.3 mmol/L Final    Chloride 101 94 - 109 mmol/L Final    Carbon Dioxide 27 20 - 32 mmol/L Final    Anion Gap 9 3 - 14 mmol/L Final    Glucose 169 (H) 70 - 99 mg/dL Final    Urea Nitrogen 18 7 - 30 mg/dL Final    Creatinine 0.96 0.66 - 1.25 mg/dL Final    GFR Estimate 78 >60 mL/min/1.7m2 Final    Non  GFR Calc    GFR Estimate If Black >90 >60 mL/min/1.7m2 Final    African American GFR Calc    Calcium 8.7 8.5 - 10.1 mg/dL Final    Bilirubin Total 1.7 (H) 0.2 - 1.3 mg/dL Final    Albumin 4.0 3.4 - 5.0 g/dL Final    Protein Total 8.2 6.8 - 8.8 g/dL Final    Alkaline Phosphatase 72 40 - 150 U/L Final    ALT 34 0 - 70 U/L Final    AST 15 0 - 45 U/L Final         11/11/2018  3:42 AM      Component Results     Component Value Ref Range & Units Status    Lipase 468 (H) 73 - 393 U/L Final         11/11/2018  4:11 AM      Narrative     CT ABDOMEN/PELVIS WITH CONTRAST   11/11/2018 4:00 AM     HISTORY: Epigastric pain.     TECHNIQUE:  CT abdomen and pelvis with 100 mL Isovue-370 IV. Radiation  dose for this scan was reduced using automated exposure control,  adjustment of the mA and/or kV according to patient size, or iterative  reconstruction technique.    COMPARISON: None.    FINDINGS:    Abdomen: There is minimal dependent atelectasis at the lung bases.  There is a 0.7 cm indeterminate nodule in the right lower lobe  laterally. The heart size is normal. There is mild diffuse fatty  infiltration of the liver. The spleen, gallbladder, adrenal glands and  right kidney are normal in appearance. There is a small cyst and  cortical scarring at the upper pole of the left kidney. There is fluid  stranding about the uncinate process and head of the pancreas. The  pancreas otherwise appears normal. No focal fluid collection. There  is  a retroaortic left renal vein. No abdominal or pelvic lymph node  enlargement. There is atherosclerotic calcification of the aorta and  its branches. No aneurysm.    Pelvis: There is no bowel obstruction or inflammation. The appendix is  normal. No free intraperitoneal gas or fluid. Degenerative disease in  the spine.        Impression     IMPRESSION:   1. Acute pancreatitis. No pseudocyst formation.  2. Mild fatty infiltration of the liver.  3. Indeterminate 0.7 cm right lower lobe lung nodule.    Recommendations for an incidental lung nodule = or > 6mm to 8mm:    Low risk patients: Initial follow-up CT at 6-12 months, then  consider CT at 18-24 months if no change.    High risk patients: Initial follow-up CT at 6-12 months, then CT at  18-24 months if no change.    *Low Risk: Minimal or absent history of smoking or other known risk  factors.  *Nonsolid (ground-glass) or partly solid nodules may require longer  follow-up to exclude indolent adenocarcinoma.  *Recommendations based on Guidelines for the Management of Incidental  Pulmonary Nodules Detected at CT: From the Fleischner Society 2017,  Radiology 2017.         11/11/2018  5:07 AM      Impression     [facility] Procedure Note      Limited Bedside ED Gallbladder  Ultrasound:    PROCEDURE: PERFORMED BY: Dr. Kalpesh Zaman  INDICATIONS:  Epigastric Pain  PROBE:  Low frequency convex probe  BODY LOCATION: Abdomen  FINDINGS:   An ultrasound of the gallbladder was performed using longitudinal and transverse views.  Gallstone(s):  Absent  Gallbladder sludge:  Absent  Sonographic Alexander's sign:  Absent  Gallbladder wall thickening (greater than 4 mm):  Absent  Pericholecystic fluid: Absent  Common bile duct (dilated if internal diameter greater than 6 mm): 4.1 mm  INTERPRETATION:  The gallbladder evaluation is normal with no gallstones/sludge, no sonographic Alexander s sign, no GB wall thickening, no pericholecystic fluid, and without evidence of  cholelithiasis or cholecystitis.  IMAGE DOCUMENTATION: Images were archived to PACs system.                    Thank you for choosing [facility]       Thank you for choosing [facility] for your care. Our goal is always to provide you with excellent care. Hearing back from our patients is one way we can continue to improve our services. Please take a few minutes to complete the written survey that you may receive in the mail after you visit with us. Thank you!        Care EveryWhere ID     This is your Care EveryWhere ID. This could be used by other organizations to access your [facility] medical records  [id number]        Equal Access to Services     SHRUTHI MARIANO : Funmilayo Murillo, tien rankin, mikael dobson, letitia tellez . So [facility] [phone].    ATENCIÓN: Si habla español, tiene a tapia disposición servicios gratuitos de asistencia lingüística. Llame al [phone].    We comply with applicable federal civil rights laws and [place] laws. We do not discriminate on the basis of race, color, national origin, age, disability, sex, sexual orientation, or gender identity.            After Visit Summary       This is your record. Keep this with you and show to your community pharmacist(s) and doctor(s) at your next visit.

## 2018-11-11 NOTE — DISCHARGE INSTRUCTIONS
Hold your metformin for the next 2 days.    Follow a clear liquid diet over the next 2 days and slowly advance back to normal diet as tolerated.  If your pain significantly worsens, please return to the ED for repeat evaluation.    Discharge Instructions for Acute Pancreatitis  You have been diagnosed with acute pancreatitis. Your pancreas is inflamed or swollen. The pancreas is an organ that makes digestive juices and hormones. Gallstones are a common cause of pancreatitis. These hard stones form in the gallbladder. The gallbladder shares a tube with the pancreas into the small intestine. If gallstones block this tube, fluid can t leave the pancreas. The fluid backs up and causes redness and swelling (inflammation). There are other causes of pancreatitis. Make sure you understand the cause of your pancreatitis. Then you can try to stop it from happening again.  Immediate home care    Find someone to drive you to appointments. Acute pancreatitis is a serious condition, and you should never drive if you are experiencing symptoms.    Stop drinking if your illness was caused by alcohol.  ? Ask your healthcare provider about alcohol abuse programs and support groups such as Alcoholics Anonymous.  ? Ask your provider about prescription medicines that can help you stop drinking.  ? Tell your provider about the alcohol withdrawal symptoms you have when you stop drinking. This is very important. You may need close medical supervision and special medicines when you stop drinking. This will depend on your alcohol withdrawal history.     Take your medicines exactly as directed. Don t skip doses.    Eat a low-fat diet. Ask your provider for menus and other diet information.    Learn to take your own pulse. Keep a record of your results. Ask your provider which readings mean that you need medical attention.  Ongoing care    Tell your provider about any medicines you are taking. Some medicines can cause this condition.    Before  starting any new medicine, ask your provider if it will harm your pancreas. This includes any new over-the-counter medicines, vitamins, or herbal supplements.      Tell your provider if you lose weight without dieting.    Be aware of symptoms that may mean your pancreatitis has come back. These symptoms include belly pain, nausea and vomiting, and fever.    Keep all follow-up appointments with your provider. Problems can often show up later.  Follow-up  Follow up with your healthcare provider, or as advised.  When to call your provider  Call your healthcare provider right away if you have any of the following:    Fever of 100.4 F (38.0 C) or higher, or as advised by your provider    Severe pain from your upper belly to your back    Nausea and vomiting    Feely dizzy or lightheaded    Yellowing of your skin or eyes (jaundice)    Bruises on your belly or back    Belly swelling and tenderness    Rapid pulse    Shallow, fast breathing   Date Last Reviewed: 8/1/2016 2000-2018 [employer]. [address] [place] [address], [place] [address]. All rights reserved. This information is not intended as a substitute for professional medical care. Always follow your healthcare professional's instructions.

## 2018-11-11 NOTE — ED PROVIDER NOTES
History     Chief Complaint   Patient presents with     Abdominal Pain     onset friday     HPI  Roscoe Jang is a 68 year old male with history of diabetes, hypertension, hyperlipidemia, obesity, and carotid stenosis presenting for evaluation of abdominal pain.  He does have a possible history of aortic aneurysm as well however repeat ultrasound in 2017 was unable to visualize any aneurysmal dilation.  Patient reports that he has had pain in his abdomen over the past 2 days.  Pain is sharp and localized in the epigastric area without radiation or migration.  Patient reports associated decreased appetite and some nausea.  He vomited twice yesterday.  Patient reports some similar pain in the past which was diagnosed as gastritis.  He reports he took 1 Pepcid AC however subsequently found that it was  did not take anymore.  He does not believe he had any significant improvement with the 1 pill he took.  Patient denies a history of alcohol use.  No history of pink otitis.  Denies previous abdominal surgeries.  Patient reports he had normal bowel movements without any bloody or black bowel movements.  Denies history of GI bleeding in the past.    Problem List:    Patient Active Problem List    Diagnosis Date Noted     Carotid stenosis, asymptomatic 2018     Priority: Medium     Carotid artery stenosis, symptomatic, right 2018     Priority: Medium     Bilateral carotid artery disease (H) 05/10/2018     Priority: Medium     Morbid obesity (H) 2018     Priority: Medium     Type 2 diabetes mellitus with hyperglycemia, without long-term current use of insulin (H) 2017     Priority: Medium     Abdominal aortic aneurysm (H) 01/10/2017     Priority: Medium     3.2 cm on US . No significant abdominal aortic aneurysm demonstrated on 2017, Will consider repeating U/S in 3-5 years.        Fatty liver 01/10/2017     Priority: Medium     Benign essential hypertension 2017      Priority: Medium     Mixed hyperlipidemia 01/09/2017     Priority: Medium     Obesity 01/09/2017     Priority: Medium        Past Medical History:    Past Medical History:   Diagnosis Date     Arthritis      Cerebral infarction (H)      Diabetes (H)      Hyperlipidemia      Hypertension      Obese        Past Surgical History:    Past Surgical History:   Procedure Laterality Date     ENDARTERECTOMY CAROTID Right 5/11/2018    Procedure: ENDARTERECTOMY CAROTID;  RIGHT CAROTID ENDARTERECTOMY WITH EEG;  Surgeon: Gaurav Bustos MD;  Location: [facility] OR     ENDARTERECTOMY CAROTID Left 6/6/2018    Procedure: ENDARTERECTOMY CAROTID;  LEFT CAROTID ENDARTERECTOMY with EXTERNAL JUGULAR VEIN PATCH;  Surgeon: Gaurav Bustos MD;  Location:  OR       Family History:    No family history on file.    Social History:  Marital Status:   [5]  Social History   Substance Use Topics     Smoking status: Former Smoker     Quit date: 10/3/2007     Smokeless tobacco: Never Used     Alcohol use No        Medications:      acetaminophen (TYLENOL) 500 MG tablet   atorvastatin (LIPITOR) 20 MG tablet   ibuprofen (ADVIL/MOTRIN) 200 MG tablet   lisinopril (PRINIVIL/ZESTRIL) 20 MG tablet   metFORMIN (GLUCOPHAGE) 500 MG tablet   ASPIRIN PO   blood glucose monitoring (RHYS CONTOUR NEXT) test strip   blood glucose monitoring (RHYS CONTOUR NEXT) test strip   blood glucose monitoring (RHYS MICROLET) lancets         Review of Systems   Constitutional: Positive for appetite change (decreased). Negative for fatigue and fever.   HENT: Negative for congestion.    Respiratory: Negative for chest tightness and shortness of breath.    Cardiovascular: Negative for chest pain.   Gastrointestinal: Positive for abdominal distention, abdominal pain, nausea and vomiting. Negative for blood in stool, constipation and diarrhea.   Genitourinary: Negative for dysuria, flank pain and hematuria.   Musculoskeletal: Negative for back pain.   Skin:  "Negative for rash.   Neurological: Negative for light-headedness and headaches.   All other systems reviewed and are negative.      Physical Exam   BP: 163/84  Pulse: 90  Temp: 97.3  F (36.3  C)  Resp: 18  Height: 172.7 cm (5' 8\")  Weight: 108.9 kg (240 lb)  SpO2: 98 %      Physical Exam   Constitutional: He is oriented to person, place, and time. He appears well-developed and well-nourished. No distress.   Patient sitting semireclined, cheerful and joking in no apparent discomfort   HENT:   Head: Normocephalic and atraumatic.   Eyes: Conjunctivae are normal.   Cardiovascular: Normal rate and regular rhythm.    Pulmonary/Chest: Effort normal.   Abdominal: Soft. He exhibits no distension. Bowel sounds are decreased. There is no tenderness. There is no rebound and no guarding.   Musculoskeletal: Normal range of motion.   Neurological: He is alert and oriented to person, place, and time.   Skin: Skin is warm and dry.   Psychiatric: He has a normal mood and affect.   Nursing note and vitals reviewed.      ED Course     ED Course     Procedures    Results for orders placed during the hospital encounter of 11/11/18   POC US ABDOMEN LIMITED    [facility] Procedure Note      Limited Bedside ED Gallbladder  Ultrasound:    PROCEDURE: PERFORMED BY: Dr. Kalpesh Zaman  INDICATIONS:  Epigastric Pain  PROBE:  Low frequency convex probe  BODY LOCATION: Abdomen  FINDINGS:   An ultrasound of the gallbladder was performed using longitudinal and transverse views.  Gallstone(s):  Absent  Gallbladder sludge:  Absent  Sonographic Alexander's sign:  Absent  Gallbladder wall thickening (greater than 4 mm):  Absent  Pericholecystic fluid: Absent  Common bile duct (dilated if internal diameter greater than 6 mm): 4.1 mm  INTERPRETATION:  The gallbladder evaluation is normal with no gallstones/sludge, no sonographic Alexander s sign, no GB wall thickening, no pericholecystic fluid, and without evidence of cholelithiasis or " cholecystitis.  IMAGE DOCUMENTATION: Images were archived to PACs system.                     Results for orders placed or performed during the hospital encounter of 11/11/18 (from the past 24 hour(s))   CBC with platelets differential   Result Value Ref Range    WBC 16.7 (H) 4.0 - 11.0 10e9/L    RBC Count 5.10 4.4 - 5.9 10e12/L    Hemoglobin 15.1 13.3 - 17.7 g/dL    Hematocrit 45.6 40.0 - 53.0 %    MCV 89 78 - 100 fl    MCH 29.6 26.5 - 33.0 pg    MCHC 33.1 31.5 - 36.5 g/dL    RDW 12.4 10.0 - 15.0 %    Platelet Count 221 150 - 450 10e9/L    Diff Method Automated Method     % Neutrophils 82.0 %    % Lymphocytes 8.6 %    % Monocytes 8.4 %    % Eosinophils 0.2 %    % Basophils 0.1 %    % Immature Granulocytes 0.7 %    Nucleated RBCs 0 0 /100    Absolute Neutrophil 13.7 (H) 1.6 - 8.3 10e9/L    Absolute Lymphocytes 1.4 0.8 - 5.3 10e9/L    Absolute Monocytes 1.4 (H) 0.0 - 1.3 10e9/L    Absolute Eosinophils 0.0 0.0 - 0.7 10e9/L    Absolute Basophils 0.0 0.0 - 0.2 10e9/L    Abs Immature Granulocytes 0.1 0 - 0.4 10e9/L    Absolute Nucleated RBC 0.0    Comprehensive metabolic panel   Result Value Ref Range    Sodium 137 133 - 144 mmol/L    Potassium 4.2 3.4 - 5.3 mmol/L    Chloride 101 94 - 109 mmol/L    Carbon Dioxide 27 20 - 32 mmol/L    Anion Gap 9 3 - 14 mmol/L    Glucose 169 (H) 70 - 99 mg/dL    Urea Nitrogen 18 7 - 30 mg/dL    Creatinine 0.96 0.66 - 1.25 mg/dL    GFR Estimate 78 >60 mL/min/1.7m2    GFR Estimate If Black >90 >60 mL/min/1.7m2    Calcium 8.7 8.5 - 10.1 mg/dL    Bilirubin Total 1.7 (H) 0.2 - 1.3 mg/dL    Albumin 4.0 3.4 - 5.0 g/dL    Protein Total 8.2 6.8 - 8.8 g/dL    Alkaline Phosphatase 72 40 - 150 U/L    ALT 34 0 - 70 U/L    AST 15 0 - 45 U/L   Lipase   Result Value Ref Range    Lipase 468 (H) 73 - 393 U/L   CT Abdomen Pelvis w Contrast    Narrative    CT ABDOMEN/PELVIS WITH CONTRAST   11/11/2018 4:00 AM     HISTORY: Epigastric pain.     TECHNIQUE:  CT abdomen and pelvis with 100 mL Isovue-370 IV.  Radiation  dose for this scan was reduced using automated exposure control,  adjustment of the mA and/or kV according to patient size, or iterative  reconstruction technique.    COMPARISON: None.    FINDINGS:    Abdomen: There is minimal dependent atelectasis at the lung bases.  There is a 0.7 cm indeterminate nodule in the right lower lobe  laterally. The heart size is normal. There is mild diffuse fatty  infiltration of the liver. The spleen, gallbladder, adrenal glands and  right kidney are normal in appearance. There is a small cyst and  cortical scarring at the upper pole of the left kidney. There is fluid  stranding about the uncinate process and head of the pancreas. The  pancreas otherwise appears normal. No focal fluid collection. There is  a retroaortic left renal vein. No abdominal or pelvic lymph node  enlargement. There is atherosclerotic calcification of the aorta and  its branches. No aneurysm.    Pelvis: There is no bowel obstruction or inflammation. The appendix is  normal. No free intraperitoneal gas or fluid. Degenerative disease in  the spine.      Impression    IMPRESSION:   1. Acute pancreatitis. No pseudocyst formation.  2. Mild fatty infiltration of the liver.  3. Indeterminate 0.7 cm right lower lobe lung nodule.    Recommendations for an incidental lung nodule = or > 6mm to 8mm:    Low risk patients: Initial follow-up CT at 6-12 months, then  consider CT at 18-24 months if no change.    High risk patients: Initial follow-up CT at 6-12 months, then CT at  18-24 months if no change.    *Low Risk: Minimal or absent history of smoking or other known risk  factors.  *Nonsolid (ground-glass) or partly solid nodules may require longer  follow-up to exclude indolent adenocarcinoma.  *Recommendations based on Guidelines for the Management of Incidental  Pulmonary Nodules Detected at CT: From the Fleischner Society 2017,  Radiology 2017.   POC US ABDOMEN LIMITED    Impression    [facility]  Procedure Note      Limited Bedside ED Gallbladder  Ultrasound:    PROCEDURE: PERFORMED BY: Dr. Kalpesh Zaman  INDICATIONS:  Epigastric Pain  PROBE:  Low frequency convex probe  BODY LOCATION: Abdomen  FINDINGS:   An ultrasound of the gallbladder was performed using longitudinal and transverse views.  Gallstone(s):  Absent  Gallbladder sludge:  Absent  Sonographic Alexander's sign:  Absent  Gallbladder wall thickening (greater than 4 mm):  Absent  Pericholecystic fluid: Absent  Common bile duct (dilated if internal diameter greater than 6 mm): 4.1 mm  INTERPRETATION:  The gallbladder evaluation is normal with no gallstones/sludge, no sonographic Alexander s sign, no GB wall thickening, no pericholecystic fluid, and without evidence of cholelithiasis or cholecystitis.  IMAGE DOCUMENTATION: Images were archived to PACs system.           Medications   famotidine (PEPCID) infusion 20 mg (0 mg Intravenous Stopped 11/11/18 0447)   ondansetron (ZOFRAN) injection 8 mg (8 mg Intravenous Given 11/11/18 0327)   lidocaine (viscous) (XYLOCAINE) 2 % 15 mL, alum & mag hydroxide-simethicone (MYLANTA ES/MAALOX  ES) 15 mL GI Cocktail (30 mLs Oral Given 11/11/18 0331)   iopamidol (ISOVUE-370) solution 100 mL (100 mLs Intravenous Given 11/11/18 0349)   sodium chloride 0.9 % bag 500mL for CT scan flush use (69 mLs Intravenous Given 11/11/18 0349)     5:04 AM: PT re-assessed. Feeling much better. Pain now 3/10 and tolerable. Pt declined any additional meds for pain.  I reviewed the CT scan results with the patient and his spouse.  Advised of the finding of pancreatitis as well as a lung nodule that will require repeat imaging in 6-12 months.    Assessments & Plan (with Medical Decision Making)  68-year-old male with a history of type 2 diabetes, hypertension, hyperlipidemia, and fatty liver presenting for evaluation of epigastric abdominal pain over the past 2 days.  Patient reports pain is worse with some foods and has been  associated with some nausea and vomiting.  In the ED, patient has minimal epigastric pain and is afebrile and in no distress.  Differentials include gastritis, peptic ulcer disease, pancreatitis, biliary colic.  He also has a questionable history of aortic aneurysm in the past so a CT was performed to further evaluate for the cause of his pain.  CT with IV contrast performed showed evidence of acute pancreatitis without pseudocyst formation and no other acute abnormality.  It also did incidentally identified 0.7 cm right lower lobe lung nodule.  He is a former smoker for over 40 years so is at relatively high risk therefore was advised to have repeat CT scan in 6-12 months.  Patient was advised of need to follow-up with his primary care provider to schedule this repeat exam.  After Zofran, Pepcid, and a GI cocktail, patient reported significant improvement in his pain.  He remained vitally normal and wished to go home.  He actually reported feeling hungry as well.  Patient advised to be cautious when restarting diet.  Encouraged a clear liquid diet for the next 1-2 days with a plan to advance to normal food as tolerated.  Patient also advised that if his pain gets significantly worse, he should return to the ED for repeat evaluation     I have reviewed the nursing notes.    I have reviewed the findings, diagnosis, plan and need for follow up with the patient.       New Prescriptions    ACETAMINOPHEN (TYLENOL) 500 MG TABLET    Take 2 tablets (1,000 mg) by mouth every 8 hours as needed for mild pain    IBUPROFEN (ADVIL/MOTRIN) 200 MG TABLET    Take 3-4 tablets (600-800 mg) by mouth every 8 hours as needed for mild pain       Final diagnoses:   Acute pancreatitis without infection or necrosis, unspecified pancreatitis type   Pulmonary nodules - Incidentally found on CT.  Repeat imaging recommended for follow-up in 6 months.  You will need to contact your primary care provider to schedule this.       11/11/2018    [facility] EMERGENCY DEPARTMENT     Zaman, Kalpesh Martines MD  11/11/18 4373

## 2018-11-11 NOTE — ED AVS SNAPSHOT
[facility] Emergency Department    [address] [address]    Phone:  [phone]    Fax:  [phone]                                       Roscoe Jang   MRN: [id number]    Department:  [facility] Emergency Department   Date of Visit:  11/11/2018           After Visit Summary Signature Page     I have received my discharge instructions, and my questions have been answered. I have discussed any challenges I see with this plan with the nurse or doctor.    ..........................................................................................................................................  Patient/Patient Representative Signature      ..........................................................................................................................................  Patient Representative Print Name and Relationship to Patient    ..................................................               ................................................  Date                                   Time    ..........................................................................................................................................  Reviewed by Signature/Title    ...................................................              ..............................................  Date                                               Time          22EPIC Rev 08/18

## 2018-11-12 NOTE — PATIENT INSTRUCTIONS
1.  Watch your carbohydrate choices 3-4 at meals, 1-2 at snacks    2.  Test blood sugars once daily, rotating testing times    3.  Stay active    4.  Call with your blood sugars in two weeks.  Judi [phone].  
DISPLAY PLAN FREE TEXT

## 2018-11-19 ENCOUNTER — OFFICE VISIT (OUTPATIENT)
Dept: FAMILY MEDICINE | Facility: CLINIC | Age: 68
End: 2018-11-19
Payer: COMMERCIAL

## 2018-11-19 VITALS
RESPIRATION RATE: 12 BRPM | BODY MASS INDEX: 36.34 KG/M2 | HEART RATE: 81 BPM | SYSTOLIC BLOOD PRESSURE: 118 MMHG | OXYGEN SATURATION: 98 % | DIASTOLIC BLOOD PRESSURE: 74 MMHG | TEMPERATURE: 98.4 F | WEIGHT: 239 LBS

## 2018-11-19 DIAGNOSIS — R91.8 PULMONARY NODULES: ICD-10-CM

## 2018-11-19 DIAGNOSIS — E11.65 TYPE 2 DIABETES MELLITUS WITH HYPERGLYCEMIA, WITHOUT LONG-TERM CURRENT USE OF INSULIN (H): ICD-10-CM

## 2018-11-19 DIAGNOSIS — K85.90 ACUTE PANCREATITIS, UNSPECIFIED COMPLICATION STATUS, UNSPECIFIED PANCREATITIS TYPE: Primary | ICD-10-CM

## 2018-11-19 LAB
ALBUMIN SERPL-MCNC: 3.4 G/DL (ref 3.4–5)
ALP SERPL-CCNC: 72 U/L (ref 40–150)
ALT SERPL W P-5'-P-CCNC: 35 U/L (ref 0–70)
AMYLASE SERPL-CCNC: 73 U/L (ref 30–110)
ANION GAP SERPL CALCULATED.3IONS-SCNC: 9 MMOL/L (ref 3–14)
AST SERPL W P-5'-P-CCNC: 15 U/L (ref 0–45)
BILIRUB SERPL-MCNC: 0.6 MG/DL (ref 0.2–1.3)
BUN SERPL-MCNC: 12 MG/DL (ref 7–30)
CALCIUM SERPL-MCNC: 8.3 MG/DL (ref 8.5–10.1)
CHLORIDE SERPL-SCNC: 107 MMOL/L (ref 94–109)
CO2 SERPL-SCNC: 28 MMOL/L (ref 20–32)
CREAT SERPL-MCNC: 0.88 MG/DL (ref 0.66–1.25)
ERYTHROCYTE [DISTWIDTH] IN BLOOD BY AUTOMATED COUNT: 12.8 % (ref 10–15)
GFR SERPL CREATININE-BSD FRML MDRD: 86 ML/MIN/1.7M2
GLUCOSE SERPL-MCNC: 195 MG/DL (ref 70–99)
HCT VFR BLD AUTO: 42.9 % (ref 40–53)
HGB BLD-MCNC: 14.2 G/DL (ref 13.3–17.7)
LIPASE SERPL-CCNC: 182 U/L (ref 73–393)
MCH RBC QN AUTO: 29.7 PG (ref 26.5–33)
MCHC RBC AUTO-ENTMCNC: 33.1 G/DL (ref 31.5–36.5)
MCV RBC AUTO: 90 FL (ref 78–100)
PLATELET # BLD AUTO: 246 10E9/L (ref 150–450)
POTASSIUM SERPL-SCNC: 4.1 MMOL/L (ref 3.4–5.3)
PROT SERPL-MCNC: 7 G/DL (ref 6.8–8.8)
RBC # BLD AUTO: 4.78 10E12/L (ref 4.4–5.9)
SODIUM SERPL-SCNC: 144 MMOL/L (ref 133–144)
WBC # BLD AUTO: 6.7 10E9/L (ref 4–11)

## 2018-11-19 PROCEDURE — 36415 COLL VENOUS BLD VENIPUNCTURE: CPT | Performed by: NURSE PRACTITIONER

## 2018-11-19 PROCEDURE — 80053 COMPREHEN METABOLIC PANEL: CPT | Performed by: NURSE PRACTITIONER

## 2018-11-19 PROCEDURE — 83690 ASSAY OF LIPASE: CPT | Performed by: NURSE PRACTITIONER

## 2018-11-19 PROCEDURE — 85027 COMPLETE CBC AUTOMATED: CPT | Performed by: NURSE PRACTITIONER

## 2018-11-19 PROCEDURE — 99214 OFFICE O/P EST MOD 30 MIN: CPT | Performed by: NURSE PRACTITIONER

## 2018-11-19 PROCEDURE — 82150 ASSAY OF AMYLASE: CPT | Performed by: NURSE PRACTITIONER

## 2018-11-19 PROCEDURE — 99207 C FOOT EXAM  NO CHARGE: CPT | Performed by: NURSE PRACTITIONER

## 2018-11-19 ASSESSMENT — PAIN SCALES - GENERAL: PAINLEVEL: MODERATE PAIN (4)

## 2018-11-19 NOTE — PROGRESS NOTES
SUBJECTIVE:   Roscoe Jang is a 68 year old male who presents to clinic today for the following health issues:      ED/UC Followup:    Facility:  [facility]   Date of visit: 11/11/18  Reason for visit: Pancreatitis  Current Status: much better - pain in mid back     68-year-old male with a history of type 2 diabetes, hypertension, hyperlipidemia, and fatty liver presenting for evaluation of epigastric abdominal pain over the past 2 days.  Patient reports pain is worse with some foods and has been associated with some nausea and vomiting.  In the ED, patient has minimal epigastric pain and is afebrile and in no distress.  Differentials include gastritis, peptic ulcer disease, pancreatitis, biliary colic.  He also has a questionable history of aortic aneurysm in the past so a CT was performed to further evaluate for the cause of his pain.  CT with IV contrast performed showed evidence of acute pancreatitis without pseudocyst formation and no other acute abnormality.  It also did incidentally identified 0.7 cm right lower lobe lung nodule.  He is a former smoker for over 40 years so is at relatively high risk therefore was advised to have repeat CT scan in 6-12 months.  Patient was advised of need to follow-up with his primary care provider to schedule this repeat exam.  After Zofran, Pepcid, and a GI cocktail, patient reported significant improvement in his pain.  He remained vitally normal and wished to go home.  He actually reported feeling hungry as well.  Patient advised to be cautious when restarting diet.  Encouraged a clear liquid diet for the next 1-2 days with a plan to advance to normal food as tolerated.  Patient also advised that if his pain gets significantly worse, he should return to the ED for repeat evaluation    Reports he is doing much better   Eating regular diet without problems   Does report pain in right flank- feels like a pulled muscle better with ibuprofen   Worse with position changes  Does  not change with eating        Problem list and histories reviewed & adjusted, as indicated.  Additional history: as documented    Patient Active Problem List   Diagnosis     Benign essential hypertension     Mixed hyperlipidemia     Obesity     Abdominal aortic aneurysm (H)     Fatty liver     Type 2 diabetes mellitus with hyperglycemia, without long-term current use of insulin (H)     Morbid obesity (H)     Bilateral carotid artery disease (H)     Carotid artery stenosis, symptomatic, right     Carotid stenosis, asymptomatic     Past Surgical History:   Procedure Laterality Date     ENDARTERECTOMY CAROTID Right 5/11/2018    Procedure: ENDARTERECTOMY CAROTID;  RIGHT CAROTID ENDARTERECTOMY WITH EEG;  Surgeon: Gaurav Bustos MD;  Location:  OR     ENDARTERECTOMY CAROTID Left 6/6/2018    Procedure: ENDARTERECTOMY CAROTID;  LEFT CAROTID ENDARTERECTOMY with EXTERNAL JUGULAR VEIN PATCH;  Surgeon: Gaurav Bustos MD;  Location:  OR       Social History   Substance Use Topics     Smoking status: Former Smoker     Quit date: 10/3/2007     Smokeless tobacco: Never Used     Alcohol use No     History reviewed. No pertinent family history.        Reviewed and updated as needed this visit by clinical staff       Reviewed and updated as needed this visit by Provider         ROS:  Constitutional, HEENT, cardiovascular, pulmonary, gi and gu systems are negative, except as otherwise noted.    OBJECTIVE:                                                    /74  Pulse 81  Temp 98.4  F (36.9  C) (Tympanic)  Resp 12  Wt 239 lb (108.4 kg)  SpO2 98%  BMI 36.34 kg/m2  Body mass index is 36.34 kg/(m^2).  GENERAL APPEARANCE: healthy, alert and no distress  RESP: lungs clear to auscultation - no rales, rhonchi or wheezes  CV: regular rates and rhythm, normal S1 S2, no S3 or S4 and no murmur, click or rub  ABDOMEN: soft, nontender, without hepatosplenomegaly or masses and bowel sounds normal  MS: extremities  normal- no gross deformities noted  SKIN: no suspicious lesions or rashes  DIABETIC FOOT EXAM: normal DP and PT pulses, no trophic changes or ulcerative lesions and normal sensory exam  PSYCH: mentation appears normal and affect normal/bright    Diagnostic test results:  Diagnostic Test Results:  Pending      ASSESSMENT/PLAN:                                                      1. Acute pancreatitis, unspecified complication status, unspecified pancreatitis type  Resolved   If flairs again or labs look worse would recommend work up of gall bladder (formal ultrasound/HIDA scan)   Watchful waiting   If develop abdominal pain- bland diet, avoid fatty foods, follow up in EMERGENCY ROOM/Clinic     - CBC with platelets  - Amylase  - Lipase  - Comprehensive metabolic panel    2. Pulmonary nodules  Need to follow with repeat CT scan in 6 months   Order has been placed- call [phone] to schedule     - CT Chest w Contrast; Future    Diabetic foot exam was done today and was normal.     Complete the FIT test      Mel Pappas NP  [facility]

## 2018-11-19 NOTE — PATIENT INSTRUCTIONS
1. Acute pancreatitis, unspecified complication status, unspecified pancreatitis type  Resolved   If flairs again or labs look worse would recommend work up of gall bladder (formal ultrasound/HIDA scan)   Watchful waiting   If develop abdominal pain- bland diet, avoid fatty foods, follow up in EMERGENCY ROOM/Clinic     - CBC with platelets  - Amylase  - Lipase  - Comprehensive metabolic panel    2. Pulmonary nodules  Need to follow with repeat CT scan in 6 months   Order has been placed- call [phone] to schedule     - CT Chest w Contrast; Future    Diabetic foot exam was done today and was normal.     Complete the FIT test

## 2018-11-19 NOTE — MR AVS SNAPSHOT
After Visit Summary   11/19/2018    Roscoe Jang    MRN: [id number]           Patient Information     Date Of Birth          1950        Visit Information        Provider Department      11/19/2018 11:00 AM Mel Pappas NP [facility]        Today's Diagnoses     Acute pancreatitis, unspecified complication status, unspecified pancreatitis type    -  1    Pulmonary nodules          Care Instructions    1. Acute pancreatitis, unspecified complication status, unspecified pancreatitis type  Resolved   If flairs again or labs look worse would recommend work up of gall bladder (formal ultrasound/HIDA scan)   Watchful waiting   If develop abdominal pain- bland diet, avoid fatty foods, follow up in EMERGENCY ROOM/Clinic     - CBC with platelets  - Amylase  - Lipase  - Comprehensive metabolic panel    2. Pulmonary nodules  Need to follow with repeat CT scan in 6 months   Order has been placed- call [phone] to schedule     - CT Chest w Contrast; Future    Diabetic foot exam was done today and was normal.     Complete the FIT test          Follow-ups after your visit        Follow-up notes from your care team     Return in about 2 weeks (around 12/3/2018), or if symptoms worsen or fail to improve, for Routine Visit otherwise see me in April .      Future tests that were ordered for you today     Open Future Orders        Priority Expected Expires Ordered    CT Chest w Contrast Routine 4/19/2019 11/19/2019 11/19/2018            Who to contact     If you have questions or need follow up information about today's clinic visit or your schedule please contact [facility] directly at [phone].  Normal or non-critical lab and imaging results will be communicated to you by MyChart, letter or phone within 4 business days after the clinic has received the results. If you do not hear from us within 7 days, please contact the clinic through MyChart or phone. If you have a  critical or abnormal lab result, we will notify you by phone as soon as possible.  Submit refill requests through [employer] or call your pharmacy and they will forward the refill request to us. Please allow 3 business days for your refill to be completed.          Additional Information About Your Visit        Care EveryWhere ID     This is your Care EveryWhere ID. This could be used by other organizations to access your [place] medical records  [id number]        Your Vitals Were     Pulse Temperature Respirations Pulse Oximetry BMI (Body Mass Index)       81 98.4  F (36.9  C) (Tympanic) 12 98% 36.34 kg/m2        Blood Pressure from Last 3 Encounters:   11/19/18 118/74   11/11/18 163/82   10/05/18 135/80    Weight from Last 3 Encounters:   11/19/18 239 lb (108.4 kg)   11/11/18 240 lb (108.9 kg)   10/05/18 245 lb 9.6 oz (111.4 kg)              We Performed the Following     Amylase     CBC with platelets     Comprehensive metabolic panel     Lipase        Primary Care Provider Office Phone # Fax #    Mel Pappas, [phone]       [address] [facility] [address]        Equal Access to Services     [facility] : Hadii aad ku hadasho Soomaali, waaxda luqadaha, qaybta kaalmada adeegyada, letitia tellez . So [facility] [phone].    ATENCIÓN: Si habla español, tiene a tapia disposición servicios gratuitos de asistencia lingüística. Llame al [phone].    We comply with applicable federal civil rights laws and [place] laws. We do not discriminate on the basis of race, color, national origin, age, disability, sex, sexual orientation, or gender identity.            Thank you!     Thank you for choosing [facility]  for your care. Our goal is always to provide you with excellent care. Hearing back from our patients is one way we can continue to improve our services. Please take a few minutes to complete the written survey that you may receive in the mail  after your visit with us. Thank you!             Your Updated Medication List - Protect others around you: Learn how to safely use, store and throw away your medicines at www.disposemymeds.org.          This list is accurate as of 11/19/18 11:24 AM.  Always use your most recent med list.                   Brand Name Dispense Instructions for use Diagnosis    acetaminophen 500 MG tablet    TYLENOL    30 tablet    Take 2 tablets (1,000 mg) by mouth every 8 hours as needed for mild pain        ASPIRIN PO      Take 81 mg by mouth every morning        atorvastatin 20 MG tablet    LIPITOR    90 tablet    TAKE 1 TABLET BY MOUTH ONCE DAILY    Carotid artery stenosis, symptomatic, right       blood glucose monitoring lancets     100 each    Use to test blood sugar 1 times daily or as directed.    Type 2 diabetes mellitus (H)       * blood glucose monitoring test strip    RHYS CONTOUR NEXT    100 strip    Use to test blood sugar 1 times daily or as directed.    Type 2 diabetes mellitus (H)       * blood glucose monitoring test strip    RHYS CONTOUR NEXT    100 strip    Use to test blood sugar 1 times daily or as directed.  Ok to substitute alternative if insurance prefers.    Type 2 diabetes mellitus with hyperglycemia, without long-term current use of insulin (H)       lisinopril 20 MG tablet    PRINIVIL/ZESTRIL    90 tablet    TAKE 1 TABLET BY MOUTH ONCE DAILY    Benign essential hypertension       metFORMIN 500 MG tablet    GLUCOPHAGE    270 tablet    TAKE ONE TABLET BY MOUTH WITH BREAKFAST AND TWO TABLETS WITH LUNCH    Type 2 diabetes mellitus with hyperglycemia, without long-term current use of insulin (H)       * Notice:  This list has 2 medication(s) that are the same as other medications prescribed for you. Read the directions carefully, and ask your doctor or other care provider to review them with you.

## 2018-12-10 ENCOUNTER — OFFICE VISIT (OUTPATIENT)
Dept: FAMILY MEDICINE | Facility: CLINIC | Age: 68
End: 2018-12-10
Payer: COMMERCIAL

## 2018-12-10 VITALS
OXYGEN SATURATION: 96 % | TEMPERATURE: 97.8 F | DIASTOLIC BLOOD PRESSURE: 70 MMHG | RESPIRATION RATE: 16 BRPM | WEIGHT: 241 LBS | BODY MASS INDEX: 36.64 KG/M2 | SYSTOLIC BLOOD PRESSURE: 138 MMHG | HEART RATE: 91 BPM

## 2018-12-10 DIAGNOSIS — S16.1XXA STRAIN OF NECK MUSCLE, INITIAL ENCOUNTER: Primary | ICD-10-CM

## 2018-12-10 PROCEDURE — 99213 OFFICE O/P EST LOW 20 MIN: CPT | Performed by: NURSE PRACTITIONER

## 2018-12-10 RX ORDER — CYCLOBENZAPRINE HCL 5 MG
5 TABLET ORAL 3 TIMES DAILY PRN
Qty: 20 TABLET | Refills: 0 | Status: CANCELLED | OUTPATIENT
Start: 2018-12-10 | End: 2018-12-20

## 2018-12-10 ASSESSMENT — PAIN SCALES - GENERAL: PAINLEVEL: SEVERE PAIN (7)

## 2018-12-10 NOTE — NURSING NOTE
"Chief Complaint   Patient presents with     Neck Pain     stiff neck right side       Initial /70   Pulse 91   Temp 97.8  F (36.6  C) (Tympanic)   Resp 16   Wt 109.3 kg (241 lb)   SpO2 96%   BMI 36.64 kg/m   Estimated body mass index is 36.64 kg/m  as calculated from the following:    Height as of 11/11/18: 1.727 m (5' 8\").    Weight as of this encounter: 109.3 kg (241 lb).    Patient presents to the clinic using No DME    Health Maintenance that is potentially due pending provider review:  Colonoscopy/FIT    Pt has kit at home.    Is there anyone who you would like to be able to receive your results? No  If yes have patient fill out JARVIS      "

## 2018-12-10 NOTE — PROGRESS NOTES
SUBJECTIVE:   Roscoe Jang is a 68 year old male who presents to clinic today for the following health issues:      Neck Pain      Duration: 10-14 days    Description:  Location: right side  Radiation: into the right neck and into the right shoulder    Intensity:  moderate    Accompanying signs and symptoms: ROM restricted to the left, can turn to the right ok    History (similar episodes/previous evaluation): has had issues with neck before usually see's chiropractor    Precipitating or alleviating factors: None    Therapies tried and outcome: heating pad has helped, icy patch 1 night, muscle cream      Problem list and histories reviewed & adjusted, as indicated.  Additional history: as documented    Patient Active Problem List   Diagnosis     Benign essential hypertension     Mixed hyperlipidemia     Obesity     Abdominal aortic aneurysm (H)     Fatty liver     Type 2 diabetes mellitus with hyperglycemia, without long-term current use of insulin (H)     Morbid obesity (H)     Bilateral carotid artery disease (H)     Carotid artery stenosis, symptomatic, right     Carotid stenosis, asymptomatic     Past Surgical History:   Procedure Laterality Date     ENDARTERECTOMY CAROTID Right 2018    Procedure: ENDARTERECTOMY CAROTID;  RIGHT CAROTID ENDARTERECTOMY WITH EEG;  Surgeon: Gaurav Bustos MD;  Location:  OR     ENDARTERECTOMY CAROTID Left 2018    Procedure: ENDARTERECTOMY CAROTID;  LEFT CAROTID ENDARTERECTOMY with EXTERNAL JUGULAR VEIN PATCH;  Surgeon: Gaurav Bustos MD;  Location:  OR       Social History     Tobacco Use     Smoking status: Former Smoker     Last attempt to quit: 10/3/2007     Years since quittin.1     Smokeless tobacco: Never Used   Substance Use Topics     Alcohol use: No     No family history on file.        Reviewed and updated as needed this visit by clinical staff  Allergies  Meds       Reviewed and updated as needed this visit by Provider          ROS:  Constitutional, HEENT, cardiovascular, pulmonary, gi and gu systems are negative, except as otherwise noted.    OBJECTIVE:                                                    /70   Pulse 91   Temp 97.8  F (36.6  C) (Tympanic)   Resp 16   Wt 109.3 kg (241 lb)   SpO2 96%   BMI 36.64 kg/m    Body mass index is 36.64 kg/m .  GENERAL APPEARANCE: healthy, alert and no distress  RESP: lungs clear to auscultation - no rales, rhonchi or wheezes  CV: regular rates and rhythm, normal S1 S2, no S3 or S4 and no murmur, click or rub  ORTHO: Cervical Spine Exam: Inspection: normal cervical lordosis  Non-tender  Range of Motion:  flexion:  full, extension: full, left lateral bending: full, right lateral bending: decreased, left lateral rotation:  full, right lateral rotation:  decreased  Strength: Full strength of all neck muscles    Diagnostic test results:  Diagnostic Test Results:  none      ASSESSMENT/PLAN:                                                    1. Strain of neck muscle, initial encounter  Discussed conservative tx with tylenol/ibuprofen, ice/heat   If not better in 2 weeks he will call and will refer to physical therapy         Patient Instructions       Patient Education     Neck Sprain or Strain  A sudden force that causes turning or bending of the neck can cause sprain or strain. An example would be the force from a car accident. This can stretch or tear muscles called a strain. It can also stretch or tear ligaments called a sprain. Either of these can cause neck pain. Sometimes neck pain occurs after a simple awkward movement. In either case, muscle spasm is commonly present and contributes to the pain.   Unless you had a forceful physical injury (for example, a car accident or fall), X-rays are often not ordered for the initial evaluation of neck pain. If pain continues and does not respond to medical treatment, X-rays and other tests may be done later.  Home care    You may feel more  soreness and spasm the first few days after the injury. Rest until symptoms start to improve.    When lying down, use a comfortable pillow or a rolled towel that supports the head and keeps the spine in a neutral position. The position of the head should not be tilted forward or backward.    Apply an ice pack over the injured area for 15 to 20 minutes every 3 to 6 hours. Do this for the first 24 to 48 hours. You can make an ice pack by filling a plastic bag that seals at the top with ice cubes and then wrapping it with a thin towel. After 48 hours, apply heat (warm shower or warm bath) for 15 to 20 minutes several times a day, or alternate ice and heat.    You may use over-the-counter pain medicine to control pain, unless another pain medicine was prescribed. If you have chronic liver or kidney disease or ever had a stomach ulcer or gastrointestinal bleeding, talk with your healthcare provider before using these medicines.    If a soft cervical collar was prescribed, only ear it for periods of increased pain. It should not be worn for more than 3 hours a day, or for longer than 1 to 2 weeks.  Follow-up care  Follow up with your healthcare provider, or as directed. Physical therapy may be needed.  Sometimes fractures don t show up on the first X-ray. Bruises and sprains can sometimes hurt as much as a fracture. These injuries can take time to heal completely. If your symptoms don t improve or they get worse, talk with your healthcare provider. You may need a repeat X-ray or other tests. If X-rays were taken, you will be told of any new findings that may affect your care.  Call 911  Call 911 if you have:    Neck swelling, difficulty or painful swallowing    Trouble breathing    Chest pain   When to seek medical advice  Call your healthcare provider right away if any of these occur:    Pain becomes worse or spreads into your arms or legs    Weakness or numbness in one or both arms or legs  Date Last Reviewed:  5/1/2018 2000-2018 [employer]. [address] [place] [address], [place] [address]. All rights reserved. This information is not intended as a substitute for professional medical care. Always follow your healthcare professional's instructions.               Mel Pappas NP  [facility]

## 2018-12-10 NOTE — PATIENT INSTRUCTIONS
Patient Education     Neck Sprain or Strain  A sudden force that causes turning or bending of the neck can cause sprain or strain. An example would be the force from a car accident. This can stretch or tear muscles called a strain. It can also stretch or tear ligaments called a sprain. Either of these can cause neck pain. Sometimes neck pain occurs after a simple awkward movement. In either case, muscle spasm is commonly present and contributes to the pain.   Unless you had a forceful physical injury (for example, a car accident or fall), X-rays are often not ordered for the initial evaluation of neck pain. If pain continues and does not respond to medical treatment, X-rays and other tests may be done later.  Home care    You may feel more soreness and spasm the first few days after the injury. Rest until symptoms start to improve.    When lying down, use a comfortable pillow or a rolled towel that supports the head and keeps the spine in a neutral position. The position of the head should not be tilted forward or backward.    Apply an ice pack over the injured area for 15 to 20 minutes every 3 to 6 hours. Do this for the first 24 to 48 hours. You can make an ice pack by filling a plastic bag that seals at the top with ice cubes and then wrapping it with a thin towel. After 48 hours, apply heat (warm shower or warm bath) for 15 to 20 minutes several times a day, or alternate ice and heat.    You may use over-the-counter pain medicine to control pain, unless another pain medicine was prescribed. If you have chronic liver or kidney disease or ever had a stomach ulcer or gastrointestinal bleeding, talk with your healthcare provider before using these medicines.    If a soft cervical collar was prescribed, only ear it for periods of increased pain. It should not be worn for more than 3 hours a day, or for longer than 1 to 2 weeks.  Follow-up care  Follow up with your healthcare provider, or as directed. Physical  therapy may be needed.  Sometimes fractures don t show up on the first X-ray. Bruises and sprains can sometimes hurt as much as a fracture. These injuries can take time to heal completely. If your symptoms don t improve or they get worse, talk with your healthcare provider. You may need a repeat X-ray or other tests. If X-rays were taken, you will be told of any new findings that may affect your care.  Call 911  Call 911 if you have:    Neck swelling, difficulty or painful swallowing    Trouble breathing    Chest pain   When to seek medical advice  Call your healthcare provider right away if any of these occur:    Pain becomes worse or spreads into your arms or legs    Weakness or numbness in one or both arms or legs  Date Last Reviewed: 5/1/2018 2000-2018 [employer]. [address] [place] [address], [place] [address]. All rights reserved. This information is not intended as a substitute for professional medical care. Always follow your healthcare professional's instructions.

## 2019-03-15 DIAGNOSIS — E11.65 TYPE 2 DIABETES MELLITUS WITH HYPERGLYCEMIA, WITHOUT LONG-TERM CURRENT USE OF INSULIN (H): ICD-10-CM

## 2019-03-15 NOTE — TELEPHONE ENCOUNTER
"Requested Prescriptions   Pending Prescriptions Disp Refills     CONTOUR NEXT TEST test strip [Pharmacy Med Name: CONTOUR NEXT        MARLEY] 50 strip 23     Sig: USE ONE STRIP TO CHECK GLUCOSE ONCE DAILY OR  AS  DIRECTED    Diabetic Supplies Protocol Passed - 3/15/2019  7:55 AM       Passed - Medication is active on med list       Passed - Patient is 18 years of age or older       Passed - Recent (6 mo) or future (30 days) visit within the authorizing provider's specialty    Patient had office visit in the last 6 months or has a visit in the next 30 days with authorizing provider.  See \"Patient Info\" tab in inbasket, or \"Choose Columns\" in Meds & Orders section of the refill encounter.            blood glucose monitoring (RHYS CONTOUR NEXT) test strip  Last Written Prescription Date:  09/20/2017  Last Fill Quantity: 100 strip,  # refills: 11   Last office visit: 12/10/2018 with prescribing provider:  MANUEL Pappas   Future Office Visit:      Halle Sheridan RT (R) (M)    "

## 2019-03-20 ENCOUNTER — HOSPITAL ENCOUNTER (OUTPATIENT)
Dept: CT IMAGING | Facility: CLINIC | Age: 69
Discharge: HOME | End: 2019-03-20
Attending: NURSE PRACTITIONER | Admitting: NURSE PRACTITIONER
Payer: MEDICARE

## 2019-03-20 DIAGNOSIS — R91.8 PULMONARY NODULES: ICD-10-CM

## 2019-03-20 DIAGNOSIS — R91.8 PULMONARY NODULES: Primary | ICD-10-CM

## 2019-03-20 PROCEDURE — 71250 CT THORAX DX C-: CPT

## 2019-04-01 ENCOUNTER — OFFICE VISIT (OUTPATIENT)
Dept: FAMILY MEDICINE | Facility: CLINIC | Age: 69
End: 2019-04-01
Payer: COMMERCIAL

## 2019-04-01 VITALS
HEART RATE: 82 BPM | RESPIRATION RATE: 16 BRPM | DIASTOLIC BLOOD PRESSURE: 80 MMHG | SYSTOLIC BLOOD PRESSURE: 138 MMHG | WEIGHT: 248 LBS | BODY MASS INDEX: 37.71 KG/M2 | TEMPERATURE: 97.4 F | OXYGEN SATURATION: 95 %

## 2019-04-01 DIAGNOSIS — E11.65 TYPE 2 DIABETES MELLITUS WITH HYPERGLYCEMIA, WITHOUT LONG-TERM CURRENT USE OF INSULIN (H): ICD-10-CM

## 2019-04-01 DIAGNOSIS — M25.512 PAIN OF BOTH SHOULDER JOINTS: Primary | ICD-10-CM

## 2019-04-01 DIAGNOSIS — M54.2 CERVICALGIA: ICD-10-CM

## 2019-04-01 DIAGNOSIS — M25.511 PAIN OF BOTH SHOULDER JOINTS: Primary | ICD-10-CM

## 2019-04-01 LAB — HBA1C MFR BLD: 7.6 % (ref 0–5.6)

## 2019-04-01 PROCEDURE — 20610 DRAIN/INJ JOINT/BURSA W/O US: CPT | Mod: 50 | Performed by: NURSE PRACTITIONER

## 2019-04-01 PROCEDURE — 36415 COLL VENOUS BLD VENIPUNCTURE: CPT | Performed by: NURSE PRACTITIONER

## 2019-04-01 PROCEDURE — 83036 HEMOGLOBIN GLYCOSYLATED A1C: CPT | Performed by: NURSE PRACTITIONER

## 2019-04-01 PROCEDURE — 99214 OFFICE O/P EST MOD 30 MIN: CPT | Mod: 25 | Performed by: NURSE PRACTITIONER

## 2019-04-01 RX ORDER — LIDOCAINE HYDROCHLORIDE 10 MG/ML
2 INJECTION, SOLUTION INFILTRATION; PERINEURAL ONCE
Status: COMPLETED | OUTPATIENT
Start: 2019-04-01 | End: 2019-04-01

## 2019-04-01 RX ORDER — TRIAMCINOLONE ACETONIDE 40 MG/ML
40 INJECTION, SUSPENSION INTRA-ARTICULAR; INTRAMUSCULAR ONCE
Status: COMPLETED | OUTPATIENT
Start: 2019-04-01 | End: 2019-04-01

## 2019-04-01 RX ADMIN — TRIAMCINOLONE ACETONIDE 40 MG: 40 INJECTION, SUSPENSION INTRA-ARTICULAR; INTRAMUSCULAR at 12:39

## 2019-04-01 RX ADMIN — TRIAMCINOLONE ACETONIDE 40 MG: 40 INJECTION, SUSPENSION INTRA-ARTICULAR; INTRAMUSCULAR at 12:38

## 2019-04-01 RX ADMIN — LIDOCAINE HYDROCHLORIDE 2 ML: 10 INJECTION, SOLUTION INFILTRATION; PERINEURAL at 12:37

## 2019-04-01 RX ADMIN — LIDOCAINE HYDROCHLORIDE 2 ML: 10 INJECTION, SOLUTION INFILTRATION; PERINEURAL at 12:38

## 2019-04-01 ASSESSMENT — PAIN SCALES - GENERAL: PAINLEVEL: MODERATE PAIN (5)

## 2019-04-01 NOTE — NURSING NOTE
"Chief Complaint   Patient presents with     Results     CT scan results      Neck Pain       Initial /80   Pulse 82   Temp 97.4  F (36.3  C) (Tympanic)   Resp 16   Wt 112.5 kg (248 lb)   SpO2 95%   BMI 37.71 kg/m   Estimated body mass index is 37.71 kg/m  as calculated from the following:    Height as of 11/11/18: 1.727 m (5' 8\").    Weight as of this encounter: 112.5 kg (248 lb).    Patient presents to the clinic using No DME    Health Maintenance that is potentially due pending provider review:  Colonoscopy/FIT and a1c    Possibly completing today per provider review.    Is there anyone who you would like to be able to receive your results? No  If yes have patient fill out JARVIS      "

## 2019-04-01 NOTE — PATIENT INSTRUCTIONS
Need to repeat CT scan of chest in 3-6 months [phone] to scheduled       Bilateral shoulders injected today   This may be part of neck pain     If still bothersome could consider referral to spine specialist- can call me and let me know     Will do labs for diabetes today

## 2019-04-01 NOTE — PROGRESS NOTES
SUBJECTIVE:   Roscoe Jang is a 68 year old male who presents to clinic today for the following health issues:      Neck Pain      Duration: ongoing issue    Description:  Location: bilateral side of neck  Radiation: into the right neck, into the right shoulder, into the left neck and nto the left shoulder    Intensity:  moderate    Accompanying signs and symptoms: headaches, pain in occipital part of neck/head, pain goes to shoulders, does have shoulder issues too, joint pains in all joints, hasn't worked since last April retired, hasn't been active    History (similar episodes/previous evaluation): hx of bilateral Carotid artery surgery    Precipitating or alleviating factors: None    Therapies tried and outcome: usually gets a massage 1 x per month, hasn't done this in a while, takes ibuprofen and tylenol      Bilateral shoulder pain- had right shoulder injection in October this provided relief wondering if we could try these again         MR CERVICAL SPINE WITHOUT CONTRAST May 8, 2018 8:02 PM      HISTORY: Cervicalgia.     TECHNIQUE: Multiplanar multisequence images were obtained through the  cervical spine without contrast.     COMPARISON: None.     FINDINGS: Sagittal images demonstrate mild gentle cervical kyphosis,  otherwise normal posterior alignment. There is no evidence for  craniovertebral or cervical medullary junction abnormality. The  cervical cord is normal in morphology and signal characteristics. Disc  space narrowing is present at C5-C6. Bone marrow signal intensity is  normal.     C2-C3: Normal.     C3-C4: Minimal disc bulging and moderate right-sided facet hypertrophy  is present causing some mild right-sided foraminal stenosis and  borderline to mild central canal stenosis.     C4-C5: There is a right paramedian disc protrusion and right-sided  uncinate spurring along with facet hypertrophy. Findings are causing  mild central and mild right-sided foraminal stenosis.     C5-C6: Posterior  osteophyte formation, bilateral uncinate spurring and  facet hypertrophy is present causing moderate right-sided foraminal  stenosis, mild left-sided foraminal stenosis and borderline to mild  central canal stenosis.     C6-C7: Minimal posterior osteophyte formation and facet hypertrophy.  No stenosis.     C7-T1: Normal.     Paraspinal soft tissues: Unremarkable as visualized.                                                                      IMPRESSION: Multilevel degenerative disc and facet disease most  advanced at C5-C6 where there is moderate right-sided foraminal  stenosis and mild left-sided foraminal stenosis. There is also mild  central canal and mild right-sided foraminal stenosis at C4-C5.      Pt here to discuss CT scan results  Results for orders placed or performed during the hospital encounter of 03/20/19   CT Chest w/o Contrast    Narrative    CT CHEST WITHOUT CONTRAST March 20, 2019 10:31 AM     HISTORY: Pulmonary nodules    TECHNIQUE: Computed tomography imaging of the chest was performed  without administration of intravenous contrast per standard protocol.  Coronal and maximal intensity projection images were produced.    COMPARISON: CT abdomen/pelvis 11/11/2018.    FINDINGS: Right lower lobe subpleural lung nodule measures  approximately 9 x 4 x 6 mm (TR by AP by SI), unchanged compared to  11/11/2018 (series 6 image 189). A 9 x 7 x 3 mm (TR by AP by SI) flat  appearing nodule is present within the left lower lobe (series 6 image  153). Multiple smaller calcified lung nodules are present. A rounded 8  mm lung nodule is present within the left upper lobe (series 6 image  86). Heart size is within normal limits. Scattered vascular  calcifications are present. No mediastinal or hilar lymphadenopathy is  identified. There is slight elongation of the trachea. No destructive  or aggressive osseous lesions. Upper abdomen is without appreciable  abnormality.      Impression    IMPRESSION:  1. Multiple  solid noncalcified lung nodules measuring greater than 8  mm. Right lower lobe lung nodule is unchanged compared to 2018  and pelvis CT.  2. As per the 2017 Fleischner Society guidelines for pulmonary  nodules, if the patient is at low risk for pulmonary malignancy,  recommend CT in 3-6 months then consider follow-up CT in 18-24 months.  If the patient is at high risk for pulmonary malignancy, recommend CT  in 3-6 months, then CT at 18-24 months.    GRANT DE LA ROSA MD           Problem list and histories reviewed & adjusted, as indicated.  Additional history: as documented    Patient Active Problem List   Diagnosis     Benign essential hypertension     Mixed hyperlipidemia     Obesity     Abdominal aortic aneurysm (H)     Fatty liver     Type 2 diabetes mellitus with hyperglycemia, without long-term current use of insulin (H)     Morbid obesity (H)     Bilateral carotid artery disease (H)     Carotid artery stenosis, symptomatic, right     Carotid stenosis, asymptomatic     Past Surgical History:   Procedure Laterality Date     ENDARTERECTOMY CAROTID Right 2018    Procedure: ENDARTERECTOMY CAROTID;  RIGHT CAROTID ENDARTERECTOMY WITH EEG;  Surgeon: Gaurav Bustos MD;  Location:  OR     ENDARTERECTOMY CAROTID Left 2018    Procedure: ENDARTERECTOMY CAROTID;  LEFT CAROTID ENDARTERECTOMY with EXTERNAL JUGULAR VEIN PATCH;  Surgeon: Gaurav Bustos MD;  Location:  OR       Social History     Tobacco Use     Smoking status: Former Smoker     Last attempt to quit: 10/3/2007     Years since quittin.5     Smokeless tobacco: Never Used   Substance Use Topics     Alcohol use: No     No family history on file.        Reviewed and updated as needed this visit by clinical staff       Reviewed and updated as needed this visit by Provider         ROS:  Constitutional, HEENT, cardiovascular, pulmonary, gi and gu systems are negative, except as otherwise noted.    OBJECTIVE:                                                     /80   Pulse 82   Temp 97.4  F (36.3  C) (Tympanic)   Resp 16   Wt 112.5 kg (248 lb)   SpO2 95%   BMI 37.71 kg/m    Body mass index is 37.71 kg/m .  GENERAL APPEARANCE: healthy, alert and no distress  RESP: lungs clear to auscultation - no rales, rhonchi or wheezes  CV: regular rates and rhythm, normal S1 S2, no S3 or S4 and no murmur, click or rub  ORTHO:   SHOULDER Exam-Bilateral   Inspection: no swelling, no bruising, no discoloration, no obvious deformity, no asymmetry, no glenohumeral joint anterior bulge, no distal clavicle elevation, no muscle atrophy, no scapular winging   Tenderness of: SC joint- no, clavicle(prox-mid)- no, clavicle-(mid-distal)- no, AC joint- no, acromion- no, anterior capsule- no, prox bicep tendon- no, greater tuberosity- no, prox humerus- no, supraspinatous- no, infraspinatous- no, superior trapezious- no, rhomboids- no   Range of Motion: Active- forward flexion- normal, abduction- normal, external rotation- normal, internal rotation- normal  Range of Motion: Passive- forward flexion- normal, abduction- normal, external rotation- normal, internal rotation- normal   Strength: forward flexion- 5/5, abduction- 5/5, internal rotation- 5/5, external rotation- 5/5 and bicep- full     Cervical Spine Exam: Inspection: normal cervical lordosis  Tender:  occipital nerves  Range of Motion:  Full ROM of cervical spine  Strength: Full strength of all neck muscles  NEURO: Normal strength and tone, mentation intact and speech normal  PSYCH: mentation appears normal and affect normal/bright    After risks, benefits and complications of steroid injection were discussed with the patient he elected to proceed.  Using sterile technique, the area was first prepped with betadyne and an alcohol swab.  A 25 gauge  needle was used to inject 40 mg Kenalog and 1 cc of 1% lidocaine into antra-articular shoulder joint on the right and left.  Roscoe  tolerated the procedure  well without complications.        Diagnostic test results:  Diagnostic Test Results:  Results for orders placed or performed in visit on 04/01/19   Hemoglobin A1c   Result Value Ref Range    Hemoglobin A1C 7.6 (H) 0 - 5.6 %        ASSESSMENT/PLAN:                                                    1. Type 2 diabetes mellitus with hyperglycemia, without long-term current use of insulin (H)  Up a bit   Recommend increase metformin to 1000 mg twice a day   Recheck in 3 months   - Hemoglobin A1c    2. Pain of both shoulder joints  Bilateral shoulders injected today   This has worked well in the past for him     - triamcinolone (KENALOG-40) injection 40 mg  - lidocaine 1 % injection 2 mL  - triamcinolone (KENALOG-40) injection 40 mg  - lidocaine 1 % injection 2 mL    3. Cervicalgia  Bilateral shoulder pain may be contributing  Injections as above  He will follow up with me by calling in 2-3 weeks if not improved and will refer to spine specialist   Offered physical therapy referral - declined       Patient Instructions   Need to repeat CT scan of chest in 3-6 months [phone] to scheduled       Bilateral shoulders injected today   This may be part of neck pain     If still bothersome could consider referral to spine specialist- can call me and let me know     Will do labs for diabetes today             Mel Pappas NP  [facility]

## 2019-04-01 NOTE — LETTER
April 2, 2019      Roscoe Jang  [address] [address] [address]        Dear ,    We are writing to inform you of your test results.        Resulted Orders   Hemoglobin A1c   Result Value Ref Range    Hemoglobin A1C 7.6 (H) 0 - 5.6 %      Comment:      Normal <5.7% Prediabetes 5.7-6.4%  Diabetes 6.5% or higher - adopted from ADA   consensus guidelines.         If you have any questions or concerns, please call the clinic at the number listed above.       Sincerely,        Mel Pappas NP/CB

## 2019-04-02 DIAGNOSIS — E11.65 TYPE 2 DIABETES MELLITUS WITH HYPERGLYCEMIA, WITHOUT LONG-TERM CURRENT USE OF INSULIN (H): ICD-10-CM

## 2019-04-18 ENCOUNTER — ANCILLARY PROCEDURE (OUTPATIENT)
Dept: GENERAL RADIOLOGY | Facility: CLINIC | Age: 69
End: 2019-04-18
Attending: FAMILY MEDICINE
Payer: COMMERCIAL

## 2019-04-18 ENCOUNTER — OFFICE VISIT (OUTPATIENT)
Dept: FAMILY MEDICINE | Facility: CLINIC | Age: 69
End: 2019-04-18
Payer: COMMERCIAL

## 2019-04-18 VITALS
HEART RATE: 88 BPM | RESPIRATION RATE: 18 BRPM | WEIGHT: 238 LBS | BODY MASS INDEX: 36.07 KG/M2 | TEMPERATURE: 97.8 F | HEIGHT: 68 IN | SYSTOLIC BLOOD PRESSURE: 144 MMHG | DIASTOLIC BLOOD PRESSURE: 80 MMHG

## 2019-04-18 DIAGNOSIS — J20.9 ACUTE BRONCHITIS, UNSPECIFIED ORGANISM: ICD-10-CM

## 2019-04-18 DIAGNOSIS — M25.552 HIP PAIN, LEFT: ICD-10-CM

## 2019-04-18 DIAGNOSIS — M25.552 HIP PAIN, LEFT: Primary | ICD-10-CM

## 2019-04-18 PROCEDURE — 99213 OFFICE O/P EST LOW 20 MIN: CPT | Performed by: FAMILY MEDICINE

## 2019-04-18 PROCEDURE — 73502 X-RAY EXAM HIP UNI 2-3 VIEWS: CPT | Mod: FY

## 2019-04-18 ASSESSMENT — MIFFLIN-ST. JEOR: SCORE: 1829.06

## 2019-04-18 NOTE — PROGRESS NOTES
SUBJECTIVE:   Roscoe Jang is a 68 year old male who presents to clinic today for the following   health issues:      Musculoskeletal problem/pain      Duration: Tuesday     Description  Location: left hip - pain so bad last night that it woke him up in the middle of the night    Intensity:  moderate    Accompanying signs and symptoms: radiation of pain to hip and upper thigh muscle    History  Previous similar problem: no   Previous evaluation:  none    Precipitating or alleviating factors:  Trauma or overuse: YES- was doing yard work and twisted wrong and it felt like someone stuck a knife in it  Aggravating factors include: sitting, standing, walking, exercise and overuse    Therapies tried and outcome: rest/inactivity and chiropractor Yesterday         RESPIRATORY SYMPTOMS      Duration: 2 weeks     Description  Had a cold going on for awhile- feeling better but can't get rid of his cough     Severity: moderate    Accompanying signs and symptoms: None    History (predisposing factors):  Former smoker- quit 2007    Precipitating or alleviating factors: None    Therapies tried and outcome:  rest and fluids, nyquil, cough drops       Additional history: as documented    Reviewed  and updated as needed this visit by clinical staff         Reviewed and updated as needed this visit by Provider         Patient Active Problem List   Diagnosis     Benign essential hypertension     Mixed hyperlipidemia     Obesity     Abdominal aortic aneurysm (H)     Fatty liver     Type 2 diabetes mellitus with hyperglycemia, without long-term current use of insulin (H)     Morbid obesity (H)     Bilateral carotid artery disease (H)     Carotid artery stenosis, symptomatic, right     Carotid stenosis, asymptomatic     Past Surgical History:   Procedure Laterality Date     ENDARTERECTOMY CAROTID Right 5/11/2018    Procedure: ENDARTERECTOMY CAROTID;  RIGHT CAROTID ENDARTERECTOMY WITH EEG;  Surgeon: Gaurav Bustos MD;   Location:  OR     ENDARTERECTOMY CAROTID Left 2018    Procedure: ENDARTERECTOMY CAROTID;  LEFT CAROTID ENDARTERECTOMY with EXTERNAL JUGULAR VEIN PATCH;  Surgeon: Gaurav Bustos MD;  Location:  OR       Social History     Tobacco Use     Smoking status: Former Smoker     Last attempt to quit: 10/3/2007     Years since quittin.5     Smokeless tobacco: Never Used   Substance Use Topics     Alcohol use: No     History reviewed. No pertinent family history.      Current Outpatient Medications   Medication Sig Dispense Refill     acetaminophen (TYLENOL) 500 MG tablet Take 2 tablets (1,000 mg) by mouth every 8 hours as needed for mild pain 30 tablet 0     ASPIRIN PO Take 81 mg by mouth every morning        atorvastatin (LIPITOR) 20 MG tablet TAKE 1 TABLET BY MOUTH ONCE DAILY 90 tablet 3     blood glucose (CONTOUR NEXT TEST) test strip USE ONE STRIP TO CHECK GLUCOSE ONCE DAILY 100 strip 1     blood glucose monitoring (Travark CONTOUR NEXT) test strip Use to test blood sugar 1 times daily or as directed. 100 strip 6     blood glucose monitoring (RHYS MICROLET) lancets Use to test blood sugar 1 times daily or as directed. 100 each 5     lisinopril (PRINIVIL/ZESTRIL) 20 MG tablet TAKE 1 TABLET BY MOUTH ONCE DAILY 90 tablet 1     metFORMIN (GLUCOPHAGE) 500 MG tablet Take 2 tablets (1,000 mg) by mouth 2 times daily (with meals) 360 tablet 0     No Known Allergies  Recent Labs   Lab Test 19  1148 18  1048 18  0314 10/02/18  1453  18  1350  10/06/17  1000 17  1326   A1C 7.6*  --   --  6.8*  --  6.7*   < > 6.4* 8.4*   LDL  --   --   --  48  --  45  --   --  86   HDL  --   --   --   --   --  48  --   --   --    TRIG  --   --   --   --   --  124  --   --   --    ALT  --  35 34 34  --   --   --   --   --    CR  --  0.88 0.96 0.96  --  0.94   < >  --  1.00   GFRESTIMATED  --  86 78 78  --  80   < >  --  75   GFRESTBLACK  --  >90 >90 >90  --  >90   < >  --  >90   GFR  "Calc     POTASSIUM  --  4.1 4.2 4.1   < > 4.4   < >  --  4.4   TSH  --   --   --   --   --   --   --  0.90  --     < > = values in this interval not displayed.      BP Readings from Last 3 Encounters:   04/18/19 144/80   04/01/19 138/80   12/10/18 138/70    Wt Readings from Last 3 Encounters:   04/18/19 108 kg (238 lb)   04/01/19 112.5 kg (248 lb)   12/10/18 109.3 kg (241 lb)                  Labs reviewed in EPIC    ROS:  Constitutional, HEENT, cardiovascular, pulmonary, GI, , musculoskeletal, neuro, skin, endocrine and psych systems are negative, except as otherwise noted.    OBJECTIVE:     /80 (Cuff Size: Adult Large)   Pulse 88   Temp 97.8  F (36.6  C) (Tympanic)   Resp 18   Ht 1.727 m (5' 8\")   Wt 108 kg (238 lb)   BMI 36.19 kg/m    Body mass index is 36.19 kg/m .  GENERAL: healthy, alert and no distress  EYES: Eyes grossly normal to inspection, PERRL and conjunctivae and sclerae normal  HENT: ear canals and TM's normal, nose and mouth without ulcers or lesions  NECK: no adenopathy, no asymmetry, masses, or scars and thyroid normal to palpation  RESP: lungs clear to auscultation - no rales, rhonchi or wheezes  CV: regular rate and rhythm, normal S1 S2, no S3 or S4, no murmur, click or rub, no peripheral edema and peripheral pulses strong  ABDOMEN: soft, nontender, no hepatosplenomegaly, no masses and bowel sounds normal  MS: subjective left hip pain, no tenderness, skin discoloration or swelling noted, range of movement normal, SLR negative, pedal pulses 3+, sensation to touch and pressure intact  NEURO: Normal strength and tone, mentation intact and speech normal      ASSESSMENT/PLAN:       (M25.552) Hip pain, left  (primary encounter diagnosis)  Comment: Suspect left hip pain secondary to strain injury.  Suggested rest, icing, over-the-counter analgesia and physical therapy.  Recommended follow-up with orthopedic if symptoms persist or worsen.  Plan: XR Pelvis and Hip Left 2 Views      (J20.9) " Acute bronchitis, unspecified organism  Comment: Symptoms are likely secondary to viral infection.  Suggested well hydration, warm fluids, over-the-counter antitussive.  Return criteria discussed in detail.      Patient Instructions       Patient Education     Hip Strain    You have a strain of the muscles around the hip joint. A muscle strain is a stretching or tearing of muscle fibers. This causes pain, especially when you move that muscle. There may also be some swelling and bruising.  Home care    Stay off the injured leg as much as possible until you can walk on it without pain. If you have a lot of pain with walking, crutches or a walker may be prescribed. These can be rented or purchased at many pharmacies and surgical or orthopedic supply stores. Follow your healthcare provider's advice about when to start putting weight on that leg.    Apply an ice pack over the injured area for 15 to 20 minutes every 3 to 6 hours. Do this for the first 24 to 48 hours. You can make an ice pack by filling a plastic bag that seals at the top with ice cubes and then wrapping it with a thin towel. Be careful not to injure your skin with the ice treatments. Ice should never be applied directly to skin. Continue the use of ice packs for relief of pain and swelling as needed. After 48 hours, apply heat (warm shower or warm bath) for 15 to 20 minutes several times a day, or alternate ice and heat.    You may use over-the-counter pain medicine to control pain, unless another pain medicine was prescribed. If you have chronic liver or kidney disease or ever had a stomach ulcer or gastrointestinal bleeding, talk with your healthcare provider before using these medicines.    If you play sports, you may resume these activities when you are able to hop and run on the injured leg without pain.  Follow-up care  Follow up with your healthcare provider, or as advised. If your symptoms don't start to get better after a week, more tests may be  needed.  If X-rays were taken, you will be told of any new findings that may affect your care.  When to seek medical advice  Call your healthcare provider right away if any of these occur:    Increased swelling or bruising    Increased pain    Losing the ability to put weight on the injured side   Date Last Reviewed: 5/1/2018 2000-2018 [employer]. [address] [place] [address]. All rights reserved. This information is not intended as a substitute for professional medical care. Always follow your healthcare professional's instructions.           Patient Education     Viral Bronchitis (Adult)    You have a viral bronchitis. Bronchitis is inflammation and swelling of the lining of the lungs. This is often caused by an infection. Symptoms include a dry, hacking cough that is worse at night. The cough may bring up yellow-green mucus. You may also feel short of breath or wheeze. Other symptoms may include tiredness, chest discomfort, and chills.  Bronchitis that is caused by a virus is not treated with antibiotics. Instead, medicines may be given to help relieve symptoms. Symptoms can last up to 2 weeks, although the cough may last much longer.  This illness is contagious during the first few days and is spread through the air by coughing and sneezing, or by direct contact (touching the sick person and then touching your own eyes, nose, or mouth).  Most viral illnesses resolve within 10 to 14 days with rest and simple home remedies, although they may sometimes last for several weeks.  Home care    If symptoms are severe, rest at home for the first 2 to 3 days. When you go back to your usual activities, don't let yourself get too tired.    Do not smoke. Also avoid being exposed to secondhand smoke.    You may use over-the-counter medicine to control fever or pain, unless another pain medicine was prescribed. If you have chronic liver or kidney disease or have ever had a stomach ulcer or  gastrointestinal bleeding, talk with your healthcare provider before using these medicines. Also talk to your provider if you are taking medicine to prevent blood clots. Aspirin should never be given to anyone younger than 18 years of age who is ill with a viral infection or fever. It may cause severe liver or brain damage.    Your appetite may be poor, so a light diet is fine. Avoid dehydration by drinking 6 to 8 glasses of fluids per day (such as water, soft drinks, sports drinks, juices, tea, or soup). Extra fluids will help loosen secretions in the nose and lungs.    Over-the-counter cough, cold, and sore-throat medicines will not shorten the length of the illness, but they may help to reduce symptoms. Don't use decongestants if you have high blood pressure.  Follow-up care  Follow up with your healthcare provider, or as advised. If you had an X-ray or ECG (electrocardiogram), a specialist will review it. You will be notified of any new findings that may affect your care.  If you are age 65 or older, or if you have a chronic lung disease or condition that affects your immune system, or you smoke, ask your healthcare provider about getting a pneumococcal vaccine and a yearly flu shot (influenza vaccine).  When to seek medical advice  Call your healthcare provider right away if any of these occur:    Fever of 100.4 F (38 C) or higher, or as directed by your healthcare provider    Coughing up increased amounts of colored sputum    Weakness, drowsiness, headache, facial pain, ear pain, or a stiff neck  Call 911  Call 911 if any of these occur:    Coughing up blood    Worsening weakness, drowsiness, headache, or stiff neck    Trouble breathing, wheezing, or pain with breathing  Date Last Reviewed: 6/1/2018 2000-2018 [employer] [address] [place] [address], [place] [address]. All rights reserved. This information is not intended as a substitute for professional medical care. Always follow your healthcare  professional's instructions.               Rell Rivers MD  [facility]

## 2019-04-18 NOTE — NURSING NOTE
"Chief Complaint   Patient presents with     Musculoskeletal Problem     Cough       Initial /80 (Cuff Size: Adult Large)   Pulse 88   Temp 97.8  F (36.6  C) (Tympanic)   Resp 18   Ht 1.727 m (5' 8\")   Wt 108 kg (238 lb)   BMI 36.19 kg/m   Estimated body mass index is 36.19 kg/m  as calculated from the following:    Height as of this encounter: 1.727 m (5' 8\").    Weight as of this encounter: 108 kg (238 lb).    Patient presents to the clinic using No DME    Health Maintenance that is potentially due pending provider review:  NONE    n/a    Is there anyone who you would like to be able to receive your results? Not Applicable  If yes have patient fill out JARVIS    "

## 2019-04-18 NOTE — PATIENT INSTRUCTIONS
Patient Education     Hip Strain    You have a strain of the muscles around the hip joint. A muscle strain is a stretching or tearing of muscle fibers. This causes pain, especially when you move that muscle. There may also be some swelling and bruising.  Home care    Stay off the injured leg as much as possible until you can walk on it without pain. If you have a lot of pain with walking, crutches or a walker may be prescribed. These can be rented or purchased at many pharmacies and surgical or orthopedic supply stores. Follow your healthcare provider's advice about when to start putting weight on that leg.    Apply an ice pack over the injured area for 15 to 20 minutes every 3 to 6 hours. Do this for the first 24 to 48 hours. You can make an ice pack by filling a plastic bag that seals at the top with ice cubes and then wrapping it with a thin towel. Be careful not to injure your skin with the ice treatments. Ice should never be applied directly to skin. Continue the use of ice packs for relief of pain and swelling as needed. After 48 hours, apply heat (warm shower or warm bath) for 15 to 20 minutes several times a day, or alternate ice and heat.    You may use over-the-counter pain medicine to control pain, unless another pain medicine was prescribed. If you have chronic liver or kidney disease or ever had a stomach ulcer or gastrointestinal bleeding, talk with your healthcare provider before using these medicines.    If you play sports, you may resume these activities when you are able to hop and run on the injured leg without pain.  Follow-up care  Follow up with your healthcare provider, or as advised. If your symptoms don't start to get better after a week, more tests may be needed.  If X-rays were taken, you will be told of any new findings that may affect your care.  When to seek medical advice  Call your healthcare provider right away if any of these occur:    Increased swelling or bruising    Increased  pain    Losing the ability to put weight on the injured side   Date Last Reviewed: 5/1/2018 2000-2018 [employer]. [address] [place] [address], [place] [address]. All rights reserved. This information is not intended as a substitute for professional medical care. Always follow your healthcare professional's instructions.           Patient Education     Viral Bronchitis (Adult)    You have a viral bronchitis. Bronchitis is inflammation and swelling of the lining of the lungs. This is often caused by an infection. Symptoms include a dry, hacking cough that is worse at night. The cough may bring up yellow-green mucus. You may also feel short of breath or wheeze. Other symptoms may include tiredness, chest discomfort, and chills.  Bronchitis that is caused by a virus is not treated with antibiotics. Instead, medicines may be given to help relieve symptoms. Symptoms can last up to 2 weeks, although the cough may last much longer.  This illness is contagious during the first few days and is spread through the air by coughing and sneezing, or by direct contact (touching the sick person and then touching your own eyes, nose, or mouth).  Most viral illnesses resolve within 10 to 14 days with rest and simple home remedies, although they may sometimes last for several weeks.  Home care    If symptoms are severe, rest at home for the first 2 to 3 days. When you go back to your usual activities, don't let yourself get too tired.    Do not smoke. Also avoid being exposed to secondhand smoke.    You may use over-the-counter medicine to control fever or pain, unless another pain medicine was prescribed. If you have chronic liver or kidney disease or have ever had a stomach ulcer or gastrointestinal bleeding, talk with your healthcare provider before using these medicines. Also talk to your provider if you are taking medicine to prevent blood clots. Aspirin should never be given to anyone younger than 18 years of age who  is ill with a viral infection or fever. It may cause severe liver or brain damage.    Your appetite may be poor, so a light diet is fine. Avoid dehydration by drinking 6 to 8 glasses of fluids per day (such as water, soft drinks, sports drinks, juices, tea, or soup). Extra fluids will help loosen secretions in the nose and lungs.    Over-the-counter cough, cold, and sore-throat medicines will not shorten the length of the illness, but they may help to reduce symptoms. Don't use decongestants if you have high blood pressure.  Follow-up care  Follow up with your healthcare provider, or as advised. If you had an X-ray or ECG (electrocardiogram), a specialist will review it. You will be notified of any new findings that may affect your care.  If you are age 65 or older, or if you have a chronic lung disease or condition that affects your immune system, or you smoke, ask your healthcare provider about getting a pneumococcal vaccine and a yearly flu shot (influenza vaccine).  When to seek medical advice  Call your healthcare provider right away if any of these occur:    Fever of 100.4 F (38 C) or higher, or as directed by your healthcare provider    Coughing up increased amounts of colored sputum    Weakness, drowsiness, headache, facial pain, ear pain, or a stiff neck  Call 911  Call 911 if any of these occur:    Coughing up blood    Worsening weakness, drowsiness, headache, or stiff neck    Trouble breathing, wheezing, or pain with breathing  Date Last Reviewed: 6/1/2018 2000-2018 [employer]. [address] [place] [address], [place] [address]. All rights reserved. This information is not intended as a substitute for professional medical care. Always follow your healthcare professional's instructions.

## 2019-04-19 ENCOUNTER — HOSPITAL ENCOUNTER (OUTPATIENT)
Dept: PHYSICAL THERAPY | Facility: CLINIC | Age: 69
Setting detail: THERAPIES SERIES
End: 2019-04-19
Attending: FAMILY MEDICINE
Payer: MEDICARE

## 2019-04-19 DIAGNOSIS — M25.552 HIP PAIN, LEFT: ICD-10-CM

## 2019-04-19 DIAGNOSIS — M25.552 HIP PAIN, LEFT: Primary | ICD-10-CM

## 2019-04-19 PROCEDURE — 97110 THERAPEUTIC EXERCISES: CPT | Mod: GP | Performed by: PHYSICAL THERAPIST

## 2019-04-19 PROCEDURE — 97140 MANUAL THERAPY 1/> REGIONS: CPT | Mod: GP | Performed by: PHYSICAL THERAPIST

## 2019-04-19 PROCEDURE — 97161 PT EVAL LOW COMPLEX 20 MIN: CPT | Mod: GP | Performed by: PHYSICAL THERAPIST

## 2019-04-19 NOTE — PROGRESS NOTES
Physical Therapy Evaluation  04/19/19 0800   General Information   Type of Visit Initial OP Ortho PT Evaluation   Start of Care Date 04/19/19   Referring Physician Dr. Rivers   Patient/Family Goals Statement Sleep and get rid of the pain   Orders Evaluate and Treat   Date of Order 04/18/19   Insurance Type Medicare;[employer]   Medical Diagnosis Hip pain, Left   Surgical/Medical history reviewed Yes   Precautions/Limitations no known precautions/limitations   Body Part(s)   Body Part(s) Hip   Presentation and Etiology   Pertinent history of current problem (include personal factors and/or comorbidities that impact the POC) Patient reports that he was in the yard filling up his truck tires and he twisted wrong and he felt a sharp pain in his hip.  Reports that the sharp pain was in his lateral L hip.  Reports no back pain.  Reports that pain radiates from his lateral left hip into his L buttock and anterior.  Pain all the time and restricting his sleep.  Unable to walk around.  Reports very intense pain that nothing makes better.   Impairments A. Pain;B. Decreased WB tolerance;E. Decreased flexibility;F. Decreased strength and endurance;H. Impaired gait   Functional Limitations perform activities of daily living;perform desired leisure / sports activities   Symptom Location L hip   How/Where did it occur At home   Onset date of current episode/exacerbation 04/16/19   Chronicity New   Pain rating (0-10 point scale) Best (/10);Worst (/10)   Worst (/10) 10   Pain quality A. Sharp;C. Aching   Frequency of pain/symptoms A. Constant   Pain/symptoms are: The same all the time   Pain/symptoms exacerbated by A. Sitting;B. Walking;C. Lifting;D. Carrying;E. Rest;I. Bending;J. ADL   Pain/symptoms eased by C. Rest;G. Heat;H. Cold   Progression of symptoms since onset: Worsened   Current / Previous Interventions   Diagnostic Tests: X-ray   X-ray Results unremarkable   Current Level of Function   Current Community Support  Family/friend caregiver   Patient role/employment history F. Retired   [facility]   Home/community accessibility no SHELL; no stairs within home   Current equipment-Gait/Locomotion Standard cane;Walker   Current equipment-ADL None   Fall Risk Screen   Fall screen completed by PT   Have you fallen 2 or more times in the past year? No   Have you fallen and had an injury in the past year? No   Is patient a fall risk? No   Abuse Screen (yes response referral indicated)   Feels Unsafe at Home or Work/School no   Feels Threatened by Someone no   Does Anyone Try to Keep You From Having Contact with Others or Doing Things Outside Your Home? no   Physical Signs of Abuse Present no   Functional Scales   Functional Scales Other   Other Scales  LEFS: 0/80   Hip Objective Findings   Side (if bilateral, select both right and left) Left   Observation Patient has to change positions frequently due to pain and is constantly shifting while sitting   Posture forward flexed and leans to the right   Gait/Locomotion ER L LE, decreased weight bearing L LE, decreased hip flexion and hip extension L LE   Balance/Proprioception (Single Leg Stance) unable to perform due to pain   Lumbar ROM Forward flexion: increased hip pain; extension: increased hip pain; R SB: slight stretching L; L SB: increased hip symptoms   Pelvic Screen Thigh thrust: (-)   Femoral Nerve Stretch Test Muscle stretch with testing   Straight Leg Raise Test pain in L anterior hip with AROM hip flexion   Scour Test (-)   NOE Test (-)   Palpation minimal TTP; hypertonicity L piriformis, lumbar paraspinal L3-L5, L hip flexor   Accessory Motion/Joint Mobility Hypomobile inferior glide L hip, hypomobile L UPA L3-L5   Left Hip Flexion PROM 110* B   Left Hip Abduction PROM 25* B   Left Hip Adduction PROM L painful past midline   Left Hip ER PROM 40* B   Left Hip IR PROM 15* B   Left Hip Ext PROM lacking 10* L; neutral R   Left Hip Flexion Strength unable to  test on L due to pain   Left Hip Abduction Strength unable to test on L due to pain   Left Hip Extension Strength unable to test on L due to pain   Left Hip IR Strength unable to test on L due to pain   Left Hip ER Strength unable to test on L due to pain   Left Knee Flexion Strength unable to test on L due to pain   Left Knee Extension Strength unable to test on L due to pain   Charles Flexibility Test (+) L   Left Hamstring Flexibility Restricted L > R   Left Piriformis Flexibility Restircted L   Left Prone Quad Flexibility Restricted L   Planned Therapy Interventions   Planned Therapy Interventions balance training;gait training;joint mobilization;manual therapy;neuromuscular re-education;ROM;strengthening;stretching   Planned Modality Interventions   Planned Modality Interventions Biofeedback;Electrical stimulation;Hot packs;Iontophoresis;TENS;Traction;Ultrasound;Cryotherapy   Clinical Impression   Criteria for Skilled Therapeutic Interventions Met yes, treatment indicated   PT Diagnosis L hip pain   Influenced by the following impairments decreased ROM, weakness, decreased flexibility, pain, decreased joint mobility   Functional limitations due to impairments difficulty with walking, standing, sitting, sleeping, lifting, bending   Clinical Presentation Stable/Uncomplicated   Clinical Presentation Rationale severity of symptoms, hx of hip symptoms   Clinical Decision Making (Complexity) Low complexity   Therapy Frequency 2 times/Week   Predicted Duration of Therapy Intervention (days/wks) 10 weeks   Risk & Benefits of therapy have been explained Yes   Patient, Family & other staff in agreement with plan of care Yes   Education Assessment   Preferred Learning Style Listening;Demonstration;Pictures/video   Barriers to Learning No barriers   ORTHO GOALS   PT Ortho Eval Goals 1;2;3   Ortho Goal 1   Goal Identifier Sleep   Goal Description Patient will tolerate sleeping more than 2 hours without an increase in  symptoms.   Target Date 06/14/19   Ortho Goal 2   Goal Identifier Walking   Goal Description Patient will tolerate walking 300 feet with correct gait mechanics in order to ambulate around his yard.   Target Date 06/28/19   Ortho Goal 3   Goal Identifier Sitting   Goal Description Patient will tolerate sitting for 10 minutes without needing to change positions.   Target Date 05/17/19   Total Evaluation Time   PT Eval, Low Complexity Minutes (63616) 35   Therapy Certification   Certification date from 04/19/19   Certification date to 06/28/19   Medical Diagnosis Hip pain, Left   Please contact me with any questions or concerns.    Thank you for your referral,     Lorraine Schaeffer, PT, DPT, CLT  Physical Therapist & Certified Lymphedema Therapist  [address] [address]  [phone]

## 2019-04-19 NOTE — PROGRESS NOTES
[facility]          OUTPATIENT PHYSICAL THERAPY ORTHOPEDIC EVALUATION  PLAN OF TREATMENT FOR OUTPATIENT REHABILITATION  (COMPLETE FOR INITIAL CLAIMS ONLY)  Patient's Last Name, First Name, M.I.  YOB: 1950  Roscoe Jang    Provider s Name:  [facility]   Medical Record No.  [id number]   Start of Care Date:  04/19/19   Onset Date:  04/16/19   Type:     _X__PT   ___OT   ___SLP Medical Diagnosis:  Hip pain, Left     PT Diagnosis:  L hip pain   Visits from SOC:  1      _________________________________________________________________________________  Plan of Treatment/Functional Goals:  balance training, gait training, joint mobilization, manual therapy, neuromuscular re-education, ROM, strengthening, stretching     Biofeedback, Electrical stimulation, Hot packs, Iontophoresis, TENS, Traction, Ultrasound, Cryotherapy     Goals  Goal Identifier: Sleep  Goal Description: Patient will tolerate sleeping more than 2 hours without an increase in symptoms.  Target Date: 06/14/19    Goal Identifier: Walking  Goal Description: Patient will tolerate walking 300 feet with correct gait mechanics in order to ambulate around his yard.  Target Date: 06/28/19    Goal Identifier: Sitting  Goal Description: Patient will tolerate sitting for 10 minutes without needing to change positions.  Target Date: 05/17/19      Therapy Frequency:  2 times/Week  Predicted Duration of Therapy Intervention:  10 weeks    Lorraine Schaeffer, PT                 I CERTIFY THE NEED FOR THESE SERVICES FURNISHED UNDER        THIS PLAN OF TREATMENT AND WHILE UNDER MY CARE     (Physician co-signature of this document indicates review and certification of the therapy plan).                       Certification Date From:  04/19/19   Certification Date To:  06/28/19    Referring Provider:  Dr. Rivers    Initial Assessment        See Epic Evaluation Start of Care Date: 04/19/19

## 2019-04-22 ENCOUNTER — HOSPITAL ENCOUNTER (OUTPATIENT)
Dept: PHYSICAL THERAPY | Facility: CLINIC | Age: 69
Setting detail: THERAPIES SERIES
End: 2019-04-22
Attending: FAMILY MEDICINE
Payer: MEDICARE

## 2019-04-22 PROCEDURE — 97110 THERAPEUTIC EXERCISES: CPT | Mod: GP | Performed by: PHYSICAL THERAPIST

## 2019-04-23 ENCOUNTER — OFFICE VISIT (OUTPATIENT)
Dept: ORTHOPEDICS | Facility: CLINIC | Age: 69
End: 2019-04-23
Payer: COMMERCIAL

## 2019-04-23 ENCOUNTER — ANCILLARY PROCEDURE (OUTPATIENT)
Dept: GENERAL RADIOLOGY | Facility: CLINIC | Age: 69
End: 2019-04-23
Attending: PEDIATRICS
Payer: COMMERCIAL

## 2019-04-23 VITALS
WEIGHT: 238 LBS | SYSTOLIC BLOOD PRESSURE: 138 MMHG | BODY MASS INDEX: 36.07 KG/M2 | DIASTOLIC BLOOD PRESSURE: 82 MMHG | HEIGHT: 68 IN

## 2019-04-23 DIAGNOSIS — M25.552 LEFT HIP PAIN: ICD-10-CM

## 2019-04-23 DIAGNOSIS — M25.552 LEFT HIP PAIN: Primary | ICD-10-CM

## 2019-04-23 PROCEDURE — 99204 OFFICE O/P NEW MOD 45 MIN: CPT | Mod: 25 | Performed by: PEDIATRICS

## 2019-04-23 PROCEDURE — 73502 X-RAY EXAM HIP UNI 2-3 VIEWS: CPT | Mod: FY

## 2019-04-23 ASSESSMENT — MIFFLIN-ST. JEOR: SCORE: 1829.06

## 2019-04-23 NOTE — LETTER
4/23/2019         RE: Roscoe Jang  [id number] [address] [address]        Dear Colleague,    Thank you for referring your patient, Roscoe Jang, to the [facility]. Please see a copy of my visit note below.    Sports Medicine Clinic Visit    PCP: Mel Pappas    Roscoe Jang is a 68 year old male who is seen  in consultation at the request of  Rell Rivers M.D. presenting with left hip pain    Injury: He reports left hip pain for 1 week. He reports pain with walking and weight bearing. He reports pain after changing a tire on a car last week. He started physical therapy, however, can not complete all exercises due to pain.  He denies back pain, but has radiating pain to his knee.  Worse with walking, has been limping and using a cane.    Location of Pain: left lateral hip  Duration of Pain: 1 week(s)  Rating of Pain at worst: 10/10  Rating of Pain Currently: 10/10  Symptoms are better with: Heat  Symptoms are worse with: standing, stairs and walking  Additional Features:   Positive: weakness   Negative: swelling, bruising, popping, grinding, catching, locking, instability, paresthesias and numbness  Other evaluation and/or treatments so far consists of: physical therapy  Prior History of related problems: Steroid injection in the lateral hip one year ago    Social History: Retired    Review of Systems  Skin: no bruising, no swelling  Musculoskeletal: as above  Neurologic: no numbness, paresthesias  Remainder of review of systems is negative including constitutional, CV, pulmonary, GI, except as noted in HPI or medical history.    Patient's current problem list, past medical and surgical history, and family history were reviewed.    Patient Active Problem List   Diagnosis     Benign essential hypertension     Mixed hyperlipidemia     Obesity     Abdominal aortic aneurysm (H)     Fatty liver     Type 2 diabetes mellitus with hyperglycemia,  "without long-term current use of insulin (H)     Morbid obesity (H)     Bilateral carotid artery disease (H)     Carotid artery stenosis, symptomatic, right     Carotid stenosis, asymptomatic     Past Medical History:   Diagnosis Date     Arthritis     neck and hip and generalized     Cerebral infarction (H)     TIA?     Diabetes (H)      Hyperlipidemia      Hypertension      Obese      Past Surgical History:   Procedure Laterality Date     ENDARTERECTOMY CAROTID Right 5/11/2018    Procedure: ENDARTERECTOMY CAROTID;  RIGHT CAROTID ENDARTERECTOMY WITH EEG;  Surgeon: Gaurav Bustos MD;  Location:  OR     ENDARTERECTOMY CAROTID Left 6/6/2018    Procedure: ENDARTERECTOMY CAROTID;  LEFT CAROTID ENDARTERECTOMY with EXTERNAL JUGULAR VEIN PATCH;  Surgeon: Gaurav Bustos MD;  Location:  OR     No family history on file.    Objective  /82 (BP Location: Left arm, Patient Position: Chair, Cuff Size: Adult Regular)   Ht 1.727 m (5' 8\")   Wt 108 kg (238 lb)   BMI 36.19 kg/m       GENERAL APPEARANCE: healthy, alert and no distress   GAIT: antalgic and cane  SKIN: no suspicious lesions or rashes  HEENT: Sclera clear, anicteric  CV: good peripheral pulses  RESP: Breathing not labored  NEURO: Normal strength and tone, mentation intact and speech normal  PSYCH:  mentation appears normal and affect normal/bright    Bilateral hip exam  Inspection:      no edema or ecchymosis in hip area    Tender:      none left    Non Tender:      remainder of the hip area left    ROM:     Range of motion limited by pain left    Strength:      flexion 4/5 left       abduction 4/5 left       adduction 4/5 left    Sensation:      grossly intact in hip and thigh    Special Tests:      positive (+) NOE left       positive (+) FADIR left       positive (+) fulcrum test  left    Radiology  I ordered, visualized and reviewed these images with the patient  AP Pelvis and lateral left XR views of hip reviewed: no acute bony " abnormality, no significant degenerative change  - will follow official read    Assessment:  1. Left hip pain      Left hip pain, given severity there is some concern for a fracture.  Recommend MRI to evaluate, would consider PT or injection pending clinical course.  Limit weight bearing in the interim with crutches or cane.    Plan:  - Today's Plan of Care:  MRI of the left hip. Call [phone] to schedule MRI  Use cane or crutches to limit weight bearing    -We also discussed other future treatment options:  Rehab: Physical Therapy or Steroid injection of hip    Follow Up: In clinic with Dr. Baltazar after MRI (wait at least 1-2 days)    Concerning signs and symptoms were reviewed.  The patient expressed understanding of this management plan and all questions were answered at this time.    Thanks for the opportunity to participate in the care of this patient, I will keep you updated on their progress.    CC: Rell Baltazar MD [facility]  Primary Care Sports Medicine  [facility]    Again, thank you for allowing me to participate in the care of your patient.        Sincerely,        Elsa Baltazar MD

## 2019-04-23 NOTE — PROGRESS NOTES
Sports Medicine Clinic Visit    PCP: Mel Pappas Suhail is a 68 year old male who is seen  in consultation at the request of  Rell Rivers M.D. presenting with left hip pain    Injury: He reports left hip pain for 1 week. He reports pain with walking and weight bearing. He reports pain after changing a tire on a car last week. He started physical therapy, however, can not complete all exercises due to pain.  He denies back pain, but has radiating pain to his knee.  Worse with walking, has been limping and using a cane.    Location of Pain: left lateral hip  Duration of Pain: 1 week(s)  Rating of Pain at worst: 10/10  Rating of Pain Currently: 10/10  Symptoms are better with: Heat  Symptoms are worse with: standing, stairs and walking  Additional Features:   Positive: weakness   Negative: swelling, bruising, popping, grinding, catching, locking, instability, paresthesias and numbness  Other evaluation and/or treatments so far consists of: physical therapy  Prior History of related problems: Steroid injection in the lateral hip one year ago    Social History: Retired    Review of Systems  Skin: no bruising, no swelling  Musculoskeletal: as above  Neurologic: no numbness, paresthesias  Remainder of review of systems is negative including constitutional, CV, pulmonary, GI, except as noted in HPI or medical history.    Patient's current problem list, past medical and surgical history, and family history were reviewed.    Patient Active Problem List   Diagnosis     Benign essential hypertension     Mixed hyperlipidemia     Obesity     Abdominal aortic aneurysm (H)     Fatty liver     Type 2 diabetes mellitus with hyperglycemia, without long-term current use of insulin (H)     Morbid obesity (H)     Bilateral carotid artery disease (H)     Carotid artery stenosis, symptomatic, right     Carotid stenosis, asymptomatic     Past Medical History:   Diagnosis Date     Arthritis     neck and hip and  "generalized     Cerebral infarction (H)     TIA?     Diabetes (H)      Hyperlipidemia      Hypertension      Obese      Past Surgical History:   Procedure Laterality Date     ENDARTERECTOMY CAROTID Right 5/11/2018    Procedure: ENDARTERECTOMY CAROTID;  RIGHT CAROTID ENDARTERECTOMY WITH EEG;  Surgeon: Gaurav Bustos MD;  Location:  OR     ENDARTERECTOMY CAROTID Left 6/6/2018    Procedure: ENDARTERECTOMY CAROTID;  LEFT CAROTID ENDARTERECTOMY with EXTERNAL JUGULAR VEIN PATCH;  Surgeon: Gaurav Bustos MD;  Location:  OR     No family history on file.    Objective  /82 (BP Location: Left arm, Patient Position: Chair, Cuff Size: Adult Regular)   Ht 1.727 m (5' 8\")   Wt 108 kg (238 lb)   BMI 36.19 kg/m      GENERAL APPEARANCE: healthy, alert and no distress   GAIT: antalgic and cane  SKIN: no suspicious lesions or rashes  HEENT: Sclera clear, anicteric  CV: good peripheral pulses  RESP: Breathing not labored  NEURO: Normal strength and tone, mentation intact and speech normal  PSYCH:  mentation appears normal and affect normal/bright    Bilateral hip exam  Inspection:      no edema or ecchymosis in hip area    Tender:      none left    Non Tender:      remainder of the hip area left    ROM:     Range of motion limited by pain left    Strength:      flexion 4/5 left       abduction 4/5 left       adduction 4/5 left    Sensation:      grossly intact in hip and thigh    Special Tests:      positive (+) NOE left       positive (+) FADIR left       positive (+) fulcrum test  left    Radiology  I ordered, visualized and reviewed these images with the patient  AP Pelvis and lateral left XR views of hip reviewed: no acute bony abnormality, no significant degenerative change  - will follow official read    Assessment:  1. Left hip pain      Left hip pain, given severity there is some concern for a fracture.  Recommend MRI to evaluate, would consider PT or injection pending clinical course.  Limit " weight bearing in the interim with crutches or cane.    Plan:  - Today's Plan of Care:  MRI of the left hip. Call [phone] to schedule MRI  Use cane or crutches to limit weight bearing    -We also discussed other future treatment options:  Rehab: Physical Therapy or Steroid injection of hip    Follow Up: In clinic with Dr. Baltazar after MRI (wait at least 1-2 days)    Concerning signs and symptoms were reviewed.  The patient expressed understanding of this management plan and all questions were answered at this time.    Thanks for the opportunity to participate in the care of this patient, I will keep you updated on their progress.    CC: Rell Baltazar MD [facility]  Primary Care Sports Medicine  [facility]

## 2019-04-23 NOTE — PATIENT INSTRUCTIONS
Plan:  - Today's Plan of Care:  MRI of the left hip. Call [phone] to schedule MRI  Use cane or crutches to limit weight bearing    -We also discussed other future treatment options:  Rehab: Physical Therapy or Steroid injection of hip    Follow Up: In clinic with Dr. Baltazar after MRI (wait at least 1-2 days)    If you have any further questions for your physician or physician s care team you can call [phone] and use option 3 to leave a voice message. Calls received during business hours will be returned same day.

## 2019-04-24 ENCOUNTER — TELEPHONE (OUTPATIENT)
Dept: FAMILY MEDICINE | Facility: CLINIC | Age: 69
End: 2019-04-24

## 2019-04-24 ENCOUNTER — NURSE TRIAGE (OUTPATIENT)
Dept: NURSING | Facility: CLINIC | Age: 69
End: 2019-04-24

## 2019-04-24 DIAGNOSIS — M25.552 LEFT HIP PAIN: Primary | ICD-10-CM

## 2019-04-24 RX ORDER — LORAZEPAM 0.5 MG/1
0.5 TABLET ORAL PRN
Qty: 2 TABLET | Refills: 0 | Status: SHIPPED | OUTPATIENT
Start: 2019-04-24 | End: 2019-06-03

## 2019-04-24 NOTE — TELEPHONE ENCOUNTER
Please update Allergies/Current medications.  Has he ever needed sedation for MRI previously? Can try short term sedating medication, he would need a  after MRI.    Elsa Baltazar MD

## 2019-04-24 NOTE — TELEPHONE ENCOUNTER
Prescription was faxed to [facility] per request. Patient was instructed to stop other medication and have a  for the procedure.  Ahmet Healy, ATC

## 2019-04-24 NOTE — TELEPHONE ENCOUNTER
Called and spoke to patient.  No history of sedation prior to MRI.  No allergies to medication.      Tamara Styles, ATC

## 2019-04-24 NOTE — TELEPHONE ENCOUNTER
Called and spoke with patient. He states that he tried 3 times for 2 minutes each in the MRI.  He notes the pain was so severe while lying down that he was unable to complete test.  He is unable to sleep in a bed and sleeps sitting up on  due to pain. He is wondering if he could be sedated because he would like to have the MRI to know what is wrong.  He previously was taking Tylenol #3 but is no longer taking due to minimal relief.  IHe requested getting a stronger pain medication or muscle relaxer. I explained that I would pass this message along to Dr. Baltazar.  Patient expressed understanding.

## 2019-04-24 NOTE — TELEPHONE ENCOUNTER
Clinic Action Needed:Yes, please call Phone   Reason for Call:Patient reporting he was scheduled for a MRI yesterday and was unable to complete it due to pain level. Patient is requesting other options for MRI testing.       Elsa Stanley RN  [facility]

## 2019-04-24 NOTE — TELEPHONE ENCOUNTER
Clinic Action Needed:Yes, please call Phone   Reason for Call:Patient reporting he was scheduled for a MRI yesterday and was unable to complete it due to pain level. Patient is requesting other options for MRI testing.       Elsa Stanley RN  [facility]

## 2019-04-24 NOTE — TELEPHONE ENCOUNTER
Lorazepam prescribed for pre-procedure.  Patient will nicolle a  to and from MRI.  DO NOT TAKE with Acetaminophen-codeine.    Elsa Baltazar MD

## 2019-04-26 ENCOUNTER — TELEPHONE (OUTPATIENT)
Dept: ORTHOPEDICS | Facility: CLINIC | Age: 69
End: 2019-04-26

## 2019-04-26 ENCOUNTER — HOSPITAL ENCOUNTER (OUTPATIENT)
Dept: MRI IMAGING | Facility: CLINIC | Age: 69
Discharge: HOME | End: 2019-04-26
Attending: PEDIATRICS | Admitting: PEDIATRICS
Payer: MEDICARE

## 2019-04-26 DIAGNOSIS — M25.552 LEFT HIP PAIN: Primary | ICD-10-CM

## 2019-04-26 DIAGNOSIS — M25.552 LEFT HIP PAIN: ICD-10-CM

## 2019-04-26 PROCEDURE — 73721 MRI JNT OF LWR EXTRE W/O DYE: CPT | Mod: LT

## 2019-04-26 NOTE — TELEPHONE ENCOUNTER
Spoke with patient quickly but call was dropped. Attempted to contact again but was unable to reach patient.  Ahmet Healy, ATC

## 2019-04-29 ENCOUNTER — TELEPHONE (OUTPATIENT)
Dept: ORTHOPEDICS | Facility: CLINIC | Age: 69
End: 2019-04-29

## 2019-04-29 DIAGNOSIS — M16.12 ARTHRITIS OF LEFT HIP: Primary | ICD-10-CM

## 2019-04-29 DIAGNOSIS — M47.816 ARTHRITIS, LUMBAR SPINE: ICD-10-CM

## 2019-04-29 NOTE — TELEPHONE ENCOUNTER
Spoke with Brennan from [facility], noting that needing repeat left hip MRI order completed.  Previous order was accidentally closed and requesting new order.  Updated order was completed.  No further action required.    Tay Michael ATC

## 2019-04-29 NOTE — TELEPHONE ENCOUNTER
Patient LVM requesting MRI results.           MR LEFT HIP WITHOUT CONTRAST  4/26/2019 6:08 PM     HISTORY:  Left hip pain. Evaluate for stress fracture.     TECHNIQUE:  Multiplanar, multisequence without contrast.     COMPARISON: MRI hips and pelvis 1/18/2017.     FINDINGS:   Osseous and Cartilaginous Structures:  No fracture or destructive bone  lesion in the pelvis or hips.  No femoral head osteonecrosis. There is  a tiny subchondral cystic change in the lateral right acetabular roof,  likely indicating early degenerative change. No significant articular  cartilage thinning or bony spurring bilaterally to indicate  significant hip degenerative changes. There is multilevel degenerative  disc disease in the visualized lower lumbar spine which has  progressed.     Acetabular Labrum: No juxtaacetabular cyst.  No obvious labral tear is  appreciated, allowing for the large FOV technique.  If indicated  clinically, MR arthrography would be considered the study of choice in  this regard.     Hip joint space:  No significant joint effusion.      Trochanteric and Iliopsoas Bursae: No fluid collection in the  trochanteric or iliopsoas bursae.     Common Hamstring Tendon: No evidence of tear or significant  tendinosis.     Additional Findings: Muscles and other soft tissues around the hips  and pelvis appear normal.  The gluteus medius and minimus tendons  appear unremarkable.      Internal Pelvic Structures: No acute abnormality air or free fluid.                                                                      IMPRESSION:   1. No acute bony abnormalities. In particular, there is no evidence  for stress fracture in the left hip region.  2. Minimal subchondral cystic change right acetabulum may indicate  very early degenerative changes.  3. Multilevel degenerative disc disease in the visualized lumbar  spine.     KYA ESPINOZA MD

## 2019-04-29 NOTE — TELEPHONE ENCOUNTER
Spoke to patient discussed results and recommendations. Patient states he is still in a lot of pain, having a hard time walking and not being able to sleep. Inquired about getting an injection to help with the pain. Has an appointment on Friday for a f/u.     Phone Number patient can be reached at:  Cell number on file:    Telephone Information:   Mobile [phone]       Can we leave a detailed message on this number:  ANDERSON Buitrago ATC

## 2019-04-29 NOTE — TELEPHONE ENCOUNTER
Please call patient with MRI results as noted below.    IMPRESSION:   1. No acute bony abnormalities. In particular, there is no evidence  for stress fracture in the left hip region.  2. Minimal subchondral cystic change right acetabulum may indicate  very early degenerative changes.  3. Multilevel degenerative disc disease in the visualized lumbar  spine.    In Summary:  - no stress fracture  - early hip arthritis with low back arthritis noted as well    I Recommend:  - Can start physical therapy (place referral if needed)  - follow up if desired to review results further in clinic    Elsa Baltazar MD

## 2019-04-29 NOTE — TELEPHONE ENCOUNTER
Will need to repeat exam to determine what, if any type of injection would help.  Overall reassuring MRI.    Elsa Baltazar MD

## 2019-04-30 ENCOUNTER — TELEPHONE (OUTPATIENT)
Dept: ORTHOPEDICS | Facility: CLINIC | Age: 69
End: 2019-04-30

## 2019-04-30 NOTE — ADDENDUM NOTE
Encounter addended by: Lorraine Schaeffer, PT on: 4/30/2019 5:39 PM   Actions taken: Sign clinical note, Episode resolved

## 2019-04-30 NOTE — TELEPHONE ENCOUNTER
Physical Therapy order was updated to external.    Copy of order was faxed to [facility] physical therapy as requested.    Spoke to spouse discussed that orders had been updated and faxed.  They are also requesting that [facility] in [place], close his existing physical therapy encounter.  Will forward message onto patient's physical therapy care team.    Tay Michael, ATC

## 2019-04-30 NOTE — PROGRESS NOTES
Outpatient Physical Therapy Discharge Note     Patient: Roscoe Jang  : 1950    Beginning/End Dates of Reporting Period:  19 to 19    Referring Provider: Dr. Rivers    Therapy Diagnosis: Left Hip Pain    Patient did not return for follow up treatments and was seen by orthopedics.  Patient requesting to close episode of care and would like to follow up at another facility.  Goal status and current objective information is therefore unknown.  Discharge from PT services at this time for this episode of treatment. Please see attached documentation under this episode of care for further information including dates of service, start of care date, referring physician, Dx, treatment plan, treatments, etc.    Please contact me with any questions or concerns.    Thank you for your referral.    Lorraine Schaeffer, PT, DPT, CLT  Physical Therapist & Certified Lymphedema Therapist  [address] [address]  [phone]

## 2019-04-30 NOTE — TELEPHONE ENCOUNTER
Ryann FRENCH for patient regarding his physical therapy.     She is requesting that a referral be sent to [facility] physical therapy  Along with cancelling physical therapy at [place].     Please call to discuss # [phone]

## 2019-05-23 ENCOUNTER — HOSPITAL ENCOUNTER (OUTPATIENT)
Dept: CT IMAGING | Facility: CLINIC | Age: 69
Discharge: HOME | End: 2019-05-23
Attending: NURSE PRACTITIONER | Admitting: NURSE PRACTITIONER
Payer: MEDICARE

## 2019-05-23 DIAGNOSIS — R91.8 PULMONARY NODULES: ICD-10-CM

## 2019-05-23 DIAGNOSIS — R91.8 PULMONARY NODULES: Primary | ICD-10-CM

## 2019-05-23 PROCEDURE — 71250 CT THORAX DX C-: CPT

## 2019-06-03 ENCOUNTER — HOSPITAL ENCOUNTER (OUTPATIENT)
Dept: CT IMAGING | Facility: CLINIC | Age: 69
Discharge: HOME | End: 2019-06-03
Attending: NURSE PRACTITIONER | Admitting: NURSE PRACTITIONER
Payer: MEDICARE

## 2019-06-03 ENCOUNTER — OFFICE VISIT (OUTPATIENT)
Dept: FAMILY MEDICINE | Facility: CLINIC | Age: 69
End: 2019-06-03
Payer: COMMERCIAL

## 2019-06-03 ENCOUNTER — TELEPHONE (OUTPATIENT)
Dept: OTHER | Facility: CLINIC | Age: 69
End: 2019-06-03

## 2019-06-03 VITALS
HEART RATE: 88 BPM | RESPIRATION RATE: 12 BRPM | WEIGHT: 239 LBS | TEMPERATURE: 97.7 F | SYSTOLIC BLOOD PRESSURE: 130 MMHG | DIASTOLIC BLOOD PRESSURE: 70 MMHG | OXYGEN SATURATION: 96 % | BODY MASS INDEX: 36.34 KG/M2

## 2019-06-03 DIAGNOSIS — R42 DIZZINESS: ICD-10-CM

## 2019-06-03 DIAGNOSIS — R53.82 CHRONIC FATIGUE: ICD-10-CM

## 2019-06-03 DIAGNOSIS — M79.10 MYALGIA: Primary | ICD-10-CM

## 2019-06-03 DIAGNOSIS — I10 BENIGN ESSENTIAL HYPERTENSION: ICD-10-CM

## 2019-06-03 DIAGNOSIS — E11.65 TYPE 2 DIABETES MELLITUS WITH HYPERGLYCEMIA, WITHOUT LONG-TERM CURRENT USE OF INSULIN (H): ICD-10-CM

## 2019-06-03 DIAGNOSIS — R06.09 DOE (DYSPNEA ON EXERTION): ICD-10-CM

## 2019-06-03 DIAGNOSIS — E66.01 MORBID OBESITY (H): ICD-10-CM

## 2019-06-03 DIAGNOSIS — I65.23 BILATERAL CAROTID ARTERY STENOSIS: ICD-10-CM

## 2019-06-03 LAB
ALBUMIN SERPL-MCNC: 4 G/DL (ref 3.4–5)
ALP SERPL-CCNC: 61 U/L (ref 40–150)
ALT SERPL W P-5'-P-CCNC: 31 U/L (ref 0–70)
ANION GAP SERPL CALCULATED.3IONS-SCNC: 5 MMOL/L (ref 3–14)
AST SERPL W P-5'-P-CCNC: 15 U/L (ref 0–45)
BASOPHILS # BLD AUTO: 0 10E9/L (ref 0–0.2)
BASOPHILS NFR BLD AUTO: 0.3 %
BILIRUB SERPL-MCNC: 1.4 MG/DL (ref 0.2–1.3)
BUN SERPL-MCNC: 21 MG/DL (ref 7–30)
CALCIUM SERPL-MCNC: 9 MG/DL (ref 8.5–10.1)
CHLORIDE SERPL-SCNC: 107 MMOL/L (ref 94–109)
CHOLEST SERPL-MCNC: 89 MG/DL
CK SERPL-CCNC: 49 U/L (ref 30–300)
CO2 SERPL-SCNC: 26 MMOL/L (ref 20–32)
CREAT BLD-MCNC: 0.8 MG/DL (ref 0.66–1.25)
CREAT SERPL-MCNC: 0.84 MG/DL (ref 0.66–1.25)
CRP SERPL-MCNC: <2.9 MG/L (ref 0–8)
DEPRECATED CALCIDIOL+CALCIFEROL SERPL-MC: 31 UG/L (ref 20–75)
DIFFERENTIAL METHOD BLD: NORMAL
EOSINOPHIL # BLD AUTO: 0.1 10E9/L (ref 0–0.7)
EOSINOPHIL NFR BLD AUTO: 1.5 %
ERYTHROCYTE [DISTWIDTH] IN BLOOD BY AUTOMATED COUNT: 14 % (ref 10–15)
ERYTHROCYTE [SEDIMENTATION RATE] IN BLOOD BY WESTERGREN METHOD: 12 MM/H (ref 0–20)
GFR SERPL CREATININE-BSD FRML MDRD: 90 ML/MIN/{1.73_M2}
GFR SERPL CREATININE-BSD FRML MDRD: >90 ML/MIN/{1.73_M2}
GLUCOSE SERPL-MCNC: 110 MG/DL (ref 70–99)
HBA1C MFR BLD: 7.2 % (ref 0–5.6)
HCT VFR BLD AUTO: 41.8 % (ref 40–53)
HDLC SERPL-MCNC: 46 MG/DL
HGB BLD-MCNC: 14 G/DL (ref 13.3–17.7)
LDLC SERPL CALC-MCNC: 20 MG/DL
LYMPHOCYTES # BLD AUTO: 1.7 10E9/L (ref 0.8–5.3)
LYMPHOCYTES NFR BLD AUTO: 24.2 %
MCH RBC QN AUTO: 29.9 PG (ref 26.5–33)
MCHC RBC AUTO-ENTMCNC: 33.5 G/DL (ref 31.5–36.5)
MCV RBC AUTO: 89 FL (ref 78–100)
MONOCYTES # BLD AUTO: 0.6 10E9/L (ref 0–1.3)
MONOCYTES NFR BLD AUTO: 8.7 %
NEUTROPHILS # BLD AUTO: 4.5 10E9/L (ref 1.6–8.3)
NEUTROPHILS NFR BLD AUTO: 65.3 %
NONHDLC SERPL-MCNC: 43 MG/DL
PLATELET # BLD AUTO: 215 10E9/L (ref 150–450)
POTASSIUM SERPL-SCNC: 4.5 MMOL/L (ref 3.4–5.3)
PROT SERPL-MCNC: 7.3 G/DL (ref 6.8–8.8)
RBC # BLD AUTO: 4.68 10E12/L (ref 4.4–5.9)
SODIUM SERPL-SCNC: 138 MMOL/L (ref 133–144)
TRIGL SERPL-MCNC: 113 MG/DL
TSH SERPL DL<=0.005 MIU/L-ACNC: 1.05 MU/L (ref 0.4–4)
VIT B12 SERPL-MCNC: 292 PG/ML (ref 193–986)
WBC # BLD AUTO: 6.9 10E9/L (ref 4–11)

## 2019-06-03 PROCEDURE — 36415 COLL VENOUS BLD VENIPUNCTURE: CPT | Performed by: NURSE PRACTITIONER

## 2019-06-03 PROCEDURE — 85652 RBC SED RATE AUTOMATED: CPT | Performed by: NURSE PRACTITIONER

## 2019-06-03 PROCEDURE — 70498 CT ANGIOGRAPHY NECK: CPT

## 2019-06-03 PROCEDURE — 82550 ASSAY OF CK (CPK): CPT | Performed by: NURSE PRACTITIONER

## 2019-06-03 PROCEDURE — 84443 ASSAY THYROID STIM HORMONE: CPT | Performed by: NURSE PRACTITIONER

## 2019-06-03 PROCEDURE — 80053 COMPREHEN METABOLIC PANEL: CPT | Performed by: NURSE PRACTITIONER

## 2019-06-03 PROCEDURE — 85025 COMPLETE CBC W/AUTO DIFF WBC: CPT | Performed by: NURSE PRACTITIONER

## 2019-06-03 PROCEDURE — 25000128 H RX IP 250 OP 636: Performed by: RADIOLOGY

## 2019-06-03 PROCEDURE — 80061 LIPID PANEL: CPT | Performed by: NURSE PRACTITIONER

## 2019-06-03 PROCEDURE — 82565 ASSAY OF CREATININE: CPT

## 2019-06-03 PROCEDURE — 99215 OFFICE O/P EST HI 40 MIN: CPT | Performed by: NURSE PRACTITIONER

## 2019-06-03 PROCEDURE — 83036 HEMOGLOBIN GLYCOSYLATED A1C: CPT | Performed by: NURSE PRACTITIONER

## 2019-06-03 PROCEDURE — 86618 LYME DISEASE ANTIBODY: CPT | Performed by: NURSE PRACTITIONER

## 2019-06-03 PROCEDURE — 86140 C-REACTIVE PROTEIN: CPT | Performed by: NURSE PRACTITIONER

## 2019-06-03 PROCEDURE — 82607 VITAMIN B-12: CPT | Performed by: NURSE PRACTITIONER

## 2019-06-03 PROCEDURE — 82306 VITAMIN D 25 HYDROXY: CPT | Performed by: NURSE PRACTITIONER

## 2019-06-03 PROCEDURE — 93000 ELECTROCARDIOGRAM COMPLETE: CPT | Performed by: NURSE PRACTITIONER

## 2019-06-03 RX ORDER — IOPAMIDOL 755 MG/ML
70 INJECTION, SOLUTION INTRAVASCULAR ONCE
Status: COMPLETED | OUTPATIENT
Start: 2019-06-03 | End: 2019-06-03

## 2019-06-03 RX ADMIN — IOPAMIDOL 70 ML: 755 INJECTION, SOLUTION INTRAVENOUS at 11:41

## 2019-06-03 ASSESSMENT — ENCOUNTER SYMPTOMS
DIZZINESS: 1
ARTHRALGIAS: 1
NECK PAIN: 1
HEMATOLOGIC/LYMPHATIC NEGATIVE: 1
GASTROINTESTINAL NEGATIVE: 1
PSYCHIATRIC NEGATIVE: 1
PALPITATIONS: 0
CONSTITUTIONAL NEGATIVE: 1
MYALGIAS: 1
ALLERGIC/IMMUNOLOGIC NEGATIVE: 1
ENDOCRINE NEGATIVE: 1

## 2019-06-03 ASSESSMENT — PAIN SCALES - GENERAL: PAINLEVEL: NO PAIN (0)

## 2019-06-03 NOTE — NURSING NOTE
"Chief Complaint   Patient presents with     Dizziness     Musculoskeletal Problem     wondering if this is due from Lipitor       Initial /70   Pulse 88   Temp 97.7  F (36.5  C) (Tympanic)   Resp 12   Wt 108.4 kg (239 lb)   SpO2 96%   BMI 36.34 kg/m   Estimated body mass index is 36.34 kg/m  as calculated from the following:    Height as of 4/23/19: 1.727 m (5' 8\").    Weight as of this encounter: 108.4 kg (239 lb).    Patient presents to the clinic using No DME    Health Maintenance that is potentially due pending provider review:  Colonoscopy/FIT    Gave pt phone number/pended order to schedule mammo and/or colonoscopy(or FIT)    Is there anyone who you would like to be able to receive your results? No  If yes have patient fill out JARVIS      "

## 2019-06-03 NOTE — PROGRESS NOTES
"Subjective     Roscoe Jang is a 68 year old male who presents to clinic today for the following health issues:    HPI   Dizziness      Duration: 1 1/2 months off and on    Description   Feeling faint:  no   Feeling like the surroundings are moving: no   Loss of consciousness or falls: no     Intensity:  moderate    Accompanying signs and symptoms:   Nausea/vomitting: no   Palpitations: no   Weakness in arms or legs: no   Vision or speech changes: YES- vision - left eye flutters  Ringing in ears (Tinnitus): no   Hearing loss related to dizziness: no   Other (fevers/chills/sweating/dyspnea): no     History (similar episodes/head trauma/previous evaluation/recent bleeding): None    Precipitating or alleviating factors (new meds/chemicals): None   Worse with activity/head movement: YES- when moving neck    Therapies tried and outcome: None    Muscle pains, and joints    Hasn't been checking BG regularly    Pt thinks pain/muscle aches are due to lipitor      Had injections of bilateral shoulders- reports that this improved his shoulder pain and neck pain       Reviewed and updated as needed this visit by Provider         Review of Systems   Constitutional: Negative.    HENT: Negative.    Eyes: Positive for visual disturbance.        Vision change in left eye \"grey spots\"    Cardiovascular: Negative for chest pain, palpitations and peripheral edema.        JORGENSEN     Gastrointestinal: Negative.    Endocrine: Negative.    Genitourinary: Negative.    Musculoskeletal: Positive for arthralgias, myalgias and neck pain.   Skin: Negative.    Allergic/Immunologic: Negative.    Neurological: Positive for dizziness.   Hematological: Negative.    Psychiatric/Behavioral: Negative.             Objective    /70   Pulse 88   Temp 97.7  F (36.5  C) (Tympanic)   Resp 12   Wt 108.4 kg (239 lb)   SpO2 96%   BMI 36.34 kg/m    Body mass index is 36.34 kg/m .  Physical Exam   Constitutional: He is oriented to person, place, and " time. He appears well-developed and well-nourished.   HENT:   Head: Normocephalic.   Eyes: Pupils are equal, round, and reactive to light. EOM are normal.   Neck: Normal range of motion. Carotid bruit is present.   Cardiovascular: Normal rate, regular rhythm and normal heart sounds. Exam reveals no gallop and no friction rub.   No murmur heard.  Pulmonary/Chest: Effort normal and breath sounds normal.   Abdominal: Soft. Bowel sounds are normal.   Musculoskeletal: He exhibits no edema or deformity.   Neurological: He is alert and oriented to person, place, and time. He has normal strength. No cranial nerve deficit or sensory deficit. He displays a negative Romberg sign.   Skin: Skin is warm, dry and intact.   Psychiatric: He has a normal mood and affect. His speech is normal and behavior is normal. Judgment and thought content normal. Cognition and memory are normal.        Diagnostic Test Results:  Labs reviewed in Epic  Pending       EKG was done, unchanged from previous tracing- NSR     Assessment & Plan     1. Dizziness  2. Bilateral carotid artery stenosis  HX CEA Bilateral April/June 2018   With 2 months of dizziness and visual changes in left eye   Lab competed today   On statin and ASA  Spoke with Dr. Bustos whom recommends CT angio head and neck   This was scheduled for today      3. Myalgia  Will check labs  Patient concerns could be related to statin   May consider changing from atorvastatin to pravastatin adding Co Q 10  Will await lab results first   - TSH with free T4 reflex  - CBC with platelets differential  - ESR: Erythrocyte sedimentation rate  - CRP inflammation  - Vitamin D Deficiency  - CK total    4. Chronic fatigue  Labs checked today     5. JORGENSEN (dyspnea on exertion)  Will check stress for [facility] cardiac work up   - NM Exercise stress test; Future    6. Type 2 diabetes mellitus with hyperglycemia, without long-term current use of insulin (H)  Lab Results   Component Value Date    A1C 7.2  "06/03/2019    A1C 7.6 04/01/2019    A1C 6.8 10/02/2018    A1C 6.7 05/11/2018    A1C 6.3 03/27/2018     Stable   Having some loose stools from metformin   Feels dietary related- worse with egg- recommend dietary modifications for now     7. Benign essential hypertension  Stable     8. Body mass index (BMI) of 36.0-36.9 in adult   Diet and exercise recommended        BMI:   Estimated body mass index is 36.34 kg/m  as calculated from the following:    Height as of 4/23/19: 1.727 m (5' 8\").    Weight as of this encounter: 108.4 kg (239 lb).   Weight management plan: Discussed healthy diet and exercise guidelines      Addendum: 6/4/19  CT scan results:  IMPRESSION:  1. Approximately 55% luminal diameter stenosis of the origin of the  left internal carotid artery.  2. Moderate-severe narrowing of the mid and distal aspects of the left  common carotid artery due to atherosclerotic plaque.  3. Otherwise, normal neck and head CTA.    With neurologic changes- visual changes in left eye concern that this is a symptomatic lesion.   Dr. Bustos reviewed CT scan and spoke with patient plan is to redo Left Carotid Endarterectomy. He is scheduled for this tomorrow. Of note plan was to purse cardiac stress testing due to dyspnea on exertion and dizziness. He denies chest pain in clinic today and he did not have any changes seen on EKG. He is cleared for Left Carotid Endarterectomy on  6/5/19 without further cardiac testing. He has follow up scheduled with me on 6/17/19.       Mel Pappas NP  [facility]        "

## 2019-06-03 NOTE — TELEPHONE ENCOUNTER
[place] [facility]    MATTHEW Ch saw  Roscoe Jang at the [facility] today.  He has a history of cerebrovascular disease and underwent a right CEA on 5/11/2018 and a left on 6/6/2018.  A follow-up duplex on 9/27/2018 revealed elevated velocities in both bifurcations of the CEAs but no stenosis.    He is now again complaining some vague neurological changes with some vision brownish spots.  No focal obvious neuro event.  However, they are concerned that there may be some underlying cerebral events occurring.    With these findings I feel that the best test would be a neck and head CTA.  This will make sure that the carotid enterectomy sites are widely patent and there is no other vascular issues that could explain the somewhat vague findings.    Mel Pappas will arrange this test and let me know the results.     Gaurav Bustos MD

## 2019-06-03 NOTE — PATIENT INSTRUCTIONS
Will check CT scan of head and neck in [place] at 1200 - nothing to eat or drink until after   Labs done today  Will call with results       Recommend stress test for further cardiac work up   [phone]- to schedule

## 2019-06-04 ENCOUNTER — TELEPHONE (OUTPATIENT)
Dept: OTHER | Facility: CLINIC | Age: 69
End: 2019-06-04

## 2019-06-04 LAB — B BURGDOR IGG+IGM SER QL: 0.08 (ref 0–0.89)

## 2019-06-04 NOTE — TELEPHONE ENCOUNTER
[place] [facility]    I called Roscoe Jang about his cerebral MRA test performed yesterday.  He saw Mel ACSTRO in the office yesterday as I documented in the phone call.  Prior to his initial carotid surgery he was having lightheadedness, dizziness, and blurry vision but completely resolved with surgery.    Over the last month he has noted some recurrence of the dizziness and some occasional blurry vision in his left eye only.  This is intermittent and go several days without symptoms.  The dizziness is becoming more frequent though the vision changes is still intermittent.    CTA revealed a widely patent right CEA.  However, there is a diffuse narrowing of the distal left common carotid artery extending the internal carotid artery which would be the site of surgery.  The stenosis only calculates to 35% but is definitely present.  With his symptoms are may be embolization since I would suspect this may be a thrombus versus scar tissue with his normal 3-month carotid duplex performed on 9/27/2018.    He also had a CT scan of his chest with stable pulmonary nodules with an area of bronchial lithiasis in the right upper lobe performed on 3/20/2019 with a recommended one-year follow-up.    He is has a cardiac evaluation on 6/12/2019 due to some generalized weakness but no specific cardiac symptoms.    Impression: I am concerned with the CTA findings of the left carotid bifurcation with his symptoms that this may be a symptomatic lesion.  I does feel that repeat surgery is indicated and I have discussed this with him.  He would like to schedule this as soon as possible and does not want to come down and see me in the office after discussion on the phone today.  He will continue his aspirin on the medications of the time of surgery.  We will try to schedule this for the very near future.      Gaurav Bustos MD

## 2019-06-04 NOTE — TELEPHONE ENCOUNTER
Type of surgery: REDO LEFT CAROTID ENDARTERECTOMY WITH EEG  Location of surgery: [facility]  Date and time of surgery: 06/05/19 @ 12:55pm  Surgeon: DR. SHIVANI CANCHOLA  Pre-Op Appt Date: [facility]  Post-Op Appt Date: PT TO SCHEDULE   Packet sent out: NO INSTRUCTIONS REVIEWED OVER THE PHONE  Pre-cert/Authorization completed:  Yes  Date: 06/04/19

## 2019-06-05 ENCOUNTER — ANESTHESIA (OUTPATIENT)
Dept: SURGERY | Facility: CLINIC | Age: 69
DRG: 039 | End: 2019-06-05
Payer: MEDICARE

## 2019-06-05 ENCOUNTER — ANESTHESIA EVENT (OUTPATIENT)
Dept: SURGERY | Facility: CLINIC | Age: 69
DRG: 039 | End: 2019-06-05
Payer: MEDICARE

## 2019-06-05 ENCOUNTER — HOSPITAL ENCOUNTER (INPATIENT)
Facility: CLINIC | Age: 69
LOS: 1 days | Discharge: HOME | DRG: 039 | End: 2019-06-06
Attending: SURGERY | Admitting: SURGERY
Payer: MEDICARE

## 2019-06-05 ENCOUNTER — APPOINTMENT (OUTPATIENT)
Dept: SURGERY | Facility: PHYSICIAN GROUP | Age: 69
End: 2019-06-05
Payer: COMMERCIAL

## 2019-06-05 DIAGNOSIS — I65.22 CAROTID STENOSIS, LEFT: Primary | ICD-10-CM

## 2019-06-05 DIAGNOSIS — E78.5 HYPERLIPIDEMIA LDL GOAL <70: ICD-10-CM

## 2019-06-05 DIAGNOSIS — E11.65 TYPE 2 DIABETES MELLITUS WITH HYPERGLYCEMIA, WITHOUT LONG-TERM CURRENT USE OF INSULIN (H): ICD-10-CM

## 2019-06-05 PROBLEM — I77.9 CAROTID ARTERY DISEASE (H): Status: ACTIVE | Noted: 2019-06-05

## 2019-06-05 LAB
GLUCOSE BLDC GLUCOMTR-MCNC: 143 MG/DL (ref 70–99)
GLUCOSE BLDC GLUCOMTR-MCNC: 241 MG/DL (ref 70–99)
GLUCOSE SERPL-MCNC: 128 MG/DL (ref 70–99)
HBA1C MFR BLD: 7.2 % (ref 0–5.6)
POTASSIUM SERPL-SCNC: 4 MMOL/L (ref 3.4–5.3)

## 2019-06-05 PROCEDURE — 27210794 ZZH OR GENERAL SUPPLY STERILE: Performed by: SURGERY

## 2019-06-05 PROCEDURE — 36415 COLL VENOUS BLD VENIPUNCTURE: CPT | Performed by: INTERNAL MEDICINE

## 2019-06-05 PROCEDURE — 36000063 ZZH SURGERY LEVEL 4 EA 15 ADDTL MIN: Performed by: SURGERY

## 2019-06-05 PROCEDURE — 03JY0ZZ INSPECTION OF UPPER ARTERY, OPEN APPROACH: ICD-10-PCS | Performed by: SURGERY

## 2019-06-05 PROCEDURE — 25800030 ZZH RX IP 258 OP 636: Performed by: ANESTHESIOLOGY

## 2019-06-05 PROCEDURE — 35301 RECHANNELING OF ARTERY: CPT | Mod: LT | Performed by: SURGERY

## 2019-06-05 PROCEDURE — 83036 HEMOGLOBIN GLYCOSYLATED A1C: CPT | Performed by: INTERNAL MEDICINE

## 2019-06-05 PROCEDURE — 25000128 H RX IP 250 OP 636: Performed by: NURSE ANESTHETIST, CERTIFIED REGISTERED

## 2019-06-05 PROCEDURE — 25000128 H RX IP 250 OP 636: Performed by: SURGERY

## 2019-06-05 PROCEDURE — 25000131 ZZH RX MED GY IP 250 OP 636 PS 637: Performed by: INTERNAL MEDICINE

## 2019-06-05 PROCEDURE — 06BQ0ZZ EXCISION OF LEFT SAPHENOUS VEIN, OPEN APPROACH: ICD-10-PCS | Performed by: SURGERY

## 2019-06-05 PROCEDURE — 25000125 ZZHC RX 250: Performed by: NURSE ANESTHETIST, CERTIFIED REGISTERED

## 2019-06-05 PROCEDURE — 25000125 ZZHC RX 250: Performed by: SURGERY

## 2019-06-05 PROCEDURE — 84132 ASSAY OF SERUM POTASSIUM: CPT | Performed by: ANESTHESIOLOGY

## 2019-06-05 PROCEDURE — C1758 CATHETER, URETERAL: HCPCS | Performed by: SURGERY

## 2019-06-05 PROCEDURE — 82947 ASSAY GLUCOSE BLOOD QUANT: CPT | Performed by: ANESTHESIOLOGY

## 2019-06-05 PROCEDURE — 25000132 ZZH RX MED GY IP 250 OP 250 PS 637

## 2019-06-05 PROCEDURE — 12000000 ZZH R&B MED SURG/OB

## 2019-06-05 PROCEDURE — 4A10X4Z MONITORING OF CENTRAL NERVOUS ELECTRICAL ACTIVITY, EXTERNAL APPROACH: ICD-10-PCS | Performed by: SURGERY

## 2019-06-05 PROCEDURE — 36415 COLL VENOUS BLD VENIPUNCTURE: CPT | Performed by: ANESTHESIOLOGY

## 2019-06-05 PROCEDURE — 35390 REOPERATION CAROTID ADD-ON: CPT | Performed by: SURGERY

## 2019-06-05 PROCEDURE — 25000566 ZZH SEVOFLURANE, EA 15 MIN: Performed by: SURGERY

## 2019-06-05 PROCEDURE — 37000009 ZZH ANESTHESIA TECHNICAL FEE, EACH ADDTL 15 MIN: Performed by: SURGERY

## 2019-06-05 PROCEDURE — 95940 IONM IN OPERATNG ROOM 15 MIN: CPT | Performed by: SURGERY

## 2019-06-05 PROCEDURE — 99223 1ST HOSP IP/OBS HIGH 75: CPT | Performed by: INTERNAL MEDICINE

## 2019-06-05 PROCEDURE — 27211022 ZZHC OR IOM SUPPLIES OPNP: Performed by: SURGERY

## 2019-06-05 PROCEDURE — A9270 NON-COVERED ITEM OR SERVICE: HCPCS

## 2019-06-05 PROCEDURE — 25000125 ZZHC RX 250: Performed by: ANESTHESIOLOGY

## 2019-06-05 PROCEDURE — 37000008 ZZH ANESTHESIA TECHNICAL FEE, 1ST 30 MIN: Performed by: SURGERY

## 2019-06-05 PROCEDURE — 71000014 ZZH RECOVERY PHASE 1 LEVEL 2 FIRST HR: Performed by: SURGERY

## 2019-06-05 PROCEDURE — 25800030 ZZH RX IP 258 OP 636: Performed by: NURSE ANESTHETIST, CERTIFIED REGISTERED

## 2019-06-05 PROCEDURE — 21000003 ZZH R&B HEART CARE OVERFLOW

## 2019-06-05 PROCEDURE — 03UL07Z SUPPLEMENT LEFT INTERNAL CAROTID ARTERY WITH AUTOLOGOUS TISSUE SUBSTITUTE, OPEN APPROACH: ICD-10-PCS | Performed by: SURGERY

## 2019-06-05 PROCEDURE — 25800030 ZZH RX IP 258 OP 636: Performed by: SURGERY

## 2019-06-05 PROCEDURE — 40000171 ZZH STATISTIC PRE-PROCEDURE ASSESSMENT III: Performed by: SURGERY

## 2019-06-05 PROCEDURE — 00000146 ZZHCL STATISTIC GLUCOSE BY METER IP

## 2019-06-05 PROCEDURE — 36000093 ZZH SURGERY LEVEL 4 1ST 30 MIN: Performed by: SURGERY

## 2019-06-05 RX ORDER — NALOXONE HYDROCHLORIDE 0.4 MG/ML
.1-.4 INJECTION, SOLUTION INTRAMUSCULAR; INTRAVENOUS; SUBCUTANEOUS
Status: DISCONTINUED | OUTPATIENT
Start: 2019-06-05 | End: 2019-06-06 | Stop reason: HOSPADM

## 2019-06-05 RX ORDER — ASPIRIN 600 MG/1
600 SUPPOSITORY RECTAL ONCE
Status: DISCONTINUED | OUTPATIENT
Start: 2019-06-05 | End: 2019-06-05 | Stop reason: HOSPADM

## 2019-06-05 RX ORDER — HEPARIN SODIUM 1000 [USP'U]/ML
INJECTION, SOLUTION INTRAVENOUS; SUBCUTANEOUS PRN
Status: DISCONTINUED | OUTPATIENT
Start: 2019-06-05 | End: 2019-06-05 | Stop reason: HOSPADM

## 2019-06-05 RX ORDER — NICOTINE POLACRILEX 4 MG
15-30 LOZENGE BUCCAL
Status: DISCONTINUED | OUTPATIENT
Start: 2019-06-05 | End: 2019-06-06 | Stop reason: HOSPADM

## 2019-06-05 RX ORDER — ONDANSETRON 2 MG/ML
4 INJECTION INTRAMUSCULAR; INTRAVENOUS EVERY 6 HOURS PRN
Status: DISCONTINUED | OUTPATIENT
Start: 2019-06-05 | End: 2019-06-05

## 2019-06-05 RX ORDER — BISACODYL 5 MG
10 TABLET, DELAYED RELEASE (ENTERIC COATED) ORAL DAILY PRN
Status: DISCONTINUED | OUTPATIENT
Start: 2019-06-05 | End: 2019-06-06 | Stop reason: HOSPADM

## 2019-06-05 RX ORDER — LIDOCAINE HYDROCHLORIDE 20 MG/ML
INJECTION, SOLUTION INFILTRATION; PERINEURAL PRN
Status: DISCONTINUED | OUTPATIENT
Start: 2019-06-05 | End: 2019-06-05

## 2019-06-05 RX ORDER — POTASSIUM CHLORIDE 1500 MG/1
20-40 TABLET, EXTENDED RELEASE ORAL
Status: DISCONTINUED | OUTPATIENT
Start: 2019-06-05 | End: 2019-06-06 | Stop reason: HOSPADM

## 2019-06-05 RX ORDER — CEFAZOLIN SODIUM 1 G/3ML
INJECTION, POWDER, FOR SOLUTION INTRAMUSCULAR; INTRAVENOUS PRN
Status: DISCONTINUED | OUTPATIENT
Start: 2019-06-05 | End: 2019-06-05

## 2019-06-05 RX ORDER — ONDANSETRON 4 MG/1
4 TABLET, ORALLY DISINTEGRATING ORAL EVERY 6 HOURS PRN
Status: DISCONTINUED | OUTPATIENT
Start: 2019-06-05 | End: 2019-06-05

## 2019-06-05 RX ORDER — METOCLOPRAMIDE HYDROCHLORIDE 5 MG/ML
5 INJECTION INTRAMUSCULAR; INTRAVENOUS EVERY 6 HOURS PRN
Status: DISCONTINUED | OUTPATIENT
Start: 2019-06-05 | End: 2019-06-06 | Stop reason: HOSPADM

## 2019-06-05 RX ORDER — EPHEDRINE SULFATE 50 MG/ML
INJECTION, SOLUTION INTRAMUSCULAR; INTRAVENOUS; SUBCUTANEOUS PRN
Status: DISCONTINUED | OUTPATIENT
Start: 2019-06-05 | End: 2019-06-05

## 2019-06-05 RX ORDER — SODIUM CHLORIDE, SODIUM LACTATE, POTASSIUM CHLORIDE, CALCIUM CHLORIDE 600; 310; 30; 20 MG/100ML; MG/100ML; MG/100ML; MG/100ML
INJECTION, SOLUTION INTRAVENOUS CONTINUOUS
Status: DISCONTINUED | OUTPATIENT
Start: 2019-06-05 | End: 2019-06-05 | Stop reason: HOSPADM

## 2019-06-05 RX ORDER — ONDANSETRON 2 MG/ML
INJECTION INTRAMUSCULAR; INTRAVENOUS PRN
Status: DISCONTINUED | OUTPATIENT
Start: 2019-06-05 | End: 2019-06-05

## 2019-06-05 RX ORDER — GLYCOPYRROLATE 0.2 MG/ML
INJECTION, SOLUTION INTRAMUSCULAR; INTRAVENOUS PRN
Status: DISCONTINUED | OUTPATIENT
Start: 2019-06-05 | End: 2019-06-05

## 2019-06-05 RX ORDER — ONDANSETRON 2 MG/ML
4 INJECTION INTRAMUSCULAR; INTRAVENOUS EVERY 6 HOURS PRN
Status: DISCONTINUED | OUTPATIENT
Start: 2019-06-05 | End: 2019-06-06 | Stop reason: HOSPADM

## 2019-06-05 RX ORDER — SODIUM CHLORIDE 9 MG/ML
INJECTION, SOLUTION INTRAVENOUS CONTINUOUS
Status: DISCONTINUED | OUTPATIENT
Start: 2019-06-05 | End: 2019-06-06 | Stop reason: HOSPADM

## 2019-06-05 RX ORDER — DEXAMETHASONE SODIUM PHOSPHATE 4 MG/ML
INJECTION, SOLUTION INTRA-ARTICULAR; INTRALESIONAL; INTRAMUSCULAR; INTRAVENOUS; SOFT TISSUE PRN
Status: DISCONTINUED | OUTPATIENT
Start: 2019-06-05 | End: 2019-06-05

## 2019-06-05 RX ORDER — POTASSIUM CHLORIDE 1.5 G/1.58G
20-40 POWDER, FOR SOLUTION ORAL
Status: DISCONTINUED | OUTPATIENT
Start: 2019-06-05 | End: 2019-06-06 | Stop reason: HOSPADM

## 2019-06-05 RX ORDER — ONDANSETRON 4 MG/1
4 TABLET, ORALLY DISINTEGRATING ORAL EVERY 6 HOURS PRN
Status: DISCONTINUED | OUTPATIENT
Start: 2019-06-05 | End: 2019-06-06 | Stop reason: HOSPADM

## 2019-06-05 RX ORDER — FENTANYL CITRATE 50 UG/ML
INJECTION, SOLUTION INTRAMUSCULAR; INTRAVENOUS PRN
Status: DISCONTINUED | OUTPATIENT
Start: 2019-06-05 | End: 2019-06-05

## 2019-06-05 RX ORDER — BISACODYL 5 MG
5 TABLET, DELAYED RELEASE (ENTERIC COATED) ORAL DAILY PRN
Status: DISCONTINUED | OUTPATIENT
Start: 2019-06-05 | End: 2019-06-06 | Stop reason: HOSPADM

## 2019-06-05 RX ORDER — BUPIVACAINE HYDROCHLORIDE 5 MG/ML
INJECTION, SOLUTION PERINEURAL PRN
Status: DISCONTINUED | OUTPATIENT
Start: 2019-06-05 | End: 2019-06-05 | Stop reason: HOSPADM

## 2019-06-05 RX ORDER — POTASSIUM CL/LIDO/0.9 % NACL 10MEQ/0.1L
10 INTRAVENOUS SOLUTION, PIGGYBACK (ML) INTRAVENOUS
Status: DISCONTINUED | OUTPATIENT
Start: 2019-06-05 | End: 2019-06-06 | Stop reason: HOSPADM

## 2019-06-05 RX ORDER — POTASSIUM CHLORIDE 29.8 MG/ML
20 INJECTION INTRAVENOUS
Status: DISCONTINUED | OUTPATIENT
Start: 2019-06-05 | End: 2019-06-06 | Stop reason: HOSPADM

## 2019-06-05 RX ORDER — DEXTROSE MONOHYDRATE 25 G/50ML
25-50 INJECTION, SOLUTION INTRAVENOUS
Status: DISCONTINUED | OUTPATIENT
Start: 2019-06-05 | End: 2019-06-06 | Stop reason: HOSPADM

## 2019-06-05 RX ORDER — BISACODYL 10 MG
10 SUPPOSITORY, RECTAL RECTAL DAILY PRN
Status: DISCONTINUED | OUTPATIENT
Start: 2019-06-05 | End: 2019-06-06 | Stop reason: HOSPADM

## 2019-06-05 RX ORDER — SODIUM CHLORIDE, SODIUM LACTATE, POTASSIUM CHLORIDE, CALCIUM CHLORIDE 600; 310; 30; 20 MG/100ML; MG/100ML; MG/100ML; MG/100ML
INJECTION, SOLUTION INTRAVENOUS CONTINUOUS PRN
Status: DISCONTINUED | OUTPATIENT
Start: 2019-06-05 | End: 2019-06-05

## 2019-06-05 RX ORDER — PROCHLORPERAZINE MALEATE 5 MG
5 TABLET ORAL EVERY 6 HOURS PRN
Status: DISCONTINUED | OUTPATIENT
Start: 2019-06-05 | End: 2019-06-06 | Stop reason: HOSPADM

## 2019-06-05 RX ORDER — PROCHLORPERAZINE 25 MG
12.5 SUPPOSITORY, RECTAL RECTAL EVERY 12 HOURS PRN
Status: DISCONTINUED | OUTPATIENT
Start: 2019-06-05 | End: 2019-06-06 | Stop reason: HOSPADM

## 2019-06-05 RX ORDER — PROPOFOL 10 MG/ML
INJECTION, EMULSION INTRAVENOUS PRN
Status: DISCONTINUED | OUTPATIENT
Start: 2019-06-05 | End: 2019-06-05

## 2019-06-05 RX ORDER — LIDOCAINE 40 MG/G
CREAM TOPICAL
Status: DISCONTINUED | OUTPATIENT
Start: 2019-06-05 | End: 2019-06-06 | Stop reason: HOSPADM

## 2019-06-05 RX ORDER — ASPIRIN 81 MG/1
81 TABLET ORAL DAILY
Status: ON HOLD | COMMUNITY
End: 2021-06-09

## 2019-06-05 RX ORDER — OXYCODONE HYDROCHLORIDE 5 MG/1
5-10 TABLET ORAL EVERY 4 HOURS PRN
Status: DISCONTINUED | OUTPATIENT
Start: 2019-06-05 | End: 2019-06-06 | Stop reason: HOSPADM

## 2019-06-05 RX ORDER — HYDROMORPHONE HYDROCHLORIDE 1 MG/ML
0.3 INJECTION, SOLUTION INTRAMUSCULAR; INTRAVENOUS; SUBCUTANEOUS
Status: DISCONTINUED | OUTPATIENT
Start: 2019-06-05 | End: 2019-06-06 | Stop reason: HOSPADM

## 2019-06-05 RX ORDER — KETOROLAC TROMETHAMINE 30 MG/ML
INJECTION, SOLUTION INTRAMUSCULAR; INTRAVENOUS PRN
Status: DISCONTINUED | OUTPATIENT
Start: 2019-06-05 | End: 2019-06-05

## 2019-06-05 RX ORDER — CLOPIDOGREL BISULFATE 75 MG/1
75 TABLET ORAL DAILY
Status: DISCONTINUED | OUTPATIENT
Start: 2019-06-05 | End: 2019-06-06 | Stop reason: HOSPADM

## 2019-06-05 RX ORDER — OXYCODONE HYDROCHLORIDE 5 MG/1
5-10 TABLET ORAL
Status: DISCONTINUED | OUTPATIENT
Start: 2019-06-05 | End: 2019-06-05

## 2019-06-05 RX ORDER — ACETAMINOPHEN 325 MG/1
975 TABLET ORAL EVERY 8 HOURS
Status: DISCONTINUED | OUTPATIENT
Start: 2019-06-05 | End: 2019-06-06 | Stop reason: HOSPADM

## 2019-06-05 RX ORDER — POLYETHYLENE GLYCOL 3350 17 G/17G
17 POWDER, FOR SOLUTION ORAL DAILY PRN
Status: DISCONTINUED | OUTPATIENT
Start: 2019-06-05 | End: 2019-06-06 | Stop reason: HOSPADM

## 2019-06-05 RX ORDER — NALOXONE HYDROCHLORIDE 0.4 MG/ML
.1-.4 INJECTION, SOLUTION INTRAMUSCULAR; INTRAVENOUS; SUBCUTANEOUS
Status: DISCONTINUED | OUTPATIENT
Start: 2019-06-05 | End: 2019-06-05

## 2019-06-05 RX ORDER — PROCHLORPERAZINE MALEATE 5 MG
5 TABLET ORAL EVERY 6 HOURS PRN
Status: DISCONTINUED | OUTPATIENT
Start: 2019-06-05 | End: 2019-06-05

## 2019-06-05 RX ORDER — LABETALOL 20 MG/4 ML (5 MG/ML) INTRAVENOUS SYRINGE
10 EVERY 10 MIN PRN
Status: DISCONTINUED | OUTPATIENT
Start: 2019-06-05 | End: 2019-06-06 | Stop reason: HOSPADM

## 2019-06-05 RX ORDER — MAGNESIUM HYDROXIDE 1200 MG/15ML
LIQUID ORAL PRN
Status: DISCONTINUED | OUTPATIENT
Start: 2019-06-05 | End: 2019-06-05 | Stop reason: HOSPADM

## 2019-06-05 RX ORDER — POTASSIUM CHLORIDE 7.45 MG/ML
10 INJECTION INTRAVENOUS
Status: DISCONTINUED | OUTPATIENT
Start: 2019-06-05 | End: 2019-06-06 | Stop reason: HOSPADM

## 2019-06-05 RX ORDER — HYDRALAZINE HYDROCHLORIDE 20 MG/ML
10 INJECTION INTRAMUSCULAR; INTRAVENOUS EVERY 4 HOURS PRN
Status: DISCONTINUED | OUTPATIENT
Start: 2019-06-05 | End: 2019-06-06 | Stop reason: HOSPADM

## 2019-06-05 RX ORDER — LISINOPRIL 20 MG/1
20 TABLET ORAL DAILY
Status: DISCONTINUED | OUTPATIENT
Start: 2019-06-06 | End: 2019-06-06 | Stop reason: HOSPADM

## 2019-06-05 RX ORDER — CEFAZOLIN SODIUM 2 G/100ML
2 INJECTION, SOLUTION INTRAVENOUS
Status: COMPLETED | OUTPATIENT
Start: 2019-06-05 | End: 2019-06-05

## 2019-06-05 RX ORDER — ASPIRIN 81 MG/1
81 TABLET ORAL DAILY
Status: DISCONTINUED | OUTPATIENT
Start: 2019-06-06 | End: 2019-06-06 | Stop reason: HOSPADM

## 2019-06-05 RX ORDER — NEOSTIGMINE METHYLSULFATE 1 MG/ML
VIAL (ML) INJECTION PRN
Status: DISCONTINUED | OUTPATIENT
Start: 2019-06-05 | End: 2019-06-05

## 2019-06-05 RX ORDER — ACETAMINOPHEN 325 MG/1
650 TABLET ORAL EVERY 4 HOURS PRN
Status: DISCONTINUED | OUTPATIENT
Start: 2019-06-08 | End: 2019-06-06 | Stop reason: HOSPADM

## 2019-06-05 RX ORDER — HEPARIN SODIUM 1000 [USP'U]/ML
INJECTION, SOLUTION INTRAVENOUS; SUBCUTANEOUS PRN
Status: DISCONTINUED | OUTPATIENT
Start: 2019-06-05 | End: 2019-06-05

## 2019-06-05 RX ORDER — ATORVASTATIN CALCIUM 10 MG/1
20 TABLET, FILM COATED ORAL DAILY
Status: DISCONTINUED | OUTPATIENT
Start: 2019-06-05 | End: 2019-06-06 | Stop reason: HOSPADM

## 2019-06-05 RX ORDER — BISACODYL 5 MG
15 TABLET, DELAYED RELEASE (ENTERIC COATED) ORAL DAILY PRN
Status: DISCONTINUED | OUTPATIENT
Start: 2019-06-05 | End: 2019-06-06 | Stop reason: HOSPADM

## 2019-06-05 RX ORDER — METOCLOPRAMIDE 5 MG/1
5 TABLET ORAL EVERY 6 HOURS PRN
Status: DISCONTINUED | OUTPATIENT
Start: 2019-06-05 | End: 2019-06-06 | Stop reason: HOSPADM

## 2019-06-05 RX ADMIN — Medication 5 MG: at 13:59

## 2019-06-05 RX ADMIN — CLOPIDOGREL BISULFATE 75 MG: 75 TABLET, FILM COATED ORAL at 21:26

## 2019-06-05 RX ADMIN — LIDOCAINE HYDROCHLORIDE 100 MG: 20 INJECTION, SOLUTION INFILTRATION; PERINEURAL at 12:34

## 2019-06-05 RX ADMIN — FENTANYL CITRATE 25 MCG: 50 INJECTION, SOLUTION INTRAMUSCULAR; INTRAVENOUS at 15:26

## 2019-06-05 RX ADMIN — NEOSTIGMINE METHYLSULFATE 5 MG: 1 INJECTION, SOLUTION INTRAVENOUS at 15:56

## 2019-06-05 RX ADMIN — DEXMEDETOMIDINE HYDROCHLORIDE 0.2 MCG/KG/HR: 100 INJECTION, SOLUTION INTRAVENOUS at 12:37

## 2019-06-05 RX ADMIN — PHENYLEPHRINE HYDROCHLORIDE 100 MCG: 10 INJECTION INTRAVENOUS at 13:20

## 2019-06-05 RX ADMIN — GLYCOPYRROLATE 0.8 MG: 0.2 INJECTION, SOLUTION INTRAMUSCULAR; INTRAVENOUS at 15:56

## 2019-06-05 RX ADMIN — FENTANYL CITRATE 25 MCG: 50 INJECTION, SOLUTION INTRAMUSCULAR; INTRAVENOUS at 16:08

## 2019-06-05 RX ADMIN — CEFAZOLIN SODIUM 2 G: 2 INJECTION, SOLUTION INTRAVENOUS at 13:00

## 2019-06-05 RX ADMIN — PHENYLEPHRINE HYDROCHLORIDE 100 MCG: 10 INJECTION INTRAVENOUS at 14:24

## 2019-06-05 RX ADMIN — CEFAZOLIN 1 G: 1 INJECTION, POWDER, FOR SOLUTION INTRAMUSCULAR; INTRAVENOUS at 15:30

## 2019-06-05 RX ADMIN — PROPOFOL 15 MG: 10 INJECTION, EMULSION INTRAVENOUS at 16:09

## 2019-06-05 RX ADMIN — FENTANYL CITRATE 25 MCG: 50 INJECTION, SOLUTION INTRAMUSCULAR; INTRAVENOUS at 13:49

## 2019-06-05 RX ADMIN — DEXAMETHASONE SODIUM PHOSPHATE 4 MG: 4 INJECTION, SOLUTION INTRA-ARTICULAR; INTRALESIONAL; INTRAMUSCULAR; INTRAVENOUS; SOFT TISSUE at 13:21

## 2019-06-05 RX ADMIN — FENTANYL CITRATE 25 MCG: 50 INJECTION, SOLUTION INTRAMUSCULAR; INTRAVENOUS at 13:15

## 2019-06-05 RX ADMIN — PROPOFOL 25 MG: 10 INJECTION, EMULSION INTRAVENOUS at 16:02

## 2019-06-05 RX ADMIN — ONDANSETRON 4 MG: 2 INJECTION INTRAMUSCULAR; INTRAVENOUS at 15:56

## 2019-06-05 RX ADMIN — SODIUM CHLORIDE, POTASSIUM CHLORIDE, SODIUM LACTATE AND CALCIUM CHLORIDE: 600; 310; 30; 20 INJECTION, SOLUTION INTRAVENOUS at 16:04

## 2019-06-05 RX ADMIN — KETOROLAC TROMETHAMINE 15 MG: 30 INJECTION, SOLUTION INTRAMUSCULAR at 15:43

## 2019-06-05 RX ADMIN — ROCURONIUM BROMIDE 10 MG: 10 INJECTION INTRAVENOUS at 15:22

## 2019-06-05 RX ADMIN — SODIUM CHLORIDE, POTASSIUM CHLORIDE, SODIUM LACTATE AND CALCIUM CHLORIDE: 600; 310; 30; 20 INJECTION, SOLUTION INTRAVENOUS at 12:47

## 2019-06-05 RX ADMIN — PROPOFOL 200 MG: 10 INJECTION, EMULSION INTRAVENOUS at 12:34

## 2019-06-05 RX ADMIN — PHENYLEPHRINE HYDROCHLORIDE 0.15 MCG/KG/MIN: 10 INJECTION INTRAVENOUS at 13:18

## 2019-06-05 RX ADMIN — MIDAZOLAM 2 MG: 1 INJECTION INTRAMUSCULAR; INTRAVENOUS at 12:30

## 2019-06-05 RX ADMIN — PHENYLEPHRINE HYDROCHLORIDE 100 MCG: 10 INJECTION INTRAVENOUS at 15:49

## 2019-06-05 RX ADMIN — HEPARIN SODIUM 5000 UNITS: 1000 INJECTION, SOLUTION INTRAVENOUS; SUBCUTANEOUS at 14:21

## 2019-06-05 RX ADMIN — ROCURONIUM BROMIDE 50 MG: 10 INJECTION INTRAVENOUS at 12:34

## 2019-06-05 RX ADMIN — PHENYLEPHRINE HYDROCHLORIDE 100 MCG: 10 INJECTION INTRAVENOUS at 15:58

## 2019-06-05 RX ADMIN — INSULIN ASPART 1 UNITS: 100 INJECTION, SOLUTION INTRAVENOUS; SUBCUTANEOUS at 22:41

## 2019-06-05 RX ADMIN — FENTANYL CITRATE 50 MCG: 50 INJECTION, SOLUTION INTRAMUSCULAR; INTRAVENOUS at 13:05

## 2019-06-05 RX ADMIN — Medication 5 MG: at 14:11

## 2019-06-05 RX ADMIN — SODIUM CHLORIDE, POTASSIUM CHLORIDE, SODIUM LACTATE AND CALCIUM CHLORIDE: 600; 310; 30; 20 INJECTION, SOLUTION INTRAVENOUS at 15:21

## 2019-06-05 RX ADMIN — PHENYLEPHRINE HYDROCHLORIDE 100 MCG: 10 INJECTION INTRAVENOUS at 15:30

## 2019-06-05 RX ADMIN — LIDOCAINE HYDROCHLORIDE 0.5 ML: 10 INJECTION, SOLUTION EPIDURAL; INFILTRATION; INTRACAUDAL; PERINEURAL at 12:02

## 2019-06-05 RX ADMIN — ROCURONIUM BROMIDE 15 MG: 10 INJECTION INTRAVENOUS at 13:49

## 2019-06-05 RX ADMIN — FENTANYL CITRATE 25 MCG: 50 INJECTION, SOLUTION INTRAMUSCULAR; INTRAVENOUS at 15:43

## 2019-06-05 RX ADMIN — SODIUM CHLORIDE, POTASSIUM CHLORIDE, SODIUM LACTATE AND CALCIUM CHLORIDE: 600; 310; 30; 20 INJECTION, SOLUTION INTRAVENOUS at 12:02

## 2019-06-05 RX ADMIN — FENTANYL CITRATE 25 MCG: 50 INJECTION, SOLUTION INTRAMUSCULAR; INTRAVENOUS at 15:54

## 2019-06-05 RX ADMIN — ACETAMINOPHEN 975 MG: 325 TABLET, FILM COATED ORAL at 21:18

## 2019-06-05 RX ADMIN — ROCURONIUM BROMIDE 15 MG: 10 INJECTION INTRAVENOUS at 13:15

## 2019-06-05 ASSESSMENT — LIFESTYLE VARIABLES: TOBACCO_USE: 0

## 2019-06-05 ASSESSMENT — MIFFLIN-ST. JEOR: SCORE: 1819.99

## 2019-06-05 ASSESSMENT — ACTIVITIES OF DAILY LIVING (ADL): ADLS_ACUITY_SCORE: 11

## 2019-06-05 NOTE — ANESTHESIA POSTPROCEDURE EVALUATION
Patient: Roscoe Jang    Procedure(s):  REDO LEFT CAROTID ENDARTERECTOMY WITH EEG with greater saphenous vein graft    Diagnosis:RESTENOSIS LEFT CAROTID ARTERY SYSTEMIC  Diagnosis Additional Information: No value filed.    Anesthesia Type:  General, ETT    Note:  Anesthesia Post Evaluation    Patient location during evaluation: PACU  Patient participation: Able to fully participate in evaluation  Level of consciousness: awake and alert  Pain management: adequate  Airway patency: patent  Cardiovascular status: acceptable  Respiratory status: acceptable  Hydration status: acceptable  PONV: none     Anesthetic complications: None          Last vitals:  Vitals:    06/05/19 1622 06/05/19 1630 06/05/19 1640   BP: 113/71 124/70 107/76   Pulse: 108 105    Resp:  15    Temp: 36.9  C (98.5  F)     SpO2: 99% 99%          Electronically Signed By: Carlos Gonzalez MD  June 5, 2019  4:58 PM

## 2019-06-05 NOTE — ANESTHESIA CARE TRANSFER NOTE
Patient: Roscoe Jang    Procedure(s):  REDO LEFT CAROTID ENDARTERECTOMY WITH EEG with greater saphenous vein graft    Diagnosis: RESTENOSIS LEFT CAROTID ARTERY SYSTEMIC  Diagnosis Additional Information: No value filed.    Anesthesia Type:   General, ETT     Note:  Airway :Face Mask  Patient transferred to:PACU  Comments: Neuromuscular blockade reversed after TOF 4/4, spontaneous respirations, adequate tidal volumes, followed commands to move all 4 extremities to voice, oropharynx suctioned with soft flexible catheter, extubated atraumatically, extubated with suction, airway patent after extubation.  Oxygen via facemask at 10 liters per minute to PACU. Oxygen tubing connected to wall O2 in PACU, SpO2, NiBP, and EKG monitors and alarms on and functioning, report on patient's clinical status given to PACU RN, RN questions answered. Handoff Report: Identifed the Patient, Identified the Reponsible Provider, Reviewed the pertinent medical history, Discussed the surgical course, Reviewed Intra-OP anesthesia mangement and issues during anesthesia, Set expectations for post-procedure period and Allowed opportunity for questions and acknowledgement of understanding      Vitals: (Last set prior to Anesthesia Care Transfer)    CRNA VITALS  6/5/2019 1550 - 6/5/2019 1630      6/5/2019             Resp Rate (set):  10                Electronically Signed By: ANITRA Hsu CRNA  June 5, 2019  4:30 PM

## 2019-06-05 NOTE — PROGRESS NOTES
Admission medication history interview status for the 6/5/2019  admission is complete. See EPIC admission navigator for prior to admission medications     Medication history source reliability:Good    Medication history interview source(s):Patient    Medication history resources (including written lists, pill bottles, clinic record):None    Primary pharmacy.[facility]    Additional medication history information not noted on PTA med list :None    Time spent in this activity: 45 minutes    Prior to Admission medications    Medication Sig Last Dose Taking? Auth Provider   aspirin 81 MG EC tablet Take 81 mg by mouth daily 6/5/2019 at 0730 Yes Reported, Patient   atorvastatin (LIPITOR) 20 MG tablet TAKE 1 TABLET BY MOUTH ONCE DAILY 6/4/2019 at am Yes Rell Rivers MD   lisinopril (PRINIVIL/ZESTRIL) 20 MG tablet TAKE 1 TABLET BY MOUTH ONCE DAILY 6/5/2019 at 0730 Yes Mel Pappas NP   metFORMIN (GLUCOPHAGE) 500 MG tablet Take 2 tablets (1,000 mg) by mouth 2 times daily (with meals) 6/4/2019 at am Yes Mel Pappas NP   blood glucose (CONTOUR NEXT TEST) test strip USE ONE STRIP TO CHECK GLUCOSE ONCE DAILY   Rell Rivers MD   blood glucose monitoring (RHYS CONTOUR NEXT) test strip Use to test blood sugar 1 times daily or as directed.   Rell Rivers MD   blood glucose monitoring (RHYS MICROLET) lancets Use to test blood sugar 1 times daily or as directed.   Rell Rivers MD

## 2019-06-05 NOTE — ANESTHESIA PROCEDURE NOTES
ARTERIAL LINE PROCEDURE NOTE:   Pre-Procedure  Performed by Carlos Gonzalez MD  Location: pre-op      Pre-Anesthestic Checklist: patient identified, IV checked, site marked, risks and benefits discussed, informed consent, monitors and equipment checked, pre-op evaluation and at physician/surgeon's request    Timeout  Correct Patient: Yes   Correct Procedure: Yes   Correct Site: Yes   Correct Laterality: Yes   Correct Position: Yes   Site Marked: Yes   .   Procedure Documentation  Procedure: arterial line  ASA 3  Supine  Insertion Site:radial, right.Skin infiltrated with 1 mL of 1% lidocaine. Injection technique: Seldinger Technique  .  .  Patient Prep;all elements of maximal sterile barrier technique followed, mask, hat, sterile gown, sterile gloves, draped, hand hygiene, chlorhexidine gluconate and isopropyl alcohol    Assessment/Narrative    Catheter: 20 gauge, 1.75 in/4.5 cm quick cath (integral wire)      Tegaderm dressing used.    Arterial waveform: Yes IBP within 10% of NIBP: Yes

## 2019-06-05 NOTE — ANESTHESIA PREPROCEDURE EVALUATION
Anesthesia Pre-Procedure Evaluation    Patient: Roscoe Jang   MRN: [id number] : 1950          Preoperative Diagnosis: RESTENOSIS LEFT CAROTID ARTERY SYSTEMIC    Procedure(s):  REDO LEFT CAROTID ENDARTERECTOMY WITH EEG    Past Medical History:   Diagnosis Date     Arthritis     neck and hip and generalized     Cerebral infarction (H)     TIA?     Diabetes (H)      Hyperlipidemia      Hypertension      Obese      Past Surgical History:   Procedure Laterality Date     ENDARTERECTOMY CAROTID Right 2018    Procedure: ENDARTERECTOMY CAROTID;  RIGHT CAROTID ENDARTERECTOMY WITH EEG;  Surgeon: Gaurav Bustos MD;  Location:  OR     ENDARTERECTOMY CAROTID Left 2018    Procedure: ENDARTERECTOMY CAROTID;  LEFT CAROTID ENDARTERECTOMY with EXTERNAL JUGULAR VEIN PATCH;  Surgeon: Gaurav Bustos MD;  Location:  OR       Anesthesia Evaluation     .             ROS/MED HX    ENT/Pulmonary:      (-) tobacco use and sleep apnea   Neurologic:     (+)CVA     Cardiovascular:     (+) Dyslipidemia, hypertension----. : . . JORGENSEN, . :. .       METS/Exercise Tolerance:     Hematologic:         Musculoskeletal:   (+) arthritis,  -       GI/Hepatic:     (+) Other GI/Hepatic fatty liver     (-) GERD   Renal/Genitourinary:         Endo:     (+) type II DM Obesity, .      Psychiatric:         Infectious Disease:         Malignancy:         Other:                          Physical Exam  Normal systems: cardiovascular, pulmonary and dental    Airway   Mallampati: III  TM distance: >3 FB  Neck ROM: full    Dental     Cardiovascular       Pulmonary             Lab Results   Component Value Date    WBC 6.9 2019    HGB 14.0 2019    HCT 41.8 2019     2019    CRP <2.9 2019    SED 12 2019     2019    POTASSIUM 4.5 2019    CHLORIDE 107 2019    CO2 26 2019    BUN 21 2019    CR 0.84 2019     (H) 2019    ROWENA 9.0 2019     "ALBUMIN 4.0 06/03/2019    PROTTOTAL 7.3 06/03/2019    ALT 31 06/03/2019    AST 15 06/03/2019    ALKPHOS 61 06/03/2019    BILITOTAL 1.4 (H) 06/03/2019    LIPASE 182 11/19/2018    AMYLASE 73 11/19/2018    TSH 1.05 06/03/2019       Preop Vitals  BP Readings from Last 3 Encounters:   06/05/19 (!) 147/98   06/03/19 130/70   04/23/19 138/82    Pulse Readings from Last 3 Encounters:   06/05/19 104   06/03/19 88   04/18/19 88      Resp Readings from Last 3 Encounters:   06/05/19 16   06/03/19 12   04/18/19 18    SpO2 Readings from Last 3 Encounters:   06/05/19 96%   06/03/19 96%   04/01/19 95%      Temp Readings from Last 1 Encounters:   06/05/19 36.9  C (98.5  F) (Temporal)    Ht Readings from Last 1 Encounters:   06/05/19 1.727 m (5' 8\")      Wt Readings from Last 1 Encounters:   06/05/19 107 kg (236 lb)    Estimated body mass index is 35.88 kg/m  as calculated from the following:    Height as of this encounter: 1.727 m (5' 8\").    Weight as of this encounter: 107 kg (236 lb).       Anesthesia Plan      History & Physical Review  History and physical reviewed and following examination; no interval change.    ASA Status:  3 .    NPO Status:  > 8 hours    Plan for General and ETT with Intravenous and Propofol induction.   PONV prophylaxis:  Ondansetron (or other 5HT-3) and Dexamethasone or Solumedrol  Additional equipment: Videolaryngoscope and Arterial Line      Postoperative Care  Postoperative pain management:  IV analgesics.      Consents  Anesthetic plan, risks, benefits and alternatives discussed with:  Patient..                 Carlos Gonzalez MD  "

## 2019-06-05 NOTE — CONSULTS
[facility]    Vascular Medicine Consultation     Attestation: I have examined the patient independently of Clau Valero PA-C and agree with the examination and plan as delineated below.    Kalpesh Guerrero MD      Date of Admission:  6/5/2019  Date of Consult (When I saw the patient): 06/05/19    Assessment & Plan   1. Symptomatic bilateral carotid artery stenosis s/p right carotid endarterectomy 5/11/18 followed by left carotid endarterectomy 6/6/18 now with recurrent symptomatic left carotid disease undergoing a redo left carotid endarterectomy 6/5/19     Post-operative cares per Dr. Bustos. He had been on aspirin 81 mg daily as an outpatient. This will be resumed post-operatively.      2. Hyperlipidemia     His lipid panel on 5/11/18 was significant for an LDL of 20 while on Atorvastatin 20 mg daily, indicating he is at goal on this regimen. He appears to be tolerating this dose well and should continue without change.      3. Type 2 diabetes     His diabetes has been historically controlled with metformin with a recent A1C of 7.2%. His metformin can be resumed post-operatively once his diet returns to normal. His sugars will be monitored closely and sliding scale insulin can be provided as needed.      4. Hypertension     He will be continued on his prior to admission lisinopril. His blood pressure will be monitored closely.       Reason for Consult   Reason for consult: Asked by Dr. Bustos to evaluate vascular risk factors and assist with medical management in this 68 year old former heavy smoking male with hyperlipidemia, hypertension, and diabetes who is s/p a right carotid endarterectomy 5/2018 followed by a left carotid endarterectomy 6/2018 now with recurrent symptomatic left carotid stenosis and undergoing a redo left carotid endarterectomy.        Primary Care Physician   Mel Pappas      History of Present Illness   Roscoe Jang is a 68 year old male former heavy smoker  with a history of diabetes, hyperlipidemia, and hypertension who developed dizziness and blurry vision transiently. Work-up of this revealed significant bilateral carotid artery stenosis on MRI. He was referred on to Dr. Bustos of Vascular Surgery who saw him in clinic the next day. Ultrasound confirmed greater than 90% stenosis on the right and 80% stenosis on the left. He subsequently underwent a right carotid endarterectomy on 5/11/18 followed by a left carotid endarterectomy on 6/6/18.     He had a history of hyperlipidemia and had troubles with higher dose Lipitor but tolerated 20 mg well.  He was treated with Plavix for 1 month following each surgery along with chronic aspirin, metformin, lisinopril, Tylenol.     He quit smoking in 2007.  He does live independently with his wife.     He had done well up until recently when he again developed some neurologic symptoms. A head/neck CTA has was ordered and returned with widely patent right CEA but diffuse narrowing of the distal left common carotid artery extending to the internal carotid artery. It was recommended that he undergo a left redo carotid endarterectomy. He presented for this today.           Past Medical History   Past Medical History:   Diagnosis Date     Arthritis     neck and hip and generalized     Cerebral infarction (H)     TIA?     Diabetes (H)      Hyperlipidemia      Hypertension      Obese        Past Surgical History   Past Surgical History:   Procedure Laterality Date     ENDARTERECTOMY CAROTID Right 5/11/2018    Procedure: ENDARTERECTOMY CAROTID;  RIGHT CAROTID ENDARTERECTOMY WITH EEG;  Surgeon: Gaurav Bustos MD;  Location:  OR     ENDARTERECTOMY CAROTID Left 6/6/2018    Procedure: ENDARTERECTOMY CAROTID;  LEFT CAROTID ENDARTERECTOMY with EXTERNAL JUGULAR VEIN PATCH;  Surgeon: Gaurav Bustos MD;  Location:  OR       Prior to Admission Medications   Prior to Admission Medications   Prescriptions Last Dose Informant  Patient Reported? Taking?   aspirin 81 MG EC tablet 6/5/2019 at 0730 Self Yes Yes   Sig: Take 81 mg by mouth daily   atorvastatin (LIPITOR) 20 MG tablet 6/4/2019 at am Self No Yes   Sig: TAKE 1 TABLET BY MOUTH ONCE DAILY   blood glucose (CONTOUR NEXT TEST) test strip   No No   Sig: USE ONE STRIP TO CHECK GLUCOSE ONCE DAILY   blood glucose monitoring (RHYS CONTOUR NEXT) test strip   No No   Sig: Use to test blood sugar 1 times daily or as directed.   blood glucose monitoring (RHYS MICROLET) lancets   No No   Sig: Use to test blood sugar 1 times daily or as directed.   lisinopril (PRINIVIL/ZESTRIL) 20 MG tablet 6/5/2019 at 0730 Self No Yes   Sig: TAKE 1 TABLET BY MOUTH ONCE DAILY   metFORMIN (GLUCOPHAGE) 500 MG tablet 6/4/2019 at am Self No Yes   Sig: Take 2 tablets (1,000 mg) by mouth 2 times daily (with meals)      Facility-Administered Medications: None     Allergies   No Known Allergies    Social History   Roscoe Jang  reports that he quit smoking about 11 years ago. He has never used smokeless tobacco. He reports that he does not drink alcohol or use drugs.    Family History   History reviewed. No pertinent family history.    Review of Systems   The 10 point Review of Systems is negative other than noted in the HPI or here.     Physical Exam   Temp: 98.5  F (36.9  C) Temp src: Temporal BP: (!) 147/98 Pulse: 104 Heart Rate: 104 Resp: 16 SpO2: 96 % O2 Device: None (Room air)    Vital Signs with Ranges  Temp:  [98.5  F (36.9  C)] 98.5  F (36.9  C)  Pulse:  [104] 104  Heart Rate:  [104] 104  Resp:  [16] 16  BP: (147)/(98) 147/98  SpO2:  [96 %] 96 %  236 lbs 0 oz    Constitutional: awake, alert, cooperative, no apparent distress, and appears stated age  Eyes: Lids and lashes normal, pupils equal, round and reactive to light, extra ocular muscles intact, sclera clear, conjunctiva normal  ENT: normocepalic, without obvious abnormality, oropharynx pink and moist  Hematologic / Lymphatic: no  lymphadenopathy  Respiratory: No increased work of breathing, good air exchange, clear to auscultation bilaterally, no crackles or wheezing  Cardiovascular: regular rate and rhythm, normal S1 and S2 and no murmur noted  GI: Normal bowel sounds, soft, non-distended, non-tender  Skin: no redness, warmth, or swelling, no rashes and no lesions  Musculoskeletal: There is no redness, warmth, or swelling of the joints.  Full range of motion noted.  Motor strength is 5 out of 5 all extremities bilaterally.  Tone is normal.  Neurologic: Awake, alert, oriented to name, place and time.  Cranial nerves II-XII are grossly intact.  Motor is 5 out of 5 bilaterally.  No tongue deviation.  Neuropsychiatric:  Normal affect, memory, insight.      Data   Most Recent 3 CBC's:  Recent Labs   Lab Test 06/03/19  0920 11/19/18  1048 11/11/18  0314   WBC 6.9 6.7 16.7*   HGB 14.0 14.2 15.1   MCV 89 90 89    246 221     Most Recent 3 BMP's:  Recent Labs   Lab Test 06/05/19  1206 06/03/19  0920 11/19/18  1048 11/11/18  0314   NA  --  138 144 137   POTASSIUM 4.0 4.5 4.1 4.2   CHLORIDE  --  107 107 101   CO2  --  26 28 27   BUN  --  21 12 18   CR  --  0.84 0.88 0.96   ANIONGAP  --  5 9 9   ROWENA  --  9.0 8.3* 8.7   * 110* 195* 169*     Most Recent Cholesterol Panel:  Recent Labs   Lab Test 06/03/19  0920   CHOL 89   LDL 20   HDL 46   TRIG 113     Most Recent Hemoglobin A1c:  Recent Labs   Lab Test 06/03/19 0920   A1C 7.2*

## 2019-06-05 NOTE — BRIEF OP NOTE
[facility]    Brief Operative Note    Pre-operative diagnosis: RESTENOSIS LEFT CAROTID ARTERY SYSTEMIC  Post-operative diagnosis * No post-op diagnosis entered *  Procedure: Procedure(s):  REDO LEFT CAROTID ENDARTERECTOMY WITH EEG with greater saphenous vein graft  Surgeon: Surgeon(s) and Role:     * Gaurav Bustos MD - Primary     * Lucía Ziegler MD - Fellow - Assisting  Anesthesia: General   Estimated blood loss: Less than 100 ml  Drains: Fidel-Knight  Specimens: * No specimens in log *  Findings:   intimal hyperplasia encountered, no endarterectomy performed, L GSV at ankle harvested for patch angioplasty.  Complications: None.  Implants:  * No implants in log *

## 2019-06-05 NOTE — OR NURSING
Call to Dr. Gonzalez re: arterial line BP and L arm cuff BP not correlating. OK to discontinue arterial line per Dr. Gonzalez.

## 2019-06-06 VITALS
OXYGEN SATURATION: 95 % | TEMPERATURE: 97.6 F | SYSTOLIC BLOOD PRESSURE: 134 MMHG | BODY MASS INDEX: 35.77 KG/M2 | WEIGHT: 236 LBS | HEART RATE: 84 BPM | RESPIRATION RATE: 17 BRPM | DIASTOLIC BLOOD PRESSURE: 69 MMHG | HEIGHT: 68 IN

## 2019-06-06 LAB
ANION GAP SERPL CALCULATED.3IONS-SCNC: 5 MMOL/L (ref 3–14)
BASOPHILS # BLD AUTO: 0 10E9/L (ref 0–0.2)
BASOPHILS NFR BLD AUTO: 0.1 %
BUN SERPL-MCNC: 20 MG/DL (ref 7–30)
CALCIUM SERPL-MCNC: 8.3 MG/DL (ref 8.5–10.1)
CHLORIDE SERPL-SCNC: 109 MMOL/L (ref 94–109)
CO2 SERPL-SCNC: 27 MMOL/L (ref 20–32)
CREAT SERPL-MCNC: 0.99 MG/DL (ref 0.66–1.25)
DIFFERENTIAL METHOD BLD: ABNORMAL
EOSINOPHIL # BLD AUTO: 0 10E9/L (ref 0–0.7)
EOSINOPHIL NFR BLD AUTO: 0.1 %
ERYTHROCYTE [DISTWIDTH] IN BLOOD BY AUTOMATED COUNT: 13.9 % (ref 10–15)
GFR SERPL CREATININE-BSD FRML MDRD: 78 ML/MIN/{1.73_M2}
GLUCOSE BLDC GLUCOMTR-MCNC: 211 MG/DL (ref 70–99)
GLUCOSE SERPL-MCNC: 142 MG/DL (ref 70–99)
HCT VFR BLD AUTO: 36.1 % (ref 40–53)
HGB BLD-MCNC: 12.1 G/DL (ref 13.3–17.7)
IMM GRANULOCYTES # BLD: 0 10E9/L (ref 0–0.4)
IMM GRANULOCYTES NFR BLD: 0.4 %
LYMPHOCYTES # BLD AUTO: 1.4 10E9/L (ref 0.8–5.3)
LYMPHOCYTES NFR BLD AUTO: 16.3 %
MCH RBC QN AUTO: 29.4 PG (ref 26.5–33)
MCHC RBC AUTO-ENTMCNC: 33.5 G/DL (ref 31.5–36.5)
MCV RBC AUTO: 88 FL (ref 78–100)
MONOCYTES # BLD AUTO: 0.8 10E9/L (ref 0–1.3)
MONOCYTES NFR BLD AUTO: 9.8 %
NEUTROPHILS # BLD AUTO: 6.2 10E9/L (ref 1.6–8.3)
NEUTROPHILS NFR BLD AUTO: 73.3 %
NRBC # BLD AUTO: 0 10*3/UL
NRBC BLD AUTO-RTO: 0 /100
PLATELET # BLD AUTO: 204 10E9/L (ref 150–450)
POTASSIUM SERPL-SCNC: 4.3 MMOL/L (ref 3.4–5.3)
RBC # BLD AUTO: 4.11 10E12/L (ref 4.4–5.9)
SODIUM SERPL-SCNC: 141 MMOL/L (ref 133–144)
WBC # BLD AUTO: 8.4 10E9/L (ref 4–11)

## 2019-06-06 PROCEDURE — 00000146 ZZHCL STATISTIC GLUCOSE BY METER IP

## 2019-06-06 PROCEDURE — 36415 COLL VENOUS BLD VENIPUNCTURE: CPT | Performed by: INTERNAL MEDICINE

## 2019-06-06 PROCEDURE — 25000132 ZZH RX MED GY IP 250 OP 250 PS 637

## 2019-06-06 PROCEDURE — A9270 NON-COVERED ITEM OR SERVICE: HCPCS

## 2019-06-06 PROCEDURE — 80048 BASIC METABOLIC PNL TOTAL CA: CPT | Performed by: INTERNAL MEDICINE

## 2019-06-06 PROCEDURE — A9270 NON-COVERED ITEM OR SERVICE: HCPCS | Performed by: INTERNAL MEDICINE

## 2019-06-06 PROCEDURE — 25000132 ZZH RX MED GY IP 250 OP 250 PS 637: Performed by: INTERNAL MEDICINE

## 2019-06-06 PROCEDURE — 85025 COMPLETE CBC W/AUTO DIFF WBC: CPT | Performed by: INTERNAL MEDICINE

## 2019-06-06 PROCEDURE — 99239 HOSP IP/OBS DSCHRG MGMT >30: CPT | Performed by: INTERNAL MEDICINE

## 2019-06-06 RX ORDER — CLOPIDOGREL BISULFATE 75 MG/1
75 TABLET ORAL DAILY
Qty: 30 TABLET | Refills: 3 | Status: SHIPPED | OUTPATIENT
Start: 2019-06-06 | End: 2019-06-17

## 2019-06-06 RX ORDER — GLIMEPIRIDE 4 MG/1
4 TABLET ORAL
Qty: 90 TABLET | Refills: 3 | Status: SHIPPED | OUTPATIENT
Start: 2019-06-06 | End: 2019-06-17

## 2019-06-06 RX ORDER — ROSUVASTATIN CALCIUM 20 MG/1
20 TABLET, COATED ORAL DAILY
Qty: 90 TABLET | Refills: 3 | Status: SHIPPED | OUTPATIENT
Start: 2019-06-06 | End: 2019-06-17

## 2019-06-06 RX ORDER — OXYCODONE HYDROCHLORIDE 5 MG/1
5-10 TABLET ORAL EVERY 4 HOURS PRN
Qty: 8 TABLET | Refills: 0 | Status: SHIPPED | OUTPATIENT
Start: 2019-06-06 | End: 2019-10-14

## 2019-06-06 RX ADMIN — LISINOPRIL 20 MG: 20 TABLET ORAL at 09:04

## 2019-06-06 RX ADMIN — CLOPIDOGREL BISULFATE 75 MG: 75 TABLET, FILM COATED ORAL at 09:04

## 2019-06-06 RX ADMIN — ASPIRIN 81 MG: 81 TABLET, COATED ORAL at 09:04

## 2019-06-06 RX ADMIN — ACETAMINOPHEN 975 MG: 325 TABLET, FILM COATED ORAL at 06:02

## 2019-06-06 RX ADMIN — ATORVASTATIN CALCIUM 20 MG: 10 TABLET, FILM COATED ORAL at 09:04

## 2019-06-06 RX ADMIN — METFORMIN HYDROCHLORIDE 1000 MG: 500 TABLET, FILM COATED ORAL at 09:04

## 2019-06-06 ASSESSMENT — ACTIVITIES OF DAILY LIVING (ADL)
ADLS_ACUITY_SCORE: 13

## 2019-06-06 NOTE — PROGRESS NOTES
Vascular Surgery Progress Note:  POD#1  Redo left CEA    S: Minimal pain.  No further dizziness or left eye vision changes.      Wants to go home.    O:   Vitals:  BP  Min: 107/76  Max: 147/98  Temp  Av.2  F (36.8  C)  Min: 97.7  F (36.5  C)  Max: 98.5  F (36.9  C)  Pulse  Av.5  Min: 76  Max: 108  I/O last 3 completed shifts:  In: 3063 [P.O.:860; I.V.:2203]  Out: 735 [Urine:650; Drains:35; Blood:50]    Physical Exam: Alert   Appropriate.                             Wds=A   No neck swelling.                             CN XII= normal       JOSE with minimal output= pulled    Assessment/Plan:#1   Doing well after re-do CEA.  Stenosis all due to intimal hyperplasia.  ? Etiology of symptoms.  Importance of risk factor control. Non-smoker. Plavix for 3 months (? Helps decrease intimal hyperplasia).  Phone follow-up in one week.   Duplex in 3 months.                                     #2.  Issues with increased Lipitor dose  (LDL=20) and Metformin.  Will discuss with Dr Guerrero.                                      #3.  Scheduled for outpatient Cardiac stree test in 2 weeks due to JORGENSEN.  Will keep this appointment.    Wm. Akash MD

## 2019-06-06 NOTE — PLAN OF CARE
Pt A&O. VSS on RA. Tele: SR. Neuros/CMS intact. Incision dressing removed; JOSE MANUEL. JOSE intact; dressing changed, minimal output. Pain managed with scheduled tylenol. Denies N&V. AUO. IMC discontinued this morning.

## 2019-06-06 NOTE — DISCHARGE SUMMARY
Admit Date:     06/05/2019   Discharge Date:           DISCHARGE DIAGNOSES:   1.  Redo left carotid endarterectomy with greater saphenous vein patch angioplasty.   2.  Intimal hyperplasia leading to need for the above.   3.  Hyperlipidemia with LDL goal less than 70, patient at that goal.   4.  LIPITOR INTOLERANCE.   5.  Suboptimally but reasonably glycemically-controlled type 2 diabetes.      HOSPITAL COURSE:   1.  Left internal carotid artery intimal hyperplasia requiring redo left carotid endarterectomy.  The patient is a 68-year-old male who has previously undergone bilateral carotid endarterectomies.  His left carotid endarterectomy was performed with external jugular vein patch angioplasty on 06/06/2019.  He was noted to have small arteries with an ICA of less than 3 mm.  He did well clinically post-procedure with a month duplex ultrasound being completely normal; however, subsequent surveillance imaging revealed 65% diffuse stenosis of his proximal left internal carotid artery and his carotid bulb.  This was felt to be due to intimal hyperplasia.  The patient was therefore taken to the operating room.  The patient will be discharged on Plavix.  The patient will be seen in followup in 7-10 days.      2.  Hyperlipidemia with LDL goal of less than 70 with patient at that goal yet while having myalgias with Lipitor at higher doses.  The patient's LDL is 20.  The patient is having myalgias with Lipitor.  He will be switched to Crestor 20 mg daily.  Lipid panel will need to be obtained in 3 months his primary MD.  If his LDL is not at its goal of less than 70, the patient should have his antilipemic regimen intensified.      3.  Type 2 diabetes.  The patient's A1c is 7.2%.  The patient has gastrointestinal upset with Metformin 1 gram p.o. b.i.d.  His dose was therefore reduced to 500 mg p.o. b.i.d.  He will have an Amaryl 4 mg daily added.  He should have an A1c obtained in 3 months with his primary MD.   Consideration of intensification of his diabetic regimen should be undertaken at that time if A1c is greater than 7%.      DISCHARGE MEDICATIONS:   1.  Crestor 20 mg daily.   2.  Amaryl 4 mg daily.   3.  Lisinopril 20 mg daily.   4.  Aspirin 81 mg daily.   5.  Metformin 500 mg p.o. b.i.d.   6.  Plavix 75 mg daily.   7.  Oxycodone 5-10 mg p.o. q.4 hours p.r.n.      Greater than 30 minutes were spent coordinating discharge on today's date.         DUKE HOLM MD             D: 2019   T: 2019   MT: VU      Name:     EDILMA TOBAR   MRN:      [id number]        Account:        [id number]   :      1950           Admit Date:     2019                                  Discharge Date:       Document: [id number]       cc: Mel Pappas NP

## 2019-06-06 NOTE — OP NOTE
Procedure Date: 06/05/2019      PREOPERATIVE DIAGNOSIS:  Recurrent stenosis, left carotid artery with vague neurological symptoms.      POSTOPERATIVE DIAGNOSIS:  Recurrent left carotid stenosis secondary to marked intimal hyperplasia.      PROCEDURES PERFORMED:      1.  Redo left endarterectomy.     a.  Greater saphenous vein patch angioplasty.   2.  [place] left ankle greater saphenous vein.   3.  Intraoperative shunting and EEG monitoring (D-10).      ANESTHESIA:  General.      PREOPERATIVE MEDICATIONS:  Ancef 2 grams IV.      SURGEON:  Gaurav Bustos MD      ASSISTANT:  Lucía Ziegler MD (vascular fellow)      INDICATIONS:  A 68-year-old patient who had undergone bilateral carotid endarterectomies.  Left carotid endarterectomy was performed with external jugular vein patch angioplasty on 06/06/2018.  He was noted to have small arteries with the ICA less than 3 mm.  He had done well post-procedure with a 3-month duplex ultrasound completely normal.  However, he has developed somewhat similar symptoms, though he noted initially with dizziness and some blurry changes to his left eye that were intermittent.  CTA was performed 48 hours ago which revealed at least a 65% diffuse stenosis of the distal carotid artery including the carotid bulb and proximal ICA.  Distal ICA was small but patent.  The right endarterectomy was widely patent as were the vertebral arteries.  We felt that the left recurrent stenosis could be a potential cause of his symptoms either from a low-flow state or emboli.  Due to this, we felt that a semi-emergent left endarterectomy was indicated under informed consent of the patient.      DESCRIPTION OF PROCEDURE:  The patient was brought to operating room and induced under general anesthesia without difficulty.  Blood pressure was monitored via a radial arterial line and was stable.  Continuous EEG monitoring was performed.  A rolled towel was placed under the shoulders.  The left neck and left ankle  area were prepped and draped.  Timeout was called and the sites were identified.      Vascular exposure:  We opened the previous incision in his left neck.  Dissection was carried down through the platysma.  As expected, there is a moderate amount of scar tissue.  Sternocleidomastoid muscle was identified and dissected free and retracted laterally.  We could not palpate a pulse in the carotid artery, making identification of the artery somewhat difficult.  He had a lower bifurcation from the previous surgery and by the CTA.  We identified the internal jugular vein.  This was quite adherent to the underlying structures and was dissected free with the aid of loupe magnification and Metzenbaum scissors.  One side branch did have some avulsion and this was easily repaired with a 6-0 Prolene suture with no narrowing to the internal jugular vein.  We dissected down to identify the vagus nerve.  Due to the lower bifurcation, the hypoglossal nerve was not identified.  With very tedious dissection, we additionally identified the internal carotid artery at the level of the patch.  This was dissected free distally to 1 cm beyond the patch.  There was no pulse within this artery and did appear to be relatively soft and this was encircled.  We then dissected more proximally to identify the common carotid artery and dissected free the patch.  We dissected free 1.5 cm before the patch angioplasty at which point a strong pulse was noted and the artery measured approximately 5 mm.  Tedious dissection was used to dissect free the external carotid and superior thyroid arteries which appeared to be disease free and encircled.      Redo carotid endarterectomy:  Intravenous heparin 5000 units was given.  After 6 minutes, the vessel was sequentially tightened with a stable EEG.  An 11 blade was used to enter the common carotid,  the patch.  This was extended distally.  There was evidence of intimal hyperplasia.  The very distal portion of  the ICA  patch had no evidence of any stenosis and a very smooth intimal plane.  Minimal back bleeding was noted, but the EEG was stable.  We then extended the arteriotomy along the patch to the common carotid artery endarterectomy origin from the previous surgery.  This vessel was completely disease free at this level.  A preheparinized Sundt shunt was inserted and secured proximally and distally with the vessel loops.  The distal end would barely accept the 2.2 mm tip of the shunt.  Stable EEG was noted.      We noted significant intimal hyperplasia as the etiology of the problem.  This was relatively smooth.  There were some irregular edges, more in the common carotid artery before the bulb and with the aid of loupe magnification [place] blade scalpel.  This was smoothed down until we had a smooth plane.  I did not feel that a redo endarterectomy was indicated due to the smooth plane.  The orifice to the external carotid artery was widely patent.  We opened up the internal carotid just beyond the previous patch and as mentioned, this was disease-free, though small.   Greater saphenous vein patch angioplasty:  Due to the very small size of the vessel and the recurrent intimal hyperplasia, a patch was necessary.  He had a good pulse in his left foot.  The previously marked left ankle greater saphenous vein was dissected free for 6 cm.  Several small branches were ligated with 4-0 silk suture and divided.  The vein was gently dilated with 0.5% Marcaine and harvested.  This was spatulated and left in the normal direction of flow.  A valve leaflet was noted on the very proximal segment.  This was then brought up to our endarterectomy in the neck and sewn to the 6 cm length with running 6-0 Prolene suture and loupe magnification.  Prior to complete closure, the shunt was removed and the vessel was flushed and blood flow was sequentially out of the ICA with a strong ICA pulse.  Stable EEG and excellent hemostasis was  noted.  Our patch measured approximately 5 mm in width throughout its length.  A small amount of Surgicel was placed on this.  Due to the dissection, we had a somewhat wet field, though no specific bleeding was noted.  Because of this, a #15 round Fidel-Knight drain was placed in the neck incision.  The sternocleidomastoid muscle was reapproximated to the medial soft tissue with interrupted 3-0 Vicryl suture.  Platysma was closed with running 3-0 Vicryl suture, and the skin was closed with 4-0 Monocryl in subcutaneous fashion.      The ankle incision was closed with interrupted 3-0 Vicryl deep and skin with 4-0 Monocryl in subcutaneous fashion.  All wounds were infiltrated with 0.5% Marcaine for postop analgesia along with Toradol 15 mg IV.  Steri-Strips and gauze dressings were applied.      The patient awoke upon the operating table, was extubated and returned to the recovery room in satisfactory condition.  Needle and sponge correct x 2.      COMPLICATIONS:  None.      ESTIMATED BLOOD LOSS:  50 mL.      OPERATIVE TIME:  3 hours and 15 minutes.  The procedure was more difficult due to the scar tissue and the small size of the artery.      OPERATIVE FINDINGS:  Extensive intimal hyperplasia that was nearly occlusive and this seems to be clinically more than 65% stenosis by the CTA.  Saphenous vein patch angioplasty was performed to reestablish an adequate luminal diameter and good flow to the ICA.         GAURAV CANCHOLA MD             D: 2019   T: 2019   MT: JUAN      Name:     EDILMA TOBAR   MRN:      [id number]        Account:        [id number]   :      1950           Procedure Date: 2019      Document: [id number]       cc: Gaurav Canchola MD

## 2019-06-06 NOTE — PLAN OF CARE
Surg: Redo left carotid endartectomy. VSS on RA. Tele: ST. A/Ox4. Neuros intact. Incisions covered with gauze, dressings cdi. CMS intact. Pt denies pain. Pt in bedrest. Tolerating regular diet. Denies N&V. Pt due to void. LS clear. Will continue to monitor.

## 2019-06-06 NOTE — PROGRESS NOTES
HOSPITALIST CONSULT/ CHART CHECK: For after hours medical cross coverage only.     - For vascular medical concerns during business hours M-F, call the [facility] at [phone] to have the rounding/on call Vascular Medicine (NOT VASCULAR SURGERY) MD paged.    - After business hours M-F, for medical concerns on this patient, please page hospitalist staff.    - For vascular surgical questions, please page the appropriate surgeon (primary vascular surgeon or on call vascular surgeon) based upon the time of day.    Dimple [facility]  Hospitalist - House JOSE  [phone]     Text Page

## 2019-06-06 NOTE — PLAN OF CARE
Discharge to home. Will follow up with dr Bustos  as instructed. Discharge education/medications complete. Transport arranged with son.

## 2019-06-06 NOTE — PLAN OF CARE
Shift [id number]: IMC. VSS. A/O x 4. Lungs: clear in upper lobes, diminished in bases throughout, RA. BS present, no BM. Voiding WDL, urine occurrence x 1. Diet: mod carb. Pain managed w/ scheduled Tylenol. CMS intact.  R/L DP pulses +1 palpable, R/L PT pulses +2 palpable. Tele: Sinus tach. Harvesting site: DANIAL site, scant amount of bloody drainage. Carotid incision: DANIAL, CDI. JOSE drain x 1: 25 ml output, bloody/bright red.

## 2019-06-06 NOTE — DISCHARGE INSTRUCTIONS
Carotid Endartectomy  Post-Operative Instructions    Typical length of stay in the hospital is 1-2 days.  On occasion, an evening in the Intensive Care Unit may be required for blood pressure regulation.    Activity    Most people are able to resume normal activities 1-2 weeks after surgery.  The key is to gradually increase your level of activity as you are able.  It is not uncommon to feel easily fatigued - this will improve with time.      You should avoid heavy lifting more than 30 lbs for 1 week.      You may return to work when you feel able - typically 1-2 weeks after surgery, depending on the type of work you do.      You should not drive a car for 1 week after surgery.  In addition,  you can not be taking prescription strength pain medication and you must feel strong enough to drive safely.    Incision Care    You can shower or bathe 2 days after surgery - avoid rubbing and soaking your incision.      You may have strips of white tape called  steri-strips  across your incision; they should stay on for 5-7 days or until the ends start to curl up.  You can then remove the steri-strips, or we will remove them at your post-operative appointment.      Avoid shaving directly over the incision for 2-3 weeks.    Common Concerns    You may notice mild skin numbness on the side of your surgery in your neck, chin, face, and earlobe.  This is due to nerve  irritation  and will gradually resolve over the next several months.  Call our office if you experience any short-lasting episode of numbness or weakness affecting one side of your body.      Some bruising and firmness around you incision can be expected.  This will soften and resolve over the next few weeks.  You may notice that some discoloration spreads to an area lower than the incision, such as the shoulder, neck or upper chest.  Likewise, swelling can occur near the incision or below the chin.  Call our office if the swelling or bruising worsens, or if you have  difficulty swallowing.    Diet    You may resume a regular diet before you leave the hospital.  You may find that it is best to try smaller, more frequent meals until your appetite returns.    Medications    You may be started on a blood thinner after surgery - typically Aspirin and / or Plavix.      You may be given a prescription for pain medication after surgery.  If you need to have this refilled, please call your pharmacy where you had it filled.  They will then call us for approval.  Please do not call after hours for a refill on pain medication.    Post-Operative Appointments    You need to see your surgeon in the clinic approximately 1-2 weeks after surgery.  Post-operative appointment:   Date: _____________________________  Time: ____________________________  Dr. ______________________________      You will have an ultrasound test and evaluation with your surgeon 90 days (3 months) after surgery.      We will notify you by mail when you are due to schedule further ultrasound testing and evaluation; typically this is done annually.     When You Should Call Our Office    Body aches, chills or temperature greater than 101      Incision redness or drainage      Loss of vision in one eye      Loss of strength / weakness in one side of your body      Severe headache      Difficulty with speech      If you will be having an invasive procedure, such as a colonoscopy or dental work within one year of your surgery, you may need to take an antibiotic prior to the procedure if you had a Dacron patch with your surgery; please call us so we can assist you with this.    Call our main number, [phone], for assistance with any concerns or questions.

## 2019-06-06 NOTE — PROGRESS NOTES
HOSPITALIST CONSULT/ CHART CHECK: For after hours medical cross coverage only.     - For vascular medical concerns during business hours M-F, call the [facility] at [phone] to have the rounding/on call Vascular Medicine (NOT VASCULAR SURGERY) MD paged.    - After business hours M-F, for medical concerns on this patient, please page hospitalist staff.    - For vascular surgical questions, please page the appropriate surgeon (primary vascular surgeon or on call vascular surgeon) based upon the time of day.     JoAnna Barthell, PA-C  6/5/2019   10:26 PM

## 2019-06-07 ENCOUNTER — TELEPHONE (OUTPATIENT)
Dept: FAMILY MEDICINE | Facility: CLINIC | Age: 69
End: 2019-06-07

## 2019-06-07 ENCOUNTER — TELEPHONE (OUTPATIENT)
Dept: OTHER | Facility: CLINIC | Age: 69
End: 2019-06-07

## 2019-06-07 DIAGNOSIS — I65.22 LEFT CAROTID STENOSIS: Primary | ICD-10-CM

## 2019-06-07 NOTE — TELEPHONE ENCOUNTER
ED/UC/IP follow up phone call: 06.06.2019  IP    RN please call to follow up. Carotid Stenosis    Number of ED visits in past 12 months = 0    Bekah FALL

## 2019-06-07 NOTE — TELEPHONE ENCOUNTER
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    Home on POD#1 with ASA/Plavic.  Feels fine today.  He slept well with no pain.    No further episodes of dizziness or visual changes.    I want him to stay on the ASA/Plavix till repeat left Carotid Duplex in 3 months.    Gaurav Bustos MD

## 2019-06-07 NOTE — TELEPHONE ENCOUNTER
ED / Discharge Outreach Protocol    Patient Contact    Attempt # 1    Was call answered?  No.  Left message on voicemail with information to call me back.  VIK Huynh RN

## 2019-06-10 NOTE — TELEPHONE ENCOUNTER
"Hospital/TCU/ED for chronic condition Discharge Protocol    \"Hi, my name is Rosemarie Huynh, a registered nurse, and I am calling from [facility].  I am calling to follow up and see how things are going for you after your recent emergency visit/hospital/TCU stay.\"    Tell me how you are doing now that you are home?\"Feeling good, no dizziness. Incision sore, neck bruised but healing- no s/sx infection. No fevers. Taking tylenol as needed for pain. Haven't taken any oxycodone.  fasting 2 days ago. No muscle aches since off lipitor and started on crestor.      Discharge Instructions    \"Let's review your discharge instructions.  What is/are the follow-up recommendations?  Pt. Response: F/U with Donya    \"Has an appointment with your primary care provider been scheduled?\"   Yes. (confirm) 10-17-19    \"When you see the provider, I would recommend that you bring your medications with you.\"    Medications    \"Tell me what changed about your medicines when you discharged?\"    Changes to chronic meds?      \"What questions do you have about your medications?\"    None     New diagnoses of heart failure, COPD, diabetes, or MI?    No    Post Discharge Medication Reconciliation Status: medication reconcilation previously completed during another office visit.              Was MTM referral placed (*Make sure to put transitions as reason for referral)?   No    Call Summary    \"What questions or concerns do you have about your recent visit and your follow-up care?\"     none    \"If you have questions or things don't continue to improve, we encourage you contact us through the main clinic number (give number).  Even if the clinic is not open, triage nurses are available 24/7 to help you.     We would like you to know that our clinic has extended hours (provide information).  We also have urgent care (provide details on closest location and hours/contact info)\"      \"Thank you for your time and take care!\"           "

## 2019-06-13 ENCOUNTER — TELEPHONE (OUTPATIENT)
Dept: OTHER | Facility: CLINIC | Age: 69
End: 2019-06-13

## 2019-06-13 NOTE — TELEPHONE ENCOUNTER
[place] [facility]ASC[facility]    Roscoe Jang called me today about his redo left CEA performed on 6/5/2019.  He had undergone a CEA a year previously bilaterally with no recurrence on the right.  He was having visual symptoms in his left along with dizziness.    He reports he is done very well following surgery.  No further dizzy episodes or visual changes.  Due to the recurrent stenosis from intimal hyperplasia we will keep him on the Plavix along with aspirin for 3 months.  Follow-up duplex ultrasound in the office will be scheduled at that time.  He had no further questions.      Gaurav Bustos MD

## 2019-06-17 ENCOUNTER — OFFICE VISIT (OUTPATIENT)
Dept: FAMILY MEDICINE | Facility: CLINIC | Age: 69
End: 2019-06-17
Payer: COMMERCIAL

## 2019-06-17 VITALS
HEIGHT: 68 IN | SYSTOLIC BLOOD PRESSURE: 144 MMHG | TEMPERATURE: 97.7 F | WEIGHT: 243 LBS | HEART RATE: 89 BPM | DIASTOLIC BLOOD PRESSURE: 90 MMHG | OXYGEN SATURATION: 97 % | RESPIRATION RATE: 12 BRPM | BODY MASS INDEX: 36.83 KG/M2

## 2019-06-17 DIAGNOSIS — I10 BENIGN ESSENTIAL HYPERTENSION: Primary | ICD-10-CM

## 2019-06-17 DIAGNOSIS — E11.65 TYPE 2 DIABETES MELLITUS WITH HYPERGLYCEMIA, WITHOUT LONG-TERM CURRENT USE OF INSULIN (H): ICD-10-CM

## 2019-06-17 DIAGNOSIS — M79.10 MYALGIA: ICD-10-CM

## 2019-06-17 DIAGNOSIS — I65.22 CAROTID STENOSIS, LEFT: ICD-10-CM

## 2019-06-17 PROCEDURE — 99214 OFFICE O/P EST MOD 30 MIN: CPT | Performed by: NURSE PRACTITIONER

## 2019-06-17 RX ORDER — CLOPIDOGREL BISULFATE 75 MG/1
75 TABLET ORAL DAILY
Qty: 30 TABLET | Refills: 2 | Status: SHIPPED | OUTPATIENT
Start: 2019-06-17 | End: 2019-10-14

## 2019-06-17 RX ORDER — LISINOPRIL 20 MG/1
40 TABLET ORAL DAILY
Qty: 90 TABLET | Refills: 1 | COMMUNITY
Start: 2019-06-17 | End: 2019-07-01

## 2019-06-17 RX ORDER — ROSUVASTATIN CALCIUM 20 MG/1
20 TABLET, COATED ORAL DAILY
Qty: 90 TABLET | Refills: 3 | Status: SHIPPED | OUTPATIENT
Start: 2019-06-17 | End: 2020-08-24

## 2019-06-17 RX ORDER — GLIMEPIRIDE 4 MG/1
4 TABLET ORAL
Qty: 90 TABLET | Refills: 3 | Status: SHIPPED | OUTPATIENT
Start: 2019-06-17 | End: 2020-08-24

## 2019-06-17 ASSESSMENT — MIFFLIN-ST. JEOR: SCORE: 1851.74

## 2019-06-17 NOTE — PATIENT INSTRUCTIONS
Increase lisinopril to 40 mg (take of the 20 mg)     Low sodium diet   Increase activity when able       Nurse in 2 week    See me in 1 month

## 2019-06-17 NOTE — NURSING NOTE
"Chief Complaint   Patient presents with     Surgical Followup     06/5/19 [facility], left carotid stenosis        Initial /90   Pulse 89   Temp 97.7  F (36.5  C) (Tympanic)   Resp 12   Ht 1.727 m (5' 8\")   Wt 110.2 kg (243 lb)   SpO2 97%   BMI 36.95 kg/m   Estimated body mass index is 36.95 kg/m  as calculated from the following:    Height as of this encounter: 1.727 m (5' 8\").    Weight as of this encounter: 110.2 kg (243 lb).    Patient presents to the clinic using No DME    Health Maintenance that is potentially due pending provider review:  Colonoscopy/FIT    Pt declines to have done right now.    Is there anyone who you would like to be able to receive your results? No  If yes have patient fill out JARVIS    "

## 2019-06-17 NOTE — PROGRESS NOTES
SUBJECTIVE   Rosceo Jang is a 68 year old male who presents with      Concern - follow up from surgery on 6/15/19 REDO LEFT CAROTID ENDARTERECTOMY WITH EEG with greater saphenous vein graft (Left Neck)       Onset: left carotid stenosis    Description:   Pt is not feeling dizzy anymore, only concern is having high blood pressures in the morning 158/90, left hand is also swollen and bruised,- had IV infiltrated     lipitor switched to crestor- muscles are better   Metformin was reduced to 500 mg twice a day and amaryl was added in- GI upset is improved  Reports that shortness of breath feels better as well- holding on stress test         PCP   Mel Pappas, NOAH [phone]    Health Maintenance        Health Maintenance Due   Topic Date Due     ADVANCED DIRECTIVE PLANNING  1950     EYE EXAM  1950     COLONOSCOPY  07/17/1961     ZOSTER IMMUNIZATION (2 of 3) 09/27/2013       HPI        Patient Active Problem List   Diagnosis     Benign essential hypertension     Mixed hyperlipidemia     Obesity     Abdominal aortic aneurysm (H)     Fatty liver     Type 2 diabetes mellitus with hyperglycemia, without long-term current use of insulin (H)     Morbid obesity (H)     Bilateral carotid artery disease (H)     Carotid artery stenosis, symptomatic, right     Carotid stenosis, asymptomatic     Carotid stenosis, left     Carotid artery disease (H)     Current Outpatient Medications   Medication     aspirin 81 MG EC tablet     blood glucose (CONTOUR NEXT TEST) test strip     blood glucose monitoring (RHYS CONTOUR NEXT) test strip     blood glucose monitoring (RHYS MICROLET) lancets     clopidogrel (PLAVIX) 75 MG tablet     glimepiride (AMARYL) 4 MG tablet     lisinopril (PRINIVIL/ZESTRIL) 20 MG tablet     metFORMIN (GLUCOPHAGE) 500 MG tablet     oxyCODONE (ROXICODONE) 5 MG tablet     rosuvastatin (CRESTOR) 20 MG tablet     No current facility-administered medications for this visit.        Patient Active  "Problem List   Diagnosis     Benign essential hypertension     Mixed hyperlipidemia     Obesity     Abdominal aortic aneurysm (H)     Fatty liver     Type 2 diabetes mellitus with hyperglycemia, without long-term current use of insulin (H)     Morbid obesity (H)     Bilateral carotid artery disease (H)     Carotid artery stenosis, symptomatic, right     Carotid stenosis, asymptomatic     Carotid stenosis, left     Carotid artery disease (H)     Past Surgical History:   Procedure Laterality Date     ENDARTERECTOMY CAROTID Right 2018    Procedure: ENDARTERECTOMY CAROTID;  RIGHT CAROTID ENDARTERECTOMY WITH EEG;  Surgeon: Gaurav Bustso MD;  Location:  OR     ENDARTERECTOMY CAROTID Left 2018    Procedure: ENDARTERECTOMY CAROTID;  LEFT CAROTID ENDARTERECTOMY with EXTERNAL JUGULAR VEIN PATCH;  Surgeon: Gaurav Bustos MD;  Location:  OR     ENDARTERECTOMY CAROTID Left 2019    Procedure: REDO LEFT CAROTID ENDARTERECTOMY WITH EEG with greater saphenous vein graft;  Surgeon: Gaurav Bustos MD;  Location:  OR       Social History     Tobacco Use     Smoking status: Former Smoker     Last attempt to quit: 10/3/2007     Years since quittin.7     Smokeless tobacco: Never Used   Substance Use Topics     Alcohol use: No     History reviewed. No pertinent family history.        Reviewed and updated:  Tobacco  Allergies  Meds  Med Hx  Surg Hx  Fam Hx  Soc Hx     ROS:  Constitutional, HEENT, cardiovascular, pulmonary, gi and gu systems are negative, except as otherwise noted.    PHYSICAL EXAM   /90   Pulse 89   Temp 97.7  F (36.5  C) (Tympanic)   Resp 12   Ht 1.727 m (5' 8\")   Wt 110.2 kg (243 lb)   SpO2 97%   BMI 36.95 kg/m    Body mass index is 36.95 kg/m .  GENERAL: healthy, alert and no distress  EYES: Eyes grossly normal to inspection, PERRL and conjunctivae and sclerae normal  HENT: ear canals and TM's normal, nose and mouth without ulcers or lesions  NECK: no " adenopathy, no asymmetry, masses, or scars and thyroid normal to palpation  RESP: lungs clear to auscultation - no rales, rhonchi or wheezes  CV: regular rate and rhythm, normal S1 S2, no S3 or S4, no murmur, click or rub, no peripheral edema and peripheral pulses strong  ABDOMEN: soft, nontender, no hepatosplenomegaly, no masses and bowel sounds normal  MS: no gross musculoskeletal defects noted, no edema  SKIN: large incision on left side of neck and left medial ankle- healing well without signs of infection   Fading ecchymosis on left dorsal portion of hand   NEURO: Normal strength and tone, mentation intact and speech normal  PSYCH: mentation appears normal, affect normal/bright    Assessment & Plan     1. Carotid stenosis, left   S/p REDO LEFT CAROTID ENDARTERECTOMY WITH EEG with greater saphenous vein graft (Left Neck)     Doing well   Symptoms resolved   On statin ASA and plavix   Has follow up with vascular in Sept    - clopidogrel (PLAVIX) 75 MG tablet; Take 1 tablet (75 mg) by mouth daily  Dispense: 30 tablet; Refill: 2  - rosuvastatin (CRESTOR) 20 MG tablet; Take 1 tablet (20 mg) by mouth daily  Dispense: 90 tablet; Refill: 3    2. Type 2 diabetes mellitus with hyperglycemia, without long-term current use of insulin (H)  Metformin was reduced to 500 mg twice a day due to diarrhea this is improved   Added in amaryl   BG running 100-170's   Recheck A1C in 3 months   - glimepiride (AMARYL) 4 MG tablet; Take 1 tablet (4 mg) by mouth every morning (before breakfast)  Dispense: 90 tablet; Refill: 3  - rosuvastatin (CRESTOR) 20 MG tablet; Take 1 tablet (20 mg) by mouth daily  Dispense: 90 tablet; Refill: 3    3. Benign essential hypertension  High today   Increase lisinopril to 40 mg   Recheck in 2 weeks   - lisinopril (PRINIVIL/ZESTRIL) 20 MG tablet; Take 2 tablets (40 mg) by mouth daily  Dispense: 90 tablet; Refill: 1    4. Myalgia  Improved since changing to rosuvastatin        BMI:   Estimated body mass  "index is 36.95 kg/m  as calculated from the following:    Height as of this encounter: 1.727 m (5' 8\").    Weight as of this encounter: 110.2 kg (243 lb).   Weight management plan: Discussed healthy diet and exercise guidelines        See Patient Instructions    Return in about 2 weeks (around 7/1/2019) for BP Recheck.    Mel Pappas NP  [facility]      Risks, benefits, side effects and rationale for treatment plan fully discussed with the patient and understanding expressed.          "

## 2019-07-01 ENCOUNTER — ALLIED HEALTH/NURSE VISIT (OUTPATIENT)
Dept: FAMILY MEDICINE | Facility: CLINIC | Age: 69
End: 2019-07-01
Payer: COMMERCIAL

## 2019-07-01 VITALS — SYSTOLIC BLOOD PRESSURE: 120 MMHG | DIASTOLIC BLOOD PRESSURE: 64 MMHG | HEART RATE: 88 BPM

## 2019-07-01 DIAGNOSIS — I10 BENIGN ESSENTIAL HYPERTENSION: Primary | ICD-10-CM

## 2019-07-01 DIAGNOSIS — I10 BENIGN ESSENTIAL HYPERTENSION: ICD-10-CM

## 2019-07-01 LAB
ANION GAP SERPL CALCULATED.3IONS-SCNC: 3 MMOL/L (ref 3–14)
BUN SERPL-MCNC: 15 MG/DL (ref 7–30)
CALCIUM SERPL-MCNC: 8.9 MG/DL (ref 8.5–10.1)
CHLORIDE SERPL-SCNC: 106 MMOL/L (ref 94–109)
CO2 SERPL-SCNC: 28 MMOL/L (ref 20–32)
CREAT SERPL-MCNC: 0.89 MG/DL (ref 0.66–1.25)
GFR SERPL CREATININE-BSD FRML MDRD: 88 ML/MIN/{1.73_M2}
GLUCOSE SERPL-MCNC: 244 MG/DL (ref 70–99)
POTASSIUM SERPL-SCNC: 4.4 MMOL/L (ref 3.4–5.3)
SODIUM SERPL-SCNC: 137 MMOL/L (ref 133–144)

## 2019-07-01 PROCEDURE — 99207 ZZC NO CHARGE NURSE ONLY: CPT

## 2019-07-01 PROCEDURE — 36415 COLL VENOUS BLD VENIPUNCTURE: CPT | Performed by: NURSE PRACTITIONER

## 2019-07-01 PROCEDURE — 80048 BASIC METABOLIC PNL TOTAL CA: CPT | Performed by: NURSE PRACTITIONER

## 2019-07-01 RX ORDER — LISINOPRIL 40 MG/1
40 TABLET ORAL DAILY
Qty: 90 TABLET | Refills: 1 | Status: SHIPPED | OUTPATIENT
Start: 2019-07-01 | End: 2020-03-23

## 2019-07-01 NOTE — PROGRESS NOTES
S-(situation): RN visit for BP check in F/U to 06-17-19 lisinopril increase. Also requesting RN to check skin tears lt forearm sustained 1-2 days ago- caught arm on metal object.    B-(background): Currently taking lisinopril 40 mg daily. Denies s/e. Reports home readings improved- 121/80.   Component      Latest Ref Rng & Units 6/6/2019   Sodium      133 - 144 mmol/L 141   Potassium      3.4 - 5.3 mmol/L 4.3   Chloride      94 - 109 mmol/L 109   Carbon Dioxide      20 - 32 mmol/L 27   Anion Gap      3 - 14 mmol/L 5   Glucose      70 - 99 mg/dL 142 (H)   Urea Nitrogen      7 - 30 mg/dL 20   Creatinine      0.66 - 1.25 mg/dL 0.99   GFR Estimate      >60 mL/min/1.73:m2 78   GFR Estimate If Black      >60 mL/min/1.73:m2 >90   Calcium      8.5 - 10.1 mg/dL 8.3 (L)     A-(assessment):   BP Readings from Last 6 Encounters:   07/01/19 120/64   06/17/19 144/90   06/06/19 134/69   06/03/19 130/70   04/23/19 138/82   04/18/19 144/80     HR 88.  Reducing sodium intake.  Bruises easily from being on Plavix. Note 2 superficial skin tears rt forearm- distal area near wrist approx 0.5 cm diam. Other area in close proximity 3 corner tear 1- 1.5 cm each side. Skin flap intact. No current bleeding but states had bled last night, applied pressure and band aide. No s/sx infection. Areas cleansed with spray wound cleanser, patted dry. Bacitracin oint applied, covered with non- stick drsg and lg band aide. Tetanus up- to - date.   Donya updated on BP and skin tears. Advises continue lisinopril 40 mg daily, continue home BP monitoring. Lab only BMP today.     R-(recommendations): Pt informed/ advised as noted per Donya. Lisinopril refilled. Reviewed BP goal < 140/90.  Advised daily wound care, keep clean/ dry. Monitor for s/sx infection, F/U as needed.  Pt voices understanding/ agreement.  VIK Huynh RN

## 2019-07-16 DIAGNOSIS — I65.23 BILATERAL CAROTID ARTERY STENOSIS: Primary | ICD-10-CM

## 2019-09-23 DIAGNOSIS — I10 BENIGN ESSENTIAL HYPERTENSION: ICD-10-CM

## 2019-09-23 RX ORDER — LISINOPRIL 20 MG/1
TABLET ORAL
Qty: 90 TABLET | Refills: 1 | Status: SHIPPED | OUTPATIENT
Start: 2019-09-23 | End: 2019-10-14 | Stop reason: DRUGHIGH

## 2019-10-14 ENCOUNTER — OFFICE VISIT (OUTPATIENT)
Dept: FAMILY MEDICINE | Facility: CLINIC | Age: 69
End: 2019-10-14
Payer: COMMERCIAL

## 2019-10-14 VITALS
WEIGHT: 253 LBS | SYSTOLIC BLOOD PRESSURE: 110 MMHG | HEIGHT: 68 IN | RESPIRATION RATE: 12 BRPM | TEMPERATURE: 97 F | HEART RATE: 80 BPM | OXYGEN SATURATION: 96 % | DIASTOLIC BLOOD PRESSURE: 78 MMHG | BODY MASS INDEX: 38.34 KG/M2

## 2019-10-14 DIAGNOSIS — E11.65 TYPE 2 DIABETES MELLITUS WITH HYPERGLYCEMIA, WITHOUT LONG-TERM CURRENT USE OF INSULIN (H): ICD-10-CM

## 2019-10-14 DIAGNOSIS — I65.23 BILATERAL CAROTID ARTERY STENOSIS: ICD-10-CM

## 2019-10-14 DIAGNOSIS — Z00.00 ENCOUNTER FOR MEDICARE ANNUAL WELLNESS EXAM: Primary | ICD-10-CM

## 2019-10-14 DIAGNOSIS — I10 BENIGN ESSENTIAL HYPERTENSION: ICD-10-CM

## 2019-10-14 LAB
ANION GAP SERPL CALCULATED.3IONS-SCNC: 3 MMOL/L (ref 3–14)
BUN SERPL-MCNC: 17 MG/DL (ref 7–30)
CALCIUM SERPL-MCNC: 9.1 MG/DL (ref 8.5–10.1)
CHLORIDE SERPL-SCNC: 106 MMOL/L (ref 94–109)
CO2 SERPL-SCNC: 29 MMOL/L (ref 20–32)
CREAT SERPL-MCNC: 0.91 MG/DL (ref 0.66–1.25)
CREAT UR-MCNC: 152 MG/DL
GFR SERPL CREATININE-BSD FRML MDRD: 86 ML/MIN/{1.73_M2}
GLUCOSE SERPL-MCNC: 131 MG/DL (ref 70–99)
HBA1C MFR BLD: 7 % (ref 0–5.6)
MICROALBUMIN UR-MCNC: 12 MG/L
MICROALBUMIN/CREAT UR: 7.83 MG/G CR (ref 0–17)
POTASSIUM SERPL-SCNC: 4.3 MMOL/L (ref 3.4–5.3)
SODIUM SERPL-SCNC: 138 MMOL/L (ref 133–144)

## 2019-10-14 PROCEDURE — 80048 BASIC METABOLIC PNL TOTAL CA: CPT | Performed by: NURSE PRACTITIONER

## 2019-10-14 PROCEDURE — 36415 COLL VENOUS BLD VENIPUNCTURE: CPT | Performed by: NURSE PRACTITIONER

## 2019-10-14 PROCEDURE — G0008 ADMIN INFLUENZA VIRUS VAC: HCPCS | Performed by: NURSE PRACTITIONER

## 2019-10-14 PROCEDURE — G0438 PPPS, INITIAL VISIT: HCPCS | Performed by: NURSE PRACTITIONER

## 2019-10-14 PROCEDURE — 82043 UR ALBUMIN QUANTITATIVE: CPT | Performed by: NURSE PRACTITIONER

## 2019-10-14 PROCEDURE — 83036 HEMOGLOBIN GLYCOSYLATED A1C: CPT | Performed by: NURSE PRACTITIONER

## 2019-10-14 PROCEDURE — 90662 IIV NO PRSV INCREASED AG IM: CPT | Performed by: NURSE PRACTITIONER

## 2019-10-14 ASSESSMENT — ENCOUNTER SYMPTOMS
DIARRHEA: 0
COUGH: 0
EYE PAIN: 0
DIZZINESS: 0
CONSTIPATION: 0
CHILLS: 0
NERVOUS/ANXIOUS: 0
HEMATURIA: 0
ABDOMINAL PAIN: 0
FREQUENCY: 0
FEVER: 0
HEMATOCHEZIA: 0

## 2019-10-14 ASSESSMENT — ACTIVITIES OF DAILY LIVING (ADL): CURRENT_FUNCTION: NO ASSISTANCE NEEDED

## 2019-10-14 ASSESSMENT — MIFFLIN-ST. JEOR: SCORE: 1892.1

## 2019-10-14 ASSESSMENT — PAIN SCALES - GENERAL: PAINLEVEL: NO PAIN (0)

## 2019-10-14 NOTE — PATIENT INSTRUCTIONS
A1C 7- today   would like to repeat this in 3 months   With lab only appointment   No change in medication today     Patient Education   Personalized Prevention Plan  You are due for the preventive services outlined below.  Your care team is available to assist you in scheduling these services.  If you have already completed any of these items, please share that information with your care team to update in your medical record.  Health Maintenance Due   Topic Date Due     Discuss Advance Care Planning  1950     Eye Exam  1950     Colonscopy  07/17/1961     Zoster (Shingles) Vaccine (2 of 3) 09/27/2013     Flu Vaccine (1) 09/01/2019     Kidney Microalbumin Urine Test  10/02/2019     Annual Wellness Visit  10/02/2019       Exercise for a Healthier Heart     Exercise with a friend. When activity is fun, you're more likely to stick with it.   You may wonder how you can improve the health of your heart. If you re thinking about exercise, you re on the right track. You don t need to become an athlete, but you do need a certain amount of brisk exercise to help strengthen your heart. If you have been diagnosed with a heart condition, your doctor may recommend exercise to help stabilize your condition. To help make exercise a habit, choose safe, fun activities.  Be sure to check with your healthcare provider before starting an exercise program.   Why exercise?  Exercising regularly offers many healthy rewards. It can help you do all of the following:    Improve your blood cholesterol level to help prevent further heart trouble    Lower your blood pressure to help prevent a stroke or heart attack    Control diabetes, or reduce your risk of getting this disease    Improve your heart and lung function    Reach and maintain a healthy weight    Make your muscles stronger and more limber so you can stay active    Prevent falls and fractures by slowing the loss of bone mass (osteoporosis)    Manage stress  better    Reduce your blood pressure    Improve your sense of self and your body image  Exercise tips  Ease into your routine. Set small goals. Then build on them.  Exercise on most days. Aim for a total of 150 or more minutes of moderate to  vigorous intensity activity each week. Consider 40 minutes, 3 to 4 times a week. For best results, activity should last for 40 minutes on average. It is OK to work up to the 40 minute period over time. Examples of moderate-intensity activity is walking 1 mile in 15 minutes or 30 to 45 minutes of yard work.  Step up your daily activity level. Along with your exercise program, try being more active throughout the day. Walk instead of drive. Do more household tasks or yard work.  Choose one or more activities you enjoy. Walking is one of the easiest things you can do. You can also try swimming, riding a bike, dancing, or taking an exercise class.  Stop exercising and call your doctor if you:    Have chest pain or feel dizzy or lightheaded    Feel burning, tightness, pressure, or heaviness in your chest, neck, shoulders, back, or arms    Have unusual shortness of breath    Have increased joint or muscle pain    Have palpitations or an irregular heartbeat   Date Last Reviewed: 5/1/2016 2000-2018 [employer]. [address] [place] [address], [place] [address]. All rights reserved. This information is not intended as a substitute for professional medical care. Always follow your healthcare professional's instructions.

## 2019-10-14 NOTE — PROGRESS NOTES
"SUBJECTIVE:   Roscoe Jang is a 69 year old male who presents for Preventive Visit.  Are you in the first 12 months of your Medicare coverage?  No    Healthy Habits:     In general, how would you rate your overall health?  Good    Frequency of exercise:  None    Do you usually eat at least 4 servings of fruit and vegetables a day, include whole grains    & fiber and avoid regularly eating high fat or \"junk\" foods?  Yes    Taking medications regularly:  Yes    Medication side effects:  None    Ability to successfully perform activities of daily living:  No assistance needed    Home Safety:  No safety concerns identified    Hearing Impairment:  No hearing concerns    In the past 6 months, have you been bothered by leaking of urine?  No    In general, how would you rate your overall mental or emotional health?  Excellent      PHQ-2 Total Score: 0    Additional concerns today:  No    Do you feel safe in your environment? Yes    Do you have a Health Care Directive? No: Advance care planning reviewed with patient; information given to patient to review.      Fall risk  Fallen 2 or more times in the past year?: No  Any fall with injury in the past year?: No    Cognitive Screening   1) Repeat 3 items (Leader, Season, Table)    2) Clock draw: NORMAL  3) 3 item recall: Recalls 2 objects   Results: NORMAL clock, 1-2 items recalled: COGNITIVE IMPAIRMENT LESS LIKELY    Mini-CogTM Copyright RAMAN Patel. Licensed by the author for use in [facility]; reprinted with permission (jarett@.[facility]). All rights reserved.      Do you have sleep apnea, excessive snoring or daytime drowsiness?: no    Reviewed and updated as needed this visit by clinical staff  Tobacco  Allergies  Meds         Reviewed and updated as needed this visit by Provider        Social History     Tobacco Use     Smoking status: Former Smoker     Last attempt to quit: 10/3/2007     Years since quittin.0     Smokeless tobacco: Never Used   Substance " Use Topics     Alcohol use: No     If you drink alcohol do you typically have >3 drinks per day or >7 drinks per week? No    Alcohol Use 10/14/2019   Prescreen: >3 drinks/day or >7 drinks/week? Not Applicable   Prescreen: >3 drinks/day or >7 drinks/week? -       Diabetes Follow-up      How often are you checking your blood sugar? One time daily    What time of day are you checking your blood sugars (select all that apply)?  Before meals    Have you had any blood sugars above 200?  No    Have you had any blood sugars below 70?  No    What symptoms do you notice when your blood sugar is low?  Shaky when he doesn't eat    What concerns do you have today about your diabetes? None     Do you have any of these symptoms? (Select all that apply)  Numbness in feet     Have you had a diabetic eye exam in the last 12 months? Yes- Date of last eye exam: 9/19     BP Readings from Last 2 Encounters:   10/14/19 110/78   07/01/19 120/64     Hemoglobin A1C (%)   Date Value   10/14/2019 7.0 (H)   06/05/2019 7.2 (H)     LDL Cholesterol Calculated (mg/dL)   Date Value   06/03/2019 20   05/11/2018 45     LDL Cholesterol Direct (mg/dL)   Date Value   10/02/2018 48       Diabetes Management Resources    Current providers sharing in care for this patient include:   Patient Care Team:  Mel Pappas NP as PCP - General (Nurse Practitioner - Adult Health)  Mel Pappas NP as Assigned PCP    The following health maintenance items are reviewed in Epic and correct as of today:  Health Maintenance   Topic Date Due     ADVANCE CARE PLANNING  1950     EYE EXAM  1950     COLONOSCOPY  07/17/1961     ZOSTER IMMUNIZATION (2 of 3) 09/27/2013     INFLUENZA VACCINE (1) 09/01/2019     MICROALBUMIN  10/02/2019     MEDICARE ANNUAL WELLNESS VISIT  10/02/2019     DIABETIC FOOT EXAM  11/19/2019     A1C  12/05/2019     FALL RISK ASSESSMENT  04/18/2020     LIPID  06/03/2020     BMP  07/01/2020     TSH W/FREE T4 REFLEX  06/03/2021      "DTAP/TDAP/TD IMMUNIZATION (3 - Td) 01/06/2022     HEPATITIS C SCREENING  Completed     PHQ-2  Completed     PNEUMOCOCCAL IMMUNIZATION 65+ LOW/MEDIUM RISK  Completed     AORTIC ANEURYSM SCREENING (SYSTEM ASSIGNED)  Completed     IPV IMMUNIZATION  Aged Out     MENINGITIS IMMUNIZATION  Aged Out     Lab work is in process  Pneumonia Vaccine: up to date    Review of Systems   Constitutional: Negative for chills and fever.   HENT: Negative for congestion and ear pain.    Eyes: Negative for pain.   Respiratory: Negative for cough.    Cardiovascular: Negative for chest pain.   Gastrointestinal: Negative for abdominal pain, constipation, diarrhea and hematochezia.   Genitourinary: Negative for frequency and hematuria.   Neurological: Negative for dizziness.   Psychiatric/Behavioral: The patient is not nervous/anxious.          OBJECTIVE:   /78   Pulse 80   Temp 97  F (36.1  C) (Tympanic)   Resp 12   Ht 1.727 m (5' 8\")   Wt 114.8 kg (253 lb)   SpO2 96%   BMI 38.47 kg/m   Estimated body mass index is 38.47 kg/m  as calculated from the following:    Height as of this encounter: 1.727 m (5' 8\").    Weight as of this encounter: 114.8 kg (253 lb).  Physical Exam  GENERAL: healthy, alert and no distress  NECK: no adenopathy, no asymmetry, masses, or scars and thyroid normal to palpation  RESP: lungs clear to auscultation - no rales, rhonchi or wheezes  CV: regular rate and rhythm, normal S1 S2, no S3 or S4, no murmur, click or rub, no peripheral edema and peripheral pulses strong  ABDOMEN: soft, nontender, no hepatosplenomegaly, no masses and bowel sounds normal  MS: no gross musculoskeletal defects noted, no edema  SKIN: no suspicious lesions or rashes  PSYCH: mentation appears normal, affect normal/bright    Diagnostic Test Results:  Labs reviewed in Epic  Results for orders placed or performed in visit on 10/14/19   Hemoglobin A1c   Result Value Ref Range    Hemoglobin A1C 7.0 (H) 0 - 5.6 %   Basic metabolic panel  " "(Ca, Cl, CO2, Creat, Gluc, K, Na, BUN)   Result Value Ref Range    Sodium 138 133 - 144 mmol/L    Potassium 4.3 3.4 - 5.3 mmol/L    Chloride 106 94 - 109 mmol/L    Carbon Dioxide 29 20 - 32 mmol/L    Anion Gap 3 3 - 14 mmol/L    Glucose 131 (H) 70 - 99 mg/dL    Urea Nitrogen 17 7 - 30 mg/dL    Creatinine 0.91 0.66 - 1.25 mg/dL    GFR Estimate 86 >60 mL/min/[1.73_m2]    GFR Estimate If Black >90 >60 mL/min/[1.73_m2]    Calcium 9.1 8.5 - 10.1 mg/dL       ASSESSMENT / PLAN:   1. Encounter for Medicare annual wellness exam      2. Type 2 diabetes mellitus with hyperglycemia, without long-term current use of insulin (H)  Stable on metformin and glimepiride   - Hemoglobin A1c  - Basic metabolic panel  (Ca, Cl, CO2, Creat, Gluc, K, Na, BUN)  - Albumin Random Urine Quantitative with Creat Ratio  - Hemoglobin A1c; Future    3. Benign essential hypertension  Stable on lisinopril     4. Bilateral carotid artery stenosis  S/P endarterectomy   Denies any further symptoms       End of Life Planning:  Patient currently has an advanced directive: No.  I have verified the patient's ablity to prepare an advanced directive/make health care decisions.  Literature was provided to assist patient in preparing an advanced directive.    COUNSELING:  Reviewed preventive health counseling, as reflected in patient instructions       Regular exercise       Healthy diet/nutrition       Immunizations    Vaccinated for: Influenza          Estimated body mass index is 38.47 kg/m  as calculated from the following:    Height as of this encounter: 1.727 m (5' 8\").    Weight as of this encounter: 114.8 kg (253 lb).    Weight management plan: Discussed healthy diet and exercise guidelines     reports that he quit smoking about 12 years ago. He has never used smokeless tobacco.      Appropriate preventive services were discussed with this patient, including applicable screening as appropriate for cardiovascular disease, diabetes, osteopenia/osteoporosis, " and glaucoma.  As appropriate for age/gender, discussed screening for colorectal cancer, prostate cancer, breast cancer, and cervical cancer. Checklist reviewing preventive services available has been given to the patient.    Reviewed patients plan of care and provided an AVS. The Basic Care Plan (routine screening as documented in Health Maintenance) for Roscoe meets the Care Plan requirement. This Care Plan has been established and reviewed with the Patient.    Counseling Resources:  ATP IV Guidelines  Pooled Cohorts Equation Calculator  Breast Cancer Risk Calculator  FRAX Risk Assessment  ICSI Preventive Guidelines  Dietary Guidelines for Americans, 2010  USDA's MyPlate  ASA Prophylaxis  Lung CA Screening    Mel Pappas NP  [facility]    Identified Health Risks:

## 2019-10-14 NOTE — PROGRESS NOTES
He is at risk for lack of exercise and has been provided with information to increase physical activity for the benefit of his well-being.

## 2019-10-14 NOTE — NURSING NOTE
"Chief Complaint   Patient presents with     Physical     /78   Pulse 80   Temp 97  F (36.1  C) (Tympanic)   Resp 12   Ht 1.727 m (5' 8\")   Wt 114.8 kg (253 lb)   SpO2 96%   BMI 38.47 kg/m   Estimated body mass index is 38.47 kg/m  as calculated from the following:    Height as of this encounter: 1.727 m (5' 8\").    Weight as of this encounter: 114.8 kg (253 lb).  Patient presents to the clinic using No DME      Health Maintenance that is potentially due pending provider review:    Health Maintenance Due   Topic Date Due     ADVANCE CARE PLANNING  1950     EYE EXAM  1950     COLONOSCOPY  07/17/1961     ZOSTER IMMUNIZATION (2 of 3) 09/27/2013     INFLUENZA VACCINE (1) 09/01/2019     MICROALBUMIN  10/02/2019     MEDICARE ANNUAL WELLNESS VISIT  10/02/2019        Eye exam done 1 month ago.        "

## 2019-10-14 NOTE — LETTER
October 16, 2019      Roscoe Jang  [address] [address] [address]        Dear ,    We are writing to inform you of your test results.    stable labs    Resulted Orders   Hemoglobin A1c   Result Value Ref Range    Hemoglobin A1C 7.0 (H) 0 - 5.6 %      Comment:      Normal <5.7% Prediabetes 5.7-6.4%  Diabetes 6.5% or higher - adopted from ADA   consensus guidelines.     Basic metabolic panel  (Ca, Cl, CO2, Creat, Gluc, K, Na, BUN)   Result Value Ref Range    Sodium 138 133 - 144 mmol/L    Potassium 4.3 3.4 - 5.3 mmol/L    Chloride 106 94 - 109 mmol/L    Carbon Dioxide 29 20 - 32 mmol/L    Anion Gap 3 3 - 14 mmol/L    Glucose 131 (H) 70 - 99 mg/dL      Comment:      Fasting specimen    Urea Nitrogen 17 7 - 30 mg/dL    Creatinine 0.91 0.66 - 1.25 mg/dL    GFR Estimate 86 >60 mL/min/[1.73_m2]      Comment:      Non  GFR Calc  Starting 12/18/2018, serum creatinine based estimated GFR (eGFR) will be   calculated using the Chronic Kidney Disease Epidemiology Collaboration   (CKD-EPI) equation.      GFR Estimate If Black >90 >60 mL/min/[1.73_m2]      Comment:       GFR Calc  Starting 12/18/2018, serum creatinine based estimated GFR (eGFR) will be   calculated using the Chronic Kidney Disease Epidemiology Collaboration   (CKD-EPI) equation.      Calcium 9.1 8.5 - 10.1 mg/dL   Albumin Random Urine Quantitative with Creat Ratio   Result Value Ref Range    Creatinine Urine 152 mg/dL    Albumin Urine mg/L 12 mg/L    Albumin Urine mg/g Cr 7.83 0 - 17 mg/g Cr       If you have any questions or concerns, please call the clinic at the number listed above.       Sincerely,        Mel Pappas NP/Dante

## 2019-10-17 ENCOUNTER — HOSPITAL ENCOUNTER (OUTPATIENT)
Dept: ULTRASOUND IMAGING | Facility: CLINIC | Age: 69
Discharge: HOME | End: 2019-10-17
Attending: SURGERY | Admitting: SURGERY
Payer: MEDICARE

## 2019-10-17 ENCOUNTER — OFFICE VISIT (OUTPATIENT)
Dept: OTHER | Facility: CLINIC | Age: 69
End: 2019-10-17
Attending: SURGERY
Payer: MEDICARE

## 2019-10-17 VITALS — DIASTOLIC BLOOD PRESSURE: 84 MMHG | SYSTOLIC BLOOD PRESSURE: 149 MMHG | HEART RATE: 90 BPM

## 2019-10-17 DIAGNOSIS — Z98.890 HISTORY OF BILATERAL CAROTID ENDARTERECTOMY: Primary | ICD-10-CM

## 2019-10-17 DIAGNOSIS — E78.5 HYPERLIPIDEMIA LDL GOAL <70: ICD-10-CM

## 2019-10-17 DIAGNOSIS — I65.23 BILATERAL CAROTID ARTERY STENOSIS: ICD-10-CM

## 2019-10-17 PROCEDURE — 99214 OFFICE O/P EST MOD 30 MIN: CPT | Mod: ZP | Performed by: SURGERY

## 2019-10-17 PROCEDURE — G0463 HOSPITAL OUTPT CLINIC VISIT: HCPCS

## 2019-10-17 PROCEDURE — 93880 EXTRACRANIAL BILAT STUDY: CPT

## 2019-10-17 NOTE — NURSING NOTE
"Roscoe Jang is a 69 year old male who presents for:  Chief Complaint   Patient presents with     RECHECK     Bilat Carotid (10:45 VHC, 1:00 WRO) History of R carotid endarterectomy on 5/11/18 & L carotid endarterectomy on 6/6/18; 1 yr f/u to 9/27/18 appt with Dr. Bustos.        Vitals:    Vitals:    10/17/19 1041   BP: (!) 149/84   BP Location: Right arm   Patient Position: Chair   Cuff Size: Adult Regular   Pulse: 90       BMI:  Estimated body mass index is 38.47 kg/m  as calculated from the following:    Height as of 10/14/19: 5' 8\" (1.727 m).    Weight as of 10/14/19: 253 lb (114.8 kg).    Pain Score:  Data Unavailable        Kimberly López MA    "

## 2019-11-25 ENCOUNTER — TELEPHONE (OUTPATIENT)
Dept: FAMILY MEDICINE | Facility: CLINIC | Age: 69
End: 2019-11-25

## 2019-11-26 ENCOUNTER — ALLIED HEALTH/NURSE VISIT (OUTPATIENT)
Dept: FAMILY MEDICINE | Facility: CLINIC | Age: 69
End: 2019-11-26
Payer: COMMERCIAL

## 2019-11-26 VITALS — HEART RATE: 72 BPM | SYSTOLIC BLOOD PRESSURE: 136 MMHG | DIASTOLIC BLOOD PRESSURE: 74 MMHG

## 2019-11-26 DIAGNOSIS — I10 BENIGN ESSENTIAL HYPERTENSION: Primary | ICD-10-CM

## 2019-11-26 PROCEDURE — 99207 ZZC NO CHARGE NURSE ONLY: CPT

## 2019-11-26 NOTE — PROGRESS NOTES
Roscoe Jang is a 69 year old year old patient who comes in today for a Blood Pressure check because of ongoing blood pressure monitoring.  Patient is taking medication as prescribed however did not take this AM prior to early morning dentist appt.  Patient is tolerating medications well.  Patient is not monitoring Blood Pressure at home at this time as misplace monitor. Will locate and resume monitoring 2x/ wk.   Current complaints: none  Disposition:  Advised to continue current med/ dose, report if home BP readings >140/90. F/U lab only appt mid Jan 2020 for A1C.  Pt voices understanding/ agreement.  VIK Huynh RN

## 2020-02-04 ENCOUNTER — HOSPITAL ENCOUNTER (OUTPATIENT)
Dept: CT IMAGING | Facility: CLINIC | Age: 70
Discharge: HOME | End: 2020-02-04
Attending: NURSE PRACTITIONER | Admitting: NURSE PRACTITIONER
Payer: MEDICARE

## 2020-02-04 DIAGNOSIS — R91.8 PULMONARY NODULES: ICD-10-CM

## 2020-02-04 PROCEDURE — 71250 CT THORAX DX C-: CPT

## 2020-03-23 DIAGNOSIS — I10 BENIGN ESSENTIAL HYPERTENSION: ICD-10-CM

## 2020-03-23 RX ORDER — LISINOPRIL 40 MG/1
40 TABLET ORAL DAILY
Qty: 90 TABLET | Refills: 0 | Status: SHIPPED | OUTPATIENT
Start: 2020-03-23 | End: 2020-06-22

## 2020-03-23 NOTE — TELEPHONE ENCOUNTER
Refilled Lisinopril for 3 months.   Due for labs before next refill.  Called patient to notify of refill  RYAN Cota

## 2020-05-04 ENCOUNTER — OFFICE VISIT (OUTPATIENT)
Dept: FAMILY MEDICINE | Facility: CLINIC | Age: 70
End: 2020-05-04
Payer: COMMERCIAL

## 2020-05-04 ENCOUNTER — DOCUMENTATION ONLY (OUTPATIENT)
Dept: FAMILY MEDICINE | Facility: CLINIC | Age: 70
End: 2020-05-04

## 2020-05-04 VITALS
BODY MASS INDEX: 38.65 KG/M2 | TEMPERATURE: 97.8 F | DIASTOLIC BLOOD PRESSURE: 70 MMHG | HEIGHT: 68 IN | HEART RATE: 88 BPM | WEIGHT: 255 LBS | RESPIRATION RATE: 18 BRPM | SYSTOLIC BLOOD PRESSURE: 130 MMHG

## 2020-05-04 DIAGNOSIS — E11.69 TYPE 2 DIABETES MELLITUS WITH OTHER SPECIFIED COMPLICATION, WITHOUT LONG-TERM CURRENT USE OF INSULIN (H): ICD-10-CM

## 2020-05-04 DIAGNOSIS — M25.511 CHRONIC RIGHT SHOULDER PAIN: ICD-10-CM

## 2020-05-04 DIAGNOSIS — E11.69 TYPE 2 DIABETES MELLITUS WITH OTHER SPECIFIED COMPLICATION, WITHOUT LONG-TERM CURRENT USE OF INSULIN (H): Primary | ICD-10-CM

## 2020-05-04 DIAGNOSIS — E11.65 TYPE 2 DIABETES MELLITUS WITH HYPERGLYCEMIA, WITHOUT LONG-TERM CURRENT USE OF INSULIN (H): ICD-10-CM

## 2020-05-04 DIAGNOSIS — R06.09 EXERTIONAL DYSPNEA: Primary | ICD-10-CM

## 2020-05-04 DIAGNOSIS — G89.29 CHRONIC RIGHT SHOULDER PAIN: ICD-10-CM

## 2020-05-04 LAB — HBA1C MFR BLD: 8.8 % (ref 0–5.6)

## 2020-05-04 PROCEDURE — 93000 ELECTROCARDIOGRAM COMPLETE: CPT | Performed by: FAMILY MEDICINE

## 2020-05-04 PROCEDURE — 83036 HEMOGLOBIN GLYCOSYLATED A1C: CPT | Performed by: FAMILY MEDICINE

## 2020-05-04 PROCEDURE — 36415 COLL VENOUS BLD VENIPUNCTURE: CPT | Performed by: FAMILY MEDICINE

## 2020-05-04 PROCEDURE — 20610 DRAIN/INJ JOINT/BURSA W/O US: CPT | Mod: RT | Performed by: FAMILY MEDICINE

## 2020-05-04 PROCEDURE — 99214 OFFICE O/P EST MOD 30 MIN: CPT | Mod: 25 | Performed by: FAMILY MEDICINE

## 2020-05-04 RX ORDER — NITROGLYCERIN 0.4 MG/1
TABLET SUBLINGUAL
Qty: 12 TABLET | Refills: 0 | Status: SHIPPED | OUTPATIENT
Start: 2020-05-04 | End: 2023-03-21

## 2020-05-04 RX ORDER — TRIAMCINOLONE ACETONIDE 40 MG/ML
40 INJECTION, SUSPENSION INTRA-ARTICULAR; INTRAMUSCULAR ONCE
Status: COMPLETED | OUTPATIENT
Start: 2020-05-04 | End: 2020-05-04

## 2020-05-04 RX ADMIN — TRIAMCINOLONE ACETONIDE 40 MG: 40 INJECTION, SUSPENSION INTRA-ARTICULAR; INTRAMUSCULAR at 12:10

## 2020-05-04 ASSESSMENT — MIFFLIN-ST. JEOR: SCORE: 1901.17

## 2020-05-04 NOTE — PROGRESS NOTES
SUBJECTIVE   Roscoe Jang is a 69 year old male who presents with     Fatigue    Has been jhonatan short winded lately.  Gets very tired really fast. Doesn't have the stamina he used to have.   Is worried there could be something wrong with his heart.       Musculoskeletal problem/pain      Duration: Ongoing     Description  Location: Right shoulder is the worst. Left is starting to hurt    Intensity:  moderate    Accompanying signs and symptoms: radiation of pain to shoulder     History  Previous similar problem: YES- Has had a shot in his right shoulder before  Previous evaluation:  x-ray    Precipitating or alleviating factors:  Trauma or overuse: no   Aggravating factors include: lifting, exercise and overuse    Therapies tried and outcome: rest/inactivity, acetaminophen and Ibuprofen        PCP   Mel Pappas -[phone]    Health Maintenance        Health Maintenance Due   Topic Date Due     ADVANCE CARE PLANNING  1950     EYE EXAM  1950     COLORECTAL CANCER SCREENING  07/17/1961     ZOSTER IMMUNIZATION (2 of 3) 09/27/2013     DIABETIC FOOT EXAM  11/19/2019     PHQ-2  01/01/2020     A1C  04/14/2020     LIPID  06/03/2020       HPI        Patient Active Problem List   Diagnosis     Benign essential hypertension     Mixed hyperlipidemia     Obesity     Abdominal aortic aneurysm (H)     Fatty liver     Type 2 diabetes mellitus with hyperglycemia, without long-term current use of insulin (H)     Morbid obesity (H)     Bilateral carotid artery disease (H)     Carotid artery stenosis, symptomatic, right     Carotid stenosis, asymptomatic     Carotid stenosis, left     Carotid artery disease (H)     Current Outpatient Medications   Medication     aspirin 81 MG EC tablet     blood glucose (CONTOUR NEXT TEST) test strip     blood glucose monitoring (RHYS CONTOUR NEXT) test strip     blood glucose monitoring (RHYS MICROLET) lancets     glimepiride (AMARYL) 4 MG tablet     lisinopril (ZESTRIL) 40  MG tablet     metFORMIN (GLUCOPHAGE) 500 MG tablet     rosuvastatin (CRESTOR) 20 MG tablet     No current facility-administered medications for this visit.        Patient Active Problem List   Diagnosis     Benign essential hypertension     Mixed hyperlipidemia     Obesity     Abdominal aortic aneurysm (H)     Fatty liver     Type 2 diabetes mellitus with hyperglycemia, without long-term current use of insulin (H)     Morbid obesity (H)     Bilateral carotid artery disease (H)     Carotid artery stenosis, symptomatic, right     Carotid stenosis, asymptomatic     Carotid stenosis, left     Carotid artery disease (H)     Past Surgical History:   Procedure Laterality Date     ENDARTERECTOMY CAROTID Right 2018    Procedure: ENDARTERECTOMY CAROTID;  RIGHT CAROTID ENDARTERECTOMY WITH EEG;  Surgeon: Gaurav Bustos MD;  Location:  OR     ENDARTERECTOMY CAROTID Left 2018    Procedure: ENDARTERECTOMY CAROTID;  LEFT CAROTID ENDARTERECTOMY with EXTERNAL JUGULAR VEIN PATCH;  Surgeon: Gaurav Bustos MD;  Location:  OR     ENDARTERECTOMY CAROTID Left 2019    Procedure: REDO LEFT CAROTID ENDARTERECTOMY WITH EEG with greater saphenous vein graft;  Surgeon: Gaurav Bustos MD;  Location:  OR       Social History     Tobacco Use     Smoking status: Former Smoker     Last attempt to quit: 10/3/2007     Years since quittin.5     Smokeless tobacco: Never Used   Substance Use Topics     Alcohol use: No     No family history on file.      Current Outpatient Medications   Medication Sig Dispense Refill     aspirin 81 MG EC tablet Take 81 mg by mouth daily       blood glucose (CONTOUR NEXT TEST) test strip USE ONE STRIP TO CHECK GLUCOSE ONCE DAILY 100 strip 1     blood glucose monitoring (RHYS CONTOUR NEXT) test strip Use to test blood sugar 1 times daily or as directed. 100 strip 6     blood glucose monitoring (RHYS MICROLET) lancets Use to test blood sugar 1 times daily or as directed.  100 each 5     glimepiride (AMARYL) 4 MG tablet Take 1 tablet (4 mg) by mouth every morning (before breakfast) 90 tablet 3     lisinopril (ZESTRIL) 40 MG tablet Take 1 tablet (40 mg) by mouth daily Refilled in provider's absence. Labs due for next refill. 90 tablet 0     metFORMIN (GLUCOPHAGE) 500 MG tablet TAKE ONE TABLET BY MOUTH WITH BREAKFAST AND TWO TABLETS WITH LUNCH 270 tablet 1     nitroGLYcerin (NITROSTAT) 0.4 MG sublingual tablet For chest pain place 1 tablet under the tongue every 5 minutes for 3 doses. If symptoms persist 5 minutes after 1st dose call 911. 12 tablet 0     rosuvastatin (CRESTOR) 20 MG tablet Take 1 tablet (20 mg) by mouth daily 90 tablet 3     No Known Allergies  Recent Labs   Lab Test 10/14/19  0931 07/01/19  0924  06/05/19 2017 06/03/19  0920  11/19/18  1048 11/11/18  0314 10/02/18  1453  05/11/18  1350  10/06/17  1000   A1C 7.0*  --   --  7.2*  --  7.2*   < >  --   --  6.8*  --  6.7*   < > 6.4*   LDL  --   --   --   --   --  20  --   --   --  48  --  45  --   --    HDL  --   --   --   --   --  46  --   --   --   --   --  48  --   --    TRIG  --   --   --   --   --  113  --   --   --   --   --  124  --   --    ALT  --   --   --   --   --  31  --  35 34 34   < >  --   --   --    CR 0.91 0.89   < >  --   --  0.84  --  0.88 0.96 0.96  --  0.94   < >  --    GFRESTIMATED 86 88   < >  --    < > 90  --  86 78 78  --  80   < >  --    GFRESTBLACK >90 >90   < >  --    < > >90  --  >90 >90 >90  --  >90   < >  --    POTASSIUM 4.3 4.4   < >  --    < > 4.5  --  4.1 4.2 4.1   < > 4.4   < >  --    TSH  --   --   --   --   --  1.05  --   --   --   --   --   --   --  0.90    < > = values in this interval not displayed.      BP Readings from Last 3 Encounters:   05/04/20 130/70   11/26/19 136/74   10/17/19 (!) 149/84    Wt Readings from Last 3 Encounters:   05/04/20 115.7 kg (255 lb)   10/14/19 114.8 kg (253 lb)   06/17/19 110.2 kg (243 lb)                    Reviewed and updated:  Tobacco  Allergies   "Meds  Med Hx  Surg Hx  Fam Hx  Soc Hx     ROS:  Constitutional, neuro, ENT, endocrine, pulmonary, cardiac, gastrointestinal, genitourinary, musculoskeletal, integument and psychiatric systems are negative, except as otherwise noted.    PHYSICAL EXAM   /70 (Cuff Size: Adult Large)   Pulse 88   Temp 97.8  F (36.6  C) (Tympanic)   Resp 18   Ht 1.727 m (5' 8\")   Wt 115.7 kg (255 lb)   BMI 38.77 kg/m    Body mass index is 38.77 kg/m .  GENERAL: alert and no distress  NECK: no adenopathy, no asymmetry, masses, or scars and thyroid normal to palpation  RESP: lungs clear to auscultation - no rales, rhonchi or wheezes  CV: regular rate and rhythm, normal S1 S2, no S3 or S4, no murmur, click or rub, no peripheral edema and peripheral pulses strong  ABDOMEN: soft, nontender, no hepatosplenomegaly, no masses and bowel sounds normal  MS: no gross musculoskeletal defects noted, no edema  SKIN: no suspicious lesions or rashes  NEURO: Normal strength and tone, mentation intact and speech normal  PSYCH: mentation appears normal, affect normal/bright    ECG: Sinus rhythm, no acute ischemic changes or rhythm abnormality noted      Lab Results   Component Value Date    A1C 7.0 10/14/2019    A1C 7.2 06/05/2019    A1C 7.2 06/03/2019    A1C 7.6 04/01/2019    A1C 6.8 10/02/2018       Assessment & Plan     (R06.09) Exertional dyspnea  (primary encounter diagnosis)  Comment: Complains of exertional dyspnea, worsening in the recent past.  History of carotid artery disease, status post carotid endarterectomy.  Differentials discussed in detail including coronary ischemia.  ECG showed sinus rhythm without any acute ischemic changes.  Lexiscan ordered and suggested to use nitroglycerin PRN.   Plan: nitroGLYcerin (NITROSTAT) 0.4 MG sublingual         tablet, NM Lexiscan stress test, EKG 12-lead         complete w/read - Clinics          (E11.69) Type 2 diabetes mellitus with other specified complication, without long-term current " use of insulin (H)  Comment: Continue metformin, glimepiride, hemoglobin A1c ordered  Plan: Hemoglobin A1c       (M25.511,  G89.29) Chronic right shoulder pain  Comment: Suspect secondary to mild arthritis, steroid injection administered in office today, see procedure note for details, has worked well in the past, will consider MRI shoulder if symptoms persist or worsen       PROCEDURE:  JOINT ASPIRATION/INJECTION.    After a discussion of risks, benefits and side effects of procedure, informed patient consent was obtained.  The right shoulder was prepped and draped in the usual clean fashion (sterile not required for this procedure).  3 cc of 1 % lidocaine was used for local analgesia.    ASPIRATION: Not performed.     INJECTION:  Using 3 cc of 1 % lidocaine mixed with 40 mg of kenalog, the right shoulder was successfully injected without complication.  Patient did experience some pain relief following injection.          Patient Instructions   Please call [phone] to schedule Benny Rivers MD  [facility]

## 2020-05-04 NOTE — NURSING NOTE
"Chief Complaint   Patient presents with     Musculoskeletal Problem     Fatigue     /70 (Cuff Size: Adult Large)   Pulse 88   Temp 97.8  F (36.6  C) (Tympanic)   Resp 18   Ht 1.727 m (5' 8\")   Wt 115.7 kg (255 lb)   BMI 38.77 kg/m   Estimated body mass index is 38.77 kg/m  as calculated from the following:    Height as of this encounter: 1.727 m (5' 8\").    Weight as of this encounter: 115.7 kg (255 lb).  Patient presents to the clinic using No DME      Health Maintenance that is potentially due pending provider review:    Health Maintenance Due   Topic Date Due     ADVANCE CARE PLANNING  1950     EYE EXAM  1950     COLORECTAL CANCER SCREENING  07/17/1961     ZOSTER IMMUNIZATION (2 of 3) 09/27/2013     DIABETIC FOOT EXAM  11/19/2019     PHQ-2  01/01/2020     A1C  04/14/2020     LIPID  06/03/2020                "

## 2020-05-13 ENCOUNTER — HOSPITAL ENCOUNTER (OUTPATIENT)
Dept: NUCLEAR MEDICINE | Facility: CLINIC | Age: 70
Setting detail: NUCLEAR MEDICINE
Discharge: HOME | End: 2020-05-13
Attending: FAMILY MEDICINE | Admitting: FAMILY MEDICINE
Payer: MEDICARE

## 2020-05-13 VITALS — WEIGHT: 255 LBS | BODY MASS INDEX: 38.65 KG/M2 | HEIGHT: 68 IN

## 2020-05-13 DIAGNOSIS — R06.09 EXERTIONAL DYSPNEA: ICD-10-CM

## 2020-05-13 LAB
CV STRESS MAX HR HE: 134
NUC STRESS EJECTION FRACTION: 76 %
RATE PRESSURE PRODUCT: NORMAL
STRESS ECHO BASELINE DIASTOLIC HE: 70
STRESS ECHO BASELINE HR: 61
STRESS ECHO BASELINE SYSTOLIC BP: 122
STRESS ECHO CALCULATED PERCENT HR: 88 %
STRESS ECHO LAST STRESS DIASTOLIC BP: 74
STRESS ECHO LAST STRESS SYSTOLIC BP: 194
STRESS ECHO POST ESTIMATED WORKLOAD: 5.9 METS
STRESS ECHO POST EXERCISE DUR MIN: 5 MIN
STRESS ECHO POST EXERCISE DUR SEC: 46 SEC
STRESS ECHO TARGET HR: 152

## 2020-05-13 PROCEDURE — 34300033 ZZH RX 343: Performed by: INTERNAL MEDICINE

## 2020-05-13 PROCEDURE — 78452 HT MUSCLE IMAGE SPECT MULT: CPT

## 2020-05-13 PROCEDURE — 78452 HT MUSCLE IMAGE SPECT MULT: CPT | Mod: 26 | Performed by: INTERNAL MEDICINE

## 2020-05-13 PROCEDURE — 93018 CV STRESS TEST I&R ONLY: CPT | Performed by: INTERNAL MEDICINE

## 2020-05-13 PROCEDURE — 93017 CV STRESS TEST TRACING ONLY: CPT

## 2020-05-13 PROCEDURE — 93016 CV STRESS TEST SUPVJ ONLY: CPT | Performed by: INTERNAL MEDICINE

## 2020-05-13 PROCEDURE — A9502 TC99M TETROFOSMIN: HCPCS | Performed by: INTERNAL MEDICINE

## 2020-05-13 RX ADMIN — TETROFOSMIN 35.6 MCI.: 1.38 INJECTION, POWDER, LYOPHILIZED, FOR SOLUTION INTRAVENOUS at 09:45

## 2020-05-13 RX ADMIN — TETROFOSMIN 12.7 MCI.: 1.38 INJECTION, POWDER, LYOPHILIZED, FOR SOLUTION INTRAVENOUS at 07:51

## 2020-05-13 ASSESSMENT — MIFFLIN-ST. JEOR: SCORE: 1901.17

## 2020-06-21 DIAGNOSIS — I10 BENIGN ESSENTIAL HYPERTENSION: ICD-10-CM

## 2020-06-22 RX ORDER — LISINOPRIL 40 MG/1
TABLET ORAL
Qty: 90 TABLET | Refills: 0 | Status: SHIPPED | OUTPATIENT
Start: 2020-06-22 | End: 2020-08-24

## 2020-08-23 DIAGNOSIS — E11.65 TYPE 2 DIABETES MELLITUS WITH HYPERGLYCEMIA, WITHOUT LONG-TERM CURRENT USE OF INSULIN (H): ICD-10-CM

## 2020-08-23 DIAGNOSIS — I10 BENIGN ESSENTIAL HYPERTENSION: ICD-10-CM

## 2020-08-23 DIAGNOSIS — I65.22 CAROTID STENOSIS, LEFT: ICD-10-CM

## 2020-08-24 RX ORDER — GLIMEPIRIDE 4 MG/1
TABLET ORAL
Qty: 90 TABLET | Refills: 0 | Status: SHIPPED | OUTPATIENT
Start: 2020-08-24 | End: 2020-11-27

## 2020-08-24 RX ORDER — ROSUVASTATIN CALCIUM 20 MG/1
TABLET, COATED ORAL
Qty: 90 TABLET | Refills: 0 | Status: SHIPPED | OUTPATIENT
Start: 2020-08-24 | End: 2020-11-27

## 2020-08-24 RX ORDER — LISINOPRIL 40 MG/1
40 TABLET ORAL DAILY
Qty: 90 TABLET | Refills: 0 | Status: SHIPPED | OUTPATIENT
Start: 2020-08-24 | End: 2020-11-30

## 2020-08-24 NOTE — TELEPHONE ENCOUNTER
"Routing refill requests to provider for review/approval because:  Labs out of range, labs not current    Requested Prescriptions   Pending Prescriptions Disp Refills     glimepiride (AMARYL) 4 MG tablet [Pharmacy Med Name: Glimepiride 4 MG Oral Tablet] 90 tablet 0     Sig: TAKE 1 TABLET (4 MG)BY MOUTH ONCE DAILY BEFORE BREAKFAST       Sulfonylurea Agents Failed - 8/23/2020  8:37 AM        Failed - Patient has documented A1c within the specified period of time.     If HgbA1C is 8 or greater, it needs to be on file within the past 3 months.  If less than 8, must be on file within the past 6 months.     Recent Labs   Lab Test 05/04/20  1223   A1C 8.8*             Passed - Medication is active on med list        Passed - Patient is age 18 or older        Passed - Patient has a recent creatinine (normal) within the past 12 mos.     Recent Labs   Lab Test 10/14/19  0931  06/03/19  1143   CR 0.91   < >  --    CREAT  --   --  0.8    < > = values in this interval not displayed.       Ok to refill medication if creatinine is low          Passed - Recent (6 mo) or future (30 days) visit within the authorizing provider's specialty     Patient had office visit in the last 6 months or has a visit in the next 30 days with authorizing provider or within the authorizing provider's specialty.  See \"Patient Info\" tab in inbasket, or \"Choose Columns\" in Meds & Orders section of the refill encounter.               metFORMIN (GLUCOPHAGE) 500 MG tablet [Pharmacy Med Name: metFORMIN HCl 500 MG Oral Tablet] 270 tablet 0     Sig: TAKE 1 TABLET BY MOUTH ONCE DAILY IN THE MORNING WITH BREAKFAST AND 2 TABS  WITH LUNCH       Biguanide Agents Failed - 8/23/2020  8:37 AM        Failed - Patient has documented A1c within the specified period of time.     If HgbA1C is 8 or greater, it needs to be on file within the past 3 months.  If less than 8, must be on file within the past 6 months.     Recent Labs   Lab Test 05/04/20  1223   A1C 8.8*         " "    Passed - Patient is age 10 or older        Passed - Patient's CR is NOT>1.4 OR Patient's EGFR is NOT<45 within past 12 mos.     Recent Labs   Lab Test 10/14/19  0931   GFRESTIMATED 86   GFRESTBLACK >90       Recent Labs   Lab Test 10/14/19  0931   CR 0.91             Passed - Patient does NOT have a diagnosis of CHF.        Passed - Medication is active on med list        Passed - Recent (6 mo) or future (30 days) visit within the authorizing provider's specialty     Patient had office visit in the last 6 months or has a visit in the next 30 days with authorizing provider or within the authorizing provider's specialty.  See \"Patient Info\" tab in inbasket, or \"Choose Columns\" in Meds & Orders section of the refill encounter.               rosuvastatin (CRESTOR) 20 MG tablet [Pharmacy Med Name: Rosuvastatin Calcium 20 MG Oral Tablet] 90 tablet 0     Sig: Take 1 tablet by mouth once daily       Statins Protocol Failed - 8/23/2020  8:37 AM        Failed - LDL on file in past 12 months     Recent Labs   Lab Test 06/03/19  0920   LDL 20             Passed - No abnormal creatine kinase in past 12 months     Recent Labs   Lab Test 06/03/19  0920   CKT 49                Passed - Recent (12 mo) or future (30 days) visit within the authorizing provider's specialty     Patient has had an office visit with the authorizing provider or a provider within the authorizing providers department within the previous 12 mos or has a future within next 30 days. See \"Patient Info\" tab in inbasket, or \"Choose Columns\" in Meds & Orders section of the refill encounter.              Passed - Medication is active on med list        Passed - Patient is age 18 or older           Justine BURGOS RN, BSN      "

## 2020-08-24 NOTE — TELEPHONE ENCOUNTER
Medications have been refilled but patient is due for a diabetic check. Please call and help schedule. Donya ZAMORA

## 2020-08-25 NOTE — TELEPHONE ENCOUNTER
Pt called and transferred to scheduling.    Katja Stephenson  [facility]

## 2020-09-01 DIAGNOSIS — I65.23 BILATERAL CAROTID ARTERY STENOSIS: ICD-10-CM

## 2020-09-01 DIAGNOSIS — Z98.890 HISTORY OF BILATERAL CAROTID ENDARTERECTOMY: Primary | ICD-10-CM

## 2020-10-05 DIAGNOSIS — E11.65 TYPE 2 DIABETES MELLITUS WITH HYPERGLYCEMIA, WITHOUT LONG-TERM CURRENT USE OF INSULIN (H): ICD-10-CM

## 2020-10-05 LAB — HBA1C MFR BLD: 7 % (ref 0–5.6)

## 2020-10-05 PROCEDURE — 83036 HEMOGLOBIN GLYCOSYLATED A1C: CPT | Performed by: NURSE PRACTITIONER

## 2020-10-05 PROCEDURE — 36415 COLL VENOUS BLD VENIPUNCTURE: CPT | Performed by: NURSE PRACTITIONER

## 2020-10-06 ENCOUNTER — OFFICE VISIT (OUTPATIENT)
Dept: FAMILY MEDICINE | Facility: CLINIC | Age: 70
End: 2020-10-06
Payer: COMMERCIAL

## 2020-10-06 VITALS
RESPIRATION RATE: 12 BRPM | TEMPERATURE: 97 F | HEART RATE: 67 BPM | DIASTOLIC BLOOD PRESSURE: 86 MMHG | SYSTOLIC BLOOD PRESSURE: 136 MMHG | BODY MASS INDEX: 38.77 KG/M2 | WEIGHT: 255 LBS | OXYGEN SATURATION: 94 %

## 2020-10-06 DIAGNOSIS — Z23 NEED FOR PROPHYLACTIC VACCINATION AND INOCULATION AGAINST INFLUENZA: ICD-10-CM

## 2020-10-06 DIAGNOSIS — M19.012 PRIMARY OSTEOARTHRITIS OF BOTH SHOULDERS: Primary | ICD-10-CM

## 2020-10-06 DIAGNOSIS — M19.011 PRIMARY OSTEOARTHRITIS OF BOTH SHOULDERS: Primary | ICD-10-CM

## 2020-10-06 PROCEDURE — 99213 OFFICE O/P EST LOW 20 MIN: CPT | Mod: 25 | Performed by: NURSE PRACTITIONER

## 2020-10-06 PROCEDURE — 20610 DRAIN/INJ JOINT/BURSA W/O US: CPT | Mod: 50 | Performed by: NURSE PRACTITIONER

## 2020-10-06 PROCEDURE — G0008 ADMIN INFLUENZA VIRUS VAC: HCPCS | Performed by: NURSE PRACTITIONER

## 2020-10-06 PROCEDURE — 90662 IIV NO PRSV INCREASED AG IM: CPT | Performed by: NURSE PRACTITIONER

## 2020-10-06 RX ORDER — TRIAMCINOLONE ACETONIDE 40 MG/ML
40 INJECTION, SUSPENSION INTRA-ARTICULAR; INTRAMUSCULAR ONCE
Status: COMPLETED | OUTPATIENT
Start: 2020-10-06 | End: 2020-10-06

## 2020-10-06 RX ADMIN — TRIAMCINOLONE ACETONIDE 40 MG: 40 INJECTION, SUSPENSION INTRA-ARTICULAR; INTRAMUSCULAR at 07:56

## 2020-10-06 RX ADMIN — TRIAMCINOLONE ACETONIDE 40 MG: 40 INJECTION, SUSPENSION INTRA-ARTICULAR; INTRAMUSCULAR at 07:57

## 2020-10-06 NOTE — NURSING NOTE
"Chief Complaint   Patient presents with     Shoulder Pain     Injections     Flu Shot     Imm/Inj     Flu Shot     /86   Pulse 67   Temp 97  F (36.1  C) (Tympanic)   Resp 12   Wt 115.7 kg (255 lb)   SpO2 94%   BMI 38.77 kg/m   Estimated body mass index is 38.77 kg/m  as calculated from the following:    Height as of 5/13/20: 1.727 m (5' 8\").    Weight as of this encounter: 115.7 kg (255 lb).  Patient presents to the clinic using No DME      Health Maintenance that is potentially due pending provider review:    Health Maintenance Due   Topic Date Due     ADVANCE CARE PLANNING  1950     EYE EXAM  1950     COLORECTAL CANCER SCREENING  07/17/1961     HEPATITIS B IMMUNIZATION (1 of 3 - Risk 3-dose series) 07/17/1970     ZOSTER IMMUNIZATION (2 of 3) 09/27/2013     DIABETIC FOOT EXAM  11/19/2019     LIPID  06/03/2020     INFLUENZA VACCINE (1) 09/01/2020     MEDICARE ANNUAL WELLNESS VISIT  10/14/2020     BMP  10/14/2020     MICROALBUMIN  10/14/2020     FALL RISK ASSESSMENT  10/17/2020        n/a        "

## 2020-10-06 NOTE — PROGRESS NOTES
SUBJECTIVE   Roscoe Jang is a 70 year old male who presents with       Musculoskeletal problem/pain      Duration: ongoing issue    Description  Location: bilateral shoulder pain    Intensity:  moderate    Accompanying signs and symptoms: lots of pain in right shoulder, left shoulder is starting to hurt    History  Previous similar problem: YES  Previous evaluation:  x-ray 2018    Precipitating or alleviating factors:  Trauma or overuse: no   Aggravating factors include: overuse    Therapies tried and outcome: previous injections done, tylenol and ibuprofen as needed for pain      PCP   Mel Pappas -[phone]    Health Maintenance        Health Maintenance Due   Topic Date Due     ADVANCE CARE PLANNING  1950     EYE EXAM  1950     COLORECTAL CANCER SCREENING  07/17/1961     HEPATITIS B IMMUNIZATION (1 of 3 - Risk 3-dose series) 07/17/1970     ZOSTER IMMUNIZATION (2 of 3) 09/27/2013     DIABETIC FOOT EXAM  11/19/2019     LIPID  06/03/2020     MEDICARE ANNUAL WELLNESS VISIT  10/14/2020     BMP  10/14/2020     MICROALBUMIN  10/14/2020     FALL RISK ASSESSMENT  10/17/2020       HPI        Patient Active Problem List   Diagnosis     Benign essential hypertension     Mixed hyperlipidemia     Obesity     Abdominal aortic aneurysm (H)     Fatty liver     Type 2 diabetes mellitus with hyperglycemia, without long-term current use of insulin (H)     Morbid obesity (H)     Bilateral carotid artery disease (H)     Carotid artery stenosis, symptomatic, right     Carotid stenosis, asymptomatic     Carotid stenosis, left     Carotid artery disease (H)     Current Outpatient Medications   Medication     aspirin 81 MG EC tablet     blood glucose (CONTOUR NEXT TEST) test strip     blood glucose monitoring (RHYS CONTOUR NEXT) test strip     blood glucose monitoring (RHYS MICROLET) lancets     glimepiride (AMARYL) 4 MG tablet     lisinopril (ZESTRIL) 40 MG tablet     metFORMIN (GLUCOPHAGE) 500 MG tablet      "nitroGLYcerin (NITROSTAT) 0.4 MG sublingual tablet     rosuvastatin (CRESTOR) 20 MG tablet     No current facility-administered medications for this visit.          Reviewed and updated:  Tobacco  Allergies  Meds  Med Hx  Surg Hx  Fam Hx  Soc Hx     ROS:  Constitutional, HEENT, cardiovascular, pulmonary, gi and gu systems are negative, except as otherwise noted.    PHYSICAL EXAM   /86   Pulse 67   Temp 97  F (36.1  C) (Tympanic)   Resp 12   Wt 115.7 kg (255 lb)   SpO2 94%   BMI 38.77 kg/m    Body mass index is 38.77 kg/m .  GENERAL: healthy, alert and no distress      After risks, benefits and complications of steroid injection were discussed with the patient he elected to proceed.  Using sterile technique, the area was first prepped with betadyne and an alcohol swab.  A 25 gauge  needle was used to inject 40 mg Kenalog and 1 cc of 1% lidocaine into antra-articular shoulder joint on the right and left.  Roscoe  tolerated the procedure well without complications.    Assessment & Plan     Primary osteoarthritis of both shoulders  Shoulders injected today   - Large Joint/Bursa injection and/or drainage (Shoulder, Knee)  - triamcinolone (KENALOG-40) injection 40 mg  - Large Joint/Bursa injection and/or drainage (Shoulder, Knee)  - triamcinolone (KENALOG-40) injection 40 mg    Need for prophylactic vaccination and inoculation against influenza  - FLUZONE HIGH DOSE 65+  [10278]  - ADMIN INFLUENZA (For MEDICARE Patients ONLY) []       BMI:   Estimated body mass index is 38.77 kg/m  as calculated from the following:    Height as of 5/13/20: 1.727 m (5' 8\").    Weight as of this encounter: 115.7 kg (255 lb).   Weight management plan: Discussed healthy diet and exercise guidelines         Patient Instructions   Shoulders injected today   Can repeat in 3 months       Return in about 3 months (around 1/6/2021), or if symptoms worsen or fail to improve.    Mel Pappas NP  [facility] " [place]      Risks, benefits, side effects and rationale for treatment plan fully discussed with the patient and understanding expressed.

## 2020-10-19 ENCOUNTER — HOSPITAL ENCOUNTER (OUTPATIENT)
Dept: ULTRASOUND IMAGING | Facility: CLINIC | Age: 70
Discharge: HOME | End: 2020-10-19
Attending: SURGERY | Admitting: SURGERY
Payer: MEDICARE

## 2020-10-19 DIAGNOSIS — Z98.890 HISTORY OF BILATERAL CAROTID ENDARTERECTOMY: ICD-10-CM

## 2020-10-19 DIAGNOSIS — I65.23 BILATERAL CAROTID ARTERY STENOSIS: ICD-10-CM

## 2020-10-19 PROCEDURE — 93880 EXTRACRANIAL BILAT STUDY: CPT

## 2020-10-21 NOTE — PROGRESS NOTES
[place] [facility]    Roscoe Jang had presented with somewhat vague cerebrovascular symptoms.  Underwent a right CEA on 5/11/2018 and a left CEA on 6/6/2018 due to high-grade stenosis.    He had a recurrence of dizziness and occasional blurry vision to his left eye.  CTA revealed a widely patent right CEA but diffuse narrowing of the left common carotid artery and carotid bulb but not the ICA measuring 65% but felt likely due to his symptoms.  Redo left CEA was performed on 6/5/2019 where we noted extensive intimal hyperplasia.  He has the vein patch was placed.    He has done well following the procedure.  Duplex on 10/17/2019 revealed both CEAs to be patent.  Some slight thickening of the left distal common carotid artery with a PSV= 155 cm/s on the left and 144 cm/s on the right.  No disease appreciated on the right.    PMH: Medications: Zestril, Crestor, Glucophage, Amaryl, aspirin              Non-smoker.  Lives independently.      10/19/2020 carotid duplex revealed essentially no stenosis bilaterally.  Still with elevated velocities with the right ICA PSV= 165 cm/s in the left ICA PSV= 154 cm/s.    Patient lives up in [facility] and thus we had a telephone consultation today.  He does have what he feels is a pinched nerve in his left shoulder giving him intermittent discomfort.  This is very different than when he had his cerebrovascular issues.    He did undergo a cardiac stress test on 5/13/2020 which was completely normal with no inducible ischemia and an ejection fraction of 76%.    10/5/2020 hemoglobin A1c= 7.0.  Last LDL on 6/3/2020= 20    We reviewed the carotid duplex findings and the images.  Some wall thickening is noted somewhat more prominent on the left and right but no significant narrowing.    Impression: #1.  Patent right and left CEA.  Some wall thickening of no clinical concern.  Will repeat bilateral carotid duplex ultrasound in 1 year.          #2.  Overall good  risk factor control.  LDL below goal but testing is over a year old.  Slightly elevated A1c on medications.  Normal cardiac function.    12 minutes on phone call today.      Gaurav WALKER  Patient Care Team:  Mel Pappas NP as PCP - General (Nurse Practitioner - Adult Health)  Mel Pappas NP as Assigned PCP

## 2020-10-22 ENCOUNTER — VIRTUAL VISIT (OUTPATIENT)
Dept: OTHER | Facility: CLINIC | Age: 70
End: 2020-10-22
Attending: SURGERY
Payer: MEDICARE

## 2020-10-22 DIAGNOSIS — Z98.890 HISTORY OF BILATERAL CAROTID ENDARTERECTOMY: Primary | ICD-10-CM

## 2020-10-22 DIAGNOSIS — I65.23 BILATERAL CAROTID ARTERY STENOSIS: ICD-10-CM

## 2020-10-22 DIAGNOSIS — E78.5 HYPERLIPIDEMIA LDL GOAL <70: ICD-10-CM

## 2020-10-22 PROCEDURE — 99213 OFFICE O/P EST LOW 20 MIN: CPT | Mod: 95 | Performed by: SURGERY

## 2020-10-22 PROCEDURE — G0463 HOSPITAL OUTPT CLINIC VISIT: HCPCS | Mod: TEL

## 2020-10-22 NOTE — PROGRESS NOTES
"Roscoe Jang is a 70 year old male who is being evaluated via a billable telephone visit.      The patient has been notified of following:     \"This telephone visit will be conducted via a call between you and your physician/provider. We have found that certain health care needs can be provided without the need for a physical exam.  This service lets us provide the care you need with a short phone conversation.  If a prescription is necessary we can send it directly to your pharmacy.  If lab work is needed we can place an order for that and you can then stop by our lab to have the test done at a later time.    Telephone visits are billed at different rates depending on your insurance coverage. During this emergency period, for some insurers they may be billed the same as an in-person visit.  Please reach out to your insurance provider with any questions.    If during the course of the call the physician/provider feels a telephone visit is not appropriate, you will not be charged for this service.\"    Patient has given verbal consent for Telephone visit?  Yes    What phone number would you like to be contacted at? [phone]    How would you like to obtain your AVS? Mail a copy    Phone call duration: 12 minutes    Kimberly López MA      "

## 2020-11-22 DIAGNOSIS — E11.65 TYPE 2 DIABETES MELLITUS WITH HYPERGLYCEMIA, WITHOUT LONG-TERM CURRENT USE OF INSULIN (H): ICD-10-CM

## 2020-11-22 DIAGNOSIS — I65.22 CAROTID STENOSIS, LEFT: ICD-10-CM

## 2020-11-27 RX ORDER — ROSUVASTATIN CALCIUM 20 MG/1
TABLET, COATED ORAL
Qty: 90 TABLET | Refills: 0 | Status: SHIPPED | OUTPATIENT
Start: 2020-11-27 | End: 2021-01-12

## 2020-11-27 RX ORDER — GLIMEPIRIDE 4 MG/1
TABLET ORAL
Qty: 90 TABLET | Refills: 0 | Status: SHIPPED | OUTPATIENT
Start: 2020-11-27 | End: 2021-01-12

## 2020-11-27 NOTE — TELEPHONE ENCOUNTER
"Requested Prescriptions   Pending Prescriptions Disp Refills     rosuvastatin (CRESTOR) 20 MG tablet [Pharmacy Med Name: Rosuvastatin Calcium 20 MG Oral Tablet] 90 tablet 0     Sig: Take 1 tablet by mouth once daily       Statins Protocol Failed - 11/22/2020 10:07 AM        Failed - LDL on file in past 12 months     Recent Labs   Lab Test 06/03/19  0920   LDL 20             Passed - No abnormal creatine kinase in past 12 months     Recent Labs   Lab Test 06/03/19  0920   CKT 49                Passed - Recent (12 mo) or future (30 days) visit within the authorizing provider's specialty     Patient has had an office visit with the authorizing provider or a provider within the authorizing providers department within the previous 12 mos or has a future within next 30 days. See \"Patient Info\" tab in inbasket, or \"Choose Columns\" in Meds & Orders section of the refill encounter.              Passed - Medication is active on med list        Passed - Patient is age 18 or older           glimepiride (AMARYL) 4 MG tablet [Pharmacy Med Name: Glimepiride 4 MG Oral Tablet] 90 tablet 0     Sig: TAKE 1 TABLET BY MOUTH ONCE DAILY BEFORE BREAKFAST       Sulfonylurea Agents Failed - 11/22/2020 10:07 AM        Failed - Patient has a recent creatinine (normal) within the past 12 mos.     Recent Labs   Lab Test 10/14/19  0931 06/03/19  1143 06/03/19  1143   CR 0.91   < >  --    CREAT  --   --  0.8    < > = values in this interval not displayed.       Ok to refill medication if creatinine is low          Passed - Patient has documented A1c within the specified period of time.     If HgbA1C is 8 or greater, it needs to be on file within the past 3 months.  If less than 8, must be on file within the past 6 months.     Recent Labs   Lab Test 10/05/20  1307   A1C 7.0*             Passed - Medication is active on med list        Passed - Patient is age 18 or older        Passed - Recent (6 mo) or future (30 days) visit within the authorizing " "provider's specialty     Patient had office visit in the last 6 months or has a visit in the next 30 days with authorizing provider or within the authorizing provider's specialty.  See \"Patient Info\" tab in inbasket, or \"Choose Columns\" in Meds & Orders section of the refill encounter.               metFORMIN (GLUCOPHAGE) 500 MG tablet [Pharmacy Med Name: metFORMIN HCl 500 MG Oral Tablet] 270 tablet 0     Sig: TAKE 1 TABLET BY MOUTH ONCE DAILY IN THE MORNING WITH BREAKFAST AND 2 TABS  WITH LUNCH       Biguanide Agents Failed - 11/22/2020 10:07 AM        Failed - Patient's CR is NOT>1.4 OR Patient's EGFR is NOT<45 within past 12 mos.     Recent Labs   Lab Test 10/14/19  0931   GFRESTIMATED 86   GFRESTBLACK >90       Recent Labs   Lab Test 10/14/19  0931   CR 0.91             Passed - Patient is age 10 or older        Passed - Patient has documented A1c within the specified period of time.     If HgbA1C is 8 or greater, it needs to be on file within the past 3 months.  If less than 8, must be on file within the past 6 months.     Recent Labs   Lab Test 10/05/20  1307   A1C 7.0*             Passed - Patient does NOT have a diagnosis of CHF.        Passed - Medication is active on med list        Passed - Recent (6 mo) or future (30 days) visit within the authorizing provider's specialty     Patient had office visit in the last 6 months or has a visit in the next 30 days with authorizing provider or within the authorizing provider's specialty.  See \"Patient Info\" tab in inbasket, or \"Choose Columns\" in Meds & Orders section of the refill encounter.               Justine BURGOS RN, BSN      "

## 2020-11-30 DIAGNOSIS — E11.69 TYPE 2 DIABETES MELLITUS WITH OTHER SPECIFIED COMPLICATION, WITHOUT LONG-TERM CURRENT USE OF INSULIN (H): ICD-10-CM

## 2020-11-30 DIAGNOSIS — E11.65 TYPE 2 DIABETES MELLITUS WITH HYPERGLYCEMIA, WITHOUT LONG-TERM CURRENT USE OF INSULIN (H): Primary | ICD-10-CM

## 2020-11-30 DIAGNOSIS — I10 BENIGN ESSENTIAL HYPERTENSION: ICD-10-CM

## 2020-12-01 RX ORDER — LISINOPRIL 40 MG/1
40 TABLET ORAL DAILY
Qty: 30 TABLET | Refills: 0 | Status: SHIPPED | OUTPATIENT
Start: 2020-12-01 | End: 2021-01-12

## 2020-12-02 DIAGNOSIS — I10 BENIGN ESSENTIAL HYPERTENSION: ICD-10-CM

## 2020-12-03 RX ORDER — LISINOPRIL 40 MG/1
40 TABLET ORAL DAILY
Qty: 30 TABLET | Refills: 0 | OUTPATIENT
Start: 2020-12-03

## 2021-01-12 ENCOUNTER — VIRTUAL VISIT (OUTPATIENT)
Dept: FAMILY MEDICINE | Facility: CLINIC | Age: 71
End: 2021-01-12
Payer: COMMERCIAL

## 2021-01-12 DIAGNOSIS — I65.22 CAROTID STENOSIS, LEFT: ICD-10-CM

## 2021-01-12 DIAGNOSIS — E11.65 TYPE 2 DIABETES MELLITUS WITH HYPERGLYCEMIA, WITHOUT LONG-TERM CURRENT USE OF INSULIN (H): ICD-10-CM

## 2021-01-12 DIAGNOSIS — I10 BENIGN ESSENTIAL HYPERTENSION: ICD-10-CM

## 2021-01-12 PROCEDURE — 99214 OFFICE O/P EST MOD 30 MIN: CPT | Mod: 95 | Performed by: FAMILY MEDICINE

## 2021-01-12 RX ORDER — ROSUVASTATIN CALCIUM 20 MG/1
20 TABLET, COATED ORAL DAILY
Qty: 90 TABLET | Refills: 3 | Status: SHIPPED | OUTPATIENT
Start: 2021-01-12 | End: 2022-01-26

## 2021-01-12 RX ORDER — GLIMEPIRIDE 4 MG/1
TABLET ORAL
Qty: 90 TABLET | Refills: 3 | Status: SHIPPED | OUTPATIENT
Start: 2021-01-12 | End: 2022-01-26

## 2021-01-12 RX ORDER — LISINOPRIL 40 MG/1
40 TABLET ORAL DAILY
Qty: 90 TABLET | Refills: 3 | Status: SHIPPED | OUTPATIENT
Start: 2021-01-12 | End: 2022-01-26

## 2021-01-12 NOTE — PROGRESS NOTES
Roscoe is a 70 year old who is being evaluated via a billable telephone visit.      What phone number would you like to be contacted at? [phone]  How would you like to obtain your AVS? Mail a copy       Assessment & Plan     Type 2 diabetes mellitus with hyperglycemia, without long-term current use of insulin (H)  Lab Results   Component Value Date    A1C 7.0 10/05/2020    A1C 8.8 05/04/2020    A1C 7.0 10/14/2019    A1C 7.2 06/05/2019    A1C 7.2 06/03/2019     Last hemoglobin A1c 7.0, consistent with well-controlled diabetes.  Suggested to continue Metformin, glimepiride.  Stressed on regular exercise, balanced diet and weight loss  - glimepiride (AMARYL) 4 MG tablet; TAKE 1 TABLET BY MOUTH ONCE DAILY BEFORE BREAKFAST  - metFORMIN (GLUCOPHAGE) 500 MG tablet; Take 2 tablets in the morning and 1 tablet at night by mouth  - rosuvastatin (CRESTOR) 20 MG tablet; Take 1 tablet (20 mg) by mouth daily    Benign essential hypertension  Continue lisinopril. Healthy lifestyle modifications stressed including regular exercise, balanced diet, weight loss and limiting salt/caffeine/pop intake  - lisinopril (ZESTRIL) 40 MG tablet; Take 1 tablet (40 mg) by mouth daily    Carotid stenosis, left  History of bilateral carotid stenosis.  Suggested to continue aspirin and rosuvastatin  - rosuvastatin (CRESTOR) 20 MG tablet; Take 1 tablet (20 mg) by mouth daily        20 minutes spent on the date of the encounter doing chart review, review of test results, patient visit and documentation           Rell Rivers MD  [facility]    Shawn Malhotra is a 70 year old who presents to clinic today for the following health issues     HPI       Diabetes Follow-up    How often are you checking your blood sugar? A few times a week  What time of day are you checking your blood sugars (select all that apply)?  Before meals  Have you had any blood sugars above 200?  No  Have you had any blood sugars below 70?   No    What symptoms do you notice when your blood sugar is low?  None    What concerns do you have today about your diabetes? None     Do you have any of these symptoms? (Select all that apply)  No numbness or tingling in feet.  No redness, sores or blisters on feet.  No complaints of excessive thirst.  No reports of blurry vision.  No significant changes to weight.    Have you had a diabetic eye exam in the last 12 months? No      Hyperlipidemia Follow-Up      Are you regularly taking any medication or supplement to lower your cholesterol?   Yes- crestor    Are you having muscle aches or other side effects that you think could be caused by your cholesterol lowering medication?  No    Hypertension Follow-up      Do you check your blood pressure regularly outside of the clinic? Yes  135/78  115-135/70-82    Are you following a low salt diet? Yes    Are your blood pressures ever more than 140 on the top number (systolic) OR more   than 90 on the bottom number (diastolic), for example 140/90? No    BP Readings from Last 2 Encounters:   10/06/20 136/86   05/04/20 130/70     Hemoglobin A1C (%)   Date Value   10/05/2020 7.0 (H)   05/04/2020 8.8 (H)     LDL Cholesterol Calculated (mg/dL)   Date Value   06/03/2019 20   05/11/2018 45     LDL Cholesterol Direct (mg/dL)   Date Value   10/02/2018 48       Review of Systems   Constitutional, HEENT, cardiovascular, pulmonary, gi and gu systems are negative, except as otherwise noted.      Objective           Vitals:  No vitals were obtained today due to virtual visit.    Physical Exam   alert and no distress  PSYCH: Alert and oriented times 3; coherent speech, normal   rate and volume, able to articulate logical thoughts, able   to abstract reason, no tangential thoughts, no hallucinations   or delusions  His affect is normal  RESP: No cough, no audible wheezing, able to talk in full sentences  Remainder of exam unable to be completed due to telephone visits      ----- Ambulatory  Services Attestations for Billing on Time -----        Phone call duration: 20 minutes

## 2021-02-19 ENCOUNTER — OFFICE VISIT (OUTPATIENT)
Dept: FAMILY MEDICINE | Facility: CLINIC | Age: 71
End: 2021-02-19
Payer: COMMERCIAL

## 2021-02-19 VITALS
HEIGHT: 68 IN | HEART RATE: 94 BPM | WEIGHT: 255 LBS | DIASTOLIC BLOOD PRESSURE: 95 MMHG | TEMPERATURE: 97.1 F | SYSTOLIC BLOOD PRESSURE: 185 MMHG | BODY MASS INDEX: 38.65 KG/M2

## 2021-02-19 DIAGNOSIS — M25.551 HIP PAIN, RIGHT: ICD-10-CM

## 2021-02-19 DIAGNOSIS — M70.61 TROCHANTERIC BURSITIS OF RIGHT HIP: Primary | ICD-10-CM

## 2021-02-19 PROCEDURE — 99213 OFFICE O/P EST LOW 20 MIN: CPT | Mod: 25 | Performed by: FAMILY MEDICINE

## 2021-02-19 PROCEDURE — 20610 DRAIN/INJ JOINT/BURSA W/O US: CPT | Mod: RT | Performed by: FAMILY MEDICINE

## 2021-02-19 RX ORDER — OXYCODONE HYDROCHLORIDE 5 MG/1
5 TABLET ORAL EVERY 8 HOURS PRN
Qty: 12 TABLET | Refills: 0 | Status: SHIPPED | OUTPATIENT
Start: 2021-02-19 | End: 2021-02-22

## 2021-02-19 RX ORDER — TRIAMCINOLONE ACETONIDE 40 MG/ML
40 INJECTION, SUSPENSION INTRA-ARTICULAR; INTRAMUSCULAR ONCE
Status: COMPLETED | OUTPATIENT
Start: 2021-02-19 | End: 2021-02-19

## 2021-02-19 RX ADMIN — TRIAMCINOLONE ACETONIDE 40 MG: 40 INJECTION, SUSPENSION INTRA-ARTICULAR; INTRAMUSCULAR at 11:30

## 2021-02-19 ASSESSMENT — PAIN SCALES - GENERAL: PAINLEVEL: WORST PAIN (10)

## 2021-02-19 ASSESSMENT — MIFFLIN-ST. JEOR: SCORE: 1896.17

## 2021-02-19 NOTE — NURSING NOTE
"Chief Complaint   Patient presents with     Musculoskeletal Problem     BP (!) 181/94 (Cuff Size: Adult Large)   Pulse 94   Temp 97.1  F (36.2  C) (Tympanic)   Ht 1.727 m (5' 8\")   Wt 115.7 kg (255 lb)   BMI 38.77 kg/m   Estimated body mass index is 38.77 kg/m  as calculated from the following:    Height as of this encounter: 1.727 m (5' 8\").    Weight as of this encounter: 115.7 kg (255 lb).  Patient presents to the clinic using No DME      Health Maintenance that is potentially due pending provider review:    Health Maintenance Due   Topic Date Due     ADVANCE CARE PLANNING  1950     EYE EXAM  1950     COLORECTAL CANCER SCREENING  07/17/1961     ZOSTER IMMUNIZATION (2 of 3) 09/27/2013     DIABETIC FOOT EXAM  11/19/2019     LIPID  06/03/2020     MEDICARE ANNUAL WELLNESS VISIT  10/14/2020     BMP  10/14/2020     MICROALBUMIN  10/14/2020                "

## 2021-02-19 NOTE — PATIENT INSTRUCTIONS
Patient Education     Understanding Trochanteric Bursitis    A bursa is a thin, slippery, sac-like film. It contains a small amount of fluid. This structure is found between bones and soft tissues in and around joints. A bursa cushions and protects a joint. It keeps parts of a joint from rubbing against each other. If a bursa becomes inflamed and irritated, it's known as bursitis.   The trochanteric bursa is found on the hip joint. It lies on top of the bump at the top of the thighbone called the greater trochanter. Inflammation of this bursa is called trochanteric bursitis.   How to say it   [id number]  Causes of trochanteric bursitis  Causes may include:    Overuse of the hip during running or other sports, dance, or work    Falling on or irritation to the side of the hip  This condition may occur along with other problems, such as osteoarthritis of the hip or knee, or low back problems. In rare cases, it may occur after hip surgery.   Symptoms of trochanteric bursitis    Pain or aching on the side of the hip. It's often felt as a sharp, intense pain. The pain may travel down the leg.    Swelling, tenderness, or warmth on the side of the hip at the bony bump at the top of the thigh    Treatment for trochanteric bursitis  These may include:    Resting the hip. This allows the bursa to heal.    Prescription or over-the-counter pain medicines. These help reduce inflammation, swelling, and pain. NSAIDs (nonsteroidal anti-inflammatory drugs) are the most common medicines used. Medicines may be prescribed or bought over the counter. They may be given as pills. Or they may be put on the skin as a gel, cream, or patch.    Cold packs and heat packs. These help reduce pain and swelling.    Stretching and strengthening exercises. These improve flexibility and strength around the hip.    Physical therapy. This includes exercises or other treatments.    Injections of medicine into the bursa.  This may help reduce  inflammation and relieve symptoms. The medicine is usually a corticosteroid. This is a strong anti-inflammatory medicine.  Possible complications  If you don t give your hip time to heal, the problem may not go away, may return, or may get worse. Rest and treat your hip as directed.   When to call your healthcare provider  Call your healthcare provider right away if you have any of these:    Fever of 100.4 F (38 C) or higher, or as directed by your provider    Redness, swelling, or warmth that gets worse    Symptoms that don t get better with prescribed medicines, or get worse    New symptoms  [employer] last reviewed this educational content on 6/1/2019 2000-2020 [employer]. [address] [place] [address], [place] [address]. All rights reserved. This information is not intended as a substitute for professional medical care. Always follow your healthcare professional's instructions.

## 2021-02-19 NOTE — PROGRESS NOTES
Assessment & Plan       ICD-10-CM    1. Trochanteric bursitis of right hip  M70.61 DRAIN/INJECT LARGE JOINT/BURSA     triamcinolone (KENALOG-40) injection 40 mg   2. Hip pain, right  M25.551 oxyCODONE (ROXICODONE) 5 MG tablet     (M70.61) Trochanteric bursitis of right hip  (primary encounter diagnosis)  Differentials discussed in detail including trochanteric bursitis, lumbar radiculopathy.  Treatment options reviewed.  Steroid injection administered in office today and oxycodone prescribed considering severity of symptoms, common side effects discussed and judicious use stressed.  Recommended to continue ice/heat, over-the-counter analgesia.  Instructed to go ER if symptoms worsen over the weekend.  Patient understood and in agreement with above plan.  All questions answered.  - oxyCODONE (ROXICODONE) 5 MG tablet; Take 1 tablet (5 mg) by mouth every 8 hours as needed for pain      Greater Trochanteric Hip Injection Procedure Note    Indications: Diagnosis and treatment of symptomatic bursal effusion    Anesthesia:  Lidocaine 1% without epinephrine without added sodium bicarbonate     Procedure Details     After a discussion of the risks and benefits with the patient (including the possibility that any manipulation of the bursa could introduce infection, worsening the current situation significantly), verbal consent was obtained for the procedure. Risks and benefits of cortisone injection were covered, including but not limited to bleeding, infection, steroid flare, flushing, nerve damage, increased blood sugar, hypopigmentation of skin, fat atrophy, and allergic reaction.  We discussed post injection therapy in the event of discomfort and steroid flare.    The greater trochanter was prepped with chloraprep and a small wheel of local anesthesia was injected into the subcutaneous tissue.      Using a spinal needle, 2 mL of triamcinolone (KENALOG) 40mg/mL with 5 mL lidocaine 1% without epinephrine was injected  into the bursa. The injection site was cleansed with topical isopropyl alcohol and a dressing was applied.    Complications:  None; patient tolerated the procedure well.      Patient Instructions     Patient Education     Understanding Trochanteric Bursitis    A bursa is a thin, slippery, sac-like film. It contains a small amount of fluid. This structure is found between bones and soft tissues in and around joints. A bursa cushions and protects a joint. It keeps parts of a joint from rubbing against each other. If a bursa becomes inflamed and irritated, it's known as bursitis.   The trochanteric bursa is found on the hip joint. It lies on top of the bump at the top of the thighbone called the greater trochanter. Inflammation of this bursa is called trochanteric bursitis.   How to say it   [id number]  Causes of trochanteric bursitis  Causes may include:    Overuse of the hip during running or other sports, dance, or work    Falling on or irritation to the side of the hip  This condition may occur along with other problems, such as osteoarthritis of the hip or knee, or low back problems. In rare cases, it may occur after hip surgery.   Symptoms of trochanteric bursitis    Pain or aching on the side of the hip. It's often felt as a sharp, intense pain. The pain may travel down the leg.    Swelling, tenderness, or warmth on the side of the hip at the bony bump at the top of the thigh    Treatment for trochanteric bursitis  These may include:    Resting the hip. This allows the bursa to heal.    Prescription or over-the-counter pain medicines. These help reduce inflammation, swelling, and pain. NSAIDs (nonsteroidal anti-inflammatory drugs) are the most common medicines used. Medicines may be prescribed or bought over the counter. They may be given as pills. Or they may be put on the skin as a gel, cream, or patch.    Cold packs and heat packs. These help reduce pain and swelling.    Stretching and strengthening  exercises. These improve flexibility and strength around the hip.    Physical therapy. This includes exercises or other treatments.    Injections of medicine into the bursa.  This may help reduce inflammation and relieve symptoms. The medicine is usually a corticosteroid. This is a strong anti-inflammatory medicine.  Possible complications  If you don t give your hip time to heal, the problem may not go away, may return, or may get worse. Rest and treat your hip as directed.   When to call your healthcare provider  Call your healthcare provider right away if you have any of these:    Fever of 100.4 F (38 C) or higher, or as directed by your provider    Redness, swelling, or warmth that gets worse    Symptoms that don t get better with prescribed medicines, or get worse    New symptoms  [employer] last reviewed this educational content on 6/1/2019 2000-2020 [employer]. [address] [place] [address]. All rights reserved. This information is not intended as a substitute for professional medical care. Always follow your healthcare professional's instructions.             Rell Rivers MD  [facility]    Shawn Malhotra is a 70 year old who presents for the following health issues  accompanied by his spouse:    HPI       Musculoskeletal problem/pain  Onset/Duration: 2 weeks- got really bad on Wednesday- hauled a lot of wood    Description  Location: right hip   Joint Swelling: no  Redness: no  Pain: YES  Warmth: no  Intensity:  moderate  Progression of Symptoms:  worsening  Accompanying signs and symptoms:   Fevers: no  Numbness/tingling/weakness: sore down to the knee   History  Trauma to the area: YES- hauling wood   Recent illness:  no  Previous similar problem: YES- had bursitis about 2 years ago   Previous evaluation:  no  Precipitating or alleviating factors:  Aggravating factors include: sitting, standing, walking, exercise and overuse  Therapies tried  "and outcome:  Chiropractor- made it worse, ice, heat, icy hot, oxycodone yesterday- had some old ones that he tried.       Review of Systems   Constitutional, HEENT, cardiovascular, pulmonary, gi and gu systems are negative, except as otherwise noted.      Objective    BP (!) 185/95   Pulse 94   Temp 97.1  F (36.2  C) (Tympanic)   Ht 1.727 m (5' 8\")   Wt 115.7 kg (255 lb)   BMI 38.77 kg/m    Body mass index is 38.77 kg/m .  Physical Exam   GENERAL: alert and in distress  NECK: no adenopathy, no asymmetry, masses, or scars and thyroid normal to palpation  RESP: lungs clear to auscultation - no rales, rhonchi or wheezes  CV: regular rates and rhythm, normal S1 S2, no S3 or S4 and no murmur, click or rub  ABDOMEN: soft, nontender  MS: Localized tenderness involving right hip without any skin discoloration or significant swelling, limited right hip range of movement, no spinal/paraspinal tenderness, pedal pulses 3+  SKIN: no suspicious lesions or rashes  PSYCH: mentation appears normal, affect normal/bright        "

## 2021-02-20 ENCOUNTER — APPOINTMENT (OUTPATIENT)
Dept: GENERAL RADIOLOGY | Facility: CLINIC | Age: 71
End: 2021-02-20
Attending: PHYSICIAN ASSISTANT
Payer: MEDICARE

## 2021-02-20 ENCOUNTER — APPOINTMENT (OUTPATIENT)
Dept: ULTRASOUND IMAGING | Facility: CLINIC | Age: 71
End: 2021-02-20
Attending: PHYSICIAN ASSISTANT
Payer: MEDICARE

## 2021-02-20 ENCOUNTER — HOSPITAL ENCOUNTER (EMERGENCY)
Facility: CLINIC | Age: 71
Discharge: HOME | End: 2021-02-20
Attending: PHYSICIAN ASSISTANT | Admitting: PHYSICIAN ASSISTANT
Payer: MEDICARE

## 2021-02-20 VITALS
OXYGEN SATURATION: 98 % | TEMPERATURE: 97.4 F | RESPIRATION RATE: 18 BRPM | HEIGHT: 68 IN | HEART RATE: 82 BPM | WEIGHT: 250 LBS | BODY MASS INDEX: 37.89 KG/M2 | SYSTOLIC BLOOD PRESSURE: 164 MMHG | DIASTOLIC BLOOD PRESSURE: 84 MMHG

## 2021-02-20 DIAGNOSIS — M79.651 PAIN OF RIGHT THIGH: ICD-10-CM

## 2021-02-20 PROCEDURE — 99285 EMERGENCY DEPT VISIT HI MDM: CPT | Mod: 25 | Performed by: PHYSICIAN ASSISTANT

## 2021-02-20 PROCEDURE — 73502 X-RAY EXAM HIP UNI 2-3 VIEWS: CPT

## 2021-02-20 PROCEDURE — 72100 X-RAY EXAM L-S SPINE 2/3 VWS: CPT

## 2021-02-20 PROCEDURE — 96375 TX/PRO/DX INJ NEW DRUG ADDON: CPT | Performed by: PHYSICIAN ASSISTANT

## 2021-02-20 PROCEDURE — 96376 TX/PRO/DX INJ SAME DRUG ADON: CPT | Performed by: PHYSICIAN ASSISTANT

## 2021-02-20 PROCEDURE — 96374 THER/PROPH/DIAG INJ IV PUSH: CPT | Performed by: PHYSICIAN ASSISTANT

## 2021-02-20 PROCEDURE — 93971 EXTREMITY STUDY: CPT | Mod: RT

## 2021-02-20 PROCEDURE — 250N000011 HC RX IP 250 OP 636: Performed by: PHYSICIAN ASSISTANT

## 2021-02-20 PROCEDURE — 99284 EMERGENCY DEPT VISIT MOD MDM: CPT | Performed by: PHYSICIAN ASSISTANT

## 2021-02-20 RX ORDER — KETOROLAC TROMETHAMINE 15 MG/ML
15 INJECTION, SOLUTION INTRAMUSCULAR; INTRAVENOUS ONCE
Status: COMPLETED | OUTPATIENT
Start: 2021-02-20 | End: 2021-02-20

## 2021-02-20 RX ORDER — HYDROMORPHONE HYDROCHLORIDE 1 MG/ML
0.5 INJECTION, SOLUTION INTRAMUSCULAR; INTRAVENOUS; SUBCUTANEOUS
Status: DISCONTINUED | OUTPATIENT
Start: 2021-02-20 | End: 2021-02-20 | Stop reason: HOSPADM

## 2021-02-20 RX ORDER — ACETAMINOPHEN 500 MG
1500 TABLET ORAL DAILY PRN
COMMUNITY

## 2021-02-20 RX ORDER — GABAPENTIN 300 MG/1
300 CAPSULE ORAL 3 TIMES DAILY
Qty: 90 CAPSULE | Refills: 0 | Status: SHIPPED | OUTPATIENT
Start: 2021-02-20 | End: 2021-11-04

## 2021-02-20 RX ORDER — CEPHALEXIN 500 MG/1
500 CAPSULE ORAL 4 TIMES DAILY
Qty: 28 CAPSULE | Refills: 0 | Status: SHIPPED | OUTPATIENT
Start: 2021-02-20 | End: 2021-02-27

## 2021-02-20 RX ORDER — IBUPROFEN 200 MG
800 TABLET ORAL EVERY 8 HOURS PRN
COMMUNITY
End: 2021-03-16

## 2021-02-20 RX ORDER — OXYCODONE HYDROCHLORIDE 5 MG/1
5 TABLET ORAL EVERY 6 HOURS PRN
Qty: 5 TABLET | Refills: 0 | Status: SHIPPED | OUTPATIENT
Start: 2021-02-20 | End: 2021-03-04

## 2021-02-20 RX ADMIN — HYDROMORPHONE HYDROCHLORIDE 0.5 MG: 1 INJECTION, SOLUTION INTRAMUSCULAR; INTRAVENOUS; SUBCUTANEOUS at 18:06

## 2021-02-20 RX ADMIN — KETOROLAC TROMETHAMINE 15 MG: 15 INJECTION, SOLUTION INTRAMUSCULAR; INTRAVENOUS at 15:12

## 2021-02-20 RX ADMIN — HYDROMORPHONE HYDROCHLORIDE 0.5 MG: 1 INJECTION, SOLUTION INTRAMUSCULAR; INTRAVENOUS; SUBCUTANEOUS at 15:13

## 2021-02-20 ASSESSMENT — MIFFLIN-ST. JEOR: SCORE: 1873.49

## 2021-02-20 NOTE — ED NOTES
Went to discharge pt,  Pt reports unable to get out of bed to get pants on.   Pt has pants on in the bed at this time.   Pt discussed take home medications.    Pt wants another dose of what was put in the IV and to talk to provider

## 2021-02-20 NOTE — ED PROVIDER NOTES
History     Chief Complaint   Patient presents with     Hip Pain     pt was seen yesterday and given shot of lidocaine and oxy.  pt continues to increased right hip pain radiating down to right knee     HPI  Roscoe Jang is a 70 year old male with past medical history significant for bilateral carotid artery disease, insulin dependent type II DM, fatty liver, HTN, hyperlipidemia who presents to the emergency department with concern over right hip pain which been present for approximately last 2 weeks.  Patient reports that he did have a minor injury when he slipped out of his skid steer and hit his anterior shin/buttock on the way down.  He did not have any pain at that time however several days later developed pain in the lateral aspect of his right hip and thigh. He noted increasing pain in Wednesday of this week after he was hauling wood at his house.   He describes pain as a burning sensation.  It radiates down the lateral aspect of his right thigh to the level of the knee.  Pain is exacerbated by standing, changes in position and is alleviated with flexion of his right hip.  He denies any fever, chills or myalgias, cough, dyspnea, wheezing, numbness or paresthesias in his lower extremity or saddle region.  No loss of bowel or bladder control reports history of similar pain on the left side several years ago diagnosed as trochanteric bursitis and treated with steroid injection.  He has been taking Tylenol/ibuprofen consistently and did have chiropractic therapy without relief.  He was evaluated in the clinic yesterday and was given a steroid for suspected trochanteric bursitis.      Allergies:  No Known Allergies    Problem List:    Patient Active Problem List    Diagnosis Date Noted     Carotid stenosis, left 06/05/2019     Priority: Medium     Carotid artery disease (H) 06/05/2019     Priority: Medium     Carotid stenosis, asymptomatic 06/06/2018     Priority: Medium     Carotid artery stenosis,  symptomatic, right 2018     Priority: Medium     Bilateral carotid artery disease (H) 05/10/2018     Priority: Medium     Morbid obesity (H) 2018     Priority: Medium     Type 2 diabetes mellitus with hyperglycemia, without long-term current use of insulin (H) 2017     Priority: Medium     Abdominal aortic aneurysm (H) 01/10/2017     Priority: Medium     3.2 cm on US . No significant abdominal aortic aneurysm demonstrated on 2017, Will consider repeating U/S in 3-5 years.        Fatty liver 01/10/2017     Priority: Medium     Benign essential hypertension 2017     Priority: Medium     Mixed hyperlipidemia 2017     Priority: Medium     Obesity 2017     Priority: Medium        Past Medical History:    Past Medical History:   Diagnosis Date     Arthritis      Cerebral infarction (H)      Diabetes (H)      Hyperlipidemia      Hypertension      Obese        Past Surgical History:    Past Surgical History:   Procedure Laterality Date     ENDARTERECTOMY CAROTID Right 2018    Procedure: ENDARTERECTOMY CAROTID;  RIGHT CAROTID ENDARTERECTOMY WITH EEG;  Surgeon: Gaurav Bustos MD;  Location:  OR     ENDARTERECTOMY CAROTID Left 2018    Procedure: ENDARTERECTOMY CAROTID;  LEFT CAROTID ENDARTERECTOMY with EXTERNAL JUGULAR VEIN PATCH;  Surgeon: Gaurav Bustos MD;  Location:  OR     ENDARTERECTOMY CAROTID Left 2019    Procedure: REDO LEFT CAROTID ENDARTERECTOMY WITH EEG with greater saphenous vein graft;  Surgeon: Gaurav Bustos MD;  Location:  OR       Family History:    No family history on file.    Social History:  Marital Status:   [5]  Social History     Tobacco Use     Smoking status: Former Smoker     Quit date: 10/3/2007     Years since quittin.3     Smokeless tobacco: Never Used   Substance Use Topics     Alcohol use: No     Drug use: No      Medications:    aspirin 81 MG EC tablet  blood glucose (CONTOUR NEXT TEST) test  "strip  blood glucose monitoring (RHYS CONTOUR NEXT) test strip  blood glucose monitoring (RHYS MICROLET) lancets  glimepiride (AMARYL) 4 MG tablet  lisinopril (ZESTRIL) 40 MG tablet  metFORMIN (GLUCOPHAGE) 500 MG tablet  nitroGLYcerin (NITROSTAT) 0.4 MG sublingual tablet  oxyCODONE (ROXICODONE) 5 MG tablet  rosuvastatin (CRESTOR) 20 MG tablet      Review of Systems   Constitutional: Negative for chills and fever.   HENT: Negative for congestion, ear pain and sore throat.    Respiratory: Negative for cough, shortness of breath and wheezing.    Cardiovascular: Negative for chest pain and palpitations.   Gastrointestinal: Negative for abdominal pain, diarrhea, nausea and vomiting.   Musculoskeletal: Positive for arthralgias (right hip pain).   Skin: Negative for color change, rash and wound.     Physical Exam   BP: (!) 201/101  Pulse: 97  Temp: 97.4  F (36.3  C)  Resp: 18  Height: 172.7 cm (5' 8\")  Weight: 113.4 kg (250 lb)  SpO2: 98 %  Physical Exam  Constitutional:       General: He is in acute distress (mild, patient appears in pain).      Appearance: He is not ill-appearing or toxic-appearing.   Cardiovascular:      Rate and Rhythm: Normal rate and regular rhythm.      Heart sounds: No murmur. No friction rub. No gallop.    Pulmonary:      Effort: Pulmonary effort is normal. No respiratory distress.      Breath sounds: Normal breath sounds. No wheezing, rhonchi or rales.   Musculoskeletal:      Right hip: He exhibits decreased range of motion (wtih full extension, external rotation due to pain) and tenderness (lateral hip). He exhibits no swelling, no crepitus, no deformity and no laceration.      Right knee: He exhibits normal range of motion, no effusion, no ecchymosis, no laceration and no erythema. No tenderness found.      Lumbar back: He exhibits decreased range of motion. He exhibits no tenderness, no bony tenderness, no deformity and no laceration.      Right upper leg: He exhibits tenderness (laterally). " He exhibits no bony tenderness, no swelling, no edema, no deformity and no laceration.   Skin:     General: Skin is warm and dry.      Capillary Refill: Capillary refill takes less than 2 seconds.      Comments: Bandaged placed over lateral right hip near greater trochanter, no erythema, lacerations or abrasions   Neurological:      Mental Status: He is alert.      Sensory: No sensory deficit.       ED Course        Procedures        Critical Care time:  none        Results for orders placed or performed during the hospital encounter of 02/20/21 (from the past 24 hour(s))   Lumbar spine XR, 2-3 views    Narrative    LUMBAR SPINE TWO - THREE VIEWS 2/20/2021 3:50 PM     COMPARISON: None    HISTORY: Right hip/back pain.      Impression    IMPRESSION: 5 lumbar type vertebrae. Normal alignment. Vertebral body  heights normal. No fractures. Mild loss of intervertebral disc space  height and mild degenerative endplate spurring at L3-L4, L4-L5 and  L5-S1. Moderate facet arthropathy at L4-L5 and L5-S1.    SIRI PERALTA MD   XR Pelvis w Hip Right 1 View    Narrative    EXAM: XR PELVIS AND HIP RIGHT 1 VIEW  LOCATION: [facility]  DATE/TIME: 2/20/2021 3:44 PM    INDICATION: Pain, no known injury  COMPARISON: None.      Impression    IMPRESSION: Normal joint spaces and alignment. No acute fracture. Mild degenerative changes in the lumbar spine.   US Lower Extremity Venous Duplex Right    Narrative    US LOWER EXTREMITY VENOUS DUPLEX RIGHT 2/20/2021 4:44 PM    CLINICAL HISTORY: Pain, rule out DVT.    TECHNIQUE: Venous Duplex ultrasound of the right lower extremity with  and without compression, augmentation and duplex. Color flow and  spectral Doppler with waveform analysis performed.    COMPARISON: None.    FINDINGS: Exam includes the common femoral, femoral, popliteal, and  contralateral common femoral veins as well as segmentally visualized  deep calf veins and greater saphenous vein.     RIGHT: No deep vein  thrombosis. No superficial thrombophlebitis. No  popliteal cyst.      Impression    IMPRESSION: No deep venous thrombosis in the right lower extremity.    DAGO HITCHCOCK MD       Medications   HYDROmorphone (PF) (DILAUDID) injection 0.5 mg (0.5 mg Intravenous Given 2/20/21 1513)   ketorolac (TORADOL) injection 15 mg (15 mg Intravenous Given 2/20/21 1512)     Assessments & Plan (with Medical Decision Making)     I have reviewed the nursing notes.  I have reviewed the findings, diagnosis, plan and need for follow up with the patient.       Discharge Medication List as of 2/20/2021  5:43 PM      START taking these medications    Details   cephALEXin (KEFLEX) 500 MG capsule Take 1 capsule (500 mg) by mouth 4 times daily for 7 days, Disp-28 capsule, R-0, E-Prescribe      gabapentin (NEURONTIN) 300 MG capsule Take 1 capsule (300 mg) by mouth 3 times daily, Disp-90 capsule, R-0, E-Prescribe      !! oxyCODONE (ROXICODONE) 5 MG tablet Take 1 tablet (5 mg) by mouth every 6 hours as needed for pain, Disp-5 tablet, R-0, E-Prescribe       !! - Potential duplicate medications found. Please discuss with provider.        Final diagnoses:   Pain of right thigh     70 year old male presents to the ER with concern over right sided lateral hip and thigh pain that developed several days after having minor injury approximately two weeks ago.  He had elevated blood pressure upon arrival, physical exam findings as described above were significant for pain with extension of the right hip, discomfort/burning pain of lateral thigh that was not clearly reproducible by palpation.  He did develop some mild warmth and erythema of the distal anterior thigh during course of visit, no obvious erythema at this time.  As part of evaluation he did have X-ray of his right hip and lumbar spine which showed normal joint space and alignment of hip, no acute fracture, mild degenerative changes in the lumbar spine.  He did also have US of leg which was negative  for DVT.  Differential would favor meralgia paresthetica, would also include lumbar radiculopathy, trochanteric bursitis. Given warmth that developed during visit would consider possibility of cellulitis, and did elect to initiate patient on keflex. I do not suspect shingles at this time.  Hew as discharged home stable with instructions for continued symptomatic treatment with tylenol/ibuprofen, appropriate dosing guidelines discussed.  Prescriptions for keflex, Neurontin, few additional oxycodone given.  Follow up with PCP if no improvement in 3-5 days.  Worrisome reasons to return to ER/UC sooner discussed.     Disclaimer: This note consists of symbols derived from keyboarding, dictation, and/or voice recognition software. As a result, there may be errors in the script that have gone undetected.  Please consider this when interpreting information found in the chart.    2/20/2021   [facility] EMERGENCY DEPT     Emerita Arriaga PA-C  02/21/21 [id number]

## 2021-02-20 NOTE — ED NOTES
Pt presents for increasing pain since having a lidocaine injection for right leg pain yesterday, PA at bedside for assessment

## 2021-02-21 ASSESSMENT — ENCOUNTER SYMPTOMS
FEVER: 0
SORE THROAT: 0
DIARRHEA: 0
PALPITATIONS: 0
COLOR CHANGE: 0
WOUND: 0
WHEEZING: 0
NAUSEA: 0
VOMITING: 0
CHILLS: 0
SHORTNESS OF BREATH: 0
ABDOMINAL PAIN: 0
ARTHRALGIAS: 1
COUGH: 0

## 2021-02-21 NOTE — ED NOTES
Pt's wife went to fill prescriptions. Pt states he will try to get into wheel chair when he returns. Does not want to try at this time.

## 2021-02-22 ENCOUNTER — TELEPHONE (OUTPATIENT)
Dept: FAMILY MEDICINE | Facility: CLINIC | Age: 71
End: 2021-02-22

## 2021-02-22 DIAGNOSIS — M25.551 HIP PAIN, RIGHT: ICD-10-CM

## 2021-02-22 DIAGNOSIS — M25.551 HIP PAIN, RIGHT: Primary | ICD-10-CM

## 2021-02-22 RX ORDER — OXYCODONE HYDROCHLORIDE 5 MG/1
5 TABLET ORAL EVERY 8 HOURS PRN
Qty: 12 TABLET | Refills: 0 | Status: SHIPPED | OUTPATIENT
Start: 2021-02-22 | End: 2021-03-15

## 2021-02-22 NOTE — TELEPHONE ENCOUNTER
Pt says he saw Dr Rivers on Friday and has been referred to Ortho. He has not gotten a call from them yet. I did give him a number to call them.   He has 2 of the pain pills left. He is asking for refill. He says he has had to take one every 4 hours. He says he was seen on Saturday in [facility] and Emerita Arriaga told him to take every 4 hrs.  He is calling to see if Dr Rivers can refill his pain med

## 2021-02-22 NOTE — TELEPHONE ENCOUNTER
Reason for call:  Patient reporting a symptom    Symptom or request: Pt continues to have R hip pain. He saw Dr Rivers on Friday and went to the ER on Saturday. They did xrays and US and and gave him IV pain med. He was given some pain med to go home. He wants to know if he should have an MRI or see a specialist in ortho.       Have you been treated for this before? Yes    Additional comments:      Phone Number patient can be reached at:  Cell number on file:    Telephone Information:   Mobile [phone]       Best Time:  anytime    Can we leave a detailed message on this number:  YES    Call taken on 2/22/2021 at 8:57 AM by Bee Santos

## 2021-02-22 NOTE — TELEPHONE ENCOUNTER
02-19-21 OV- hip inj, oxycodone.  02-20-21 ED visit for persistent pain- see visit note.   States pain rt hip to knee intolerable 10+ with or without wt bearing.  Please advise ortho referral or further imaging.  VIK Huynh RN

## 2021-02-22 NOTE — TELEPHONE ENCOUNTER
We will do orthopedic consult for further review and recommendations.  Referral placed.    Dr Rivers

## 2021-02-22 NOTE — TELEPHONE ENCOUNTER
Pt informed/ advised as noted per MD. Gave # to call if does not hear in 1 business day.   In meantime will continue current pain management. Advised walker to offload pressure rt L/E with transfers/ amb.  VIK Huynh RN

## 2021-02-23 ENCOUNTER — OFFICE VISIT (OUTPATIENT)
Dept: ORTHOPEDICS | Facility: CLINIC | Age: 71
End: 2021-02-23
Payer: COMMERCIAL

## 2021-02-23 VITALS — BODY MASS INDEX: 37.89 KG/M2 | HEIGHT: 68 IN | WEIGHT: 250 LBS

## 2021-02-23 DIAGNOSIS — M25.551 HIP PAIN, RIGHT: ICD-10-CM

## 2021-02-23 DIAGNOSIS — M54.16 LUMBAR RADICULOPATHY, ACUTE: Primary | ICD-10-CM

## 2021-02-23 PROCEDURE — 99204 OFFICE O/P NEW MOD 45 MIN: CPT | Performed by: FAMILY MEDICINE

## 2021-02-23 RX ORDER — NABUMETONE 500 MG/1
500 TABLET, FILM COATED ORAL 2 TIMES DAILY
Qty: 30 TABLET | Refills: 1 | Status: SHIPPED | OUTPATIENT
Start: 2021-02-23 | End: 2021-11-04

## 2021-02-23 RX ORDER — CYCLOBENZAPRINE HCL 10 MG
10 TABLET ORAL
Qty: 30 TABLET | Refills: 0 | Status: SHIPPED | OUTPATIENT
Start: 2021-02-23 | End: 2021-11-04

## 2021-02-23 RX ORDER — METHYLPREDNISOLONE 4 MG
TABLET, DOSE PACK ORAL
Qty: 21 TABLET | Refills: 0 | Status: SHIPPED | OUTPATIENT
Start: 2021-02-23 | End: 2021-03-15

## 2021-02-23 ASSESSMENT — MIFFLIN-ST. JEOR: SCORE: 1873.49

## 2021-02-23 NOTE — PATIENT INSTRUCTIONS
# Acute on Chronic Right Lumbar Radiculopathy: Noted after a fall 2 weeks ago with right ant thigh pain going down leg.  Pain over sciatic notch with positive straight leg raise and slump test.  Reviewed x-ray showing degenerative changes in the lumbar region.  Likely cause of pain right lumbar radiculopathy.  No red flag symptoms/signs on history or examination.  Counseled patient on nature of condition and treatment options.  Given this plan as below, follow-up 3-4 weeks    Image Findings: degenerative changes  Treatment: Activity encouraged, physical therapy  Medications/Injections: Medrol dosepak, flexeril at night, Relafen afterwards  Follow-up: 3-4 weeks if not improved, sooner if worsening    Please call [phone]   Ask for my team if you have any questions or concerns    It was great seeing you today!    Tobi Casey MD, [facility]    Patient Education     Understanding Lumbar Radiculopathy    Lumbar radiculopathy is irritation or inflammation of a nerve root in the low back. It causes symptoms that spread out from the back down one or both legs. To understand this condition, it helps to understand the parts of the spine:    Vertebrae. These are bones that stack to form the spine. The lumbar spine contains 5 vertebrae near the bottom of your spine.    Disks. These are soft pads of tissue between the vertebrae. They act as shock absorbers for the spine.    Spinal canal. This is a tunnel formed within the stacked vertebrae. In the lumbar spine, nerves run through this canal.    Nerves. These branch off and leave the spinal canal, traveling out to parts of the body. As they leave the spinal canal, nerves pass through openings between the vertebrae. The nerve root is the part of the nerve that is closest to the spinal canal.    Sciatic nerve. This is a large nerve formed from several nerve roots in the low back. This nerve extends down the back of the leg to the foot.  With lumbar radiculopathy, nerve roots  in the low back become irritated. This leads to pain and symptoms. The sciatic nerve is commonly involved, so the condition is often called sciatica.  What causes lumbar radiculopathy?  Aging, injury, poor posture, extra body weight, and other issues can lead to problems in the low back. These problems may then irritate nerve roots. They include:    Damage to a disk in the lumbar spine. The damaged disk may then press on nearby nerve roots.    Degeneration from wear and tear, and aging. This can lead to narrowing (stenosis) of the openings between the vertebrae. The narrowed openings press on nerve roots as they leave the spinal canal.    Unstable spine. This is when a vertebra slips forward. It can then press on a nerve root.  Other, less common things can put pressure on nerves in the low back. These include diabetes, infection, or a tumor.  Symptoms of lumbar radiculopathy  These include:    Pain in the low back    Pain, numbness, tingling, or weakness that travels into the buttocks, hip, groin, or leg    Muscle spasms in the low back, or leg  Treatment for lumbar radiculopathy  In most cases, your healthcare provider will first try treatments that help relieve symptoms. These may include:    Prescription and over-the-counter pain medicines. These help relieve pain, swelling, and irritation.    Limits on positions and activities that increase pain. But lying in bed or avoiding all movement is only recommended for a short period of time.    Physical therapy, including exercises and stretches. This helps decrease pain and increase movement and function.    Steroid shots into the lower back. This may help relieve symptoms for a time.    Weight-loss program. If you are overweight, losing extra pounds (kilograms) may help relieve symptoms.  In some cases, you may need surgery to fix the underlying problem. This depends on the cause, the symptoms, and how long the pain has lasted.  Possible complications  Over time, an  irritated and inflamed nerve may become damaged. This may lead to long-lasting (permanent) numbness or weakness in your legs and feet. If symptoms change suddenly or get worse, be sure to let your healthcare provider know.  When to call your healthcare provider  Call your healthcare provider right away if you have any of these:    New pain or pain that gets worse    New or increasing weakness, tingling, or numbness in your leg or foot    Problems controlling your bladder or bowel  Mode last reviewed this educational content on 2/1/2020 2000-2020 [email or web address] [address] [place] [address]. All rights reserved. This information is not intended as a substitute for professional medical care. Always follow your healthcare professional's instructions.           Yoga Strap  3 sets of 30 sec on each side twice daily

## 2021-02-23 NOTE — PROGRESS NOTES
ASSESSMENT & PLAN  Roscoe was seen today for pain.    Diagnoses and all orders for this visit:    Lumbar radiculopathy, acute  -     Orthopedic & Spine  Referral  -     methylPREDNISolone (MEDROL DOSEPAK) 4 MG tablet therapy pack; Follow Package Directions  -     nabumetone (RELAFEN) 500 MG tablet; Take 1 tablet (500 mg) by mouth 2 times daily  -     cyclobenzaprine (FLEXERIL) 10 MG tablet; Take 1 tablet (10 mg) by mouth nightly as needed for muscle spasms  -     DG PT, HAND, AND CHIROPRACTIC REFERRAL; Future    Hip pain, right      Patient is a 70 year old male presenting for evaluation of   Chief Complaint   Patient presents with     Right Hip - Pain      # Acute on Chronic Right Lumbar Radiculopathy: Noted after a fall 2 weeks ago with right ant thigh pain going down leg.  Pain over sciatic notch with positive straight leg raise and slump test.  Reviewed x-ray showing degenerative changes in the lumbar region.  Likely cause of pain right lumbar radiculopathy.  No red flag symptoms/signs on history or examination.  Counseled patient on nature of condition and treatment options.  Given this plan as below, follow-up 3-4 weeks    Image Findings: degenerative changes  Treatment: Activity encouraged, physical therapy  Medications/Injections: Medrol dosepak, flexeril at night, Relafen afterwards  Follow-up: 3-4 weeks if not improved, sooner if worsening      Concerning signs/sx that would warrant urgent evaluation were discussed.  All questions were answered, patient understands and agrees with plan.      Return in about 1 month (around 3/23/2021).    -----    SUBJECTIVE  Roscoe Jang is a/an 70 year old male who is seen in consultation at the request of  Rell Rivers M.D. for evaluation of right hip pain. The patient is seen with their friend.    Onset: 2.5-3 week(s) ago worsening over the last week. Patient describes injury as hauling a lot of wood, repetitive movements. Patient was seen by PCP  21 and in ED 21.  Fell off of skid  2 weeks ago fell on bottom.  Had something similar 2019 went away on its own.  Location of Pain: right lateral hip to knee   Rating of Pain at worst: 20/10  Rating of Pain Currently: 20/10  Worsened by: sitting, standing, walking   Better with: lying down   Treatments tried: ice, heat, chiropractor, oxycodone, troch bursitis injection    Quality: sharp, burning   Associated symptoms: numbness, tingling and weakness of right leg   Orthopedic history: NO  Relevant surgical history: NO  Social: Retired , gambling  Past Medical History:   Diagnosis Date     Arthritis     neck and hip and generalized     Cerebral infarction (H)     TIA?     Diabetes (H)      Hyperlipidemia      Hypertension      Obese      Social History     Socioeconomic History     Marital status:      Spouse name: None     Number of children: None     Years of education: None     Highest education level: None   Occupational History     None   Social Needs     Financial resource strain: None     Food insecurity     Worry: None     Inability: None     Transportation needs     Medical: None     Non-medical: None   Tobacco Use     Smoking status: Former Smoker     Quit date: 10/3/2007     Years since quittin.4     Smokeless tobacco: Never Used   Substance and Sexual Activity     Alcohol use: No     Drug use: No     Sexual activity: Yes     Partners: Female   Lifestyle     Physical activity     Days per week: None     Minutes per session: None     Stress: None   Relationships     Social connections     Talks on phone: None     Gets together: None     Attends L[employer] day service: None     Active member of club or organization: None     Attends meetings of clubs or organizations: None     Relationship status: None     Intimate partner violence     Fear of current or ex partner: None     Emotionally abused: None     Physically abused: None     Forced sexual activity: None   Other  "Topics Concern     Parent/sibling w/ CABG, MI or angioplasty before 65F 55M? Not Asked   Social History Narrative    Live with girl friend, 3 children, wife passed away in Jan 2000, quit smoking in 2007.          Patient's past medical, surgical, social, and family histories were reviewed today and no changes are noted.  No family history pertinent to the patient's problem today    REVIEW OF SYSTEMS:  10 point ROS is negative other than symptoms noted above in HPI, Past Medical History or as stated below  Constitutional: NEGATIVE for fever, chills, change in weight  Skin: NEGATIVE for worrisome rashes, moles or lesions  GI/: NEGATIVE for bowel or bladder changes  Neuro: NEGATIVE for weakness, dizziness or paresthesias    OBJECTIVE:  Ht 1.727 m (5' 8\")   Wt 113.4 kg (250 lb)   BMI 38.01 kg/m     General: healthy, alert and in no distress  HEENT: no scleral icterus or conjunctival erythema  Skin: no suspicious lesions or rash. No jaundice.  CV: no pedal edema  Resp: normal respiratory effort without conversational dyspnea   Psych: normal mood and affect  Gait: normal steady gait with appropriate coordination and balance  Neuro: Normal light sensory exam of lower extremity  MSK:  BILATERAL HIP  Inspection:    No obvious deformity or asymmetry, level pelvis  Palpation:  Nontender.  Active Range of Motion:     Flexion full, IR full, ER  full  Strength:    Flexion 5/5, adduction 5/5, abduction 5/5  Special Tests:    Positive: none    Negative: Logroll, NOE, anterior impingement (FADIR)    THORACIC/LUMBAR SPINE  Inspection:    No redness, swelling, overlying skin change, gross deformity/asymmetry, scapular winging  Palpation:    Tender about the right parathoracic muscles. Otherwise remainder of landmarks are nontender.  Range of Motion:     Lumbar flexion limited substantially by pain    Lumbar extension limited substantially by pain    Right side bend full    Left side bend full    Right rotation full    Left " rotation full  Strength:    5/5 - quadriceps, hamstrings, tibialis anterior, gastrocsoleus, and extensor hallicus longus  Special Tests:    Positive: straight leg raise (right), slump test (right)  Negative: straight leg raise (left), slump test (left)    Independent visualization of the below image:  No results found for this or any previous visit (from the past 24 hour(s)).  LUMBAR SPINE TWO - THREE VIEWS 2/20/2021 3:50 PM      COMPARISON: None     HISTORY: Right hip/back pain.                                                                      IMPRESSION: 5 lumbar type vertebrae. Normal alignment. Vertebral body  heights normal. No fractures. Mild loss of intervertebral disc space  height and mild degenerative endplate spurring at L3-L4, L4-L5 and  L5-S1. Moderate facet arthropathy at L4-L5 and L5-S1.     SIRI PERALTA MD    EXAM: XR PELVIS AND HIP RIGHT 1 VIEW  LOCATION: [facility]  DATE/TIME: 2/20/2021 3:44 PM     INDICATION: Pain, no known injury  COMPARISON: None.                                                                      IMPRESSION: Normal joint spaces and alignment. No acute fracture. Mild degenerative changes in the lumbar spine.    I visualized and reviewed these images with the patient       Patient's conditions were thoroughly discussed during today's visit with greater than 50% of the visit spent counseling the patient with total time spent face-to-face with the patient being 40 minutes.    Tobi Casey MD, [facility]

## 2021-03-01 ENCOUNTER — TELEPHONE (OUTPATIENT)
Dept: FAMILY MEDICINE | Facility: CLINIC | Age: 71
End: 2021-03-01

## 2021-03-01 DIAGNOSIS — M54.16 LUMBAR RADICULOPATHY: ICD-10-CM

## 2021-03-01 DIAGNOSIS — M25.551 HIP PAIN, RIGHT: Primary | ICD-10-CM

## 2021-03-01 NOTE — TELEPHONE ENCOUNTER
Reason for Call: Request for an order or referral:    Order or referral being requested: mri for rt hip pain    Date needed: as soon as possible    Has the patient been seen by the PCP for this problem? YES    Additional comments: pt has seen PCP and ortho    Phone number Patient can be reached at:  Home number on file [phone]    Best Time:  any    Can we leave a detailed message on this number?  YES    Call taken on 3/1/2021 at 7:15 AM by Magui Blackwell

## 2021-03-01 NOTE — TELEPHONE ENCOUNTER
Per Dr. Rivers-   MRI lumbar spine and right hip ordered for further evaluation.  Please let patient know. Thanks

## 2021-03-02 ENCOUNTER — HOSPITAL ENCOUNTER (OUTPATIENT)
Dept: MRI IMAGING | Facility: CLINIC | Age: 71
End: 2021-03-02
Attending: FAMILY MEDICINE
Payer: MEDICARE

## 2021-03-02 DIAGNOSIS — M54.16 LUMBAR RADICULOPATHY: ICD-10-CM

## 2021-03-02 DIAGNOSIS — M25.551 HIP PAIN, RIGHT: ICD-10-CM

## 2021-03-02 PROCEDURE — 72148 MRI LUMBAR SPINE W/O DYE: CPT

## 2021-03-02 PROCEDURE — 73721 MRI JNT OF LWR EXTRE W/O DYE: CPT | Mod: RT

## 2021-03-02 PROCEDURE — 73721 MRI JNT OF LWR EXTRE W/O DYE: CPT | Mod: 26 | Performed by: RADIOLOGY

## 2021-03-03 ENCOUNTER — NURSE TRIAGE (OUTPATIENT)
Dept: NURSING | Facility: CLINIC | Age: 71
End: 2021-03-03

## 2021-03-03 DIAGNOSIS — S73.191A TEAR OF RIGHT ACETABULAR LABRUM, INITIAL ENCOUNTER: Primary | ICD-10-CM

## 2021-03-03 NOTE — PROGRESS NOTES
MRI findings discussed with patient consistent with extensive right hip acetabular labrum tear along with lumbar degenerative disc disease.  Orthopedic referral placed.  All questions answered.      MR right hip without contrast 3/2/2021 7:57 PM     Techniques: Multiplanar multisequence imaging of the right hip was  obtained without  administration of intra-articular or intravenous  contrast using routing protocol.     History: Hip pain, chronic, osteoarthritis suspected; Ongoing right  hip pain; Hip pain, right      Comparison: MR left hip/pelvis 4/26/2019, radiographs of pelvis/right  hip 2/20/2021     Findings:  Examination is compromised by inhomogeneous fat suppression     Osseous structures  Osseous structures: No fracture, stress reaction, avascular necrosis,  or focal osseous lesion is seen.     Articular cartilage and labrum  Assessment limited on this non-arthrographic study due to relative  lack of joint distension.     Articular cartilage: Area of suspected full-thickness chondral  fissuring and focal area of cartilage with underlying subchondral  cystic change in the anterior acetabulum (series 6 images 20-23).     Labrum: Extensive full-thickness tearing of the acetabular labrum from  3:00 anterior to 12:00 superior (series 6 images 13-24) using a 3:00  anterior and 6:00 inferior mid transverse ligament clock face.  Subchondral cystic change adjacent to a portion of the labral tear  within the acetabulum (series 6 image 19).     Ligament teres and transverse ligament of acetabulum: Intact.     Joint or bursal effusion     Joint effusion: A physiologic amount of joint fluid.     Bursal effusion: Minimal nonspecific edema over the greater  trochanter. No substantial iliopsoas or trochanteric bursal effusion.     Muscles and tendons  Muscles and tendons: Proximal hamstrings, rectus femoris, sartorius,  and iliopsoas tendons intact. Mild tendinosis of the hamstrings. The  hip abductors are intact. The  visualized adductor muscles are  unremarkable.      Nerves:  The visualized course of the sciatic nerve is unremarkable.     Other Findings:  None.                                                                      Impression:  1. Extensive full-thickness tearing of the acetabular labrum from 3:00  anterior to 12:00 superior using a 3:00 anterior and 6:00 inferior mid  transverse ligament clock face. Subchondral cystic change adjacent to  a portion of the labral tear within the acetabulum.  2. Focal full-thickness chondral fissuring focal area of cartilage  delamination in the anterosuperior acetabulum with underlying  subchondral cystic change.      MRI OF THE LUMBAR SPINE WITHOUT CONTRAST 3/2/2021 7:32 PM      COMPARISON: Lumbar spine plain films 2/20/2021     HISTORY: Low back pain with right lower extremity radicular pain.     TECHNIQUE: Multiplanar, multisequence MRI images of the lumbar spine  were acquired without IV contrast.     FINDINGS: There are five lumbar-type vertebrae for the purposes of  this dictation.      Normal vertebral body heights and marrow signal. 2 mm L5-S1  retrolisthesis. The conus tip is identified at L1. Visualized  extraspinal soft tissues are within normal limits.     T12-L1: Normal disc height and signal. No herniation. Normal facets.  No spinal canal or neural foraminal stenosis.     L1-L2: Normal disc height and signal. No herniation. Normal facets. No  spinal canal or neural foraminal stenosis.     L2-L3: Normal disc height and signal. No herniation. Normal facets. No  spinal canal or neural foraminal stenosis.     L3-L4: Mild disc height and T2 signal loss. Mild circumferential disc  osteophyte complex. Superimposed moderate right foraminal and  extraforaminal disc herniation measuring 6.3 x 4.7 mm in AP by  craniocaudal dimensions. Mild bilateral facet arthropathy. No spinal  canal stenosis. Severe right neural foraminal stenosis. Mild left  neural foraminal  stenosis.     L4-L5: Moderate disc height and T2 signal loss. Mild/moderate  circumferential disc osteophyte complex most pronounced in the left  neural foramen. Mild bilateral facet arthropathy. Mild effacement of  the ventral spinal canal. Mild right neural foraminal stenosis.  Moderate to severe left neural foraminal stenosis.     L5-S1: 2 mm retrolisthesis. Moderate disc height and T2 signal loss.  Mild/moderate circumferential disc osteophyte complex. Mild to  moderate bilateral facet arthropathy. No spinal canal stenosis.  Moderate to severe bilateral neural foraminal stenoses.                                                                      IMPRESSION:  1.  Moderate right foraminal and extraforaminal L3-L4 disc herniation  with severe right neural foraminal stenosis.  2.  Moderate to severe left L4-L5 neural foraminal stenosis due to  disc osteophyte complex.  3.  Moderate to severe bilateral L5-S1 neural foraminal stenoses due  to disc osteophyte complex.      Dr Rivers

## 2021-03-04 DIAGNOSIS — S73.191A TEAR OF RIGHT ACETABULAR LABRUM, INITIAL ENCOUNTER: Primary | ICD-10-CM

## 2021-03-04 NOTE — TELEPHONE ENCOUNTER
Patient reports on and off heartburn/acid reflux since Sunday 2/28. Pain is right between the rib cage on the front. He feels like he is about to vomit. Patient tried taking Tums, and drinking Sprite yesterday which had no relief of the heartburn. Advised per protocol to be seen in the ER for evaluation. Patient verbalized understanding, states he will go into the ER.     Priya Beverly RN/[facility]                COVID 19 Nurse Triage Plan/Patient Instructions    Please be aware that novel coronavirus (COVID-19) may be circulating in the community. If you develop symptoms such as fever, cough, or SOB or if you have concerns about the presence of another infection including coronavirus (COVID-19), please contact your health care provider or visit [email or web address].     Disposition/Instructions    ED Visit recommended. Follow protocol based instructions.     Bring Your Own Device:  Please also bring your smart device(s) (smart phones, tablets, laptops) and their charging cables for your personal use and to communicate with your care team during your visit.    Thank you for taking steps to prevent the spread of this virus.  o Limit your contact with others.  o Wear a simple mask to cover your cough.  o Wash your hands well and often.    Resources    [facility]: About COVID-19: [email or web address]    CDC: What to Do If You're Sick: www.cdc.gov/coronavirus/2019-ncov/about/steps-when-sick.html    CDC: Ending Home Isolation: www.cdc.gov/coronavirus/2019-ncov/hcp/disposition-in-home-patients.html     CDC: Caring for Someone: www.cdc.gov/coronavirus/2019-ncov/if-you-are-sick/care-for-someone.html     [facility]: Interim Guidance for Hospital Discharge to Home: [email or web address]/coronavirus/hcp/hospdischarge.pdf    [facility] clinical trials (COVID-19 research studies): [email or web address]     Below are the COVID-19 hotlines  at the [place] Department of Health ([facility]). Interpreters are available.   o For health questions: Call 044-749-6034 or 1-719.397.3623 (7 a.m. to 7 p.m.)  o For questions about schools and childcare: Call 770-090-7630 or 1-474.885.6968 (7 a.m. to 7 p.m.)     Reason for Disposition    [1] Pain lasts > 10 minutes AND [2] age > 50    Additional Information    Negative: Severe difficulty breathing (e.g., struggling for each breath, speaks in single words)    Negative: Shock suspected (e.g., cold/pale/clammy skin, too weak to stand, low BP, rapid pulse)    Negative: Difficult to awaken or acting confused (e.g., disoriented, slurred speech)    Negative: Passed out (i.e., lost consciousness, collapsed and was not responding)    Negative: Visible sweat on face or sweat dripping down face    Negative: Sounds like a life-threatening emergency to the triager    Negative: [1] SEVERE pain (e.g., excruciating) AND [2] present > 1 hour    Protocols used: ABDOMINAL PAIN - UPPER-A-AH

## 2021-03-05 RX ORDER — OXYCODONE HYDROCHLORIDE 5 MG/1
5 TABLET ORAL 3 TIMES DAILY PRN
Qty: 12 TABLET | Refills: 0 | Status: SHIPPED | OUTPATIENT
Start: 2021-03-05 | End: 2021-03-15

## 2021-03-05 NOTE — TELEPHONE ENCOUNTER
He wanted Dr Rivers to know that He got a cortisone shot in his hip yesterday. He says it feels much better. He took one of the pain pills last night but says he may not need to take any more because it's doing pretty good. He says he can finally sit in the front seat of his truck. Ortho told him if this didn't help, he may need a hip replacement.

## 2021-03-15 ENCOUNTER — OFFICE VISIT (OUTPATIENT)
Dept: FAMILY MEDICINE | Facility: CLINIC | Age: 71
End: 2021-03-15
Payer: COMMERCIAL

## 2021-03-15 VITALS
HEIGHT: 68 IN | HEART RATE: 80 BPM | DIASTOLIC BLOOD PRESSURE: 60 MMHG | BODY MASS INDEX: 34.86 KG/M2 | TEMPERATURE: 97 F | RESPIRATION RATE: 18 BRPM | SYSTOLIC BLOOD PRESSURE: 112 MMHG | WEIGHT: 230 LBS

## 2021-03-15 DIAGNOSIS — R10.10 UPPER ABDOMINAL PAIN: Primary | ICD-10-CM

## 2021-03-15 LAB
ALBUMIN SERPL-MCNC: 3.4 G/DL (ref 3.4–5)
ALP SERPL-CCNC: 69 U/L (ref 40–150)
ALT SERPL W P-5'-P-CCNC: 63 U/L (ref 0–70)
ANION GAP SERPL CALCULATED.3IONS-SCNC: 5 MMOL/L (ref 3–14)
AST SERPL W P-5'-P-CCNC: 21 U/L (ref 0–45)
BILIRUB SERPL-MCNC: 0.6 MG/DL (ref 0.2–1.3)
BUN SERPL-MCNC: 48 MG/DL (ref 7–30)
CALCIUM SERPL-MCNC: 9 MG/DL (ref 8.5–10.1)
CHLORIDE SERPL-SCNC: 108 MMOL/L (ref 94–109)
CO2 SERPL-SCNC: 26 MMOL/L (ref 20–32)
CREAT SERPL-MCNC: 1.54 MG/DL (ref 0.66–1.25)
GFR SERPL CREATININE-BSD FRML MDRD: 45 ML/MIN/{1.73_M2}
GLUCOSE SERPL-MCNC: 105 MG/DL (ref 70–99)
LIPASE SERPL-CCNC: 496 U/L (ref 73–393)
POTASSIUM SERPL-SCNC: 5.4 MMOL/L (ref 3.4–5.3)
PROT SERPL-MCNC: 7 G/DL (ref 6.8–8.8)
SODIUM SERPL-SCNC: 139 MMOL/L (ref 133–144)

## 2021-03-15 PROCEDURE — 85027 COMPLETE CBC AUTOMATED: CPT | Performed by: FAMILY MEDICINE

## 2021-03-15 PROCEDURE — 83690 ASSAY OF LIPASE: CPT | Performed by: FAMILY MEDICINE

## 2021-03-15 PROCEDURE — 99214 OFFICE O/P EST MOD 30 MIN: CPT | Performed by: FAMILY MEDICINE

## 2021-03-15 PROCEDURE — 80053 COMPREHEN METABOLIC PANEL: CPT | Performed by: FAMILY MEDICINE

## 2021-03-15 PROCEDURE — 36415 COLL VENOUS BLD VENIPUNCTURE: CPT | Performed by: FAMILY MEDICINE

## 2021-03-15 RX ORDER — PANTOPRAZOLE SODIUM 40 MG/1
40 TABLET, DELAYED RELEASE ORAL DAILY
Qty: 30 TABLET | Refills: 0 | Status: SHIPPED | OUTPATIENT
Start: 2021-03-15 | End: 2021-11-04

## 2021-03-15 RX ORDER — MELOXICAM 15 MG/1
TABLET ORAL
COMMUNITY
Start: 2021-03-11 | End: 2023-03-29

## 2021-03-15 ASSESSMENT — MIFFLIN-ST. JEOR: SCORE: 1782.77

## 2021-03-15 NOTE — PROGRESS NOTES
Assessment & Plan     Upper abdominal pain  70-year-old male presented with upper abdominal pain associated with decreased appetite and weight loss, experiencing symptoms for about 6 weeks or so.  Physical examination unremarkable aprt from significant weight loss.  Differentials discussed in detail including peptic ulcer disease, gastroesophageal reflux disease, constipation, cholelithiasis, chronic pancreatitis and mass lesion.  CBC, CMP, lipase, H. pylori stool antigen, CT abdomen/pelvis and upper and lower GI endoscopy ordered for further evaluation.  Recommended to use Prilosec and continue well hydration, healthy diet.  Written information provided.  Patient understood and in agreement with above plan.  All questions answered.  - CBC with platelets  - Comprehensive metabolic panel (BMP + Alb, Alk Phos, ALT, AST, Total. Bili, TP)  - CT Abdomen Pelvis w Contrast; Future  - Lipase  - pantoprazole (PROTONIX) 40 MG EC tablet; Take 1 tablet (40 mg) by mouth daily  - GASTROENTEROLOGY ADULT REF PROCEDURE ONLY; Future  - H Pylori antigen stool; Future      Patient Instructions     Patient Education     Abdominal Pain  Abdominal pain is pain in the stomach or belly area. Everyone has this pain from time to time. In many cases it goes away on its own. But abdominal pain can sometimes be due to a serious problem, such as appendicitis. So it s important to know when to get help.    Causes of abdominal pain  There are many possible causes of abdominal pain. Common causes in adults include:    Constipation, diarrhea, or gas    Stomach acid flowing back up into the esophagus (acid reflux or heartburn)    Severe acid reflux, called GERD (gastroesophageal reflux disease)    A sore in the lining of the stomach or small intestine (peptic ulcer)    Inflammation of the gallbladder, liver, or pancreas    Gallstones or kidney stones    Appendicitis     Intestinal blockage     An internal organ pushing through a muscle or other  tissue (hernia)    Urinary tract infections    In women, menstrual cramps, fibroids, ovarian cysts, pelvic inflammatory disease, or endometriosis    Inflammation or infection of the intestines, including Crohn's disease and ulcerative colitis    Irritable bowel syndrome  Diagnosing the cause of abdominal pain  Your healthcare provider will give you a physical exam help find the cause of your pain. If needed, you will have tests. Belly pain has many possible causes. So it can be hard to find the reason for your pain. Giving details about your pain can help. Tell your provider where and when you feel the pain, and what makes it better or worse. Also let your provider know if you have other symptoms such as:    Fever    Tiredness    Upset stomach (nausea)    Vomiting    Changes in bathroom habits    Blood in the stool or black, tarry stool    Weight loss that you can't explain (involuntary weight loss?)  Also report any family history of stomach or intestinal problems, or cancers. Tell your provider about all your alcohol use and drug use. Tell your provider about all medicines you use, including herbs, vitamins, and supplements.  Treating abdominal pain  Some causes of pain need emergency medical treatment right away. These include appendicitis or a bowel blockage. Other problems can be treated with rest, fluids, or medicines. Your healthcare provider can give you specific instructions for treatment or self-care based on what is causing your pain.     If you have vomiting or diarrhea, sip water or other clear fluids. When you are ready to eat solid foods again, start with small amounts of easy-to-digest, low-fat foods. These include apple sauce, toast, or crackers.  When to get medical care  Call 911 or go to the hospital right away if you:    Can t pass stool and are vomiting    Are vomiting blood or have bloody diarrhea or black, tarry diarrhea    Have chest, neck, or shoulder pain    Feel like you might pass  out    Have pain in your shoulder blades with nausea    Have sudden, severe belly pain    Have new, severe pain unlike any you have felt before    Have a belly that is rigid, hard, and hurts to touch  Call your healthcare provider if you have:    Pain for more than 5 days    Bloating for more than 2 days    Diarrhea for more than 5 days    A fever of 100.4 F (38 C) or higher, or as directed by your healthcare provider    Pain that gets worse    Weight loss for no reason    Continued lack of appetite    Blood in your stool  How to prevent abdominal pain  Here are some tips to help prevent abdominal pain:    Eat smaller amounts of food at each meal.    Don't eat greasy, fried, or other high-fat foods.    Don't eat foods that give you gas.    Exercise regularly.    Drink plenty of fluids.  To help prevent GERD symptoms:    Quit smoking.    Reduce alcohol and foods that increase stomach acid.    Don't use aspirin or over-the-counter pain and fever medicines, if possible. This includes nonsteroidal anti-inflammatory drugs (NSAIDs).    Lose excess weight.    Finish eating at least 2 hours before you go to bed or lie down.    Raise the head of your bed.  [employer] last reviewed this educational content on 4/1/2019 2000-2020 [employer]. All rights reserved. This information is not intended as a substitute for professional medical care. Always follow your healthcare professional's instructions.               No follow-ups on file.    Rell Rivers MD  [facility]    Shawn Malhotra is a 70 year old who presents for the following health issues     HPI     Abdominal/Flank Pain  Onset/Duration: 6 weeks   Description:   Character: Dull ache  Location: epigastric region  Radiation: None  Intensity: moderate  Progression of Symptoms:  same  Accompanying Signs & Symptoms:  Fever/Chills: no- but gets clamy feeling   Gas/Bloating: YES- bloated   Nausea: YES- after eating   Vomitting:  "no  Diarrhea: no  Constipation: YES- after the oxycodone- but is getting better now   Dysuria or Hematuria: no  History:   Trauma: no  Previous similar pain: no  Previous tests done: none  Precipitating factors:   Does the pain change with:     Food: YES- makes him nauseated     Bowel Movement: no    Urination: no   Other factors:  Hasn't been able to each much.  Has lost about 20 pounds   Therapies tried and outcome: pepcid- didn't help, sprite- helps him burp, crackers       Review of Systems   Constitutional, HEENT, cardiovascular, pulmonary, GI, , musculoskeletal, neuro, skin, endocrine and psych systems are negative, except as otherwise noted.      Objective    /60 (Cuff Size: Adult Regular)   Pulse 80   Temp 97  F (36.1  C) (Tympanic)   Resp 18   Ht 1.727 m (5' 8\")   Wt 104.3 kg (230 lb)   BMI 34.97 kg/m    Body mass index is 34.97 kg/m .  Physical Exam   GENERAL: alert, no distress and obese  EYES: Eyes grossly normal to inspection, PERRL and conjunctivae and sclerae normal  NECK: no adenopathy, no asymmetry, masses, or scars and thyroid normal to palpation  RESP: lungs clear to auscultation - no rales, rhonchi or wheezes  CV: regular rate and rhythm, normal S1 S2, no S3 or S4, no murmur, click or rub, no peripheral edema and peripheral pulses strong  ABDOMEN: soft, nontender, no hepatosplenomegaly, no masses and bowel sounds normal  SKIN: no suspicious lesions or rashes  NEURO: Normal strength and tone, mentation intact and speech normal  PSYCH: mentation appears normal, affect normal/bright      Wt Readings from Last 10 Encounters:   03/15/21 104.3 kg (230 lb)   02/23/21 113.4 kg (250 lb)   02/20/21 113.4 kg (250 lb)   02/19/21 115.7 kg (255 lb)   10/06/20 115.7 kg (255 lb)   05/13/20 115.7 kg (255 lb)   05/04/20 115.7 kg (255 lb)   10/14/19 114.8 kg (253 lb)   06/17/19 110.2 kg (243 lb)   06/05/19 107 kg (236 lb)               "

## 2021-03-15 NOTE — PATIENT INSTRUCTIONS
Patient Education     Abdominal Pain  Abdominal pain is pain in the stomach or belly area. Everyone has this pain from time to time. In many cases it goes away on its own. But abdominal pain can sometimes be due to a serious problem, such as appendicitis. So it s important to know when to get help.    Causes of abdominal pain  There are many possible causes of abdominal pain. Common causes in adults include:    Constipation, diarrhea, or gas    Stomach acid flowing back up into the esophagus (acid reflux or heartburn)    Severe acid reflux, called GERD (gastroesophageal reflux disease)    A sore in the lining of the stomach or small intestine (peptic ulcer)    Inflammation of the gallbladder, liver, or pancreas    Gallstones or kidney stones    Appendicitis     Intestinal blockage     An internal organ pushing through a muscle or other tissue (hernia)    Urinary tract infections    In women, menstrual cramps, fibroids, ovarian cysts, pelvic inflammatory disease, or endometriosis    Inflammation or infection of the intestines, including Crohn's disease and ulcerative colitis    Irritable bowel syndrome  Diagnosing the cause of abdominal pain  Your healthcare provider will give you a physical exam help find the cause of your pain. If needed, you will have tests. Belly pain has many possible causes. So it can be hard to find the reason for your pain. Giving details about your pain can help. Tell your provider where and when you feel the pain, and what makes it better or worse. Also let your provider know if you have other symptoms such as:    Fever    Tiredness    Upset stomach (nausea)    Vomiting    Changes in bathroom habits    Blood in the stool or black, tarry stool    Weight loss that you can't explain (involuntary weight loss?)  Also report any family history of stomach or intestinal problems, or cancers. Tell your provider about all your alcohol use and drug use. Tell your provider about all medicines you  use, including herbs, vitamins, and supplements.  Treating abdominal pain  Some causes of pain need emergency medical treatment right away. These include appendicitis or a bowel blockage. Other problems can be treated with rest, fluids, or medicines. Your healthcare provider can give you specific instructions for treatment or self-care based on what is causing your pain.     If you have vomiting or diarrhea, sip water or other clear fluids. When you are ready to eat solid foods again, start with small amounts of easy-to-digest, low-fat foods. These include apple sauce, toast, or crackers.  When to get medical care  Call 911 or go to the hospital right away if you:    Can t pass stool and are vomiting    Are vomiting blood or have bloody diarrhea or black, tarry diarrhea    Have chest, neck, or shoulder pain    Feel like you might pass out    Have pain in your shoulder blades with nausea    Have sudden, severe belly pain    Have new, severe pain unlike any you have felt before    Have a belly that is rigid, hard, and hurts to touch  Call your healthcare provider if you have:    Pain for more than 5 days    Bloating for more than 2 days    Diarrhea for more than 5 days    A fever of 100.4 F (38 C) or higher, or as directed by your healthcare provider    Pain that gets worse    Weight loss for no reason    Continued lack of appetite    Blood in your stool  How to prevent abdominal pain  Here are some tips to help prevent abdominal pain:    Eat smaller amounts of food at each meal.    Don't eat greasy, fried, or other high-fat foods.    Don't eat foods that give you gas.    Exercise regularly.    Drink plenty of fluids.  To help prevent GERD symptoms:    Quit smoking.    Reduce alcohol and foods that increase stomach acid.    Don't use aspirin or over-the-counter pain and fever medicines, if possible. This includes nonsteroidal anti-inflammatory drugs (NSAIDs).    Lose excess weight.    Finish eating at least 2 hours  before you go to bed or lie down.    Raise the head of your bed.  [employer] last reviewed this educational content on 4/1/2019 2000-2020 [employer]. All rights reserved. This information is not intended as a substitute for professional medical care. Always follow your healthcare professional's instructions.

## 2021-03-15 NOTE — NURSING NOTE
"No chief complaint on file.    /60 (Cuff Size: Adult Regular)   Pulse 80   Temp 97  F (36.1  C) (Tympanic)   Resp 18   Ht 1.727 m (5' 8\")   Wt 104.3 kg (230 lb)   BMI 34.97 kg/m   Estimated body mass index is 34.97 kg/m  as calculated from the following:    Height as of this encounter: 1.727 m (5' 8\").    Weight as of this encounter: 104.3 kg (230 lb).  Patient presents to the clinic using No DME      Health Maintenance that is potentially due pending provider review:    Health Maintenance Due   Topic Date Due     ADVANCE CARE PLANNING  Never done     EYE EXAM  Never done     COLORECTAL CANCER SCREENING  Never done     COVID-19 Vaccine (1 of 2) Never done     ZOSTER IMMUNIZATION (2 of 3) 09/27/2013     DIABETIC FOOT EXAM  11/19/2019     LIPID  06/03/2020     MEDICARE ANNUAL WELLNESS VISIT  10/14/2020     BMP  10/14/2020     MICROALBUMIN  10/14/2020                "

## 2021-03-16 ENCOUNTER — HOSPITAL ENCOUNTER (OUTPATIENT)
Dept: CT IMAGING | Facility: CLINIC | Age: 71
Discharge: HOME | End: 2021-03-16
Attending: FAMILY MEDICINE | Admitting: FAMILY MEDICINE
Payer: MEDICARE

## 2021-03-16 ENCOUNTER — TELEPHONE (OUTPATIENT)
Dept: FAMILY MEDICINE | Facility: CLINIC | Age: 71
End: 2021-03-16

## 2021-03-16 ENCOUNTER — DOCUMENTATION ONLY (OUTPATIENT)
Dept: FAMILY MEDICINE | Facility: CLINIC | Age: 71
End: 2021-03-16

## 2021-03-16 DIAGNOSIS — N28.9 ACUTE RENAL IMPAIRMENT: Primary | ICD-10-CM

## 2021-03-16 DIAGNOSIS — R10.10 UPPER ABDOMINAL PAIN: ICD-10-CM

## 2021-03-16 LAB
BASOPHILS # BLD AUTO: 0 10E9/L (ref 0–0.2)
BASOPHILS NFR BLD AUTO: 0.2 %
CREAT BLD-MCNC: 1.3 MG/DL (ref 0.66–1.25)
DIFFERENTIAL METHOD BLD: ABNORMAL
EOSINOPHIL # BLD AUTO: 0.2 10E9/L (ref 0–0.7)
EOSINOPHIL NFR BLD AUTO: 1.1 %
ERYTHROCYTE [DISTWIDTH] IN BLOOD BY AUTOMATED COUNT: 11.9 % (ref 10–15)
GFR SERPL CREATININE-BSD FRML MDRD: 55 ML/MIN/{1.73_M2}
HCT VFR BLD AUTO: 51.5 % (ref 40–53)
HGB BLD-MCNC: 16.9 G/DL (ref 13.3–17.7)
IMM GRANULOCYTES # BLD: 0.2 10E9/L (ref 0–0.4)
IMM GRANULOCYTES NFR BLD: 1.2 %
LYMPHOCYTES # BLD AUTO: 3.7 10E9/L (ref 0.8–5.3)
LYMPHOCYTES NFR BLD AUTO: 23.7 %
MCH RBC QN AUTO: 28.5 PG (ref 26.5–33)
MCHC RBC AUTO-ENTMCNC: 32.8 G/DL (ref 31.5–36.5)
MCV RBC AUTO: 87 FL (ref 78–100)
MONOCYTES # BLD AUTO: 1 10E9/L (ref 0–1.3)
MONOCYTES NFR BLD AUTO: 6.1 %
NEUTROPHILS # BLD AUTO: 10.5 10E9/L (ref 1.6–8.3)
NEUTROPHILS NFR BLD AUTO: 67.7 %
NRBC # BLD AUTO: 0 10*3/UL
NRBC BLD AUTO-RTO: 0 /100
PLATELET # BLD AUTO: 291 10E9/L (ref 150–450)
RBC # BLD AUTO: 5.94 10E12/L (ref 4.4–5.9)
WBC # BLD AUTO: 15.5 10E9/L (ref 4–11)

## 2021-03-16 PROCEDURE — 250N000011 HC RX IP 250 OP 636: Performed by: RADIOLOGY

## 2021-03-16 PROCEDURE — 250N000009 HC RX 250: Performed by: RADIOLOGY

## 2021-03-16 PROCEDURE — 74177 CT ABD & PELVIS W/CONTRAST: CPT

## 2021-03-16 PROCEDURE — 82565 ASSAY OF CREATININE: CPT

## 2021-03-16 RX ORDER — IOPAMIDOL 755 MG/ML
100 INJECTION, SOLUTION INTRAVASCULAR ONCE
Status: COMPLETED | OUTPATIENT
Start: 2021-03-16 | End: 2021-03-16

## 2021-03-16 RX ADMIN — SODIUM CHLORIDE 68 ML: 9 INJECTION, SOLUTION INTRAVENOUS at 09:05

## 2021-03-16 RX ADMIN — IOPAMIDOL 100 ML: 755 INJECTION, SOLUTION INTRAVENOUS at 09:05

## 2021-03-16 NOTE — TELEPHONE ENCOUNTER
Reason for Call:  Pt said he had a CT and Lab work. Pt was notified by Dr. Rivers. Pt is wondering if he still needs to do the Colonoscopy/ Gastrosopy.   Please Advise.    Phone Number Patient can be reached at: Home number on file [phone]    Best Time: Any Time      Can we leave a detailed message on this number? YES    Call taken on 3/16/2021 at 2:52 PM by Lenka Purdy

## 2021-03-16 NOTE — TELEPHONE ENCOUNTER
Patient should have screening colonoscopy.  We can hold off upper GI endoscopy for now.      Dr Rivers

## 2021-03-16 NOTE — PROGRESS NOTES
Lab results and CT abdomen/pelvis findings discussed with patient.  White blood cell count elevated, likely due to recent steroid course.  Recommended to stop NSAIDs considering impaired renal function and continue Protonix for epigastric pain.  CT abdomen/pelvis unremarkable.  Surgery continue pushing fluids.  Will repeat labs in 4 weeks or so, order placed.  All questions answered.    Results for orders placed or performed during the hospital encounter of 03/16/21   CT Abdomen Pelvis w Contrast     Status: None    Narrative    CT ABDOMEN PELVIS W CONTRAST 3/16/2021 9:18 AM    HISTORY: Epigastric and upper abdominal pain.    CONTRAST:  100 mL [id number].    TECHNIQUE: CT of the abdomen and pelvis is performed with IV contrast.    Routine assessed structures include the liver, spleen, pancreas,  adrenal glands, and kidneys. Other assessed structures include the  retroperitoneum, abdominal aorta, visualized gastrointestinal tract,  and abdominal wall. Intrapelvic anatomy is also assessed.    Radiation dose for this scan is reduced using automated exposure  control, adjustment of the mA and/or kV according to patient size, or  iterative reconstruction technique.    COMPARISON: 11/11/2018.    FINDINGS:    Abdomen: No focal inflammatory process is seen. The pancreas has a  normal appearance. A small focal scar in the upper pole left kidney is  unchanged.    Pelvis:  No significant abnormality is demonstrated.      Impression    IMPRESSION:  No acute finding nor specific reason for upper abdominal  pain is demonstrated.    ALLIE GALLEGOS MD   Creatinine POCT     Status: Abnormal   Result Value Ref Range    Creatinine 1.3 (H) 0.66 - 1.25 mg/dL    GFR Estimate 55 (L) >60 mL/min/[1.73_m2]    GFR Estimate If Black 66 >60 mL/min/[1.73_m2]       Dr Rivers

## 2021-05-23 DIAGNOSIS — Z11.59 ENCOUNTER FOR SCREENING FOR OTHER VIRAL DISEASES: ICD-10-CM

## 2021-06-01 ENCOUNTER — OFFICE VISIT (OUTPATIENT)
Dept: FAMILY MEDICINE | Facility: CLINIC | Age: 71
End: 2021-06-01
Payer: COMMERCIAL

## 2021-06-01 VITALS
SYSTOLIC BLOOD PRESSURE: 130 MMHG | HEART RATE: 94 BPM | OXYGEN SATURATION: 98 % | DIASTOLIC BLOOD PRESSURE: 70 MMHG | BODY MASS INDEX: 37.25 KG/M2 | WEIGHT: 245 LBS | RESPIRATION RATE: 12 BRPM | TEMPERATURE: 97.6 F

## 2021-06-01 DIAGNOSIS — Z01.818 PREOP GENERAL PHYSICAL EXAM: Primary | ICD-10-CM

## 2021-06-01 DIAGNOSIS — I10 BENIGN ESSENTIAL HYPERTENSION: ICD-10-CM

## 2021-06-01 DIAGNOSIS — S73.192D TEAR OF LEFT ACETABULAR LABRUM, SUBSEQUENT ENCOUNTER: ICD-10-CM

## 2021-06-01 DIAGNOSIS — M25.552 HIP PAIN, LEFT: ICD-10-CM

## 2021-06-01 DIAGNOSIS — E11.65 TYPE 2 DIABETES MELLITUS WITH HYPERGLYCEMIA, WITHOUT LONG-TERM CURRENT USE OF INSULIN (H): ICD-10-CM

## 2021-06-01 DIAGNOSIS — I65.23 BILATERAL CAROTID ARTERY STENOSIS: ICD-10-CM

## 2021-06-01 LAB
ANION GAP SERPL CALCULATED.3IONS-SCNC: 6 MMOL/L (ref 3–14)
BUN SERPL-MCNC: 23 MG/DL (ref 7–30)
CALCIUM SERPL-MCNC: 9.1 MG/DL (ref 8.5–10.1)
CHLORIDE SERPL-SCNC: 108 MMOL/L (ref 94–109)
CO2 SERPL-SCNC: 27 MMOL/L (ref 20–32)
CREAT SERPL-MCNC: 1.19 MG/DL (ref 0.66–1.25)
ERYTHROCYTE [DISTWIDTH] IN BLOOD BY AUTOMATED COUNT: 14 % (ref 10–15)
GFR SERPL CREATININE-BSD FRML MDRD: 62 ML/MIN/{1.73_M2}
GLUCOSE SERPL-MCNC: 88 MG/DL (ref 70–99)
HBA1C MFR BLD: 7 % (ref 0–5.6)
HCT VFR BLD AUTO: 41.3 % (ref 40–53)
HGB BLD-MCNC: 14 G/DL (ref 13.3–17.7)
MCH RBC QN AUTO: 29.7 PG (ref 26.5–33)
MCHC RBC AUTO-ENTMCNC: 33.9 G/DL (ref 31.5–36.5)
MCV RBC AUTO: 88 FL (ref 78–100)
PLATELET # BLD AUTO: 239 10E9/L (ref 150–450)
POTASSIUM SERPL-SCNC: 4.5 MMOL/L (ref 3.4–5.3)
RBC # BLD AUTO: 4.72 10E12/L (ref 4.4–5.9)
SODIUM SERPL-SCNC: 141 MMOL/L (ref 133–144)
WBC # BLD AUTO: 9.3 10E9/L (ref 4–11)

## 2021-06-01 PROCEDURE — 80048 BASIC METABOLIC PNL TOTAL CA: CPT | Performed by: FAMILY MEDICINE

## 2021-06-01 PROCEDURE — 85027 COMPLETE CBC AUTOMATED: CPT | Performed by: FAMILY MEDICINE

## 2021-06-01 PROCEDURE — 83036 HEMOGLOBIN GLYCOSYLATED A1C: CPT | Performed by: FAMILY MEDICINE

## 2021-06-01 PROCEDURE — 36415 COLL VENOUS BLD VENIPUNCTURE: CPT | Performed by: FAMILY MEDICINE

## 2021-06-01 PROCEDURE — 93000 ELECTROCARDIOGRAM COMPLETE: CPT | Performed by: FAMILY MEDICINE

## 2021-06-01 PROCEDURE — 99214 OFFICE O/P EST MOD 30 MIN: CPT | Performed by: FAMILY MEDICINE

## 2021-06-01 NOTE — LETTER
June 2, 2021      Roscoe Jang  [address] [address] [address]        Dear ,    We are writing to inform you of your test results.    Hemoglobin A1c 7.0, within target goal of less than 8.  Cell counts, hemoglobin, electrolytes and kidney function test came back normal.   Let us know if there are any questions.     Resulted Orders   HEMOGLOBIN A1C   Result Value Ref Range    Hemoglobin A1C 7.0 (H) 0 - 5.6 %      Comment:      Normal <5.7% Prediabetes 5.7-6.4%  Diabetes 6.5% or higher - adopted from ADA   consensus guidelines.     CBC with platelets   Result Value Ref Range    WBC 9.3 4.0 - 11.0 10e9/L    RBC Count 4.72 4.4 - 5.9 10e12/L    Hemoglobin 14.0 13.3 - 17.7 g/dL    Hematocrit 41.3 40.0 - 53.0 %    MCV 88 78 - 100 fl    MCH 29.7 26.5 - 33.0 pg    MCHC 33.9 31.5 - 36.5 g/dL    RDW 14.0 10.0 - 15.0 %    Platelet Count 239 150 - 450 10e9/L   Basic metabolic panel   Result Value Ref Range    Sodium 141 133 - 144 mmol/L    Potassium 4.5 3.4 - 5.3 mmol/L    Chloride 108 94 - 109 mmol/L    Carbon Dioxide 27 20 - 32 mmol/L    Anion Gap 6 3 - 14 mmol/L    Glucose 88 70 - 99 mg/dL    Urea Nitrogen 23 7 - 30 mg/dL    Creatinine 1.19 0.66 - 1.25 mg/dL    GFR Estimate 62 >60 mL/min/[1.73_m2]      Comment:      Non  GFR Calc  Starting 12/18/2018, serum creatinine based estimated GFR (eGFR) will be   calculated using the Chronic Kidney Disease Epidemiology Collaboration   (CKD-EPI) equation.      GFR Estimate If Black 71 >60 mL/min/[1.73_m2]      Comment:       GFR Calc  Starting 12/18/2018, serum creatinine based estimated GFR (eGFR) will be   calculated using the Chronic Kidney Disease Epidemiology Collaboration   (CKD-EPI) equation.      Calcium 9.1 8.5 - 10.1 mg/dL       If you have any questions or concerns, please call the clinic at the number listed above.       Sincerely,      Rell Rivers MD

## 2021-06-01 NOTE — PROGRESS NOTES
[facility]  [address] [place] [address]  Phone: [phone]  Fax: [phone]  Primary Provider: Rell Rivers  Pre-op Performing Provider: RELL RIVERS      PREOPERATIVE EVALUATION:  Today's date: 6/1/2021    Roscoe Jang is a 70 year old male who presents for a preoperative evaluation.    Surgical Information:  Surgery/Procedure: ARTHROPLASTY, HIP, TOTAL Right  Surgery Location: [facility]  Surgeon: Dr. Charles Antonio  Surgery Date: 06/09/21  Time of Surgery: TBD  Where patient plans to recover: At home with family  Fax number for surgical facility: Note does not need to be faxed, will be available electronically in Epic.    Type of Anesthesia Anticipated: Other    Assessment & Plan   The proposed surgical procedure is considered INTERMEDIATE risk.      ICD-10-CM    1. Preop general physical exam  Z01.818    2. Tear of left acetabular labrum, subsequent encounter  S73.192D    3. Hip pain, left  M25.552    4. Type 2 diabetes mellitus with hyperglycemia, without long-term current use of insulin (H)  E11.65 HEMOGLOBIN A1C     EKG 12-lead complete w/read - Clinics     CBC with platelets     Basic metabolic panel   5. Benign essential hypertension  I10    6. Bilateral carotid artery stenosis  I65.23        Risks and Recommendations:  The patient has the following additional risks and recommendations for perioperative complications:   - No identified additional risk factors other than previously addressed    Medication Instructions:   - aspirin: Discontinue aspirin 7 days prior to procedure to reduce bleeding risk. It should be resumed postoperatively.    - metformin: HOLD day of surgery.   - sulfonylurea (e.g. glyburide, glipizide): HOLD day of surgery    RECOMMENDATION:  APPROVAL GIVEN to proceed with proposed procedure, without further diagnostic evaluation.      Subjective   HPI related to upcoming procedure:   70-year-old male presents for a  preop physical exam.  Patient is scheduled to have left hip total arthroplasty on June 9, 2021. He requires evaluation and anesthesia risk assessment prior to undergoing surgery/procedure.  Patient denies any fever, chills, sore throat, cough, shortness of breath, chest pain, palpitation diarrhea, constipation or other relevant systemic symptoms, able to perform > 4 METS of activity without any difficulty.    Preop Questions 6/1/2021   1. Have you ever had a heart attack or stroke? No   2. Have you ever had surgery on your heart or blood vessels, such as a stent placement, a coronary artery bypass, or surgery on an artery in your head, neck, heart, or legs? YES - bilateral neck carotids   3. Do you have chest pain with activity? No   4. Do you have a history of  heart failure? No   5. Do you currently have a cold, bronchitis or symptoms of other infection? No   6. Do you have a cough, shortness of breath, or wheezing? No   7. Do you or anyone in your family have previous history of blood clots? No   8. Do you or does anyone in your family have a serious bleeding problem such as prolonged bleeding following surgeries or cuts? No   9. Have you ever had problems with anemia or been told to take iron pills? No   10. Have you had any abnormal blood loss such as black, tarry or bloody stools? No   11. Have you ever had a blood transfusion? No   12. Are you willing to have a blood transfusion if it is medically needed before, during, or after your surgery? Yes   13. Have you or any of your relatives ever had problems with anesthesia? No   14. Do you have sleep apnea, excessive snoring or daytime drowsiness? No   15. Do you have any artifical heart valves or other implanted medical devices like a pacemaker, defibrillator, or continuous glucose monitor? No   16. Do you have artificial joints? No   17. Are you allergic to latex? No       Health Care Directive:  Patient does not have a Health Care Directive or Living Will:  Discussed advance care planning with patient; information given to patient to review.    Preoperative Review of :   reviewed - controlled substances reflected in medication list.        Review of Systems  Constitutional, neuro, ENT, endocrine, pulmonary, cardiac, gastrointestinal, genitourinary, musculoskeletal, integument and psychiatric systems are negative, except as otherwise noted.    Patient Active Problem List    Diagnosis Date Noted     Carotid stenosis, left 06/05/2019     Priority: Medium     Carotid artery disease (H) 06/05/2019     Priority: Medium     Carotid stenosis, asymptomatic 06/06/2018     Priority: Medium     Carotid artery stenosis, symptomatic, right 05/11/2018     Priority: Medium     Bilateral carotid artery disease (H) 05/10/2018     Priority: Medium     Morbid obesity (H) 05/07/2018     Priority: Medium     Type 2 diabetes mellitus with hyperglycemia, without long-term current use of insulin (H) 07/08/2017     Priority: Medium     Abdominal aortic aneurysm (H) 01/10/2017     Priority: Medium     3.2 cm on US 8/14. No significant abdominal aortic aneurysm demonstrated on 1/2017, Will consider repeating U/S in 3-5 years.        Fatty liver 01/10/2017     Priority: Medium     Benign essential hypertension 01/09/2017     Priority: Medium     Mixed hyperlipidemia 01/09/2017     Priority: Medium     Obesity 01/09/2017     Priority: Medium      Past Medical History:   Diagnosis Date     Arthritis     neck and hip and generalized     Cerebral infarction (H)     TIA?     Diabetes (H)      Hyperlipidemia      Hypertension      Obese      Past Surgical History:   Procedure Laterality Date     ENDARTERECTOMY CAROTID Right 5/11/2018    Procedure: ENDARTERECTOMY CAROTID;  RIGHT CAROTID ENDARTERECTOMY WITH EEG;  Surgeon: Gaurav Bustos MD;  Location:  OR     ENDARTERECTOMY CAROTID Left 6/6/2018    Procedure: ENDARTERECTOMY CAROTID;  LEFT CAROTID ENDARTERECTOMY with EXTERNAL JUGULAR VEIN  PATCH;  Surgeon: Gaurav Bustos MD;  Location:  OR     ENDARTERECTOMY CAROTID Left 6/5/2019    Procedure: REDO LEFT CAROTID ENDARTERECTOMY WITH EEG with greater saphenous vein graft;  Surgeon: Gaurav Bustos MD;  Location:  OR     Current Outpatient Medications   Medication Sig Dispense Refill     acetaminophen (TYLENOL) 500 MG tablet Take 1,500 mg by mouth every 8 hours as needed for mild pain       blood glucose (CONTOUR NEXT TEST) test strip USE ONE STRIP TO CHECK GLUCOSE ONCE DAILY 100 strip 1     blood glucose monitoring ([employer]) lancets Use to test blood sugar 1 times daily or as directed. 100 each 5     cyclobenzaprine (FLEXERIL) 10 MG tablet Take 1 tablet (10 mg) by mouth nightly as needed for muscle spasms 30 tablet 0     gabapentin (NEURONTIN) 300 MG capsule Take 1 capsule (300 mg) by mouth 3 times daily 90 capsule 0     glimepiride (AMARYL) 4 MG tablet TAKE 1 TABLET BY MOUTH ONCE DAILY BEFORE BREAKFAST (Patient taking differently: Take 4 mg by mouth every morning (before breakfast) TAKE 1 TABLET BY MOUTH ONCE DAILY BEFORE BREAKFAST) 90 tablet 3     lisinopril (ZESTRIL) 40 MG tablet Take 1 tablet (40 mg) by mouth daily 90 tablet 3     meloxicam (MOBIC) 15 MG tablet TAKE 1 TABLET BY MOUTH ONCE DAILY WITH FOOD       metFORMIN (GLUCOPHAGE) 500 MG tablet Take 2 tablets in the morning and 1 tablet at night by mouth 270 tablet 1     nabumetone (RELAFEN) 500 MG tablet Take 1 tablet (500 mg) by mouth 2 times daily 30 tablet 1     nitroGLYcerin (NITROSTAT) 0.4 MG sublingual tablet For chest pain place 1 tablet under the tongue every 5 minutes for 3 doses. If symptoms persist 5 minutes after 1st dose call 911. 12 tablet 0     pantoprazole (PROTONIX) 40 MG EC tablet Take 1 tablet (40 mg) by mouth daily 30 tablet 0     rosuvastatin (CRESTOR) 20 MG tablet Take 1 tablet (20 mg) by mouth daily 90 tablet 3     aspirin 81 MG EC tablet Take 81 mg by mouth daily         No Known Allergies      Social History     Tobacco Use     Smoking status: Former Smoker     Quit date: 10/3/2007     Years since quittin.6     Smokeless tobacco: Never Used   Substance Use Topics     Alcohol use: No     No family history on file.  History   Drug Use No         Objective     /70   Pulse 94   Temp 97.6  F (36.4  C) (Tympanic)   Resp 12   Wt 111.1 kg (245 lb)   SpO2 98%   BMI 37.25 kg/m      Physical Exam    GENERAL APPEARANCE: alert, active and no distress     EYES: EOMI,  PERRL     HENT: ear canals and TM's normal and nose and mouth without ulcers or lesions     NECK: no adenopathy, no asymmetry, masses, or scars and thyroid normal to palpation     RESP: lungs clear to auscultation - no rales, rhonchi or wheezes     CV: regular rates and rhythm, normal S1 S2, no S3 or S4 and no murmur, click or rub     ABDOMEN:  soft, nontender, no HSM or masses and bowel sounds normal     MS: extremities normal- no gross deformities noted, no evidence of inflammation in joints, FROM in all extremities.     SKIN: no suspicious lesions or rashes     NEURO: Normal strength and tone, sensory exam grossly normal, mentation intact and speech normal     PSYCH: mentation appears normal. and affect normal/bright     LYMPHATICS: No cervical adenopathy    Recent Labs   Lab Test 03/15/21  1536 10/05/20  1307 20  1223 10/14/19  0931 19  0530 19  0530   HGB 16.9  --   --   --   --  12.1*     --   --   --   --  204     --   --  138   < > 141   POTASSIUM 5.4*  --   --  4.3   < > 4.3   CR 1.54*  --   --  0.91   < > 0.99   A1C  --  7.0* 8.8* 7.0*  --   --     < > = values in this interval not displayed.      Lab Results   Component Value Date    A1C 7.0 2021    A1C 7.0 10/05/2020    A1C 8.8 2020    A1C 7.0 10/14/2019    A1C 7.2 2019       Diagnostics:  Labs pending at this time.  Results will be reviewed when available.   EKG: Sinus rhythm, no acute ischemic changes or rhythm abnormality  noted    Revised Cardiac Risk Index (RCRI):  The patient has the following serious cardiovascular risks for perioperative complications:   - No serious cardiac risks = 0 points     RCRI Interpretation: 0 points: Class I (very low risk - 0.4% complication rate)      Signed Electronically by: Rell Rivers MD  Copy of this evaluation report is provided to requesting physician.

## 2021-06-01 NOTE — H&P (VIEW-ONLY)
[facility]  [address] [place] [address]  Phone: [phone]  Fax: [phone]  Primary Provider: Rell Rivers  Pre-op Performing Provider: RELL RIVERS      PREOPERATIVE EVALUATION:  Today's date: 6/1/2021    Roscoe Jang is a 70 year old male who presents for a preoperative evaluation.    Surgical Information:  Surgery/Procedure: ARTHROPLASTY, HIP, TOTAL Right  Surgery Location: [facility]  Surgeon: Dr. Charles Antonio  Surgery Date: 06/09/21  Time of Surgery: TBD  Where patient plans to recover: At home with family  Fax number for surgical facility: Note does not need to be faxed, will be available electronically in Epic.    Type of Anesthesia Anticipated: Other    Assessment & Plan   The proposed surgical procedure is considered INTERMEDIATE risk.      ICD-10-CM    1. Preop general physical exam  Z01.818    2. Tear of left acetabular labrum, subsequent encounter  S73.192D    3. Hip pain, left  M25.552    4. Type 2 diabetes mellitus with hyperglycemia, without long-term current use of insulin (H)  E11.65 HEMOGLOBIN A1C     EKG 12-lead complete w/read - Clinics     CBC with platelets     Basic metabolic panel   5. Benign essential hypertension  I10    6. Bilateral carotid artery stenosis  I65.23        Risks and Recommendations:  The patient has the following additional risks and recommendations for perioperative complications:   - No identified additional risk factors other than previously addressed    Medication Instructions:   - aspirin: Discontinue aspirin 7 days prior to procedure to reduce bleeding risk. It should be resumed postoperatively.    - metformin: HOLD day of surgery.   - sulfonylurea (e.g. glyburide, glipizide): HOLD day of surgery    RECOMMENDATION:  APPROVAL GIVEN to proceed with proposed procedure, without further diagnostic evaluation.      Subjective   HPI related to upcoming procedure:   70-year-old male presents for a  preop physical exam.  Patient is scheduled to have left hip total arthroplasty on June 9, 2021. He requires evaluation and anesthesia risk assessment prior to undergoing surgery/procedure.  Patient denies any fever, chills, sore throat, cough, shortness of breath, chest pain, palpitation diarrhea, constipation or other relevant systemic symptoms, able to perform > 4 METS of activity without any difficulty.    Preop Questions 6/1/2021   1. Have you ever had a heart attack or stroke? No   2. Have you ever had surgery on your heart or blood vessels, such as a stent placement, a coronary artery bypass, or surgery on an artery in your head, neck, heart, or legs? YES - bilateral neck carotids   3. Do you have chest pain with activity? No   4. Do you have a history of  heart failure? No   5. Do you currently have a cold, bronchitis or symptoms of other infection? No   6. Do you have a cough, shortness of breath, or wheezing? No   7. Do you or anyone in your family have previous history of blood clots? No   8. Do you or does anyone in your family have a serious bleeding problem such as prolonged bleeding following surgeries or cuts? No   9. Have you ever had problems with anemia or been told to take iron pills? No   10. Have you had any abnormal blood loss such as black, tarry or bloody stools? No   11. Have you ever had a blood transfusion? No   12. Are you willing to have a blood transfusion if it is medically needed before, during, or after your surgery? Yes   13. Have you or any of your relatives ever had problems with anesthesia? No   14. Do you have sleep apnea, excessive snoring or daytime drowsiness? No   15. Do you have any artifical heart valves or other implanted medical devices like a pacemaker, defibrillator, or continuous glucose monitor? No   16. Do you have artificial joints? No   17. Are you allergic to latex? No       Health Care Directive:  Patient does not have a Health Care Directive or Living Will:  Discussed advance care planning with patient; information given to patient to review.    Preoperative Review of :   reviewed - controlled substances reflected in medication list.        Review of Systems  Constitutional, neuro, ENT, endocrine, pulmonary, cardiac, gastrointestinal, genitourinary, musculoskeletal, integument and psychiatric systems are negative, except as otherwise noted.    Patient Active Problem List    Diagnosis Date Noted     Carotid stenosis, left 06/05/2019     Priority: Medium     Carotid artery disease (H) 06/05/2019     Priority: Medium     Carotid stenosis, asymptomatic 06/06/2018     Priority: Medium     Carotid artery stenosis, symptomatic, right 05/11/2018     Priority: Medium     Bilateral carotid artery disease (H) 05/10/2018     Priority: Medium     Morbid obesity (H) 05/07/2018     Priority: Medium     Type 2 diabetes mellitus with hyperglycemia, without long-term current use of insulin (H) 07/08/2017     Priority: Medium     Abdominal aortic aneurysm (H) 01/10/2017     Priority: Medium     3.2 cm on US 8/14. No significant abdominal aortic aneurysm demonstrated on 1/2017, Will consider repeating U/S in 3-5 years.        Fatty liver 01/10/2017     Priority: Medium     Benign essential hypertension 01/09/2017     Priority: Medium     Mixed hyperlipidemia 01/09/2017     Priority: Medium     Obesity 01/09/2017     Priority: Medium      Past Medical History:   Diagnosis Date     Arthritis     neck and hip and generalized     Cerebral infarction (H)     TIA?     Diabetes (H)      Hyperlipidemia      Hypertension      Obese      Past Surgical History:   Procedure Laterality Date     ENDARTERECTOMY CAROTID Right 5/11/2018    Procedure: ENDARTERECTOMY CAROTID;  RIGHT CAROTID ENDARTERECTOMY WITH EEG;  Surgeon: Gaurav Bustos MD;  Location:  OR     ENDARTERECTOMY CAROTID Left 6/6/2018    Procedure: ENDARTERECTOMY CAROTID;  LEFT CAROTID ENDARTERECTOMY with EXTERNAL JUGULAR VEIN  PATCH;  Surgeon: Gaurav Bustos MD;  Location:  OR     ENDARTERECTOMY CAROTID Left 6/5/2019    Procedure: REDO LEFT CAROTID ENDARTERECTOMY WITH EEG with greater saphenous vein graft;  Surgeon: Gaurav Bustos MD;  Location:  OR     Current Outpatient Medications   Medication Sig Dispense Refill     acetaminophen (TYLENOL) 500 MG tablet Take 1,500 mg by mouth every 8 hours as needed for mild pain       blood glucose (CONTOUR NEXT TEST) test strip USE ONE STRIP TO CHECK GLUCOSE ONCE DAILY 100 strip 1     blood glucose monitoring ([employer]) lancets Use to test blood sugar 1 times daily or as directed. 100 each 5     cyclobenzaprine (FLEXERIL) 10 MG tablet Take 1 tablet (10 mg) by mouth nightly as needed for muscle spasms 30 tablet 0     gabapentin (NEURONTIN) 300 MG capsule Take 1 capsule (300 mg) by mouth 3 times daily 90 capsule 0     glimepiride (AMARYL) 4 MG tablet TAKE 1 TABLET BY MOUTH ONCE DAILY BEFORE BREAKFAST (Patient taking differently: Take 4 mg by mouth every morning (before breakfast) TAKE 1 TABLET BY MOUTH ONCE DAILY BEFORE BREAKFAST) 90 tablet 3     lisinopril (ZESTRIL) 40 MG tablet Take 1 tablet (40 mg) by mouth daily 90 tablet 3     meloxicam (MOBIC) 15 MG tablet TAKE 1 TABLET BY MOUTH ONCE DAILY WITH FOOD       metFORMIN (GLUCOPHAGE) 500 MG tablet Take 2 tablets in the morning and 1 tablet at night by mouth 270 tablet 1     nabumetone (RELAFEN) 500 MG tablet Take 1 tablet (500 mg) by mouth 2 times daily 30 tablet 1     nitroGLYcerin (NITROSTAT) 0.4 MG sublingual tablet For chest pain place 1 tablet under the tongue every 5 minutes for 3 doses. If symptoms persist 5 minutes after 1st dose call 911. 12 tablet 0     pantoprazole (PROTONIX) 40 MG EC tablet Take 1 tablet (40 mg) by mouth daily 30 tablet 0     rosuvastatin (CRESTOR) 20 MG tablet Take 1 tablet (20 mg) by mouth daily 90 tablet 3     aspirin 81 MG EC tablet Take 81 mg by mouth daily         No Known Allergies      Social History     Tobacco Use     Smoking status: Former Smoker     Quit date: 10/3/2007     Years since quittin.6     Smokeless tobacco: Never Used   Substance Use Topics     Alcohol use: No     No family history on file.  History   Drug Use No         Objective     /70   Pulse 94   Temp 97.6  F (36.4  C) (Tympanic)   Resp 12   Wt 111.1 kg (245 lb)   SpO2 98%   BMI 37.25 kg/m      Physical Exam    GENERAL APPEARANCE: alert, active and no distress     EYES: EOMI,  PERRL     HENT: ear canals and TM's normal and nose and mouth without ulcers or lesions     NECK: no adenopathy, no asymmetry, masses, or scars and thyroid normal to palpation     RESP: lungs clear to auscultation - no rales, rhonchi or wheezes     CV: regular rates and rhythm, normal S1 S2, no S3 or S4 and no murmur, click or rub     ABDOMEN:  soft, nontender, no HSM or masses and bowel sounds normal     MS: extremities normal- no gross deformities noted, no evidence of inflammation in joints, FROM in all extremities.     SKIN: no suspicious lesions or rashes     NEURO: Normal strength and tone, sensory exam grossly normal, mentation intact and speech normal     PSYCH: mentation appears normal. and affect normal/bright     LYMPHATICS: No cervical adenopathy    Recent Labs   Lab Test 03/15/21  1536 10/05/20  1307 20  1223 10/14/19  0931 19  0530 19  0530   HGB 16.9  --   --   --   --  12.1*     --   --   --   --  204     --   --  138   < > 141   POTASSIUM 5.4*  --   --  4.3   < > 4.3   CR 1.54*  --   --  0.91   < > 0.99   A1C  --  7.0* 8.8* 7.0*  --   --     < > = values in this interval not displayed.      Lab Results   Component Value Date    A1C 7.0 2021    A1C 7.0 10/05/2020    A1C 8.8 2020    A1C 7.0 10/14/2019    A1C 7.2 2019       Diagnostics:  Labs pending at this time.  Results will be reviewed when available.   EKG: Sinus rhythm, no acute ischemic changes or rhythm abnormality  noted    Revised Cardiac Risk Index (RCRI):  The patient has the following serious cardiovascular risks for perioperative complications:   - No serious cardiac risks = 0 points     RCRI Interpretation: 0 points: Class I (very low risk - 0.4% complication rate)      Signed Electronically by: Rell Rivers MD  Copy of this evaluation report is provided to requesting physician.

## 2021-06-01 NOTE — PATIENT INSTRUCTIONS

## 2021-06-01 NOTE — NURSING NOTE
"Chief Complaint   Patient presents with     Pre-Op Exam     /70   Pulse 94   Temp 97.6  F (36.4  C) (Tympanic)   Resp 12   Wt 111.1 kg (245 lb)   SpO2 98%   BMI 37.25 kg/m   Estimated body mass index is 37.25 kg/m  as calculated from the following:    Height as of 3/15/21: 1.727 m (5' 8\").    Weight as of this encounter: 111.1 kg (245 lb).  Patient presents to the clinic using [facility]      Health Maintenance that is potentially due pending provider review:    Health Maintenance Due   Topic Date Due     ANNUAL REVIEW OF HM ORDERS  Never done     ADVANCE CARE PLANNING  Never done     EYE EXAM  Never done     COLORECTAL CANCER SCREENING  Never done     ZOSTER IMMUNIZATION (2 of 3) 09/27/2013     DIABETIC FOOT EXAM  11/19/2019     LIPID  06/03/2020     MEDICARE ANNUAL WELLNESS VISIT  10/14/2020     MICROALBUMIN  10/14/2020     A1C  04/05/2021     COVID-19 Vaccine (2 - Moderna 2-dose series) 05/13/2021        Possibly completing today per provider review.        "

## 2021-06-07 ENCOUNTER — ANESTHESIA EVENT (OUTPATIENT)
Dept: SURGERY | Facility: CLINIC | Age: 71
End: 2021-06-07
Payer: MEDICARE

## 2021-06-07 DIAGNOSIS — Z11.59 ENCOUNTER FOR SCREENING FOR OTHER VIRAL DISEASES: ICD-10-CM

## 2021-06-07 LAB
SARS-COV-2 RNA RESP QL NAA+PROBE: NORMAL
SPECIMEN SOURCE: NORMAL

## 2021-06-07 PROCEDURE — U0005 INFEC AGEN DETEC AMPLI PROBE: HCPCS | Performed by: ORTHOPAEDIC SURGERY

## 2021-06-07 PROCEDURE — U0003 INFECTIOUS AGENT DETECTION BY NUCLEIC ACID (DNA OR RNA); SEVERE ACUTE RESPIRATORY SYNDROME CORONAVIRUS 2 (SARS-COV-2) (CORONAVIRUS DISEASE [COVID-19]), AMPLIFIED PROBE TECHNIQUE, MAKING USE OF HIGH THROUGHPUT TECHNOLOGIES AS DESCRIBED BY CMS-2020-01-R: HCPCS | Performed by: ORTHOPAEDIC SURGERY

## 2021-06-07 ASSESSMENT — LIFESTYLE VARIABLES: TOBACCO_USE: 1

## 2021-06-07 NOTE — ANESTHESIA PREPROCEDURE EVALUATION
Anesthesia Pre-Procedure Evaluation    Patient: Roscoe Jang   MRN: [id number] : 1950        Preoperative Diagnosis: Osteoarthritis [M19.90]   Procedure : Procedure(s):  ARTHROPLASTY, HIP, TOTAL     Past Medical History:   Diagnosis Date     Arthritis     neck and hip and generalized     Cerebral infarction (H)     TIA?     Diabetes (H)      Hyperlipidemia      Hypertension      Obese       Past Surgical History:   Procedure Laterality Date     ENDARTERECTOMY CAROTID Right 2018    Procedure: ENDARTERECTOMY CAROTID;  RIGHT CAROTID ENDARTERECTOMY WITH EEG;  Surgeon: Gaurav Bustos MD;  Location:  OR     ENDARTERECTOMY CAROTID Left 2018    Procedure: ENDARTERECTOMY CAROTID;  LEFT CAROTID ENDARTERECTOMY with EXTERNAL JUGULAR VEIN PATCH;  Surgeon: Gaurav Bustos MD;  Location: [facility] OR     ENDARTERECTOMY CAROTID Left 2019    Procedure: REDO LEFT CAROTID ENDARTERECTOMY WITH EEG with greater saphenous vein graft;  Surgeon: Gaurav Bustos MD;  Location: [facility] OR      No Known Allergies   Social History     Tobacco Use     Smoking status: Former Smoker     Quit date: 10/3/2007     Years since quittin.6     Smokeless tobacco: Never Used   Substance Use Topics     Alcohol use: No      Wt Readings from Last 1 Encounters:   21 111.1 kg (245 lb)        Anesthesia Evaluation   Pt has had prior anesthetic. Type: General.        ROS/MED HX  ENT/Pulmonary:     (+) ANA LUISA risk factors, hypertension, obese, tobacco use, Past use,     Neurologic:     (+) CVA,     Cardiovascular: Comment: S/P Right carotid endarterectomy 18  S/P Left carotid endarterectomy 19  Hx AAA    (+) Dyslipidemia hypertension-Peripheral Vascular Disease-- Carotid Stenosis and Other. ---Previous cardiac testing   Echo: Date: Results:    Stress Test: Date: Results:    ECG Reviewed: Date: 21 Results:  Sinus Rhythm   WITHIN NORMAL LIMITS  Cath: Date: Results:      METS/Exercise Tolerance:      Hematologic:       Musculoskeletal:   (+) arthritis,     GI/Hepatic:     (+) liver disease,     Renal/Genitourinary:       Endo:     (+) type II DM, Last HgA1c: 7, date: 6/1/21, Obesity,     Psychiatric/Substance Use:       Infectious Disease:       Malignancy:       Other:            Physical Exam    Airway        Mallampati: II   TM distance: > 3 FB   Neck ROM: full   Mouth opening: > 3 cm    Respiratory Devices and Support         Dental  no notable dental history         Cardiovascular   cardiovascular exam normal          Pulmonary   pulmonary exam normal                OUTSIDE LABS:  CBC:   Lab Results   Component Value Date    WBC 9.3 06/01/2021    WBC 15.5 (H) 03/15/2021    HGB 14.0 06/01/2021    HGB 16.9 03/15/2021    HCT 41.3 06/01/2021    HCT 51.5 03/15/2021     06/01/2021     03/15/2021     BMP:   Lab Results   Component Value Date     06/01/2021     03/15/2021    POTASSIUM 4.5 06/01/2021    POTASSIUM 5.4 (H) 03/15/2021    CHLORIDE 108 06/01/2021    CHLORIDE 108 03/15/2021    CO2 27 06/01/2021    CO2 26 03/15/2021    BUN 23 06/01/2021    BUN 48 (H) 03/15/2021    CR 1.19 06/01/2021    CR 1.54 (H) 03/15/2021    GLC 88 06/01/2021     (H) 03/15/2021     COAGS: No results found for: PTT, INR, FIBR  POC:   Lab Results   Component Value Date     (H) 06/06/2019     HEPATIC:   Lab Results   Component Value Date    ALBUMIN 3.4 03/15/2021    PROTTOTAL 7.0 03/15/2021    ALT 63 03/15/2021    AST 21 03/15/2021    ALKPHOS 69 03/15/2021    BILITOTAL 0.6 03/15/2021     OTHER:   Lab Results   Component Value Date    A1C 7.0 (H) 06/01/2021    ROWENA 9.1 06/01/2021    LIPASE 496 (H) 03/15/2021    AMYLASE 73 11/19/2018    TSH 1.05 06/03/2019    CRP <2.9 06/03/2019    SED 12 06/03/2019       Anesthesia Plan    ASA Status:  3   NPO Status:  NPO Appropriate    Anesthesia Type: Spinal.      Maintenance: Other.        Consents    Anesthesia Plan(s) and associated risks, benefits, and  realistic alternatives discussed. Questions answered and patient/representative(s) expressed understanding.     - Discussed with:  Patient      - Extended Intubation/Ventilatory Support Discussed: No.      - Patient is DNR/DNI Status: No    Use of blood products discussed: Yes.     - Discussed with: Patient.     - Consented: consented to blood products            Reason for refusal: other.     Postoperative Care    Pain management: IV analgesics, Multi-modal analgesia.   PONV prophylaxis: Ondansetron (or other 5HT-3), Dexamethasone or Solumedrol     Comments:                ANITRA Soliz CRNA

## 2021-06-08 ENCOUNTER — NURSE TRIAGE (OUTPATIENT)
Dept: NURSING | Facility: CLINIC | Age: 71
End: 2021-06-08

## 2021-06-08 LAB
LABORATORY COMMENT REPORT: NORMAL
SARS-COV-2 RNA RESP QL NAA+PROBE: NEGATIVE
SPECIMEN SOURCE: NORMAL

## 2021-06-08 NOTE — TELEPHONE ENCOUNTER
Triage Call:    Patient has surgery tomorrow and he was told he couldn't eat after 4:30.  He can't remember if it was 4:30 pm or am.  Advised it was am and he can eat tonight.  HE was able to recite all of the other guidelines regarding what meds to hold, etc.    No further questions.    Pt was advised of protocol recommendation/disposition of homecare.     Kerri Walker RN on 6/8/2021 at 5:35 PM        COVID 19 Nurse Triage Plan/Patient Instructions    Please be aware that novel coronavirus (COVID-19) may be circulating in the community. If you develop symptoms such as fever, cough, or SOB or if you have concerns about the presence of another infection including coronavirus (COVID-19), please contact your health care provider or visit [email or web address].     Disposition/Instructions    Home care recommended. Follow home care protocol based instructions.    Thank you for taking steps to prevent the spread of this virus.  o Limit your contact with others.  o Wear a simple mask to cover your cough.  o Wash your hands well and often.    Resources    [facility]: About COVID-19: [email or web address]    CDC: What to Do If You're Sick: www.cdc.gov/coronavirus/2019-ncov/about/steps-when-sick.html    CDC: Ending Home Isolation: www.cdc.gov/coronavirus/2019-ncov/hcp/disposition-in-home-patients.html     CDC: Caring for Someone: www.cdc.gov/coronavirus/2019-ncov/if-you-are-sick/care-for-someone.html     [place]: Interim Guidance for Hospital Discharge to Home: www.health.[facility].mn.us/diseases/coronavirus/hcp/hospdischarge.pdf    [facility] clinical trials (COVID-19 research studies): [email or web address]/um-clinical-trials     Below are the COVID-19 hotlines at the [facility] ([place]). Interpreters are available.   o For health questions: Call 695-519-7555 or 1-895.766.6754 (7 a.m. to 7 p.m.)  o For questions about schools and childcare: Call 115-920-5637 or 1-232.908.6390 (7  a.m. to 7 p.m.)                   Additional Information    Health Information question, no triage required and triager able to answer question    Protocols used: INFORMATION ONLY CALL-A-AH

## 2021-06-09 ENCOUNTER — ANESTHESIA (OUTPATIENT)
Dept: SURGERY | Facility: CLINIC | Age: 71
End: 2021-06-09
Payer: MEDICARE

## 2021-06-09 ENCOUNTER — APPOINTMENT (OUTPATIENT)
Dept: GENERAL RADIOLOGY | Facility: CLINIC | Age: 71
End: 2021-06-09
Attending: ORTHOPAEDIC SURGERY
Payer: MEDICARE

## 2021-06-09 ENCOUNTER — HOSPITAL ENCOUNTER (OUTPATIENT)
Facility: CLINIC | Age: 71
Discharge: HOME | End: 2021-06-10
Attending: ORTHOPAEDIC SURGERY | Admitting: ORTHOPAEDIC SURGERY
Payer: MEDICARE

## 2021-06-09 DIAGNOSIS — M16.11 ARTHRITIS OF RIGHT HIP: Primary | ICD-10-CM

## 2021-06-09 PROBLEM — K21.9 GERD (GASTROESOPHAGEAL REFLUX DISEASE): Status: ACTIVE | Noted: 2021-06-09

## 2021-06-09 PROBLEM — I77.9 BILATERAL CAROTID ARTERY DISEASE (H): Chronic | Status: ACTIVE | Noted: 2018-05-10

## 2021-06-09 PROBLEM — Z96.641 STATUS POST TOTAL REPLACEMENT OF RIGHT HIP: Status: ACTIVE | Noted: 2021-06-09

## 2021-06-09 PROBLEM — E11.65 TYPE 2 DIABETES MELLITUS WITH HYPERGLYCEMIA, WITHOUT LONG-TERM CURRENT USE OF INSULIN (H): Status: ACTIVE | Noted: 2017-07-08

## 2021-06-09 PROBLEM — E66.01 MORBID OBESITY (H): Chronic | Status: ACTIVE | Noted: 2018-05-07

## 2021-06-09 PROBLEM — I65.22 CAROTID STENOSIS, LEFT: Chronic | Status: ACTIVE | Noted: 2019-06-05

## 2021-06-09 PROBLEM — I65.21 CAROTID ARTERY STENOSIS, SYMPTOMATIC, RIGHT: Chronic | Status: ACTIVE | Noted: 2018-05-11

## 2021-06-09 PROBLEM — I77.9 CAROTID ARTERY DISEASE (H): Chronic | Status: ACTIVE | Noted: 2019-06-05

## 2021-06-09 PROBLEM — K76.0 FATTY LIVER: Chronic | Status: ACTIVE | Noted: 2017-01-10

## 2021-06-09 PROBLEM — E11.65 TYPE 2 DIABETES MELLITUS WITH HYPERGLYCEMIA, WITHOUT LONG-TERM CURRENT USE OF INSULIN (H): Chronic | Status: ACTIVE | Noted: 2017-07-08

## 2021-06-09 PROBLEM — I65.29 CAROTID STENOSIS, ASYMPTOMATIC: Chronic | Status: ACTIVE | Noted: 2018-06-06

## 2021-06-09 PROBLEM — I10 BENIGN ESSENTIAL HYPERTENSION: Chronic | Status: ACTIVE | Noted: 2017-01-09

## 2021-06-09 PROBLEM — K21.9 GERD (GASTROESOPHAGEAL REFLUX DISEASE): Chronic | Status: ACTIVE | Noted: 2021-06-09

## 2021-06-09 PROBLEM — E78.2 MIXED HYPERLIPIDEMIA: Chronic | Status: ACTIVE | Noted: 2017-01-09

## 2021-06-09 LAB
CREAT SERPL-MCNC: 1 MG/DL (ref 0.66–1.25)
GFR SERPL CREATININE-BSD FRML MDRD: 76 ML/MIN/{1.73_M2}
GLUCOSE BLDC GLUCOMTR-MCNC: 164 MG/DL (ref 70–99)
GLUCOSE BLDC GLUCOMTR-MCNC: 84 MG/DL (ref 70–99)
HGB BLD-MCNC: 13.5 G/DL (ref 13.3–17.7)
INR PPP: 1.02 (ref 0.86–1.14)
POTASSIUM SERPL-SCNC: 4.8 MMOL/L (ref 3.4–5.3)

## 2021-06-09 PROCEDURE — 258N000001 HC RX 258: Performed by: ORTHOPAEDIC SURGERY

## 2021-06-09 PROCEDURE — 85610 PROTHROMBIN TIME: CPT | Performed by: PHYSICIAN ASSISTANT

## 2021-06-09 PROCEDURE — 250N000009 HC RX 250: Performed by: NURSE ANESTHETIST, CERTIFIED REGISTERED

## 2021-06-09 PROCEDURE — 82565 ASSAY OF CREATININE: CPT | Performed by: PHYSICIAN ASSISTANT

## 2021-06-09 PROCEDURE — 258N000003 HC RX IP 258 OP 636: Performed by: NURSE ANESTHETIST, CERTIFIED REGISTERED

## 2021-06-09 PROCEDURE — 250N000009 HC RX 250: Performed by: ORTHOPAEDIC SURGERY

## 2021-06-09 PROCEDURE — 250N000013 HC RX MED GY IP 250 OP 250 PS 637: Performed by: PHYSICIAN ASSISTANT

## 2021-06-09 PROCEDURE — 84132 ASSAY OF SERUM POTASSIUM: CPT | Performed by: PHYSICIAN ASSISTANT

## 2021-06-09 PROCEDURE — 85018 HEMOGLOBIN: CPT | Performed by: PHYSICIAN ASSISTANT

## 2021-06-09 PROCEDURE — 250N000013 HC RX MED GY IP 250 OP 250 PS 637: Performed by: NURSE ANESTHETIST, CERTIFIED REGISTERED

## 2021-06-09 PROCEDURE — 710N000009 HC RECOVERY PHASE 1, LEVEL 1, PER MIN: Performed by: ORTHOPAEDIC SURGERY

## 2021-06-09 PROCEDURE — 250N000011 HC RX IP 250 OP 636: Performed by: NURSE ANESTHETIST, CERTIFIED REGISTERED

## 2021-06-09 PROCEDURE — 360N000077 HC SURGERY LEVEL 4, PER MIN: Performed by: ORTHOPAEDIC SURGERY

## 2021-06-09 PROCEDURE — 250N000009 HC RX 250: Performed by: REGISTERED NURSE

## 2021-06-09 PROCEDURE — 36415 COLL VENOUS BLD VENIPUNCTURE: CPT | Performed by: PHYSICIAN ASSISTANT

## 2021-06-09 PROCEDURE — 250N000011 HC RX IP 250 OP 636: Performed by: REGISTERED NURSE

## 2021-06-09 PROCEDURE — C1713 ANCHOR/SCREW BN/BN,TIS/BN: HCPCS | Performed by: ORTHOPAEDIC SURGERY

## 2021-06-09 PROCEDURE — 272N000001 HC OR GENERAL SUPPLY STERILE: Performed by: ORTHOPAEDIC SURGERY

## 2021-06-09 PROCEDURE — 999N000063 XR PELVIS PORT 1/2 VIEWS

## 2021-06-09 PROCEDURE — 999N000141 HC STATISTIC PRE-PROCEDURE NURSING ASSESSMENT: Performed by: ORTHOPAEDIC SURGERY

## 2021-06-09 PROCEDURE — 250N000011 HC RX IP 250 OP 636: Performed by: PHYSICIAN ASSISTANT

## 2021-06-09 PROCEDURE — 258N000003 HC RX IP 258 OP 636: Performed by: ORTHOPAEDIC SURGERY

## 2021-06-09 PROCEDURE — 250N000013 HC RX MED GY IP 250 OP 250 PS 637: Performed by: ORTHOPAEDIC SURGERY

## 2021-06-09 PROCEDURE — 250N000011 HC RX IP 250 OP 636: Performed by: ORTHOPAEDIC SURGERY

## 2021-06-09 PROCEDURE — 370N000017 HC ANESTHESIA TECHNICAL FEE, PER MIN: Performed by: ORTHOPAEDIC SURGERY

## 2021-06-09 PROCEDURE — C1776 JOINT DEVICE (IMPLANTABLE): HCPCS | Performed by: ORTHOPAEDIC SURGERY

## 2021-06-09 PROCEDURE — 82962 GLUCOSE BLOOD TEST: CPT

## 2021-06-09 DEVICE — IMP SCR STRK LOW PROFILE HEX 6.5X25MM 7030-6525: Type: IMPLANTABLE DEVICE | Site: HIP | Status: FUNCTIONAL

## 2021-06-09 DEVICE — IMP HEAD FEMORAL STRK BIOLOX DELTA CERAMIC 36MM +0MM: Type: IMPLANTABLE DEVICE | Site: HIP | Status: FUNCTIONAL

## 2021-06-09 DEVICE — IMP STEM FEMORAL HIP STRK ACCOLADE II 127DEG SZ 5 6721-0535: Type: IMPLANTABLE DEVICE | Site: HIP | Status: FUNCTIONAL

## 2021-06-09 DEVICE — IMP LINER STRK TRIDENT X3 POLY 36MM 10DEG SZ E 623-10-36E: Type: IMPLANTABLE DEVICE | Site: HIP | Status: FUNCTIONAL

## 2021-06-09 DEVICE — IMPLANTABLE DEVICE: Type: IMPLANTABLE DEVICE | Site: HIP | Status: FUNCTIONAL

## 2021-06-09 RX ORDER — HYDROMORPHONE HCL IN WATER/PF 6 MG/30 ML
0.4 PATIENT CONTROLLED ANALGESIA SYRINGE INTRAVENOUS
Status: DISCONTINUED | OUTPATIENT
Start: 2021-06-09 | End: 2021-06-10 | Stop reason: HOSPADM

## 2021-06-09 RX ORDER — NICOTINE POLACRILEX 4 MG
15-30 LOZENGE BUCCAL
Status: DISCONTINUED | OUTPATIENT
Start: 2021-06-09 | End: 2021-06-10 | Stop reason: HOSPADM

## 2021-06-09 RX ORDER — DEXTROSE MONOHYDRATE 25 G/50ML
25-50 INJECTION, SOLUTION INTRAVENOUS
Status: DISCONTINUED | OUTPATIENT
Start: 2021-06-09 | End: 2021-06-09

## 2021-06-09 RX ORDER — CEFAZOLIN SODIUM 2 G/100ML
2 INJECTION, SOLUTION INTRAVENOUS
Status: DISCONTINUED | OUTPATIENT
Start: 2021-06-09 | End: 2021-06-09 | Stop reason: HOSPADM

## 2021-06-09 RX ORDER — GABAPENTIN 100 MG/1
100 CAPSULE ORAL AT BEDTIME
Status: DISCONTINUED | OUTPATIENT
Start: 2021-06-09 | End: 2021-06-10 | Stop reason: HOSPADM

## 2021-06-09 RX ORDER — METOPROLOL TARTRATE 1 MG/ML
1-2 INJECTION, SOLUTION INTRAVENOUS EVERY 5 MIN PRN
Status: DISCONTINUED | OUTPATIENT
Start: 2021-06-09 | End: 2021-06-09 | Stop reason: HOSPADM

## 2021-06-09 RX ORDER — PROCHLORPERAZINE MALEATE 5 MG
5 TABLET ORAL EVERY 6 HOURS PRN
Status: DISCONTINUED | OUTPATIENT
Start: 2021-06-09 | End: 2021-06-10 | Stop reason: HOSPADM

## 2021-06-09 RX ORDER — HYDROMORPHONE HCL IN WATER/PF 6 MG/30 ML
0.2 PATIENT CONTROLLED ANALGESIA SYRINGE INTRAVENOUS
Status: DISCONTINUED | OUTPATIENT
Start: 2021-06-09 | End: 2021-06-10 | Stop reason: HOSPADM

## 2021-06-09 RX ORDER — PROPOFOL 10 MG/ML
INJECTION, EMULSION INTRAVENOUS CONTINUOUS PRN
Status: DISCONTINUED | OUTPATIENT
Start: 2021-06-09 | End: 2021-06-09

## 2021-06-09 RX ORDER — DEXTROSE MONOHYDRATE 25 G/50ML
25-50 INJECTION, SOLUTION INTRAVENOUS
Status: DISCONTINUED | OUTPATIENT
Start: 2021-06-09 | End: 2021-06-10 | Stop reason: HOSPADM

## 2021-06-09 RX ORDER — TRANEXAMIC ACID 650 MG/1
1950 TABLET ORAL ONCE
Status: COMPLETED | OUTPATIENT
Start: 2021-06-09 | End: 2021-06-09

## 2021-06-09 RX ORDER — CEFAZOLIN SODIUM 2 G/100ML
2 INJECTION, SOLUTION INTRAVENOUS EVERY 8 HOURS
Status: COMPLETED | OUTPATIENT
Start: 2021-06-09 | End: 2021-06-10

## 2021-06-09 RX ORDER — BISACODYL 10 MG
10 SUPPOSITORY, RECTAL RECTAL DAILY PRN
Status: DISCONTINUED | OUTPATIENT
Start: 2021-06-09 | End: 2021-06-10 | Stop reason: HOSPADM

## 2021-06-09 RX ORDER — ALBUTEROL SULFATE 0.83 MG/ML
2.5 SOLUTION RESPIRATORY (INHALATION) EVERY 4 HOURS PRN
Status: DISCONTINUED | OUTPATIENT
Start: 2021-06-09 | End: 2021-06-09 | Stop reason: HOSPADM

## 2021-06-09 RX ORDER — ACETAMINOPHEN 325 MG/1
975 TABLET ORAL ONCE
Status: COMPLETED | OUTPATIENT
Start: 2021-06-09 | End: 2021-06-09

## 2021-06-09 RX ORDER — ONDANSETRON 2 MG/ML
4 INJECTION INTRAMUSCULAR; INTRAVENOUS EVERY 6 HOURS PRN
Status: DISCONTINUED | OUTPATIENT
Start: 2021-06-09 | End: 2021-06-10 | Stop reason: HOSPADM

## 2021-06-09 RX ORDER — NALOXONE HYDROCHLORIDE 0.4 MG/ML
0.4 INJECTION, SOLUTION INTRAMUSCULAR; INTRAVENOUS; SUBCUTANEOUS
Status: DISCONTINUED | OUTPATIENT
Start: 2021-06-09 | End: 2021-06-10 | Stop reason: HOSPADM

## 2021-06-09 RX ORDER — OXYCODONE HYDROCHLORIDE 5 MG/1
5-10 TABLET ORAL
Qty: 30 TABLET | Refills: 0 | Status: SHIPPED | OUTPATIENT
Start: 2021-06-09 | End: 2021-06-10

## 2021-06-09 RX ORDER — FENTANYL CITRATE-0.9 % NACL/PF 10 MCG/ML
200 PLASTIC BAG, INJECTION (ML) INTRAVENOUS PRN
Status: DISCONTINUED | OUTPATIENT
Start: 2021-06-09 | End: 2021-06-09 | Stop reason: HOSPADM

## 2021-06-09 RX ORDER — SODIUM CHLORIDE, SODIUM LACTATE, POTASSIUM CHLORIDE, CALCIUM CHLORIDE 600; 310; 30; 20 MG/100ML; MG/100ML; MG/100ML; MG/100ML
INJECTION, SOLUTION INTRAVENOUS CONTINUOUS
Status: DISCONTINUED | OUTPATIENT
Start: 2021-06-09 | End: 2021-06-10 | Stop reason: HOSPADM

## 2021-06-09 RX ORDER — HYDROXYZINE HYDROCHLORIDE 10 MG/1
10 TABLET, FILM COATED ORAL EVERY 6 HOURS PRN
Status: DISCONTINUED | OUTPATIENT
Start: 2021-06-09 | End: 2021-06-10 | Stop reason: HOSPADM

## 2021-06-09 RX ORDER — ACETAMINOPHEN 325 MG/1
975 TABLET ORAL ONCE
Status: DISCONTINUED | OUTPATIENT
Start: 2021-06-09 | End: 2021-06-09 | Stop reason: HOSPADM

## 2021-06-09 RX ORDER — CELECOXIB 200 MG/1
200 CAPSULE ORAL ONCE
Status: COMPLETED | OUTPATIENT
Start: 2021-06-09 | End: 2021-06-09

## 2021-06-09 RX ORDER — ACETAMINOPHEN 325 MG/1
975 TABLET ORAL EVERY 8 HOURS
Status: DISCONTINUED | OUTPATIENT
Start: 2021-06-09 | End: 2021-06-10 | Stop reason: HOSPADM

## 2021-06-09 RX ORDER — SODIUM CHLORIDE, SODIUM LACTATE, POTASSIUM CHLORIDE, CALCIUM CHLORIDE 600; 310; 30; 20 MG/100ML; MG/100ML; MG/100ML; MG/100ML
INJECTION, SOLUTION INTRAVENOUS CONTINUOUS
Status: DISCONTINUED | OUTPATIENT
Start: 2021-06-09 | End: 2021-06-09 | Stop reason: HOSPADM

## 2021-06-09 RX ORDER — ONDANSETRON 4 MG/1
4 TABLET, ORALLY DISINTEGRATING ORAL EVERY 6 HOURS PRN
Status: DISCONTINUED | OUTPATIENT
Start: 2021-06-09 | End: 2021-06-10 | Stop reason: HOSPADM

## 2021-06-09 RX ORDER — OXYCODONE HYDROCHLORIDE 5 MG/1
10 TABLET ORAL EVERY 4 HOURS PRN
Status: DISCONTINUED | OUTPATIENT
Start: 2021-06-09 | End: 2021-06-10 | Stop reason: HOSPADM

## 2021-06-09 RX ORDER — GABAPENTIN 300 MG/1
300 CAPSULE ORAL ONCE
Status: COMPLETED | OUTPATIENT
Start: 2021-06-09 | End: 2021-06-09

## 2021-06-09 RX ORDER — FENTANYL CITRATE 50 UG/ML
25-50 INJECTION, SOLUTION INTRAMUSCULAR; INTRAVENOUS
Status: DISCONTINUED | OUTPATIENT
Start: 2021-06-09 | End: 2021-06-09 | Stop reason: HOSPADM

## 2021-06-09 RX ORDER — LISINOPRIL 40 MG/1
40 TABLET ORAL DAILY
Status: DISCONTINUED | OUTPATIENT
Start: 2021-06-10 | End: 2021-06-10 | Stop reason: HOSPADM

## 2021-06-09 RX ORDER — POLYETHYLENE GLYCOL 3350 17 G/17G
17 POWDER, FOR SOLUTION ORAL DAILY
Status: DISCONTINUED | OUTPATIENT
Start: 2021-06-10 | End: 2021-06-10 | Stop reason: HOSPADM

## 2021-06-09 RX ORDER — NICOTINE POLACRILEX 4 MG
15-30 LOZENGE BUCCAL
Status: DISCONTINUED | OUTPATIENT
Start: 2021-06-09 | End: 2021-06-09

## 2021-06-09 RX ORDER — NALOXONE HYDROCHLORIDE 0.4 MG/ML
0.2 INJECTION, SOLUTION INTRAMUSCULAR; INTRAVENOUS; SUBCUTANEOUS
Status: DISCONTINUED | OUTPATIENT
Start: 2021-06-09 | End: 2021-06-10 | Stop reason: HOSPADM

## 2021-06-09 RX ORDER — ONDANSETRON 2 MG/ML
4 INJECTION INTRAMUSCULAR; INTRAVENOUS EVERY 30 MIN PRN
Status: DISCONTINUED | OUTPATIENT
Start: 2021-06-09 | End: 2021-06-09 | Stop reason: HOSPADM

## 2021-06-09 RX ORDER — FENTANYL CITRATE 50 UG/ML
INJECTION, SOLUTION INTRAMUSCULAR; INTRAVENOUS PRN
Status: DISCONTINUED | OUTPATIENT
Start: 2021-06-09 | End: 2021-06-09

## 2021-06-09 RX ORDER — CEFAZOLIN SODIUM 2 G/100ML
2 INJECTION, SOLUTION INTRAVENOUS SEE ADMIN INSTRUCTIONS
Status: DISCONTINUED | OUTPATIENT
Start: 2021-06-09 | End: 2021-06-09 | Stop reason: HOSPADM

## 2021-06-09 RX ORDER — PROPOFOL 10 MG/ML
INJECTION, EMULSION INTRAVENOUS PRN
Status: DISCONTINUED | OUTPATIENT
Start: 2021-06-09 | End: 2021-06-09

## 2021-06-09 RX ORDER — LIDOCAINE 40 MG/G
CREAM TOPICAL
Status: DISCONTINUED | OUTPATIENT
Start: 2021-06-09 | End: 2021-06-10 | Stop reason: HOSPADM

## 2021-06-09 RX ORDER — EPHEDRINE SULFATE 50 MG/ML
INJECTION, SOLUTION INTRAMUSCULAR; INTRAVENOUS; SUBCUTANEOUS PRN
Status: DISCONTINUED | OUTPATIENT
Start: 2021-06-09 | End: 2021-06-09

## 2021-06-09 RX ORDER — HYDROMORPHONE HYDROCHLORIDE 1 MG/ML
.3-.5 INJECTION, SOLUTION INTRAMUSCULAR; INTRAVENOUS; SUBCUTANEOUS EVERY 5 MIN PRN
Status: DISCONTINUED | OUTPATIENT
Start: 2021-06-09 | End: 2021-06-09 | Stop reason: HOSPADM

## 2021-06-09 RX ORDER — FAMOTIDINE 20 MG/1
20 TABLET, FILM COATED ORAL 2 TIMES DAILY
Status: DISCONTINUED | OUTPATIENT
Start: 2021-06-09 | End: 2021-06-10 | Stop reason: HOSPADM

## 2021-06-09 RX ORDER — MAGNESIUM HYDROXIDE 1200 MG/15ML
LIQUID ORAL PRN
Status: DISCONTINUED | OUTPATIENT
Start: 2021-06-09 | End: 2021-06-09 | Stop reason: HOSPADM

## 2021-06-09 RX ORDER — AMOXICILLIN 250 MG
1 CAPSULE ORAL 2 TIMES DAILY
Status: DISCONTINUED | OUTPATIENT
Start: 2021-06-09 | End: 2021-06-10 | Stop reason: HOSPADM

## 2021-06-09 RX ORDER — KETAMINE HYDROCHLORIDE 10 MG/ML
INJECTION, SOLUTION INTRAMUSCULAR; INTRAVENOUS PRN
Status: DISCONTINUED | OUTPATIENT
Start: 2021-06-09 | End: 2021-06-09

## 2021-06-09 RX ORDER — ONDANSETRON 4 MG/1
4 TABLET, ORALLY DISINTEGRATING ORAL EVERY 30 MIN PRN
Status: DISCONTINUED | OUTPATIENT
Start: 2021-06-09 | End: 2021-06-09 | Stop reason: HOSPADM

## 2021-06-09 RX ORDER — OXYCODONE HYDROCHLORIDE 5 MG/1
5 TABLET ORAL EVERY 4 HOURS PRN
Status: DISCONTINUED | OUTPATIENT
Start: 2021-06-09 | End: 2021-06-10 | Stop reason: HOSPADM

## 2021-06-09 RX ORDER — DOCUSATE SODIUM 100 MG/1
100 CAPSULE, LIQUID FILLED ORAL 2 TIMES DAILY
Status: DISCONTINUED | OUTPATIENT
Start: 2021-06-09 | End: 2021-06-10 | Stop reason: HOSPADM

## 2021-06-09 RX ORDER — GLIMEPIRIDE 1 MG/1
4 TABLET ORAL
Status: DISCONTINUED | OUTPATIENT
Start: 2021-06-10 | End: 2021-06-10 | Stop reason: HOSPADM

## 2021-06-09 RX ORDER — MEPERIDINE HYDROCHLORIDE 25 MG/ML
12.5 INJECTION INTRAMUSCULAR; INTRAVENOUS; SUBCUTANEOUS EVERY 5 MIN PRN
Status: DISCONTINUED | OUTPATIENT
Start: 2021-06-09 | End: 2021-06-09 | Stop reason: HOSPADM

## 2021-06-09 RX ORDER — ROSUVASTATIN CALCIUM 20 MG/1
20 TABLET, COATED ORAL DAILY
Status: DISCONTINUED | OUTPATIENT
Start: 2021-06-10 | End: 2021-06-10 | Stop reason: HOSPADM

## 2021-06-09 RX ORDER — HYDROXYZINE HYDROCHLORIDE 10 MG/1
10 TABLET, FILM COATED ORAL EVERY 6 HOURS PRN
Qty: 30 TABLET | Refills: 0 | Status: SHIPPED | OUTPATIENT
Start: 2021-06-09 | End: 2021-11-04

## 2021-06-09 RX ORDER — KETOROLAC TROMETHAMINE 15 MG/ML
15 INJECTION, SOLUTION INTRAMUSCULAR; INTRAVENOUS EVERY 6 HOURS
Status: DISCONTINUED | OUTPATIENT
Start: 2021-06-09 | End: 2021-06-10 | Stop reason: HOSPADM

## 2021-06-09 RX ORDER — ACETAMINOPHEN 325 MG/1
650 TABLET ORAL EVERY 4 HOURS PRN
Status: DISCONTINUED | OUTPATIENT
Start: 2021-06-12 | End: 2021-06-10 | Stop reason: HOSPADM

## 2021-06-09 RX ORDER — LIDOCAINE 40 MG/G
CREAM TOPICAL
Status: DISCONTINUED | OUTPATIENT
Start: 2021-06-09 | End: 2021-06-09 | Stop reason: HOSPADM

## 2021-06-09 RX ADMIN — PHENYLEPHRINE HYDROCHLORIDE 200 MCG: 10 INJECTION INTRAVENOUS at 13:01

## 2021-06-09 RX ADMIN — OXYCODONE HYDROCHLORIDE 5 MG: 5 TABLET ORAL at 17:51

## 2021-06-09 RX ADMIN — GABAPENTIN 100 MG: 100 CAPSULE ORAL at 21:54

## 2021-06-09 RX ADMIN — DOCUSATE SODIUM AND SENNOSIDES 1 TABLET: 8.6; 5 TABLET ORAL at 20:40

## 2021-06-09 RX ADMIN — PHENYLEPHRINE HYDROCHLORIDE 200 MCG: 10 INJECTION INTRAVENOUS at 13:17

## 2021-06-09 RX ADMIN — SODIUM CHLORIDE, POTASSIUM CHLORIDE, SODIUM LACTATE AND CALCIUM CHLORIDE: 600; 310; 30; 20 INJECTION, SOLUTION INTRAVENOUS at 10:54

## 2021-06-09 RX ADMIN — PHENYLEPHRINE HYDROCHLORIDE 200 MCG: 10 INJECTION INTRAVENOUS at 13:11

## 2021-06-09 RX ADMIN — Medication 200 MCG: at 14:50

## 2021-06-09 RX ADMIN — PHENYLEPHRINE HYDROCHLORIDE 200 MCG: 10 INJECTION INTRAVENOUS at 14:07

## 2021-06-09 RX ADMIN — FAMOTIDINE 20 MG: 20 TABLET ORAL at 20:40

## 2021-06-09 RX ADMIN — PROPOFOL 50 MG: 10 INJECTION, EMULSION INTRAVENOUS at 13:25

## 2021-06-09 RX ADMIN — MIDAZOLAM 2 MG: 1 INJECTION INTRAMUSCULAR; INTRAVENOUS at 12:18

## 2021-06-09 RX ADMIN — Medication 1 UNITS: at 13:29

## 2021-06-09 RX ADMIN — KETAMINE HYDROCHLORIDE 30 MG: 10 INJECTION INTRAMUSCULAR; INTRAVENOUS at 12:24

## 2021-06-09 RX ADMIN — TRANEXAMIC ACID 1950 MG: 650 TABLET ORAL at 10:52

## 2021-06-09 RX ADMIN — CEFAZOLIN SODIUM 2 G: 2 INJECTION, SOLUTION INTRAVENOUS at 20:41

## 2021-06-09 RX ADMIN — KETAMINE HYDROCHLORIDE 20 MG: 10 INJECTION INTRAMUSCULAR; INTRAVENOUS at 12:35

## 2021-06-09 RX ADMIN — PHENYLEPHRINE HYDROCHLORIDE 200 MCG: 10 INJECTION INTRAVENOUS at 13:25

## 2021-06-09 RX ADMIN — LIDOCAINE HYDROCHLORIDE 0.1 ML: 10 INJECTION, SOLUTION EPIDURAL; INFILTRATION; INTRACAUDAL; PERINEURAL at 10:54

## 2021-06-09 RX ADMIN — Medication 1 UNITS: at 13:38

## 2021-06-09 RX ADMIN — Medication 200 MCG: at 15:02

## 2021-06-09 RX ADMIN — DOCUSATE SODIUM 100 MG: 100 CAPSULE, LIQUID FILLED ORAL at 20:40

## 2021-06-09 RX ADMIN — FENTANYL CITRATE 100 MCG: 50 INJECTION, SOLUTION INTRAMUSCULAR; INTRAVENOUS at 12:18

## 2021-06-09 RX ADMIN — PHENYLEPHRINE HYDROCHLORIDE 200 MCG: 10 INJECTION INTRAVENOUS at 13:45

## 2021-06-09 RX ADMIN — CELECOXIB 200 MG: 200 CAPSULE ORAL at 10:52

## 2021-06-09 RX ADMIN — PHENYLEPHRINE HYDROCHLORIDE 200 MCG: 10 INJECTION INTRAVENOUS at 12:50

## 2021-06-09 RX ADMIN — Medication 10 MG: at 13:12

## 2021-06-09 RX ADMIN — Medication 200 MCG: at 14:20

## 2021-06-09 RX ADMIN — Medication 10 MG: at 13:45

## 2021-06-09 RX ADMIN — GABAPENTIN 300 MG: 300 CAPSULE ORAL at 10:52

## 2021-06-09 RX ADMIN — PROPOFOL 50 MCG/KG/MIN: 10 INJECTION, EMULSION INTRAVENOUS at 12:31

## 2021-06-09 RX ADMIN — ACETAMINOPHEN 975 MG: 325 TABLET, FILM COATED ORAL at 10:51

## 2021-06-09 RX ADMIN — KETOROLAC TROMETHAMINE 15 MG: 15 INJECTION, SOLUTION INTRAMUSCULAR; INTRAVENOUS at 21:53

## 2021-06-09 RX ADMIN — Medication 200 MCG: at 14:36

## 2021-06-09 RX ADMIN — SODIUM CHLORIDE, POTASSIUM CHLORIDE, SODIUM LACTATE AND CALCIUM CHLORIDE: 600; 310; 30; 20 INJECTION, SOLUTION INTRAVENOUS at 13:06

## 2021-06-09 RX ADMIN — ACETAMINOPHEN 975 MG: 325 TABLET, FILM COATED ORAL at 20:40

## 2021-06-09 RX ADMIN — CEFAZOLIN SODIUM 2 G: 2 INJECTION, SOLUTION INTRAVENOUS at 12:18

## 2021-06-09 RX ADMIN — SODIUM CHLORIDE, POTASSIUM CHLORIDE, SODIUM LACTATE AND CALCIUM CHLORIDE: 600; 310; 30; 20 INJECTION, SOLUTION INTRAVENOUS at 15:40

## 2021-06-09 RX ADMIN — PHENYLEPHRINE HYDROCHLORIDE 200 MCG: 10 INJECTION INTRAVENOUS at 12:41

## 2021-06-09 ASSESSMENT — MIFFLIN-ST. JEOR
SCORE: 1849.5
SCORE: 1819.06

## 2021-06-09 NOTE — ANESTHESIA POSTPROCEDURE EVALUATION
Patient: Roscoe Jang    Procedure(s):  Right  ARTHROPLASTY, HIP, TOTAL    Diagnosis:Osteoarthritis [M19.90]  Diagnosis Additional Information: No value filed.    Anesthesia Type:  Spinal    Note:  Disposition: Inpatient   Postop Pain Control: Uneventful            Sign Out: Well controlled pain   PONV: No   Neuro/Psych: Uneventful            Sign Out: Acceptable/Baseline neuro status   Airway/Respiratory: Uneventful            Sign Out: Acceptable/Baseline resp. status   CV/Hemodynamics: Uneventful            Sign Out: Acceptable CV status; No obvious hypovolemia; No obvious fluid overload   Other NRE: NONE   DID A NON-ROUTINE EVENT OCCUR? No           Last vitals:  Vitals:    06/09/21 1022   BP: (!) 144/54   Resp: 18   Temp: 36.8  C (98.2  F)   SpO2: 98%       Last vitals prior to Anesthesia Care Transfer:  CRNA VITALS  6/9/2021 1326 - 6/9/2021 1357      6/9/2021             Pulse:  82    SpO2:  100 %          Electronically Signed By: Sam Mayorga CRNA, APRN CRNA  June 9, 2021  1:57 PM

## 2021-06-09 NOTE — ANESTHESIA PROCEDURE NOTES
Intrathecal injection Procedure Note  Pre-Procedure   Staff -        CRNA: Sam Mayorga APRN CRNA       Other Anesthesia Staff: Judi Bee       Performed By: YESSI       Location: OB       Procedure Start/Stop Times: 6/9/2021 12:18 PM and 6/9/2021 12:28 PM       Pre-Anesthestic Checklist: patient identified, IV checked, site marked, risks and benefits discussed, informed consent, monitors and equipment checked, pre-op evaluation and at physician/surgeon's request  Timeout:       Correct Patient: Yes        Correct Procedure: Yes        Correct Site: Yes        Correct Position: Yes   Procedure Documentation  Procedure: intrathecal injection       Patient Position: sitting       Patient Prep/Sterile Barriers: sterile gloves, mask, patient draped       Skin prep: Betadine       Insertion Site: L4-5. (midline approach).       Needle Gauge: 25.        Needle Length (Inches): 3.5        Spinal Needle Type: Vamsi tip       Introducer used      # of attempts: 1 and  # of redirects:     Assessment/Narrative         Paresthesias: No.       CSF fluid: clear.

## 2021-06-09 NOTE — PLAN OF CARE
"[facility] ADMISSION NOTE    Patient admitted to room 2213 at approximately 1610 via cart from surgery. Patient was accompanied by transport tech.     Verbal SBAR report received from PACU RN prior to patient arrival.     Patient trasferred to bed via air aiden. Patient alert and oriented X 3. The patient is not having any pain.  . Admission vital signs: Blood pressure 112/63, pulse 93, temperature 97.3  F (36.3  C), temperature source Oral, resp. rate 16, height 1.727 m (5' 8\"), weight 111 kg (244 lb 11.4 oz), SpO2 99 %. Patient was oriented to plan of care, call light, bed controls, tv, telephone, bathroom, and visiting hours.     Risk Assessment    The following safety risks were identified during admission: fall. Yellow risk band applied: YES.     Skin Initial Assessment    This writer admitted this patient and completed a full skin assessment and Nikita score in the Adult PCS flowsheet. Appropriate interventions initiated as needed.     Secondary skin check completed by MS, RN. Right hip incision covered with Aquacell dressing.     Nikita Risk Assessment  Sensory Perception: 4-->no impairment  Moisture: 4-->rarely moist  Activity: 4-->walks frequently  Mobility: 3-->slightly limited  Nutrition: 3-->adequate  Friction and Shear: 3-->no apparent problem  Nikita Score: 21  Mattress: Standard Hospital Mattress (Foam)  Bed Frame: Standard width and length    Education    Patient has a [place] to Observation order: Yes  Observation education completed and documented: N/A      Karrie Church RN      "

## 2021-06-09 NOTE — OP NOTE
Total Hip Arthoplasty Operative Note        PLAN:  Weight bearing status: Weight bearing as tolerated   Activity: Activity as tolerated  Patient may move about with assist as indicated or with supervision   Anticoagulation plan:                  mg daily  for 42 days  Follow up plan                           Follow up in 2 week(s)        Name: Roscoe Jang    PCP: Rell Rivers    Procedure Date: 6/9/2021    Pre-operative diagnosis: Osteoarthritis [M19.90]   Post-operative diagnosis: Same   Procedure: Total hip arthoplasty (Right)   Surgeon: Charles Antonio MD     Assistant(s): Douglas Gray PA-C   Anesthesia: Spinal Anesthesia   Estimated blood loss: 250cc   Drains: None   Specimens: None       Findings: See full dictated operative note for details   Complications: None       Comments: See dictated operative report for full details     Indications:    The patient has experienced progressive right hip pain despite use of  Analgesics, NSAID's, Injection therapy and physical therapy. Because of the failure of these therapies to control symptoms and limited walking, night pain and altered activities the patient has decided to move ahead with joint replacement surgery.        Procedure and Findings:    After being informed of the risks, benefits, and alternatives to the procedure, the patient desired to proceed. Brought to the main operating suite where she was placed under spinal anesthetic. She was positioned in an Innomed hip lau. The patient s Right lower extremity was prepped and draped in a manner appropriate for the procedure after a timeout verification step was complete.    Patient received 2 grams of intravenous Ancef. Douglas Gray PA-C was present for the entire length of the case for the purposes of proper patient positioning, surgical exposure, and patient safety.    A minimally invasive posterior approach to the hip was taken. IT band was divided. Gluteus fibers divided and the  Charnley retractor was placed. Attention was directed towards the external rotators. The piriformis was identified and tagged. Minimus was elevated. The capsule was teed and tagged. Hip leg length and offset were then determined using a Smith and Nephew device with a pin in the innominate and the hip was then dislocated. The neck was resected using the [employer] guide. Full thickness cartilage loss was demonstrated involving the femoral head and acetabulum.     Attention was directed toward the acetabulum. Retractors were placed. Soft tissues were resected. Sequential reaming from 45 to 53 mm was carried out. Good cancellous bleeding bed was demonstrated. The trail 52 mm cup was stable. The final 52 mm Trident 2 HA PSL cup was selected and secured with 2 supplemental fixation screws. A 10 degree liner was placed. Attention was directed towards the femur. Box chisel, canal finder, sequential broaching up to 5 was carried out. Trial reductions were carried out and excellent range of motion and stability and restoration of leg length and offset was demonstrated with a 0,36 head. X-ray was obtained which demonstrated appropriate sizing and positioning of components. The trial components were then removed. The final 10 degree liner was secured. Inferior osteophytes were resected from the acetabulum. The implant 5 Accolade 2, 127 degree offset stem was the secured. Trial reductions were carried out and a 0, 36 mm head was selected. The hip was reduced. The joint was copiously irrigated. The joint capsule and piriformis tendon was repaired back to the greater trochanter through drill holes in bone. Soft tissues were infiltrated with anesthetic solution. Care was taken to avoid neurovascular structures.   The IT band was closed with running #1 running Stratafix stitch. Subcutaneous closure With layered 2-0 Vicryl, skin was closed with 3-0 Stratafix and Aquacell dressing. Sterile dressing was applied. Hip abductor pillow was  placed. The patient returned to PAR in stable condition.       Charles Antonio MD    Date: 6/9/2021 Time: 1:46 PM      CONFIDENTIALITY NOTICE This message and any included attachments are from [facility] and are intended only for the addressee. The information contained in this message is confidential and may constitute inside or non-public information under international, federal, or state securities laws. Unauthorized forwarding, printing, copying, distribution, or use of such information is strictly prohibited and may be unlawful. If you are not the addressee, please promptly delete this message and notify the sender of the delivery error by e-mail.

## 2021-06-09 NOTE — ANESTHESIA CARE TRANSFER NOTE
Patient: Roscoe Jang    Procedure(s):  Right  ARTHROPLASTY, HIP, TOTAL    Diagnosis: Osteoarthritis [M19.90]  Diagnosis Additional Information: No value filed.    Anesthesia Type:   Spinal     Note:    Oropharynx: oropharynx clear of all foreign objects and spontaneously breathing  Level of Consciousness: awake and drowsy  Oxygen Supplementation: room air    Independent Airway: airway patency satisfactory and stable  Dentition: dentition unchanged  Vital Signs Stable: post-procedure vital signs reviewed and stable  Report to RN Given: handoff report given  Patient transferred to: Phase II    Handoff Report: Identifed the Patient, Identified the Reponsible Provider, Reviewed the pertinent medical history, Discussed the surgical course, Reviewed Intra-OP anesthesia mangement and issues during anesthesia, Set expectations for post-procedure period and Allowed opportunity for questions and acknowledgement of understanding      Vitals: (Last set prior to Anesthesia Care Transfer)  CRNA VITALS  6/9/2021 1326 - 6/9/2021 1356      6/9/2021             Pulse:  82    SpO2:  100 %        Electronically Signed By: Sam Mayorga CRNA, APRN CRNA  June 9, 2021  1:56 PM

## 2021-06-09 NOTE — BRIEF OP NOTE
[facility]    Brief Operative Note    Pre-operative diagnosis: Right Hip Primary Osteoarthritis [M19.90]  Post-operative diagnosis Same as pre-operative diagnosis    Procedure: Procedure(s):  Right  ARTHROPLASTY, HIP, TOTAL  Surgeon: Surgeon(s) and Role:     * Charles Antonio MD - Primary     * Douglas Peguero PA-C - Assisting  Anesthesia: Spinal   Estimated blood loss: 250cc  Drains: None  Specimens: * No specimens in log *  Findings:   None.  Complications: None.  Implants:   Implant Name Type Inv. Item Serial No.  Lot No. LRB No. Used Action   TRIDENT II PSL CLUSTERHOLE ACETABULAR SHELL 52MM     [employer]AR [id number] Right 1 Implanted   IMP SCR STRK LOW PROFILE HEX 6.5X25MM [id number] - [id number] Metallic Hardware/[place] IMP SCR STRK LOW PROFILE HEX 6.5X25MM [id number]  [employer] [id number] Right 1 Implanted   IMP SCR STRK LOW PROFILE HEX 6.5X25MM [id number] - [id number] Metallic Hardware/[place] IMP SCR STRK LOW PROFILE HEX 6.5X25MM [id number]  [employer] [id number] Right 1 Implanted   IMP STEM FEMORAL HIP STRK ACCOLADE II 127DEG  5 [id number] - [id number] Total Joint Component/Insert IMP STEM FEMORAL HIP STRK ACCOLADE II 127DEG  5 5998-0353  [employer] [id number] Right 1 Implanted   IMP LINER STRK TRIDENT X3 POLY 36MM 10DEG  E 623-10-36E - [id number] Total Joint Component/Insert IMP LINER STRK TRIDENT X3 POLY 36MM 10DEG  E 623-10-36E  [employer] [id number] Right 1 Implanted   IMP HEAD FEMORAL STRK BIOLOX DELTA CERAMIC 36MM +0MM - [id number] Total Joint Component/Insert IMP HEAD FEMORAL STRK BIOLOX DELTA CERAMIC 36MM +0MM  [employer] [id number] Right 1 Implanted

## 2021-06-09 NOTE — PLAN OF CARE
Patient alert and orientated. Vital signs stable. On room air. Afebrile. Capno on.      POD #0. Dressing to right hip is clean, dry, and intact. CMS intact. Patient reported pain rated a 4/10; PRN oxycodone given. Ice applied to hip. Patient tolerated a regular dinner tray and denied any nausea. IV fluids running.      Patient has not voided or been out of bed yet.     Probable discharge home tomorrow.

## 2021-06-10 ENCOUNTER — APPOINTMENT (OUTPATIENT)
Dept: PHYSICAL THERAPY | Facility: CLINIC | Age: 71
End: 2021-06-10
Attending: ORTHOPAEDIC SURGERY
Payer: MEDICARE

## 2021-06-10 ENCOUNTER — APPOINTMENT (OUTPATIENT)
Dept: OCCUPATIONAL THERAPY | Facility: CLINIC | Age: 71
End: 2021-06-10
Attending: ORTHOPAEDIC SURGERY
Payer: MEDICARE

## 2021-06-10 VITALS
HEART RATE: 90 BPM | RESPIRATION RATE: 18 BRPM | HEIGHT: 68 IN | DIASTOLIC BLOOD PRESSURE: 70 MMHG | SYSTOLIC BLOOD PRESSURE: 140 MMHG | WEIGHT: 244.71 LBS | TEMPERATURE: 97.9 F | OXYGEN SATURATION: 96 % | BODY MASS INDEX: 37.09 KG/M2

## 2021-06-10 LAB
GLUCOSE BLDC GLUCOMTR-MCNC: 116 MG/DL (ref 70–99)
GLUCOSE BLDC GLUCOMTR-MCNC: 137 MG/DL (ref 70–99)
HGB BLD-MCNC: 10.2 G/DL (ref 13.3–17.7)

## 2021-06-10 PROCEDURE — 258N000003 HC RX IP 258 OP 636: Performed by: ORTHOPAEDIC SURGERY

## 2021-06-10 PROCEDURE — 97165 OT EVAL LOW COMPLEX 30 MIN: CPT | Mod: GO

## 2021-06-10 PROCEDURE — 85018 HEMOGLOBIN: CPT | Performed by: ORTHOPAEDIC SURGERY

## 2021-06-10 PROCEDURE — 250N000013 HC RX MED GY IP 250 OP 250 PS 637: Performed by: PHYSICIAN ASSISTANT

## 2021-06-10 PROCEDURE — 97110 THERAPEUTIC EXERCISES: CPT | Mod: GP

## 2021-06-10 PROCEDURE — 250N000011 HC RX IP 250 OP 636: Performed by: ORTHOPAEDIC SURGERY

## 2021-06-10 PROCEDURE — 250N000013 HC RX MED GY IP 250 OP 250 PS 637: Performed by: ORTHOPAEDIC SURGERY

## 2021-06-10 PROCEDURE — 82962 GLUCOSE BLOOD TEST: CPT

## 2021-06-10 PROCEDURE — 97535 SELF CARE MNGMENT TRAINING: CPT | Mod: GO

## 2021-06-10 PROCEDURE — 36415 COLL VENOUS BLD VENIPUNCTURE: CPT | Performed by: ORTHOPAEDIC SURGERY

## 2021-06-10 PROCEDURE — 97116 GAIT TRAINING THERAPY: CPT | Mod: GP

## 2021-06-10 PROCEDURE — 97161 PT EVAL LOW COMPLEX 20 MIN: CPT | Mod: GP

## 2021-06-10 RX ADMIN — FAMOTIDINE 20 MG: 20 TABLET ORAL at 08:12

## 2021-06-10 RX ADMIN — ASPIRIN 325 MG: 325 TABLET ORAL at 08:11

## 2021-06-10 RX ADMIN — ROSUVASTATIN CALCIUM 20 MG: 20 TABLET, FILM COATED ORAL at 08:12

## 2021-06-10 RX ADMIN — ACETAMINOPHEN 975 MG: 325 TABLET, FILM COATED ORAL at 11:35

## 2021-06-10 RX ADMIN — SODIUM CHLORIDE, POTASSIUM CHLORIDE, SODIUM LACTATE AND CALCIUM CHLORIDE: 600; 310; 30; 20 INJECTION, SOLUTION INTRAVENOUS at 02:52

## 2021-06-10 RX ADMIN — ACETAMINOPHEN 975 MG: 325 TABLET, FILM COATED ORAL at 03:43

## 2021-06-10 RX ADMIN — DOCUSATE SODIUM AND SENNOSIDES 1 TABLET: 8.6; 5 TABLET ORAL at 08:11

## 2021-06-10 RX ADMIN — KETOROLAC TROMETHAMINE 15 MG: 15 INJECTION, SOLUTION INTRAMUSCULAR; INTRAVENOUS at 09:50

## 2021-06-10 RX ADMIN — GLIMEPIRIDE 4 MG: 1 TABLET ORAL at 06:23

## 2021-06-10 RX ADMIN — LISINOPRIL 40 MG: 40 TABLET ORAL at 08:12

## 2021-06-10 RX ADMIN — KETOROLAC TROMETHAMINE 15 MG: 15 INJECTION, SOLUTION INTRAMUSCULAR; INTRAVENOUS at 03:43

## 2021-06-10 RX ADMIN — CEFAZOLIN SODIUM 2 G: 2 INJECTION, SOLUTION INTRAVENOUS at 03:43

## 2021-06-10 NOTE — PLAN OF CARE
Patient alert and orientated. Vital signs stable. On room air. Afebrile.     POD #1. Right hip dressing is clean, dry, and intact. CMS intact. Pain managed with scheduled IV toradol and PO tylenol. Ice applied intermittently. Tolerating a regular diet and denied any nausea. BG checks completed. Voiding spontaneously without issues.     Patient denied any lightheadedness/dizziness, difficulty breathing, shortness of breath, or numbness/tingling.     Patient discharging home today; patient's friend will be picking him up.

## 2021-06-10 NOTE — DISCHARGE SUMMARY
[facility] Discharge Summary                                       EDILMA TOBAR [id number]   Age: 70 year old  PCP: Rell Rivers , [phone] 1950     Date of Admission:  6/9/2021  Date of Discharge::  6/10/2021  Discharge Physician:  Rekha Lazar PA-C    Code status:  Full Code    Admission Information:  Admission Diagnosis:  Osteoarthritis [M19.90]  Arthritis of right hip [M16.11]    Post-Operative Day: 1 Day Post-Op     Reason for admission:  The patient was admitted for the following:Procedure(s) (LRB):  Right  ARTHROPLASTY, HIP, TOTAL (Right)    Principal Problem:    Status post total replacement of right hip  Active Problems:    Benign essential hypertension    Mixed hyperlipidemia    Type 2 diabetes mellitus with hyperglycemia, without long-term current use of insulin (H)    Morbid obesity (H)    Carotid artery disease (H)    Arthritis of right hip    GERD (gastroesophageal reflux disease)      Allergies:  Patient has no known allergies.    Following the procedure noted above the patient was transferred to the post-op floor and started on:    Therapy:  physical therapy and occupational therapy  Anticoagulation Plan:  mg daily  for 42 days  Pain Management: toradol and tylenol  Weight bearing status: Weight bearing as tolerated     The patient was followed and co-managed by the hospitalist service during the inpatient treatment course  Complications:  None  Consultations:  None     Pertinent Labs   Lab Results: personally reviewed.     Recent Labs   Lab Test 06/10/21  0410 06/09/21  1043 06/01/21  1534 03/15/21  1536 10/14/19  0931 06/06/19  0530 06/06/19  0530 06/03/19  0920   INR  --  1.02  --   --   --   --   --   --    HGB 10.2* 13.5 14.0 16.9  --   --  12.1* 14.0   HCT  --   --  41.3 51.5  --   --  36.1* 41.8   MCV  --   --  88 87  --   --  88 89   PLT  --   --  239 291  --   --  204 215   NA  --   --  141 139 138   < > 141 138   CRP  --   --   --    --   --   --   --  <2.9    < > = values in this interval not displayed.          Discharge Information:  Condition at discharge: Stable  Discharge destination:  Discharged to home     Medications at discharge:  Current Discharge Medication List      START taking these medications    Details   hydrOXYzine (ATARAX) 10 MG tablet Take 1 tablet (10 mg) by mouth every 6 hours as needed for itching or anxiety (with pain, moderate pain)  Qty: 30 tablet, Refills: 0    Associated Diagnoses: Arthritis of right hip         CONTINUE these medications which have CHANGED    Details   aspirin (ASA) 325 MG EC tablet Take 1 tablet (325 mg) by mouth daily  Qty: 42 tablet, Refills: 0    Associated Diagnoses: Arthritis of right hip         CONTINUE these medications which have NOT CHANGED    Details   acetaminophen (TYLENOL) 500 MG tablet Take 1,500 mg by mouth every 8 hours as needed for mild pain      blood glucose (CONTOUR NEXT TEST) test strip USE ONE STRIP TO CHECK GLUCOSE ONCE DAILY  Qty: 100 strip, Refills: 1    Associated Diagnoses: Type 2 diabetes mellitus with hyperglycemia, without long-term current use of insulin (H)      blood glucose monitoring (RHYS MICROLET) lancets Use to test blood sugar 1 times daily or as directed.  Qty: 100 each, Refills: 5    Associated Diagnoses: Type 2 diabetes mellitus (H)      glimepiride (AMARYL) 4 MG tablet TAKE 1 TABLET BY MOUTH ONCE DAILY BEFORE BREAKFAST  Qty: 90 tablet, Refills: 3    Associated Diagnoses: Type 2 diabetes mellitus with hyperglycemia, without long-term current use of insulin (H)      lisinopril (ZESTRIL) 40 MG tablet Take 1 tablet (40 mg) by mouth daily  Qty: 90 tablet, Refills: 3    Associated Diagnoses: Benign essential hypertension      meloxicam (MOBIC) 15 MG tablet TAKE 1 TABLET BY MOUTH ONCE DAILY WITH FOOD      metFORMIN (GLUCOPHAGE) 500 MG tablet Take 2 tablets in the morning and 1 tablet at night by mouth  Qty: 270 tablet, Refills: 1    Comments: Profile Rx:  patient will contact pharmacy when needed  Associated Diagnoses: Type 2 diabetes mellitus with hyperglycemia, without long-term current use of insulin (H)      rosuvastatin (CRESTOR) 20 MG tablet Take 1 tablet (20 mg) by mouth daily  Qty: 90 tablet, Refills: 3    Associated Diagnoses: Carotid stenosis, left; Type 2 diabetes mellitus with hyperglycemia, without long-term current use of insulin (H)      cyclobenzaprine (FLEXERIL) 10 MG tablet Take 1 tablet (10 mg) by mouth nightly as needed for muscle spasms  Qty: 30 tablet, Refills: 0    Associated Diagnoses: Lumbar radiculopathy, acute      gabapentin (NEURONTIN) 300 MG capsule Take 1 capsule (300 mg) by mouth 3 times daily  Qty: 90 capsule, Refills: 0      nabumetone (RELAFEN) 500 MG tablet Take 1 tablet (500 mg) by mouth 2 times daily  Qty: 30 tablet, Refills: 1    Associated Diagnoses: Lumbar radiculopathy, acute      nitroGLYcerin (NITROSTAT) 0.4 MG sublingual tablet For chest pain place 1 tablet under the tongue every 5 minutes for 3 doses. If symptoms persist 5 minutes after 1st dose call 911.  Qty: 12 tablet, Refills: 0    Associated Diagnoses: Exertional dyspnea      pantoprazole (PROTONIX) 40 MG EC tablet Take 1 tablet (40 mg) by mouth daily  Qty: 30 tablet, Refills: 0    Associated Diagnoses: Upper abdominal pain                        Follow-Up Care:  Patient should be seen by [facility] in 10-14 days by the patient's Orthopedic Surgeon/Physician Assistant.  Call [phone] for appointment or questions.    NATE Morillo MD

## 2021-06-10 NOTE — PROGRESS NOTES
"                  [facility] Orthopedics Progress Note      Post-operative Day: 1 Day Post-Op    Procedure(s):  Right  ARTHROPLASTY, HIP, TOTAL      Subjective:    Pain: minimal - well controlled with Tylenol, does not want any narcotics  Chest pain, SOB:  No  Urinating: Yes  Flatus: Yes  Nausea/vomiting: No    Objective:  Blood pressure (!) 140/70, pulse 90, temperature 97.9  F (36.6  C), temperature source Oral, resp. rate 18, height 1.727 m (5' 8\"), weight 111 kg (244 lb 11.4 oz), SpO2 96 %.    Motor function, sensation, and circulation intact   Yes  Wound status: Dressings are clean dry and intact. Yes  Calf tenderness: Bilateral  No    Pertinent Labs   Lab Results: personally reviewed.     Recent Labs   Lab Test 06/10/21  0410 06/09/21  1043 06/01/21  1534 03/15/21  1536 10/14/19  0931 06/06/19  0530 06/06/19  0530 06/03/19  0920   INR  --  1.02  --   --   --   --   --   --    HGB 10.2* 13.5 14.0 16.9  --   --  12.1* 14.0   HCT  --   --  41.3 51.5  --   --  36.1* 41.8   MCV  --   --  88 87  --   --  88 89   PLT  --   --  239 291  --   --  204 215   NA  --   --  141 139 138   < > 141 138   CRP  --   --   --   --   --   --   --  <2.9    < > = values in this interval not displayed.       Plan: Anticoagulation protocol:  mg daily  x 42  days            Pain medications:  toradol and tylenol, does not want any narcotics            Weight bearing status:  WBAT            Disposition:  Home today with outpatient services             Continue cares and rehabilitation     Report completed by:  Rekha Lazar PA-C  Date: 6/10/2021  Time: 10:15 AM    "

## 2021-06-10 NOTE — PROGRESS NOTES
06/10/21 1000   Quick Adds   Type of Visit Initial Occupational Therapy Evaluation   Living Environment   People in home alone   Current Living Arrangements house   Home Accessibility no concerns   Number of Stairs, Within Home, Primary none   Transportation Anticipated family or friend will provide   Living Environment Comments walk-in shower with grab bar. Friend plans on staying for as long as needed.    Self-Care   Usual Activity Tolerance good   Current Activity Tolerance fair   Regular Exercise No   Equipment Currently Used at Home walker, rolling   Activity/Exercise/Self-Care Comment uses walker as needed d/t pain    Disability/Function   Hearing Difficulty or Deaf no   Wear Glasses or Blind yes   Vision Management wear glasses    Concentrating, Remembering or Making Decisions Difficulty no   Difficulty Communicating no   Difficulty Eating/Swallowing no   Walking or Climbing Stairs Difficulty no   Dressing/Bathing Difficulty no   Toileting issues no  (has higher toilet at home. )   Doing Errands Independently Difficulty (such as shopping) no   Fall history within last six months no   Change in Functional Status Since Onset of Current Illness/Injury yes   General Information   Onset of Illness/Injury or Date of Surgery 06/09/21   Referring Physician Charles Antonio MD   Patient/Family Therapy Goal Statement (OT) to return home.    Additional Occupational Profile Info/Pertinent History of Current Problem s/p R FANTA   Existing Precautions/Restrictions no hip IR;no hip ADD past midline;90 degree hip flexion   Right Lower Extremity (Weight-bearing Status) weight-bearing as tolerated (WBAT)   Cognitive Status Examination   Orientation Status orientation to person, place and time   Pain Assessment   Patient Currently in Pain Yes, see Vital Sign flowsheet   Transfers   Transfer Comments SBA for sit to stand using FWW. SBA ambulation in room using FWW    Balance   Balance Comments steady on feet using FWW,  however needs reminders to use walker during dressing/ADL tasks    Upper Body Dressing Assessment/Training   [place] Level (Upper Body Dressing) independent   Lower Body Dressing Assessment/Training   [place] Level (Lower Body Dressing) minimum assist (75% patient effort)   Comment (Lower Body Dressing) independent following treatment    Toileting   [place] Level (Toileting) independent   Instrumental Activities of Daily Living (IADL)   IADL Comments pt states friend can assist upon discharge as needed.    Clinical Impression   Criteria for Skilled Therapeutic Interventions Met (OT) yes;skilled treatment is necessary   OT Diagnosis decreased independence with ADLs and functional mobility    OT Problem List-Impairments impacting ADL post-surgical precautions   Assessment of Occupational Performance 1-3 Performance Deficits   Identified Performance Deficits LB dressing, car transfer   Planned Therapy Interventions (OT) ADL retraining   Clinical Decision Making Complexity (OT) low complexity   Therapy Frequency (OT) 1x eval and treat   Predicted Duration of Therapy 1 day   Anticipated Equipment Needs Upon Discharge (OT)   (sock aide )   Risk & Benefits of therapy have been explained evaluation/treatment results reviewed;care plan/treatment goals reviewed;risks/benefits reviewed;current/potential barriers reviewed;participants voiced agreement with care plan;participants included;patient   OT Discharge Planning    OT Discharge Recommendation (DC Rec) Home with assist   OT Rationale for DC Rec Pt has met all IP OT goals and will have assist from friend upon discharge as needed.    Total Evaluation Time (Minutes)   Total Evaluation Time (Minutes) 8

## 2021-06-10 NOTE — PLAN OF CARE
Up to toilet with 2 assist with IV and equipment, tolerated walk well states pain was almost non existing as pre procedure pain was much worse.  Voided good amount felt he emptied his bladder and returned to bed to rest.  Abd. Pillow in place, dressing dry and intact with two spots of old drainage that has not changed.  Plan of care continues.  Continue to monitor.

## 2021-06-10 NOTE — PLAN OF CARE
Awakened for blood sugar check reading of 137, complained he felt very hungry so did have a meat & cheese sandwich at that time.  Patient states pain is very controled with combination of Tylenol and Toradol scheduled since surgery .  Dressing dry and intact with unchanged dry drainage from immediately after surgery.  Capno on for night and  readings remain stable.  Has voided X 2 without difficulty.  Plan of care continues.

## 2021-06-10 NOTE — PLAN OF CARE
Occupational Therapy Discharge Summary    Reason for therapy discharge:    All goals and outcomes met, no further needs identified.    Progress towards therapy goal(s). See goals on Care Plan in [facility] electronic health record for goal details.  Goals met    Therapy recommendation(s):    No further therapy is recommended.

## 2021-06-10 NOTE — PLAN OF CARE
Physical Therapy Discharge Summary    Reason for therapy discharge:    Discharged to home with outpatient therapy.    Progress towards therapy goal(s). See goals on Care Plan in [facility] electronic health record for goal details.  Goals met    Therapy recommendation(s):    Continued therapy is recommended.  Rationale/Recommendations:  OP PT to maximize strength and mobility.

## 2021-06-10 NOTE — PROGRESS NOTES
[facility] DISCHARGE NOTE    Patient discharged to home at 11:45 AM via ambulation. Accompanied by other:self and staff. Discharge instructions reviewed with patient, opportunity offered to ask questions. Prescriptions sent with patient to fill . All belongings sent with patient.    Karrie Church RN

## 2021-06-10 NOTE — PROGRESS NOTES
Initial IP Physical Therapy Evaluation     06/10/21 0841   Quick Adds   Type of Visit Initial PT Evaluation   Living Environment   People in home alone   Current Living Arrangements house   Home Accessibility no concerns   Number of Stairs, Within Home, Primary none   Transportation Anticipated family or friend will provide   Living Environment Comments Friend will be staying with patient to assist first few weeks, all day   Self-Care   Usual Activity Tolerance good   Current Activity Tolerance fair   Regular Exercise No   Equipment Currently Used at Home walker, rolling   Activity/Exercise/Self-Care Comment Sporadic use of walker depending on pain levels, typically no AD.    Disability/Function   Hearing Difficulty or Deaf no   Wear Glasses or Blind yes   Vision Management glasses   Concentrating, Remembering or Making Decisions Difficulty no   Difficulty Communicating no   Difficulty Eating/Swallowing no   Walking or Climbing Stairs Difficulty no   Dressing/Bathing Difficulty no   Toileting issues no   Doing Errands Independently Difficulty (such as shopping) no   Fall history within last six months no   Change in Functional Status Since Onset of Current Illness/Injury yes   General Information   Onset of Illness/Injury or Date of Surgery 06/09/21   Referring Physician Dr. Charles Antonio   Patient/Family Therapy Goals Statement (PT) Get home today   Pertinent History of Current Problem (include personal factors and/or comorbidities that impact the POC) Patient is 69 y/o s/p R FANTA pn 6/9/21   Cognition   Orientation Status (Cognition) oriented x 4   Affect/Mental Status (Cognition) WNL   Follows Commands (Cognition) WNL   Pain Assessment   Patient Currently in Pain Yes, see Vital Sign flowsheet  (2/10 pain with activit)   Integumentary/Edema   Integumentary/Edema no deficits were identifed   Precautions   Precautions no hip IR;no hip ADD past midline;no hip hyperextension;90 degree hip flexion   Range of Motion  (ROM)   ROM Comment BLE WFL   Strength   Strength Comments RLE WFL, LLE WNL   Bed Mobility   Comment (Bed Mobility) Supine<>sit SBA, extra time and effort but safe with good body mechanics   Transfers   Transfer Safety Comments Sit<>stand with FWW, SBA   Gait/Stairs (Locomotion)   [place] Level (Gait) supervision   Assistive Device (Gait) walker, front-wheeled   Distance in Feet (Required for LE Total Joints) 175   Pattern (Gait) step-through   Comment (Gait/Stairs) Patient ambulates in hallway safely and efficiently. Able to bear almost full weight with minimal to no increases in pain. Proper technique with FWW. VC for step length, weight shifting and body mechanics but tolerated well. Patient has no stairs to perform at home.   Balance   Balance other (describe)   Balance Comments SBA FWW   Sensory Examination   Sensory Perception WFL   Coordination   Coordination no deficits were identified   Muscle Tone   Muscle Tone no deficits were identified   Clinical Impression   Criteria for Skilled Therapeutic Intervention yes, treatment indicated   PT Diagnosis (PT) S/p R FANTA   Influenced by the following impairments Pain, weakness, decreased activity tolerance   Functional limitations due to impairments Mobility, transfers   Clinical Presentation Stable/Uncomplicated   Clinical Presentation Rationale Clinical judgment   Clinical Decision Making (Complexity) low complexity   Therapy Frequency (PT) One time eval and treatment only   Predicted Duration of Therapy Intervention (days/wks) 1 day   Planned Therapy Interventions (PT) bed mobility training;gait training;home exercise program;strengthening;transfer training   Anticipated Equipment Needs at Discharge (PT) walker, rolling   Risk & Benefits of therapy have been explained evaluation/treatment results reviewed;care plan/treatment goals reviewed;risks/benefits reviewed;current/potential barriers reviewed;participants voiced agreement with care plan;participants  included;patient   PT Discharge Planning    PT Discharge Recommendation (DC Rec) home with outpatient physical therapy   PT Rationale for DC Rec Patient demonstrates safe ambulation technique and can safely complete stairs to discharge home. OP PT recommended to maximize strength and mobility.   PT Brief overview of current status  SBA ambulation 175' FWW, SBA transfers   Total Evaluation Time   Total Evaluation Time (Minutes) 10     Tay Lindquist, PT, DPT

## 2021-06-17 NOTE — PLAN OF CARE
[facility]      OUTPATIENT OCCUPATIONAL THERAPY  EVALUATION  PLAN OF TREATMENT FOR OUTPATIENT REHABILITATION  (COMPLETE FOR INITIAL CLAIMS ONLY)  Patient's Last Name, First Name, M.I.  YOB: 1950  SuhailRoscoe MITCHELL                          Provider's Name  [facility] Medical Record No.  [id number]                               Onset Date:  06/09/21   Start of Care Date:     6/10/2021   Type:     ___PT   _X_OT   ___SLP Medical Diagnosis:                        S/p R FANTA   OT Diagnosis:  decreased independence with ADLs and functional mobility    Visits from SOC:  1   _________________________________________________________________________________  Plan of Treatment/Functional Goals    Planned Interventions: ADL retraining   Goals: See Occupational Therapy Goals on Care Plan in [facility] electronic health record.    Therapy Frequency: 1x eval and treat  Predicted Duration of Therapy Intervention: 1 day  _________________________________________________________________________________    I CERTIFY THE NEED FOR THESE SERVICES FURNISHED UNDER        THIS PLAN OF TREATMENT AND WHILE UNDER MY CARE     (Physician co-signature of this document indicates review and certification of the therapy plan).               ,      Referring Physician: Charles Antonio MD            Initial Assessment        See Occupational Therapy evaluation dated   in Epic electronic health record.

## 2021-10-18 ENCOUNTER — OFFICE VISIT (OUTPATIENT)
Dept: FAMILY MEDICINE | Facility: CLINIC | Age: 71
End: 2021-10-18
Payer: COMMERCIAL

## 2021-10-18 VITALS
WEIGHT: 248 LBS | RESPIRATION RATE: 18 BRPM | TEMPERATURE: 97.6 F | SYSTOLIC BLOOD PRESSURE: 144 MMHG | HEIGHT: 68 IN | DIASTOLIC BLOOD PRESSURE: 80 MMHG | HEART RATE: 70 BPM | BODY MASS INDEX: 37.59 KG/M2

## 2021-10-18 DIAGNOSIS — M19.011 PRIMARY OSTEOARTHRITIS OF RIGHT SHOULDER: Primary | ICD-10-CM

## 2021-10-18 PROCEDURE — 20610 DRAIN/INJ JOINT/BURSA W/O US: CPT | Mod: RT | Performed by: FAMILY MEDICINE

## 2021-10-18 RX ORDER — TRIAMCINOLONE ACETONIDE 40 MG/ML
40 INJECTION, SUSPENSION INTRA-ARTICULAR; INTRAMUSCULAR ONCE
Status: COMPLETED | OUTPATIENT
Start: 2021-10-18 | End: 2021-10-18

## 2021-10-18 RX ADMIN — TRIAMCINOLONE ACETONIDE 40 MG: 40 INJECTION, SUSPENSION INTRA-ARTICULAR; INTRAMUSCULAR at 10:27

## 2021-10-18 ASSESSMENT — MIFFLIN-ST. JEOR: SCORE: 1859.42

## 2021-10-18 NOTE — NURSING NOTE
"Chief Complaint   Patient presents with     Musculoskeletal Problem     BP (!) 144/80 (Cuff Size: Adult Large)   Pulse 70   Temp 97.6  F (36.4  C) (Tympanic)   Resp 18   Ht 1.727 m (5' 8\")   Wt 112.5 kg (248 lb)   BMI 37.71 kg/m   Estimated body mass index is 37.71 kg/m  as calculated from the following:    Height as of this encounter: 1.727 m (5' 8\").    Weight as of this encounter: 112.5 kg (248 lb).  Patient presents to the clinic using No DME      Health Maintenance that is potentially due pending provider review:    Health Maintenance Due   Topic Date Due     ANNUAL REVIEW OF HM ORDERS  Never done     ADVANCE CARE PLANNING  Never done     EYE EXAM  Never done     COLORECTAL CANCER SCREENING  Never done     ZOSTER IMMUNIZATION (2 of 3) 09/27/2013     DIABETIC FOOT EXAM  11/19/2019     LIPID  06/03/2020     MEDICARE ANNUAL WELLNESS VISIT  10/14/2020     MICROALBUMIN  10/14/2020     COVID-19 Vaccine (2 - Moderna 2-dose series) 05/13/2021     FALL RISK ASSESSMENT  10/22/2021                "

## 2021-10-18 NOTE — PROGRESS NOTES
PROCEDURE:  JOINT INJECTION.- Right Shoulder - ongoing pain. Last injection 10/6/2020    After a discussion of risks, benefits and side effects of procedure, informed patient consent was obtained.  The right Shoulder was prepped and draped in the usual clean fashion (sterile not required for this procedure).  3 cc of 1 % lidocaine was used for local analgesia.      INJECTION:  Using 3 cc of 1 % lidocaine mixed with 40 mg of Kenalog, the Right Shoulder was successfully injected without complication.  Patient did experience some pain relief following injection.      Rell Rivers MD  [facility]

## 2021-11-02 NOTE — PROGRESS NOTES
[facility]    Roscoe Jang returns for vascular follow-up.  Right CEA on May 11, 2018 and left CEA on June 6, 2018 due to high-grade stenoses.  Very minimal stenosis on follow-up October 9, 2020 --- right ICA PSV =165 cm/s and left ICA PSV = 154 cm/s with minimal visualized stenosis.  Mild wall thickening was noted particular left carotid bulb.         PMH: Medications: Lisinopril, Glucophage, Amaryl, Crestor, , aspirin   Medical: Hypertension    Type 2 diabetes-last A1c= 7.0    Hyperlipidemia on statin-last LDL=20 on Lidia 3, 2020    GERD    History morbid obesity    Degenerative arthritis right hip--right hip replacement 6/2021     Non-smoker.  Lives independently     May 13, 2020 cardiac stress test normal with EF 76% and no inducible ischemia    ROS: Overall doing well.  Hip is still somewhat problematic but much better than before surgery.  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  No claudication symptoms.    Exam: Alert and appropriate.  Normal affect.   Blood pressure 117/76.  Pulse 86.   HEENT= unremarkable.  Well-healed carotid incisions.  No mandibular numbness.    Wears glasses.   Chest= clear to auscultation   Cardiovascular= regular rate   Abdomen= moderately obese, nontender   Extremities= unremarkable.  Normal sensation.  No edema.    +3 PT pulses bilaterally.    Carotid duplex today revealed both CEA are widely patent.  Velocities have normalized bilaterally with the right ICA PSV= 115 cm/s the left 138 cm/s.          Impression: #1.  Widely patent right and left CEA.  Patient still concerned about this and thus we will repeat the exam in 1 year.  Continue on children's aspirin.     #2.  No evidence of significant PAD.  Excellent distal pulses.       #3.  Chart mentions that he has a AAA.  I reviewed the 11/11/2018 CT of his abdomen where he had no infrarenal aorta.  Mild calcific change in the aorta and iliac arteries with no high-grade stenosis.     #4.  Aware of importance of good risk  factor control.  He is normotensive.  LDL is at goal on a statin.  A1c is somewhat elevated and he is working on this also.  He did lose weight associated with his hip issue of order 40 pounds but gained some of this back.    Follow-up carotid duplex in 1 year.    Over 20 minutes with patient including old chart review--also reviewing CT abdomen         Gaurav Bustos MD    CC: Dr. Rell Rivers

## 2021-11-04 ENCOUNTER — HOSPITAL ENCOUNTER (OUTPATIENT)
Dept: ULTRASOUND IMAGING | Facility: CLINIC | Age: 71
End: 2021-11-04
Attending: SURGERY
Payer: MEDICARE

## 2021-11-04 ENCOUNTER — OFFICE VISIT (OUTPATIENT)
Dept: OTHER | Facility: CLINIC | Age: 71
End: 2021-11-04
Attending: SURGERY
Payer: MEDICARE

## 2021-11-04 VITALS — DIASTOLIC BLOOD PRESSURE: 76 MMHG | HEART RATE: 86 BPM | SYSTOLIC BLOOD PRESSURE: 117 MMHG

## 2021-11-04 DIAGNOSIS — Z98.890 HISTORY OF BILATERAL CAROTID ENDARTERECTOMY: Primary | ICD-10-CM

## 2021-11-04 DIAGNOSIS — Z98.890 HISTORY OF BILATERAL CAROTID ENDARTERECTOMY: ICD-10-CM

## 2021-11-04 DIAGNOSIS — E78.5 HYPERLIPIDEMIA LDL GOAL <70: ICD-10-CM

## 2021-11-04 DIAGNOSIS — I65.23 BILATERAL CAROTID ARTERY STENOSIS: ICD-10-CM

## 2021-11-04 PROCEDURE — 99213 OFFICE O/P EST LOW 20 MIN: CPT | Performed by: SURGERY

## 2021-11-04 PROCEDURE — G0463 HOSPITAL OUTPT CLINIC VISIT: HCPCS

## 2021-11-04 PROCEDURE — 93880 EXTRACRANIAL BILAT STUDY: CPT

## 2021-11-04 NOTE — PROGRESS NOTES
[facility]        Patient is here for a  follow up.     Pt is currently taking Aspirin and Statin.    /76 (BP Location: Left arm, Patient Position: Chair, Cuff Size: Adult Regular)   Pulse 86     The provider has been notified that the patient has no concerns.     Questions patient would like addressed today are: N/A.    Refills are needed: N/A    Has homecare services and agency name:  Brigitte López MA

## 2021-11-28 DIAGNOSIS — E11.65 TYPE 2 DIABETES MELLITUS WITH HYPERGLYCEMIA, WITHOUT LONG-TERM CURRENT USE OF INSULIN (H): ICD-10-CM

## 2021-12-06 ENCOUNTER — OFFICE VISIT (OUTPATIENT)
Dept: FAMILY MEDICINE | Facility: CLINIC | Age: 71
End: 2021-12-06
Payer: COMMERCIAL

## 2021-12-06 VITALS
WEIGHT: 249 LBS | HEART RATE: 70 BPM | TEMPERATURE: 97.6 F | BODY MASS INDEX: 37.74 KG/M2 | HEIGHT: 68 IN | RESPIRATION RATE: 18 BRPM | SYSTOLIC BLOOD PRESSURE: 144 MMHG | DIASTOLIC BLOOD PRESSURE: 80 MMHG

## 2021-12-06 DIAGNOSIS — F17.211 NICOTINE DEPENDENCE, CIGARETTES, IN REMISSION: ICD-10-CM

## 2021-12-06 DIAGNOSIS — Z00.00 ROUTINE GENERAL MEDICAL EXAMINATION AT A HEALTH CARE FACILITY: Primary | ICD-10-CM

## 2021-12-06 DIAGNOSIS — Z12.2 ENCOUNTER FOR SCREENING FOR LUNG CANCER: ICD-10-CM

## 2021-12-06 DIAGNOSIS — Z12.11 COLON CANCER SCREENING: ICD-10-CM

## 2021-12-06 DIAGNOSIS — E11.69 TYPE 2 DIABETES MELLITUS WITH OTHER SPECIFIED COMPLICATION, WITHOUT LONG-TERM CURRENT USE OF INSULIN (H): ICD-10-CM

## 2021-12-06 DIAGNOSIS — Z12.5 SCREENING FOR PROSTATE CANCER: ICD-10-CM

## 2021-12-06 LAB
ANION GAP SERPL CALCULATED.3IONS-SCNC: 3 MMOL/L (ref 3–14)
BUN SERPL-MCNC: 16 MG/DL (ref 7–30)
CALCIUM SERPL-MCNC: 9 MG/DL (ref 8.5–10.1)
CHLORIDE BLD-SCNC: 107 MMOL/L (ref 94–109)
CHOLEST SERPL-MCNC: 89 MG/DL
CO2 SERPL-SCNC: 30 MMOL/L (ref 20–32)
CREAT SERPL-MCNC: 0.86 MG/DL (ref 0.66–1.25)
CREAT UR-MCNC: 120 MG/DL
ERYTHROCYTE [DISTWIDTH] IN BLOOD BY AUTOMATED COUNT: 13.7 % (ref 10–15)
FASTING STATUS PATIENT QL REPORTED: YES
GFR SERPL CREATININE-BSD FRML MDRD: 88 ML/MIN/1.73M2
GLUCOSE BLD-MCNC: 177 MG/DL (ref 70–99)
HBA1C MFR BLD: 7.8 % (ref 0–5.6)
HCT VFR BLD AUTO: 43 % (ref 40–53)
HDLC SERPL-MCNC: 38 MG/DL
HGB BLD-MCNC: 14.1 G/DL (ref 13.3–17.7)
LDLC SERPL CALC-MCNC: 23 MG/DL
MCH RBC QN AUTO: 28.1 PG (ref 26.5–33)
MCHC RBC AUTO-ENTMCNC: 32.8 G/DL (ref 31.5–36.5)
MCV RBC AUTO: 86 FL (ref 78–100)
MICROALBUMIN UR-MCNC: 42 MG/L
MICROALBUMIN/CREAT UR: 35 MG/G CR (ref 0–17)
NONHDLC SERPL-MCNC: 51 MG/DL
PLATELET # BLD AUTO: 253 10E3/UL (ref 150–450)
POTASSIUM BLD-SCNC: 4.6 MMOL/L (ref 3.4–5.3)
PSA SERPL-MCNC: 0.15 UG/L (ref 0–4)
RBC # BLD AUTO: 5.01 10E6/UL (ref 4.4–5.9)
SODIUM SERPL-SCNC: 140 MMOL/L (ref 133–144)
TRIGL SERPL-MCNC: 138 MG/DL
WBC # BLD AUTO: 6.7 10E3/UL (ref 4–11)

## 2021-12-06 PROCEDURE — 85027 COMPLETE CBC AUTOMATED: CPT | Performed by: FAMILY MEDICINE

## 2021-12-06 PROCEDURE — G0103 PSA SCREENING: HCPCS | Performed by: FAMILY MEDICINE

## 2021-12-06 PROCEDURE — 99213 OFFICE O/P EST LOW 20 MIN: CPT | Mod: 25 | Performed by: FAMILY MEDICINE

## 2021-12-06 PROCEDURE — 80061 LIPID PANEL: CPT | Performed by: FAMILY MEDICINE

## 2021-12-06 PROCEDURE — 82043 UR ALBUMIN QUANTITATIVE: CPT | Performed by: FAMILY MEDICINE

## 2021-12-06 PROCEDURE — 99207 PR FOOT EXAM NO CHARGE: CPT | Mod: 25 | Performed by: FAMILY MEDICINE

## 2021-12-06 PROCEDURE — 36415 COLL VENOUS BLD VENIPUNCTURE: CPT | Performed by: FAMILY MEDICINE

## 2021-12-06 PROCEDURE — 83036 HEMOGLOBIN GLYCOSYLATED A1C: CPT | Performed by: FAMILY MEDICINE

## 2021-12-06 PROCEDURE — 99397 PER PM REEVAL EST PAT 65+ YR: CPT | Performed by: FAMILY MEDICINE

## 2021-12-06 PROCEDURE — 80048 BASIC METABOLIC PNL TOTAL CA: CPT | Performed by: FAMILY MEDICINE

## 2021-12-06 ASSESSMENT — ENCOUNTER SYMPTOMS
DYSURIA: 0
NAUSEA: 0
CHILLS: 0
JOINT SWELLING: 0
ABDOMINAL PAIN: 0
WEAKNESS: 0
DIZZINESS: 0
HEMATURIA: 0
DIARRHEA: 0
HEARTBURN: 0
SORE THROAT: 0
PARESTHESIAS: 0
HEMATOCHEZIA: 0
COUGH: 0
FEVER: 0
HEADACHES: 0
PALPITATIONS: 0
EYE PAIN: 0
ARTHRALGIAS: 0
CONSTIPATION: 0
NERVOUS/ANXIOUS: 0
MYALGIAS: 0
SHORTNESS OF BREATH: 0
FREQUENCY: 0

## 2021-12-06 ASSESSMENT — MIFFLIN-ST. JEOR: SCORE: 1863.96

## 2021-12-06 ASSESSMENT — PAIN SCALES - GENERAL: PAINLEVEL: NO PAIN (0)

## 2021-12-06 ASSESSMENT — ACTIVITIES OF DAILY LIVING (ADL): CURRENT_FUNCTION: NO ASSISTANCE NEEDED

## 2021-12-06 NOTE — NURSING NOTE
"Chief Complaint   Patient presents with     Physical     BP (!) 144/80 (Cuff Size: Adult Regular)   Pulse 70   Temp 97.6  F (36.4  C) (Tympanic)   Resp 18   Ht 1.727 m (5' 8\")   Wt 112.9 kg (249 lb)   BMI 37.86 kg/m   Estimated body mass index is 37.86 kg/m  as calculated from the following:    Height as of this encounter: 1.727 m (5' 8\").    Weight as of this encounter: 112.9 kg (249 lb).  Patient presents to the clinic using No DME      Health Maintenance that is potentially due pending provider review:    Health Maintenance Due   Topic Date Due     ANNUAL REVIEW OF HM ORDERS  Never done     ADVANCE CARE PLANNING  Never done     EYE EXAM  Never done     COLORECTAL CANCER SCREENING  Never done     ZOSTER IMMUNIZATION (2 of 3) 09/27/2013     DIABETIC FOOT EXAM  11/19/2019     LIPID  06/03/2020     MICROALBUMIN  10/14/2020     LUNG CANCER SCREENING  02/04/2021     FALL RISK ASSESSMENT  10/22/2021     A1C  12/01/2021                "

## 2021-12-06 NOTE — PROGRESS NOTES
"SUBJECTIVE:   Roscoe Jang is a 71 year old male who presents for Preventive Visit.    Patient has been advised of split billing requirements and indicates understanding: Yes   Are you in the first 12 months of your Medicare coverage?  No    Healthy Habits:     In general, how would you rate your overall health?  Good    Frequency of exercise:  2-3 days/week    Duration of exercise:  30-45 minutes    Do you usually eat at least 4 servings of fruit and vegetables a day, include whole grains    & fiber and avoid regularly eating high fat or \"junk\" foods?  Yes    Taking medications regularly:  Yes    Medication side effects:  None    Ability to successfully perform activities of daily living:  No assistance needed    Home Safety:  No safety concerns identified    Hearing Impairment:  No hearing concerns    In the past 6 months, have you been bothered by leaking of urine?  No    In general, how would you rate your overall mental or emotional health?  Excellent      PHQ-2 Total Score: 0    Additional concerns today:  No    Do you feel safe in your environment? Yes    Have you ever done Advance Care Planning? (For example, a Health Directive, POLST, or a discussion with a medical provider or your loved ones about your wishes): No, advance care planning information given to patient to review.  Patient plans to discuss their wishes with loved ones or provider.         Fall risk  Fallen 2 or more times in the past year?: No  Any fall with injury in the past year?: No      Reviewed and updated as needed this visit by clinical staff  Tobacco  Allergies  Meds             Reviewed and updated as needed this visit by Provider               Social History     Tobacco Use     Smoking status: Former Smoker     Quit date: 10/3/2007     Years since quittin.1     Smokeless tobacco: Never Used   Substance Use Topics     Alcohol use: No         Alcohol Use 2021   Prescreen: >3 drinks/day or >7 drinks/week? No "   Prescreen: >3 drinks/day or >7 drinks/week? -         Current providers sharing in care for this patient include:   Patient Care Team:  Rell Rivers MD as PCP - General (Family Medicine)  Gaurav Bustos as Assigned Heart and Vascular Provider  Rell Rivers MD as Assigned PCP  Tobi Casey MD as Assigned Musculoskeletal Provider    The following health maintenance items are reviewed in Epic and correct as of today:  Health Maintenance Due   Topic Date Due     ANNUAL REVIEW OF HM ORDERS  Never done     EYE EXAM  Never done     COLORECTAL CANCER SCREENING  Never done     ZOSTER IMMUNIZATION (2 of 3) 09/27/2013     DIABETIC FOOT EXAM  11/19/2019     LIPID  06/03/2020     MICROALBUMIN  10/14/2020     LUNG CANCER SCREENING  02/04/2021     FALL RISK ASSESSMENT  10/22/2021     A1C  12/01/2021     Lab work is in process  Labs reviewed in EPIC  BP Readings from Last 3 Encounters:   12/06/21 (!) 144/80   11/04/21 117/76   10/18/21 (!) 144/80    Wt Readings from Last 3 Encounters:   12/06/21 112.9 kg (249 lb)   10/18/21 112.5 kg (248 lb)   06/09/21 111 kg (244 lb 11.4 oz)                  Patient Active Problem List   Diagnosis     Benign essential hypertension     Mixed hyperlipidemia     Abdominal aortic aneurysm (H)     Fatty liver     Type 2 diabetes mellitus with hyperglycemia, without long-term current use of insulin (H)     Morbid obesity (H)     Bilateral carotid artery disease (H)     Carotid artery stenosis, symptomatic, right     Carotid stenosis, asymptomatic     Carotid stenosis, left     Carotid artery disease (H)     Arthritis of right hip     Status post total replacement of right hip     GERD (gastroesophageal reflux disease)     Past Surgical History:   Procedure Laterality Date     ARTHROPLASTY HIP Right 6/9/2021    Procedure: Right  ARTHROPLASTY, HIP, TOTAL;  Surgeon: Charles Antonio MD;  Location: [facility]     ENDARTERECTOMY CAROTID Right 5/11/2018    Procedure: ENDARTERECTOMY  CAROTID;  RIGHT CAROTID ENDARTERECTOMY WITH EEG;  Surgeon: Gaurav Bustos MD;  Location: [facility]     ENDARTERECTOMY CAROTID Left 2018    Procedure: ENDARTERECTOMY CAROTID;  LEFT CAROTID ENDARTERECTOMY with EXTERNAL JUGULAR VEIN PATCH;  Surgeon: Gaurav Bustos MD;  Location: [facility]     ENDARTERECTOMY CAROTID Left 2019    Procedure: REDO LEFT CAROTID ENDARTERECTOMY WITH EEG with greater saphenous vein graft;  Surgeon: Gaurav Bustos MD;  Location:  OR       Social History     Tobacco Use     Smoking status: Former Smoker     Quit date: 10/3/2007     Years since quittin.1     Smokeless tobacco: Never Used   Substance Use Topics     Alcohol use: No     No family history on file.      Current Outpatient Medications   Medication Sig Dispense Refill     acetaminophen (TYLENOL) 500 MG tablet Take 1,500 mg by mouth every 8 hours as needed for mild pain       aspirin (ASA) 325 MG EC tablet Take 1 tablet (325 mg) by mouth daily 42 tablet 0     blood glucose (CONTOUR NEXT TEST) test strip USE ONE STRIP TO CHECK GLUCOSE ONCE DAILY 100 strip 1     blood glucose monitoring (BaiheET) lancets Use to test blood sugar 1 times daily or as directed. 100 each 5     glimepiride (AMARYL) 4 MG tablet TAKE 1 TABLET BY MOUTH ONCE DAILY BEFORE BREAKFAST (Patient taking differently: Take 4 mg by mouth every morning (before breakfast) TAKE 1 TABLET BY MOUTH ONCE DAILY BEFORE BREAKFAST) 90 tablet 3     lisinopril (ZESTRIL) 40 MG tablet Take 1 tablet (40 mg) by mouth daily 90 tablet 3     meloxicam (MOBIC) 15 MG tablet TAKE 1 TABLET BY MOUTH ONCE DAILY WITH FOOD       metFORMIN (GLUCOPHAGE) 500 MG tablet TAKE 2 TABLETS BY MOUTH IN THE MORNING AND 1 AT NIGHT 270 tablet 0     nitroGLYcerin (NITROSTAT) 0.4 MG sublingual tablet For chest pain place 1 tablet under the tongue every 5 minutes for 3 doses. If symptoms persist 5 minutes after 1st dose call 911. 12 tablet 0     rosuvastatin (CRESTOR) 20 MG  tablet Take 1 tablet (20 mg) by mouth daily 90 tablet 3     No Known Allergies  Recent Labs   Lab Test 06/09/21  1043 06/01/21  1534 06/01/21  1530 03/16/21  0904 03/15/21  1536 10/05/20  1307 05/04/20  1223 06/03/19  1143 06/03/19  0920 04/01/19  1148 11/19/18  1048 11/11/18  0314 10/02/18  1453 06/06/18  1315 05/11/18  1350 03/27/18  1545 10/06/17  1000   A1C  --   --  7.0*  --   --  7.0* 8.8*   < > 7.2*   < >  --   --  6.8*  --  6.7*   < > 6.4*   LDL  --   --   --   --   --   --   --   --  20  --   --   --  48  --  45  --   --    HDL  --   --   --   --   --   --   --   --  46  --   --   --   --   --  48  --   --    TRIG  --   --   --   --   --   --   --   --  113  --   --   --   --   --  124  --   --    ALT  --   --   --   --  63  --   --   --  31  --  35   < > 34  --   --   --   --    CR 1.00 1.19  --   --  1.54*  --   --    < > 0.84  --  0.88   < > 0.96  --  0.94   < >  --    GFRESTIMATED 76 62  --    < > 45*  --   --    < > 90  --  86   < > 78  --  80   < >  --    GFRESTBLACK 88 71  --    < > 52*  --   --    < > >90  --  >90   < > >90  --  >90   < >  --    POTASSIUM 4.8 4.5  --   --  5.4*  --   --    < > 4.5  --  4.1   < > 4.1   < > 4.4   < >  --    TSH  --   --   --   --   --   --   --   --  1.05  --   --   --   --   --   --   --  0.90    < > = values in this interval not displayed.        Review of Systems   Constitutional: Negative for chills and fever.   HENT: Negative for congestion, ear pain, hearing loss and sore throat.    Eyes: Negative for pain and visual disturbance.   Respiratory: Negative for cough and shortness of breath.    Cardiovascular: Negative for chest pain, palpitations and peripheral edema.   Gastrointestinal: Negative for abdominal pain, constipation, diarrhea, heartburn, hematochezia and nausea.   Genitourinary: Negative for dysuria, frequency, genital sores, hematuria, impotence, penile discharge and urgency.   Musculoskeletal: Negative for arthralgias, joint swelling and myalgias.  "  Skin: Negative for rash.   Neurological: Negative for dizziness, weakness, headaches and paresthesias.   Psychiatric/Behavioral: Negative for mood changes. The patient is not nervous/anxious.      Constitutional, HEENT, cardiovascular, pulmonary, GI, , musculoskeletal, neuro, skin, endocrine and psych systems are negative, except as otherwise noted.    OBJECTIVE:   BP (!) 144/80 (Cuff Size: Adult Regular)   Pulse 70   Temp 97.6  F (36.4  C) (Tympanic)   Resp 18   Ht 1.727 m (5' 8\")   Wt 112.9 kg (249 lb)   BMI 37.86 kg/m   Estimated body mass index is 37.86 kg/m  as calculated from the following:    Height as of this encounter: 1.727 m (5' 8\").    Weight as of this encounter: 112.9 kg (249 lb).  Physical Exam  GENERAL: alert, no distress and obese  EYES: Eyes grossly normal to inspection, PERRL and conjunctivae and sclerae normal  HENT: normal cephalic/atraumatic, nose and mouth without ulcers or lesions, oropharynx clear and oral mucous membranes moist  NECK: no adenopathy, no asymmetry, masses, or scars and thyroid normal to palpation  RESP: lungs clear to auscultation - no rales, rhonchi or wheezes  CV: regular rate and rhythm, normal S1 S2, no S3 or S4, no murmur, click or rub, no peripheral edema and peripheral pulses strong  ABDOMEN: soft, nontender, no hepatosplenomegaly, no masses and bowel sounds normal  MS: no gross musculoskeletal defects noted, no edema  SKIN: no suspicious lesions or rashes  NEURO: Normal strength and tone, mentation intact and speech normal  PSYCH: mentation appears normal, affect normal/bright  Diabetic foot exam: Slightly reduced vibration sensation, normal monofilament testing, pedal pulses 2+ bilaterally, no significant pedal edema noted      ASSESSMENT / PLAN:   (Z00.00) Routine general medical examination at a health care facility  (primary encounter diagnosis)  Comment: Clinically doing well, home blood pressure readings stable.  Recommended to continue regular walks, " "diabetic diet.  Medications reviewed and no changes made.  Recommended to discuss with insurance company about Shingrix vaccine coverage      (E11.69) Type 2 diabetes mellitus with other specified complication, without long-term current use of insulin (H)  Comment: Continue Metformin, glimepiride, aspirin and Crestor.  Lab Results   Component Value Date    A1C 7.0 06/01/2021    A1C 7.0 10/05/2020    A1C 8.8 05/04/2020    A1C 7.0 10/14/2019    A1C 7.2 06/05/2019   Plan: Lipid panel reflex to direct LDL Fasting,         Albumin Random Urine Quantitative with Creat         Ratio, FOOT EXAM, Hemoglobin A1c, CBC with         platelets, Basic metabolic panel  (Ca, Cl, CO2,        Creat, Gluc, K, Na, BUN)      (Z12.11) Colon cancer screening  Comment: Screening colonoscopy ordered  Plan: Adult Gastro Ref - Procedure Only        (Z12.2) Encounter for screening for lung cancer  Comment: Quit smoking in 2007, lung cancer screening discussed.  Low-dose CT chest ordered  Plan: CT Chest Lung Cancer Scrn Low Dose wo            (F17.211) Nicotine dependence, cigarettes, in remission   Comment:   Plan: CT Chest Lung Cancer Scrn Low Dose wo            COUNSELING:  Reviewed preventive health counseling, as reflected in patient instructions    Estimated body mass index is 37.86 kg/m  as calculated from the following:    Height as of this encounter: 1.727 m (5' 8\").    Weight as of this encounter: 112.9 kg (249 lb).    Weight management plan: Discussed healthy diet and exercise guidelines    He reports that he quit smoking about 14 years ago. He has never used smokeless tobacco.      Appropriate preventive services were discussed with this patient, including applicable screening as appropriate for cardiovascular disease, diabetes, osteopenia/osteoporosis, and glaucoma.  As appropriate for age/gender, discussed screening for colorectal cancer, prostate cancer, breast cancer, and cervical cancer. Checklist reviewing preventive services " available has been given to the patient.    Reviewed patients plan of care and provided an AVS. The Basic Care Plan (routine screening as documented in Health Maintenance) for Roscoe meets the Care Plan requirement. This Care Plan has been established and reviewed with the Patient.    Counseling Resources:  ATP IV Guidelines  Pooled Cohorts Equation Calculator  Breast Cancer Risk Calculator  Breast Cancer: Medication to Reduce Risk  FRAX Risk Assessment  ICSI Preventive Guidelines  Dietary Guidelines for Americans, 2010  [employer]'s MyPlate  ASA Prophylaxis  Lung CA Screening    Rell Rivers MD  [facility]    Identified Health Risks:

## 2021-12-06 NOTE — PATIENT INSTRUCTIONS
Preventive Health Recommendations:     See your health care provider every year to    Review health changes.     Discuss preventive care.      Review your medicines if your doctor has prescribed any.      Talk with your health care provider about whether you should have a test to screen for prostate cancer (PSA).    Every 3 years, have a diabetes test (fasting glucose). If you are at risk for diabetes, you should have this test more often.    Every 5 years, have a cholesterol test. Have this test more often if you are at risk for high cholesterol or heart disease.     Every 10 years, have a colonoscopy. Or, have a yearly FIT test (stool test). These exams will check for colon cancer.    Talk to with your health care provider about screening for Abdominal Aortic Aneurysm if you have a family history of AAA or have a history of smoking.    Shots:     Get a flu shot each year.     Get a tetanus shot every 10 years.     Talk to your doctor about your pneumonia vaccines. There are now two you should receive - Pneumovax (PPSV 23) and Prevnar (PCV 13).     Talk to your pharmacist about a shingles vaccine.     Talk to your doctor about the hepatitis B vaccine.  Nutrition:     Eat at least 5 servings of fruits and vegetables each day.     Eat whole-grain bread, whole-wheat pasta and brown rice instead of white grains and rice.     Get adequate Calcium and Vitamin D.   Lifestyle    Exercise for at least 150 minutes a week (30 minutes a day, 5 days a week). This will help you control your weight and prevent disease.     Limit alcohol to one drink per day.     No smoking.     Wear sunscreen to prevent skin cancer.    See your dentist every six months for an exam and cleaning.    See your eye doctor every 1 to 2 years to screen for conditions such as glaucoma, macular degeneration, cataracts, etc.    Personalized Prevention Plan  You are due for the preventive services outlined below.  Your care team is available to assist you  in scheduling these services.  If you have already completed any of these items, please share that information with your care team to update in your medical record.  Health Maintenance Due   Topic Date Due     ANNUAL REVIEW OF HM ORDERS  Never done     Eye Exam  Never done     Colorectal Cancer Screening  Never done     Zoster (Shingles) Vaccine (2 of 3) 09/27/2013     Diabetic Foot Exam  11/19/2019     Cholesterol Lab  06/03/2020     Kidney Microalbumin Urine Test  10/14/2020     LUNG CANCER SCREENING  02/04/2021     FALL RISK ASSESSMENT  10/22/2021     A1C Lab  12/01/2021

## 2021-12-09 ENCOUNTER — HOSPITAL ENCOUNTER (OUTPATIENT)
Dept: CT IMAGING | Facility: CLINIC | Age: 71
Discharge: HOME | End: 2021-12-09
Attending: FAMILY MEDICINE | Admitting: FAMILY MEDICINE
Payer: MEDICARE

## 2021-12-09 DIAGNOSIS — F17.211 NICOTINE DEPENDENCE, CIGARETTES, IN REMISSION: ICD-10-CM

## 2021-12-09 DIAGNOSIS — Z12.2 ENCOUNTER FOR SCREENING FOR LUNG CANCER: ICD-10-CM

## 2021-12-09 PROCEDURE — 71271 CT THORAX LUNG CANCER SCR C-: CPT

## 2022-01-23 DIAGNOSIS — E11.65 TYPE 2 DIABETES MELLITUS WITH HYPERGLYCEMIA, WITHOUT LONG-TERM CURRENT USE OF INSULIN (H): ICD-10-CM

## 2022-01-23 DIAGNOSIS — I10 BENIGN ESSENTIAL HYPERTENSION: ICD-10-CM

## 2022-01-23 DIAGNOSIS — I65.22 CAROTID STENOSIS, LEFT: ICD-10-CM

## 2022-01-26 RX ORDER — ROSUVASTATIN CALCIUM 20 MG/1
TABLET, COATED ORAL
Qty: 90 TABLET | Refills: 3 | Status: SHIPPED | OUTPATIENT
Start: 2022-01-26 | End: 2023-02-14

## 2022-01-26 RX ORDER — GLIMEPIRIDE 4 MG/1
TABLET ORAL
Qty: 90 TABLET | Refills: 1 | Status: SHIPPED | OUTPATIENT
Start: 2022-01-26 | End: 2022-06-13

## 2022-01-26 RX ORDER — LISINOPRIL 40 MG/1
TABLET ORAL
Qty: 90 TABLET | Refills: 3 | Status: SHIPPED | OUTPATIENT
Start: 2022-01-26 | End: 2022-10-03

## 2022-02-17 PROBLEM — I71.40 ABDOMINAL AORTIC ANEURYSM (H): Chronic | Status: ACTIVE | Noted: 2017-01-10

## 2022-04-17 DIAGNOSIS — E11.65 TYPE 2 DIABETES MELLITUS WITH HYPERGLYCEMIA, WITHOUT LONG-TERM CURRENT USE OF INSULIN (H): ICD-10-CM

## 2022-05-27 ENCOUNTER — TELEPHONE (OUTPATIENT)
Dept: FAMILY MEDICINE | Facility: CLINIC | Age: 72
End: 2022-05-27
Payer: COMMERCIAL

## 2022-05-27 NOTE — TELEPHONE ENCOUNTER
Patient Quality Outreach    Patient is due for the following:   Diabetes -  A1C, BP Check and Diabetic Follow-Up Visit  IVD  -  BP Check    NEXT STEPS:   Patient has upcoming appointment, these items will be addressed at that time.    Type of outreach:    Chart review performed, no outreach needed.      Questions for provider review:    None     Zora Olivier CMA

## 2022-06-12 DIAGNOSIS — E11.65 TYPE 2 DIABETES MELLITUS WITH HYPERGLYCEMIA, WITHOUT LONG-TERM CURRENT USE OF INSULIN (H): ICD-10-CM

## 2022-06-13 RX ORDER — GLIMEPIRIDE 4 MG/1
TABLET ORAL
Qty: 90 TABLET | Refills: 0 | Status: SHIPPED | OUTPATIENT
Start: 2022-06-13 | End: 2022-09-07

## 2022-09-04 DIAGNOSIS — E11.65 TYPE 2 DIABETES MELLITUS WITH HYPERGLYCEMIA, WITHOUT LONG-TERM CURRENT USE OF INSULIN (H): ICD-10-CM

## 2022-09-07 RX ORDER — GLIMEPIRIDE 4 MG/1
TABLET ORAL
Qty: 90 TABLET | Refills: 0 | Status: SHIPPED | OUTPATIENT
Start: 2022-09-07 | End: 2022-10-24

## 2022-10-03 ENCOUNTER — OFFICE VISIT (OUTPATIENT)
Dept: FAMILY MEDICINE | Facility: CLINIC | Age: 72
End: 2022-10-03
Payer: COMMERCIAL

## 2022-10-03 VITALS
HEIGHT: 68 IN | WEIGHT: 246 LBS | RESPIRATION RATE: 18 BRPM | TEMPERATURE: 97.8 F | DIASTOLIC BLOOD PRESSURE: 84 MMHG | HEART RATE: 70 BPM | SYSTOLIC BLOOD PRESSURE: 150 MMHG | BODY MASS INDEX: 37.28 KG/M2

## 2022-10-03 DIAGNOSIS — Z00.00 ROUTINE HISTORY AND PHYSICAL EXAMINATION OF ADULT: Primary | ICD-10-CM

## 2022-10-03 DIAGNOSIS — E11.9 TYPE 2 DIABETES MELLITUS WITHOUT COMPLICATION, WITHOUT LONG-TERM CURRENT USE OF INSULIN (H): ICD-10-CM

## 2022-10-03 DIAGNOSIS — R05.3 CHRONIC COUGH: ICD-10-CM

## 2022-10-03 DIAGNOSIS — J30.2 SEASONAL ALLERGIES: ICD-10-CM

## 2022-10-03 DIAGNOSIS — I10 BENIGN ESSENTIAL HYPERTENSION: ICD-10-CM

## 2022-10-03 DIAGNOSIS — Z12.11 COLON CANCER SCREENING: ICD-10-CM

## 2022-10-03 DIAGNOSIS — E78.2 MIXED HYPERLIPIDEMIA: ICD-10-CM

## 2022-10-03 LAB
ANION GAP SERPL CALCULATED.3IONS-SCNC: 11 MMOL/L (ref 7–15)
BUN SERPL-MCNC: 13.4 MG/DL (ref 8–23)
CALCIUM SERPL-MCNC: 9.1 MG/DL (ref 8.8–10.2)
CHLORIDE SERPL-SCNC: 103 MMOL/L (ref 98–107)
CHOLEST SERPL-MCNC: 95 MG/DL
CREAT SERPL-MCNC: 0.89 MG/DL (ref 0.67–1.17)
DEPRECATED HCO3 PLAS-SCNC: 26 MMOL/L (ref 22–29)
ERYTHROCYTE [DISTWIDTH] IN BLOOD BY AUTOMATED COUNT: 12.5 % (ref 10–15)
GFR SERPL CREATININE-BSD FRML MDRD: >90 ML/MIN/1.73M2
GLUCOSE SERPL-MCNC: 242 MG/DL (ref 70–99)
HBA1C MFR BLD: 9.7 % (ref 0–5.6)
HCT VFR BLD AUTO: 42.6 % (ref 40–53)
HDLC SERPL-MCNC: 38 MG/DL
HGB BLD-MCNC: 14.4 G/DL (ref 13.3–17.7)
LDLC SERPL CALC-MCNC: 22 MG/DL
MCH RBC QN AUTO: 28.8 PG (ref 26.5–33)
MCHC RBC AUTO-ENTMCNC: 33.8 G/DL (ref 31.5–36.5)
MCV RBC AUTO: 85 FL (ref 78–100)
NONHDLC SERPL-MCNC: 57 MG/DL
PLATELET # BLD AUTO: 206 10E3/UL (ref 150–450)
POTASSIUM SERPL-SCNC: 4.4 MMOL/L (ref 3.4–5.3)
RBC # BLD AUTO: 5 10E6/UL (ref 4.4–5.9)
SODIUM SERPL-SCNC: 140 MMOL/L (ref 136–145)
TRIGL SERPL-MCNC: 174 MG/DL
WBC # BLD AUTO: 6.5 10E3/UL (ref 4–11)

## 2022-10-03 PROCEDURE — 85027 COMPLETE CBC AUTOMATED: CPT | Performed by: FAMILY MEDICINE

## 2022-10-03 PROCEDURE — 0124A COVID-19,PF,PFIZER BOOSTER BIVALENT: CPT | Performed by: FAMILY MEDICINE

## 2022-10-03 PROCEDURE — 90662 IIV NO PRSV INCREASED AG IM: CPT | Performed by: FAMILY MEDICINE

## 2022-10-03 PROCEDURE — 80061 LIPID PANEL: CPT | Performed by: FAMILY MEDICINE

## 2022-10-03 PROCEDURE — G0008 ADMIN INFLUENZA VIRUS VAC: HCPCS | Performed by: FAMILY MEDICINE

## 2022-10-03 PROCEDURE — 91312 COVID-19,PF,PFIZER BOOSTER BIVALENT: CPT | Performed by: FAMILY MEDICINE

## 2022-10-03 PROCEDURE — 99214 OFFICE O/P EST MOD 30 MIN: CPT | Mod: 25 | Performed by: FAMILY MEDICINE

## 2022-10-03 PROCEDURE — G0439 PPPS, SUBSEQ VISIT: HCPCS | Performed by: FAMILY MEDICINE

## 2022-10-03 PROCEDURE — 80048 BASIC METABOLIC PNL TOTAL CA: CPT | Performed by: FAMILY MEDICINE

## 2022-10-03 PROCEDURE — 83036 HEMOGLOBIN GLYCOSYLATED A1C: CPT | Performed by: FAMILY MEDICINE

## 2022-10-03 PROCEDURE — 36415 COLL VENOUS BLD VENIPUNCTURE: CPT | Performed by: FAMILY MEDICINE

## 2022-10-03 RX ORDER — AMLODIPINE BESYLATE 5 MG/1
5 TABLET ORAL DAILY
Qty: 90 TABLET | Refills: 1 | Status: SHIPPED | OUTPATIENT
Start: 2022-10-03 | End: 2023-02-27

## 2022-10-03 RX ORDER — LOSARTAN POTASSIUM 100 MG/1
100 TABLET ORAL DAILY
Qty: 90 TABLET | Refills: 1 | Status: SHIPPED | OUTPATIENT
Start: 2022-10-03 | End: 2023-03-17

## 2022-10-03 ASSESSMENT — ENCOUNTER SYMPTOMS
EYE PAIN: 0
HEMATURIA: 0
JOINT SWELLING: 0
FEVER: 0
NERVOUS/ANXIOUS: 0
HEARTBURN: 0
FREQUENCY: 0
HEADACHES: 0
PARESTHESIAS: 0
MYALGIAS: 0
NAUSEA: 0
COUGH: 1
PALPITATIONS: 0
DIZZINESS: 0
DYSURIA: 0
CHILLS: 0
DIARRHEA: 0
ARTHRALGIAS: 1
SORE THROAT: 0
SHORTNESS OF BREATH: 0
ABDOMINAL PAIN: 0
HEMATOCHEZIA: 0
WEAKNESS: 0

## 2022-10-03 ASSESSMENT — PAIN SCALES - GENERAL: PAINLEVEL: NO PAIN (0)

## 2022-10-03 ASSESSMENT — ACTIVITIES OF DAILY LIVING (ADL): CURRENT_FUNCTION: NO ASSISTANCE NEEDED

## 2022-10-03 NOTE — LETTER
October 3, 2022      Roscoe Jang  [address] [address] [address]        Dear ,    We are writing to inform you of your test results.    Lab results consistent with elevated triglycerides and low HDL (good cholesterol).  Random blood glucose 242 and hemoglobin A1c 9.7, consistent with poorly controlled diabetes.     Will recommend to start Jardiance as prescribed, strict diabetic diet, regular walks and follow-up with diabetic educator as well.     Resulted Orders   Lipid panel   Result Value Ref Range    Cholesterol 95 <200 mg/dL    Triglycerides 174 (H) <150 mg/dL    Direct Measure HDL 38 (L) >=40 mg/dL    LDL Cholesterol Calculated 22 <=100 mg/dL    Non HDL Cholesterol 57 <130 mg/dL    Narrative    Cholesterol  Desirable:  <200 mg/dL    Triglycerides  Normal:  Less than 150 mg/dL  Borderline High:  150-199 mg/dL  High:  200-499 mg/dL  Very High:  Greater than or equal to 500 mg/dL    Direct Measure HDL  Female:  Greater than or equal to 50 mg/dL   Male:  Greater than or equal to 40 mg/dL    LDL Cholesterol  Desirable:  <100mg/dL  Above Desirable:  100-129 mg/dL   Borderline High:  130-159 mg/dL   High:  160-189 mg/dL   Very High:  >= 190 mg/dL    Non HDL Cholesterol  Desirable:  130 mg/dL  Above Desirable:  130-159 mg/dL  Borderline High:  160-189 mg/dL  High:  190-219 mg/dL  Very High:  Greater than or equal to 220 mg/dL   CBC with platelets   Result Value Ref Range    WBC Count 6.5 4.0 - 11.0 10e3/uL    RBC Count 5.00 4.40 - 5.90 10e6/uL    Hemoglobin 14.4 13.3 - 17.7 g/dL    Hematocrit 42.6 40.0 - 53.0 %    MCV 85 78 - 100 fL    MCH 28.8 26.5 - 33.0 pg    MCHC 33.8 31.5 - 36.5 g/dL    RDW 12.5 10.0 - 15.0 %    Platelet Count 206 150 - 450 10e3/uL   Hemoglobin A1c   Result Value Ref Range    Hemoglobin A1C 9.7 (H) 0.0 - 5.6 %      Comment:      Normal <5.7%   Prediabetes 5.7-6.4%    Diabetes 6.5% or higher     Note: Adopted from ADA consensus guidelines.   Basic metabolic panel   (Ca, Cl, CO2, Creat, Gluc, K, Na, BUN)   Result Value Ref Range    Sodium 140 136 - 145 mmol/L    Potassium 4.4 3.4 - 5.3 mmol/L    Chloride 103 98 - 107 mmol/L    Carbon Dioxide (CO2) 26 22 - 29 mmol/L    Anion Gap 11 7 - 15 mmol/L    Urea Nitrogen 13.4 8.0 - 23.0 mg/dL    Creatinine 0.89 0.67 - 1.17 mg/dL    Calcium 9.1 8.8 - 10.2 mg/dL    Glucose 242 (H) 70 - 99 mg/dL    GFR Estimate >90 >60 mL/min/1.73m2      Comment:      Effective December 21, 2021 eGFRcr in adults is calculated using the 2021 CKD-EPI creatinine equation which includes age and gender ( et al., NEJM, DOI: 10.1056/PTIBjr2584975)       If you have any questions or concerns, please call the clinic at the number listed above.     Sincerely,    Rell Rivers MD

## 2022-10-03 NOTE — PROGRESS NOTES
"SUBJECTIVE:   Roscoe is a 72 year old who presents for Preventive Visit.    Patient has been advised of split billing requirements and indicates understanding: Yes  Are you in the first 12 months of your Medicare coverage?  No    Healthy Habits:     In general, how would you rate your overall health?  Good    Frequency of exercise:  None    Do you usually eat at least 4 servings of fruit and vegetables a day, include whole grains    & fiber and avoid regularly eating high fat or \"junk\" foods?  Yes    Taking medications regularly:  Yes    Medication side effects:  None    Ability to successfully perform activities of daily living:  No assistance needed    Home Safety:  No safety concerns identified    Hearing Impairment:  No hearing concerns    In the past 6 months, have you been bothered by leaking of urine?  No    In general, how would you rate your overall mental or emotional health?  Excellent      PHQ-2 Total Score: 0    Additional concerns today:  No     Do you feel safe in your environment? Yes    Have you ever done Advance Care Planning? (For example, a Health Directive, POLST, or a discussion with a medical provider or your loved ones about your wishes): No, advance care planning information given to patient to review.  Patient plans to discuss their wishes with loved ones or provider.         Fall risk  Fallen 2 or more times in the past year?: No  Any fall with injury in the past year?: No        Reviewed and updated as needed this visit by clinical staff   Tobacco  Allergies  Meds  Problems  Med Hx  Surg Hx  Fam Hx            Reviewed and updated as needed this visit by Provider   Tobacco  Allergies  Meds  Problems  Med Hx  Surg Hx  Fam Hx           Social History     Tobacco Use     Smoking status: Former Smoker     Quit date: 10/3/2007     Years since quitting: 15.0     Smokeless tobacco: Never Used   Substance Use Topics     Alcohol use: No         Alcohol Use 10/3/2022   Prescreen: >3 " drinks/day or >7 drinks/week? No   Prescreen: >3 drinks/day or >7 drinks/week? -       PROBLEMS TO ADD ON...  Wheezing at night. Gets worse after mowing lawn or cutting wood.        Current providers sharing in care for this patient include:   Patient Care Team:  Rell Rivers MD as PCP - General (Family Medicine)  Gaurav Bustos as Assigned Heart and Vascular Provider  Rell Rivers MD as Assigned PCP    The following health maintenance items are reviewed in Epic and correct as of today:  Health Maintenance   Topic Date Due     EYE EXAM  Never done     COLORECTAL CANCER SCREENING  Never done     DTAP/TDAP/TD IMMUNIZATION (3 - Td or Tdap) 01/06/2022     ZOSTER IMMUNIZATION (3 of 3) 02/16/2022     BMP  12/06/2022     LIPID  12/06/2022     MICROALBUMIN  12/06/2022     DIABETIC FOOT EXAM  12/06/2022     LUNG CANCER SCREENING  12/09/2022     A1C  04/03/2023     MEDICARE ANNUAL WELLNESS VISIT  10/03/2023     ANNUAL REVIEW OF HM ORDERS  10/03/2023     FALL RISK ASSESSMENT  10/03/2023     ADVANCE CARE PLANNING  10/03/2027     HEPATITIS C SCREENING  Completed     PHQ-2 (once per calendar year)  Completed     INFLUENZA VACCINE  Completed     Pneumococcal Vaccine: 65+ Years  Completed     AORTIC ANEURYSM SCREENING (SYSTEM ASSIGNED)  Completed     COVID-19 Vaccine  Completed     IPV IMMUNIZATION  Aged Out     MENINGITIS IMMUNIZATION  Aged Out     Lab work is in process  Labs reviewed in EPIC  BP Readings from Last 3 Encounters:   10/03/22 (!) 150/84   12/06/21 (!) 144/80   11/04/21 117/76    Wt Readings from Last 3 Encounters:   10/03/22 111.6 kg (246 lb)   12/06/21 112.9 kg (249 lb)   10/18/21 112.5 kg (248 lb)                  Patient Active Problem List   Diagnosis     Benign essential hypertension     Mixed hyperlipidemia     Abdominal aortic aneurysm     Fatty liver     Type 2 diabetes mellitus with hyperglycemia, without long-term current use of insulin (H)     Morbid obesity (H)     Bilateral carotid  artery disease (H)     Carotid artery stenosis, symptomatic, right     Carotid stenosis, asymptomatic     Carotid stenosis, left     Carotid artery disease (H)     Arthritis of right hip     Status post total replacement of right hip     GERD (gastroesophageal reflux disease)     Past Surgical History:   Procedure Laterality Date     ARTHROPLASTY HIP Right 6/9/2021    Procedure: Right  ARTHROPLASTY, HIP, TOTAL;  Surgeon: Charles Antonio MD;  Location: [facility]     ENDARTERECTOMY CAROTID Right 5/11/2018    Procedure: ENDARTERECTOMY CAROTID;  RIGHT CAROTID ENDARTERECTOMY WITH EEG;  Surgeon: Gaurav Bustos MD;  Location: [facility]     ENDARTERECTOMY CAROTID Left 6/6/2018    Procedure: ENDARTERECTOMY CAROTID;  LEFT CAROTID ENDARTERECTOMY with EXTERNAL JUGULAR VEIN PATCH;  Surgeon: Gaurav Bustos MD;  Location: [facility]     ENDARTERECTOMY CAROTID Left 6/5/2019    Procedure: REDO LEFT CAROTID ENDARTERECTOMY WITH EEG with greater saphenous vein graft;  Surgeon: Gaurav Bustos MD;  Location:  OR       Social History     Tobacco Use     Smoking status: Former Smoker     Quit date: 10/3/2007     Years since quitting: 15.0     Smokeless tobacco: Never Used   Substance Use Topics     Alcohol use: No     History reviewed. No pertinent family history.      Current Outpatient Medications   Medication Sig Dispense Refill     acetaminophen (TYLENOL) 500 MG tablet Take 1,500 mg by mouth every 8 hours as needed for mild pain       amLODIPine (NORVASC) 5 MG tablet Take 1 tablet (5 mg) by mouth daily 90 tablet 1     aspirin (ASA) 325 MG EC tablet Take 1 tablet (325 mg) by mouth daily 42 tablet 0     blood glucose (CONTOUR NEXT TEST) test strip USE ONE STRIP TO CHECK GLUCOSE ONCE DAILY 100 strip 1     blood glucose monitoring (Mobly MICROLET) lancets Use to test blood sugar 1 times daily or as directed. 100 each 5     glimepiride (AMARYL) 4 MG tablet TAKE 1 TABLET BY MOUTH ONCE DAILY BEFORE BREAKFAST 90 tablet 0      losartan (COZAAR) 100 MG tablet Take 1 tablet (100 mg) by mouth daily 90 tablet 1     meloxicam (MOBIC) 15 MG tablet TAKE 1 TABLET BY MOUTH ONCE DAILY WITH FOOD       metFORMIN (GLUCOPHAGE) 500 MG tablet TAKE 2 TABLETS BY MOUTH IN THE MORNING AND 1 IN THE EVENING 270 tablet 0     nitroGLYcerin (NITROSTAT) 0.4 MG sublingual tablet For chest pain place 1 tablet under the tongue every 5 minutes for 3 doses. If symptoms persist 5 minutes after 1st dose call 911. 12 tablet 0     rosuvastatin (CRESTOR) 20 MG tablet Take 1 tablet by mouth once daily 90 tablet 3     No Known Allergies  Recent Labs   Lab Test 10/03/22  0906 12/06/21  0909 06/09/21  1043 06/01/21  1534 06/01/21  1530 03/16/21  0904 03/15/21  1536 06/03/19  1143 06/03/19  0920 04/01/19  1148 11/19/18  1048 11/11/18  0314 10/02/18  1453 06/06/18  1315 05/11/18  1350 03/27/18  1545 10/06/17  1000   A1C 9.7* 7.8*  --   --  7.0*  --   --    < > 7.2*   < >  --   --  6.8*  --  6.7*   < > 6.4*   LDL  --  23  --   --   --   --   --   --  20  --   --   --  48  --  45  --   --    HDL  --  38*  --   --   --   --   --   --  46  --   --   --   --   --  48  --   --    TRIG  --  138  --   --   --   --   --   --  113  --   --   --   --   --  124  --   --    ALT  --   --   --   --   --   --  63  --  31  --  35   < > 34  --   --   --   --    CR  --  0.86 1.00 1.19  --   --  1.54*   < > 0.84  --  0.88   < > 0.96  --  0.94   < >  --    GFRESTIMATED  --  88 76 62  --    < > 45*   < > 90  --  86   < > 78  --  80   < >  --    GFRESTBLACK  --   --  88 71  --    < > 52*   < > >90  --  >90   < > >90  --  >90   < >  --    POTASSIUM  --  4.6 4.8 4.5  --   --  5.4*   < > 4.5  --  4.1   < > 4.1   < > 4.4   < >  --    TSH  --   --   --   --   --   --   --   --  1.05  --   --   --   --   --   --   --  0.90    < > = values in this interval not displayed.          Review of Systems   Constitutional: Negative for chills and fever.   HENT: Negative for congestion, ear pain, hearing loss and sore  "throat.    Eyes: Negative for pain and visual disturbance.   Respiratory: Positive for cough. Negative for shortness of breath.    Cardiovascular: Negative for chest pain, palpitations and peripheral edema.   Gastrointestinal: Negative for abdominal pain, diarrhea, heartburn, hematochezia and nausea.   Genitourinary: Negative for dysuria, frequency, genital sores, hematuria, impotence, penile discharge and urgency.   Musculoskeletal: Positive for arthralgias. Negative for joint swelling and myalgias.   Skin: Negative for rash.   Neurological: Negative for dizziness, weakness, headaches and paresthesias.   Psychiatric/Behavioral: Negative for mood changes. The patient is not nervous/anxious.      Constitutional, HEENT, cardiovascular, pulmonary, GI, , musculoskeletal, neuro, skin, endocrine and psych systems are negative, except as otherwise noted.    OBJECTIVE:   BP (!) 150/84 (Cuff Size: Adult Large)   Pulse 70   Temp 97.8  F (36.6  C) (Tympanic)   Resp 18   Ht 1.727 m (5' 8\")   Wt 111.6 kg (246 lb)   BMI 37.40 kg/m   Estimated body mass index is 37.4 kg/m  as calculated from the following:    Height as of this encounter: 1.727 m (5' 8\").    Weight as of this encounter: 111.6 kg (246 lb).  Physical Exam  GENERAL: alert, no distress and obese  EYES: Eyes grossly normal to inspection, PERRL and conjunctivae and sclerae normal  HENT: normal cephalic/atraumatic, ear canals and TM's normal, nose and mouth without ulcers or lesions, oropharynx clear and oral mucous membranes moist  NECK: no adenopathy, no asymmetry, masses, or scars and thyroid normal to palpation  RESP: lungs clear to auscultation - no rales, rhonchi or wheezes  CV: regular rate and rhythm, normal S1 S2, no S3 or S4, no murmur, click or rub, no peripheral edema and peripheral pulses strong  ABDOMEN: soft, nontender, no hepatosplenomegaly, no masses and bowel sounds normal  MS: no gross musculoskeletal defects noted, no edema  SKIN: no suspicious " lesions or rashes  NEURO: Normal strength and tone, mentation intact and speech normal  PSYCH: mentation appears normal, affect normal/bright      ASSESSMENT / PLAN:   (Z00.00) Routine history and physical examination of adult  (primary encounter diagnosis)  Comment: Complains of chronic cough, runny nose, wheezing which he has been experiencing for several months.  Differential discussed in detail including seasonal allergies and cough related to ACE inhibitor.  Lisinopril switched to losartan and recommended to use Flonase, antihistamine for seasonal allergies.  Influenza and COVID vaccines administered in office today.  Patient deferred colon cancer screening. Healthy lifestyle modifications stressed including regular exercise, balanced diet, weight loss and limiting salt/caffeine/pop intake.  Follow-up with RN in 2 weeks for repeat blood pressure check      (J30.2) Seasonal allergies  Comment: As above.  Allergist referral placed as well  Plan: Adult Allergy/Asthma Referral            (E11.9) Type 2 diabetes mellitus without complication, without long-term current use of insulin (H)  Comment: Recommended to continue glimepiride, metformin, strict diabetic diet and regular walks  Plan: Basic metabolic panel  (Ca, Cl, CO2, Creat,         Gluc, K, Na, BUN), Hemoglobin A1c, CBC with         platelets         (E78.2) Mixed hyperlipidemia  Comment: Continue rosuvastatin  Plan: Lipid panel           (I10) Benign essential hypertension  Comment: Blood pressure above target goal of less than 140/90.  Suspect cough related to ACE inhibitor.  Lisinopril switched to losartan and amlodipine added for better blood pressure control.  Follow-up with RN in 2 weeks for repeat blood pressure check   Plan: amLODIPine (NORVASC) 5 MG tablet, losartan         (COZAAR) 100 MG tablet            (R05.3) Chronic cough  Comment: As above  Plan:     (Z12.11) Colon cancer screening  Comment: Patient deferred colon cancer  "screening    COUNSELING:  Reviewed preventive health counseling, as reflected in patient instructions    Estimated body mass index is 37.4 kg/m  as calculated from the following:    Height as of this encounter: 1.727 m (5' 8\").    Weight as of this encounter: 111.6 kg (246 lb).    Weight management plan: Discussed healthy diet and exercise guidelines    He reports that he quit smoking about 15 years ago. He has never used smokeless tobacco.      Appropriate preventive services were discussed with this patient, including applicable screening as appropriate for cardiovascular disease, diabetes, osteopenia/osteoporosis, and glaucoma.  As appropriate for age/gender, discussed screening for colorectal cancer, prostate cancer, breast cancer, and cervical cancer. Checklist reviewing preventive services available has been given to the patient.    Reviewed patients plan of care and provided an AVS. The Basic Care Plan (routine screening as documented in Health Maintenance) for Roscoe meets the Care Plan requirement. This Care Plan has been established and reviewed with the Patient.    Counseling Resources:  ATP IV Guidelines  Pooled Cohorts Equation Calculator  Breast Cancer Risk Calculator  Breast Cancer: Medication to Reduce Risk  FRAX Risk Assessment  ICSI Preventive Guidelines  Dietary Guidelines for Americans, 2010  Sample[employer]'s MyPlate  ASA Prophylaxis  Lung CA Screening    Rell Rivers MD  [facility]    Identified Health Risks:    Addendum:  Hemoglobin A1c 9.7, discussed with patient treatment options.  Jardiance prescribed, common side effects discussed, will be helpful for controlling blood pressure as well.  Recommended to schedule appointment with diabetic educatorl, referral placed.  Healthy lifestyle modifications stressed including regular exercise, strict diabetic diet and regular exercise.    Dr Rivers  "

## 2022-10-24 ENCOUNTER — ALLIED HEALTH/NURSE VISIT (OUTPATIENT)
Dept: EDUCATION SERVICES | Facility: CLINIC | Age: 72
End: 2022-10-24
Attending: FAMILY MEDICINE
Payer: COMMERCIAL

## 2022-10-24 ENCOUNTER — ALLIED HEALTH/NURSE VISIT (OUTPATIENT)
Dept: FAMILY MEDICINE | Facility: CLINIC | Age: 72
End: 2022-10-24
Payer: COMMERCIAL

## 2022-10-24 VITALS — HEART RATE: 80 BPM | DIASTOLIC BLOOD PRESSURE: 72 MMHG | RESPIRATION RATE: 18 BRPM | SYSTOLIC BLOOD PRESSURE: 134 MMHG

## 2022-10-24 VITALS — WEIGHT: 244 LBS | BODY MASS INDEX: 37.1 KG/M2

## 2022-10-24 DIAGNOSIS — E11.65 TYPE 2 DIABETES MELLITUS WITH HYPERGLYCEMIA, WITHOUT LONG-TERM CURRENT USE OF INSULIN (H): ICD-10-CM

## 2022-10-24 DIAGNOSIS — E11.9 TYPE 2 DIABETES MELLITUS WITHOUT COMPLICATION, WITHOUT LONG-TERM CURRENT USE OF INSULIN (H): ICD-10-CM

## 2022-10-24 DIAGNOSIS — I10 BENIGN ESSENTIAL HYPERTENSION: Primary | ICD-10-CM

## 2022-10-24 PROCEDURE — G0108 DIAB MANAGE TRN  PER INDIV: HCPCS | Performed by: DIETITIAN, REGISTERED

## 2022-10-24 PROCEDURE — 99207 PR NO CHARGE NURSE ONLY: CPT

## 2022-10-24 RX ORDER — GLIMEPIRIDE 4 MG/1
TABLET ORAL
Qty: 180 TABLET | Refills: 1 | COMMUNITY
Start: 2022-10-24 | End: 2022-12-06

## 2022-10-24 RX ORDER — GLIMEPIRIDE 4 MG/1
TABLET ORAL
Qty: 180 TABLET | Refills: 1 | Status: SHIPPED | OUTPATIENT
Start: 2022-10-24 | End: 2022-10-24

## 2022-10-24 NOTE — PROGRESS NOTES
Diabetes Self-Management Education & Support    Presents for: Individual review    Type of Service: In Person Visit    Assessment Type:   ASSESSMENT:  -Jardiance was prescribed 1 month ago, but was 700 dollars so he did not pick it up  -he has not been testing blood sugars. He will start testing once a day.  a1c was 9.7%  -He is taking only 2 metformin and 4 mg Glimiperide.  Want him to try adding back in the third metformin in the am with breakast and to take one Glimipiride tablet with breakfast and 1 with supper.  Reviewed hypoglycemia, eating regularly and carrying carbohydrate at all times.  -may need to add in basal insulin if numbers are still running higher in one month.  GLP1 and SGLT2's are too expensive for pt.    Patient's most recent   Lab Results   Component Value Date    A1C 9.7 10/03/2022    A1C 7.0 06/01/2021     is not meeting goal of <8.0    Diabetes knowledge and skills assessment:   Patient is knowledgeable in diabetes management concepts related to: Healthy Eating, Being Active and Monitoring    Continue education with the following diabetes management concepts: Healthy Eating, Being Active, Monitoring, Taking Medication, Problem Solving, Reducing Risks and Healthy Coping    Based on learning assessment above, most appropriate setting for further diabetes education would be: Individual setting.      PLAN  1. Check blood sugars daily in the am.  Goal is  mg/dL in the am.    2.    Watch carbohydrate grams 45-60 at meals and 15-30 at snacks.    3.     Stay active daily.    4.    Recheck November 28th at 8 am    5.  Try taking that third Metformin in the am along with Glimepiride 4 mg.  Take the two Metformin in the evening along with another Glimipiride 4 mg.    Topics to cover at upcoming visits: Taking Medication, Problem Solving and Reducing Risks    Follow-up: in 5 weeks    See Care Plan for co-developed, patient-state behavior change goals.  AVS provided for patient today.    Education  "Materials Provided:  Carbohydrate Counting      SUBJECTIVE/OBJECTIVE:  Presents for: Individual review  Diabetes type: Type 2  Cultural Influences/Ethnic Background:  Not  or       Diabetes Symptoms & Complications:          Patient Problem List and Family Medical History reviewed for relevant medical history, current medical status, and diabetes risk factors.    Vitals:  There were no vitals taken for this visit.  Estimated body mass index is 37.4 kg/m  as calculated from the following:    Height as of 10/3/22: 1.727 m (5' 8\").    Weight as of 10/3/22: 111.6 kg (246 lb).   Last 3 BP:   BP Readings from Last 3 Encounters:   10/03/22 (!) 150/84   12/06/21 (!) 144/80   11/04/21 117/76       History   Smoking Status     Former     Quit date: 10/3/2007   Smokeless Tobacco     Never       Labs:  Lab Results   Component Value Date    A1C 9.7 10/03/2022    A1C 7.0 06/01/2021     Lab Results   Component Value Date     10/03/2022     12/06/2021    GLC 88 06/01/2021     Lab Results   Component Value Date    LDL 22 10/03/2022    LDL 20 06/03/2019     HDL Cholesterol   Date Value Ref Range Status   06/03/2019 46 >39 mg/dL Final     Direct Measure HDL   Date Value Ref Range Status   10/03/2022 38 (L) >=40 mg/dL Final   ]  GFR Estimate   Date Value Ref Range Status   10/03/2022 >90 >60 mL/min/1.73m2 Final     Comment:     Effective December 21, 2021 eGFRcr in adults is calculated using the 2021 CKD-EPI creatinine equation which includes age and gender (Ki lundberg al., NEJM, DOI: 10.1056/CVIHoi2727697)   06/09/2021 76 >60 mL/min/[1.73_m2] Final     Comment:     Non  GFR Calc  Starting 12/18/2018, serum creatinine based estimated GFR (eGFR) will be   calculated using the Chronic Kidney Disease Epidemiology Collaboration   (CKD-EPI) equation.       GFR Estimate If Black   Date Value Ref Range Status   06/09/2021 88 >60 mL/min/[1.73_m2] Final     Comment:      GFR " Calc  Starting 2018, serum creatinine based estimated GFR (eGFR) will be   calculated using the Chronic Kidney Disease Epidemiology Collaboration   (CKD-EPI) equation.       Lab Results   Component Value Date    CR 0.89 10/03/2022    CR 1.00 2021     No results found for: MICROALBUMIN    Healthy Eating:  Healthy Eating Assessed Today: Yes  Breakfast: eggs, potatoes, becker/sausage or toast.  orange juice small bottle or banana and two toast or oatmeal 1 cup, 2 toast, brown sugar 1 tbsp  Lunch: ham and cheese sandwich, diet pepsi, small bag chips or chicken nuggets or ritz crackers sleeve and cheese whiz or saltines  Dinner: steak,1/2 cup broccoli and cheese, rice, sweet potato 1/2 cup, diet pepsi or goes out cheeseburger and fries or salad and  chicken.  Snacks: crackers or mini baby kemar or peanuts with yogurt balls or mid afternoon bag of chips, snack: apple or ice cream dove bar  Other: drinks lots of water during the day  Beverages: Water    Being Active:  Being Active Assessed Today: Yes (cuts wood, loads it, work in shop.  is active for 4-5 hours a day. recently not as much due to back went out.)    Monitoring:   not testing his blood sugars.  Has supplies at home, reminded him to make sure they are not         Taking Medications:  Diabetes Medication(s)     Biguanides       metFORMIN (GLUCOPHAGE) 500 MG tablet    TAKE 2 TABLETS BY MOUTH IN THE MORNING AND 1 IN THE EVENING    Sodium-Glucose Co-Transporter 2 (SGLT2) Inhibitors       empagliflozin (JARDIANCE) 10 MG TABS tablet    Take 1 tablet (10 mg) by mouth daily    Sulfonylureas       glimepiride (AMARYL) 4 MG tablet    TAKE 1 TABLET BY MOUTH ONCE DAILY BEFORE BREAKFAST               Problem Solving:                 Reducing Risks:       Healthy Coping:     Patient Activation Measure Survey Score:  No flowsheet data found.      Care Plan and Education Provided:  Care Plan: Diabetes   Updates made by Judi Dotson, XENIA since 10/24/2022 12:00 AM       Problem: HbA1C Not In Goal       Goal: Establish Regular Follow-Ups with PCP       Task: Discuss with PCP the recommended timing for patient's next follow up visit(s)    Responsible User: Judi Dotson RD      Task: Discuss schedule for PCP visits with patient    Responsible User: Judi Dotson RD      Goal: Get HbA1C Level in Goal       Task: Educate patient on diabetes education self-management topics    Responsible User: Judi Dotson RD      Task: Educate patient on benefits of regular glucose monitoring    Responsible User: Judi Dotson RD      Task: Refer patient to appropriate extended care team member, as needed (Medication Therapy Management, Behavioral Health, Physical Therapy, etc.)    Responsible User: Judi Dotson RD      Task: Discuss diabetes treatment plan with patient    Responsible User: Judi Dotson RD      Problem: Diabetes Self-Management Education Needed to Optimize Self-Care Behaviors       Goal: Understand diabetes pathophysiology and disease progression       Task: Provide education on diabetes pathophysiology and disease progression specfic to patient's diabetes type    Responsible User: Judi Dotson RD      Goal: Healthy Eating - follow a healthy eating pattern for diabetes    Start Date: 10/24/2022   Expected End Date: 11/28/2022   Note:    Watch carbohydrate grams 45-60 meals, 15-30 snacks     Task: Provide education on portion control and consistency in amount, composition and timing of food intake    Responsible User: Judi Dotson RD      Task: Provide education on managing carbohydrate intake (carbohydrate counting, plate planning method, etc.)    Responsible User: Judi Dotson RD      Task: Provide education on weight management    Responsible User: Judi Dotson RD      Task: Provide education on heart healthy eating    Responsible User: Judi Dotson RD      Task: Provide education on eating out    Responsible User: Judi Dotson RD      Task: Develop individualized healthy eating  plan with patient    Responsible User: Judi Dotson RD      Goal: Being Active - get regular physical activity, working up to at least 150 minutes per week       Task: Provide education on relationship of activity to glucose and precautions to take if at risk for low glucose    Responsible User: Judi Dotson RD      Task: Discuss barriers to physical activity with patient    Responsible User: Judi Dotson RD      Task: Develop physical activity plan with patient    Responsible User: Judi Dotson RD      Task: Explore community resources including walking groups, assistance programs, and home videos    Responsible User: Judi Dotson RD      Goal: Monitoring - monitor glucose and ketones as directed       Task: Provide education on blood glucose monitoring (purpose, proper technique, frequency, glucose targets, interpreting results, when to use glucose control solution, sharps disposal)    Responsible User: Judi Dotson RD      Task: Provide education on continuous glucose monitoring (sensor placement, use of juana or /reader, understanding glucose trends, alerts and alarms, differences between sensor glucose and blood glucose)    Responsible User: Judi Dotson RD      Task: Provide education on ketone monitoring (when to monitor, frequency, etc.)    Responsible User: Judi Dotson RD      Goal: Taking Medication - patient is consistently taking medications as directed       Task: Provide education on action of prescribed medication, including when to take and possible side effects    Responsible User: Judi Dotson RD      Task: Provide education on insulin and injectable diabetes medications, including administration, storage, site selection and rotation for injection sites    Responsible User: Judi Dotson RD      Task: Discuss barriers to medication adherence with patient and provide management technique ideas as appropriate    Responsible User: Judi Dotson RD      Task: Provide education on frequency and  refill details of medications    Responsible User: Judi Dotson RD      Goal: Problem Solving - know how to prevent and manage short-term diabetes complications       Task: Provide education on high blood glucose - causes, signs/symptoms, prevention and treatment    Responsible User: Judi Dotson RD      Task: Provide education on low blood glucose - causes, signs/symptoms, prevention, treatment, carrying a carbohydrate source at all times, and medical identification    Responsible User: Judi Dotson RD      Task: Provide education on safe travel with diabetes    Responsible User: Judi Dotson RD      Task: Provide education on how to care for diabetes on sick days    Responsible User: Judi Dotson RD      Task: Provide education on when to call a health care provider    Responsible User: Judi Dotson RD      Goal: Reducing Risks - know how to prevent and treat long-term diabetes complications       Task: Provide education on major complications of diabetes, prevention, early diagnostic measures and treatment of complications    Responsible User: Judi Dotson RD      Task: Provide education on recommended care for dental, eye and foot health    Responsible User: Judi Dotson RD      Task: Provide education on Hemoglobin A1c - goals and relationship to blood glucose levels    Responsible User: Judi Dotson RD      Task: Provide education on recommendations for heart health - lipid levels and goals, blood pressure and goals, and aspirin therapy, if indicated    Responsible User: Judi Dotson RD      Task: Provide education on tobacco cessation    Responsible User: Judi Dotson RD      Goal: Healthy Coping - use available resources to cope with the challenges of managing diabetes       Task: Discuss recognizing feelings about having diabetes    Responsible User: Judi Dotson RD      Task: Provide education on the benefits of making appropriate lifestyle changes    Responsible User: Judi Dotson RD      Task: Provide  education on benefits of utilizing support systems    Responsible User: Judi Dotson RD      Task: Discuss methods for coping with stress    Responsible User: Judi Dotson RD      Task: Provide education on when to seek professional counseling    Responsible User: Judi Dotson RD              Time Spent: 45 minutes  Encounter Type: Individual    Any diabetes medication dose changes were made via the CDE Protocol per the patient's primary care provider. A copy of this encounter was shared with the provider.

## 2022-10-24 NOTE — PATIENT INSTRUCTIONS
Check blood sugars daily in the am.  Goal is  mg/dL in the am.    2.    Watch carbohydrate grams 45-60 at meals and 15-30 at snacks.    3.     Stay active daily.    4.    Recheck November 28th at 8 am    5.  Try taking that third Metformin in the am along with Glimepiride 4 mg.  Take the two Metformin in the evening along with another Glimipiride 4 mg.

## 2022-10-24 NOTE — LETTER
10/24/2022         RE: Roscoe Jang  [address] [address] [address]        Dear Colleague,    Thank you for referring your patient, Roscoe Jang, to the [facility]. Please see a copy of my visit note below.    Diabetes Self-Management Education & Support    Presents for: Individual review    Type of Service: In Person Visit    Assessment Type:   ASSESSMENT:  -Jardiance was prescribed 1 month ago, but was 700 dollars so he did not pick it up  -he has not been testing blood sugars. He will start testing once a day.  a1c was 9.7%  -He is taking only 2 metformin and 4 mg Glimiperide.  Want him to try adding back in the third metformin in the am with breakast and to take one Glimipiride tablet with breakfast and 1 with supper.  Reviewed hypoglycemia, eating regularly and carrying carbohydrate at all times.  -may need to add in basal insulin if numbers are still running higher in one month.  GLP1 and SGLT2's are too expensive for pt.    Patient's most recent   Lab Results   Component Value Date    A1C 9.7 10/03/2022    A1C 7.0 06/01/2021     is not meeting goal of <8.0    Diabetes knowledge and skills assessment:   Patient is knowledgeable in diabetes management concepts related to: Healthy Eating, Being Active and Monitoring    Continue education with the following diabetes management concepts: Healthy Eating, Being Active, Monitoring, Taking Medication, Problem Solving, Reducing Risks and Healthy Coping    Based on learning assessment above, most appropriate setting for further diabetes education would be: Individual setting.      PLAN  1. Check blood sugars daily in the am.  Goal is  mg/dL in the am.    2.    Watch carbohydrate grams 45-60 at meals and 15-30 at snacks.    3.     Stay active daily.    4.    Recheck November 28th at 8 am    5.  Try taking that third Metformin in the am along with Glimepiride 4 mg.  Take the two Metformin in the evening along  "with another Glimipiride 4 mg.    Topics to cover at upcoming visits: Taking Medication, Problem Solving and Reducing Risks    Follow-up: in 5 weeks    See Care Plan for co-developed, patient-state behavior change goals.  AVS provided for patient today.    Education Materials Provided:  Carbohydrate Counting      SUBJECTIVE/OBJECTIVE:  Presents for: Individual review  Diabetes type: Type 2  Cultural Influences/Ethnic Background:  Not  or       Diabetes Symptoms & Complications:          Patient Problem List and Family Medical History reviewed for relevant medical history, current medical status, and diabetes risk factors.    Vitals:  There were no vitals taken for this visit.  Estimated body mass index is 37.4 kg/m  as calculated from the following:    Height as of 10/3/22: 1.727 m (5' 8\").    Weight as of 10/3/22: 111.6 kg (246 lb).   Last 3 BP:   BP Readings from Last 3 Encounters:   10/03/22 (!) 150/84   12/06/21 (!) 144/80   11/04/21 117/76       History   Smoking Status     Former     Quit date: 10/3/2007   Smokeless Tobacco     Never       Labs:  Lab Results   Component Value Date    A1C 9.7 10/03/2022    A1C 7.0 06/01/2021     Lab Results   Component Value Date     10/03/2022     12/06/2021    GLC 88 06/01/2021     Lab Results   Component Value Date    LDL 22 10/03/2022    LDL 20 06/03/2019     HDL Cholesterol   Date Value Ref Range Status   06/03/2019 46 >39 mg/dL Final     Direct Measure HDL   Date Value Ref Range Status   10/03/2022 38 (L) >=40 mg/dL Final   ]  GFR Estimate   Date Value Ref Range Status   10/03/2022 >90 >60 mL/min/1.73m2 Final     Comment:     Effective December 21, 2021 eGFRcr in adults is calculated using the 2021 CKD-EPI creatinine equation which includes age and gender (Ki lundberg al., NEJM, DOI: 10.1056/SLHJui7022742)   06/09/2021 76 >60 mL/min/[1.73_m2] Final     Comment:     Non  GFR Calc  Starting 12/18/2018, serum creatinine based " estimated GFR (eGFR) will be   calculated using the Chronic Kidney Disease Epidemiology Collaboration   (CKD-EPI) equation.       GFR Estimate If Black   Date Value Ref Range Status   2021 88 >60 mL/min/[1.73_m2] Final     Comment:      GFR Calc  Starting 2018, serum creatinine based estimated GFR (eGFR) will be   calculated using the Chronic Kidney Disease Epidemiology Collaboration   (CKD-EPI) equation.       Lab Results   Component Value Date    CR 0.89 10/03/2022    CR 1.00 2021     No results found for: MICROALBUMIN    Healthy Eating:  Healthy Eating Assessed Today: Yes  Breakfast: eggs, potatoes, becker/sausage or toast.  orange juice small bottle or banana and two toast or oatmeal 1 cup, 2 toast, brown sugar 1 tbsp  Lunch: ham and cheese sandwich, diet pepsi, small bag chips or chicken nuggets or ritz crackers sleeve and cheese whiz or saltines  Dinner: steak,1/2 cup broccoli and cheese, rice, sweet potato 1/2 cup, diet pepsi or goes out cheeseburger and fries or salad and  chicken.  Snacks: crackers or mini baby kemar or peanuts with yogurt balls or mid afternoon bag of chips, snack: apple or ice cream dove bar  Other: drinks lots of water during the day  Beverages: Water    Being Active:  Being Active Assessed Today: Yes (cuts wood, loads it, work in shop.  is active for 4-5 hours a day. recently not as much due to back went out.)    Monitoring:   not testing his blood sugars.  Has supplies at home, reminded him to make sure they are not         Taking Medications:  Diabetes Medication(s)     Biguanides       metFORMIN (GLUCOPHAGE) 500 MG tablet    TAKE 2 TABLETS BY MOUTH IN THE MORNING AND 1 IN THE EVENING    Sodium-Glucose Co-Transporter 2 (SGLT2) Inhibitors       empagliflozin (JARDIANCE) 10 MG TABS tablet    Take 1 tablet (10 mg) by mouth daily    Sulfonylureas       glimepiride (AMARYL) 4 MG tablet    TAKE 1 TABLET BY MOUTH ONCE DAILY BEFORE BREAKFAST                Problem Solving:                 Reducing Risks:       Healthy Coping:     Patient Activation Measure Survey Score:  No flowsheet data found.      Care Plan and Education Provided:  Care Plan: Diabetes   Updates made by Judi Dotson RD since 10/24/2022 12:00 AM      Problem: HbA1C Not In Goal       Goal: Establish Regular Follow-Ups with PCP       Task: Discuss with PCP the recommended timing for patient's next follow up visit(s)    Responsible User: Judi Dotson RD      Task: Discuss schedule for PCP visits with patient    Responsible User: Judi Dotson RD      Goal: Get HbA1C Level in Goal       Task: Educate patient on diabetes education self-management topics    Responsible User: Judi Dotson RD      Task: Educate patient on benefits of regular glucose monitoring    Responsible User: Judi Dotson RD      Task: Refer patient to appropriate extended care team member, as needed (Medication Therapy Management, Behavioral Health, Physical Therapy, etc.)    Responsible User: Judi Dotson RD      Task: Discuss diabetes treatment plan with patient    Responsible User: Judi Dotson RD      Problem: Diabetes Self-Management Education Needed to Optimize Self-Care Behaviors       Goal: Understand diabetes pathophysiology and disease progression       Task: Provide education on diabetes pathophysiology and disease progression specfic to patient's diabetes type    Responsible User: Judi Dotson RD      Goal: Healthy Eating - follow a healthy eating pattern for diabetes    Start Date: 10/24/2022   Expected End Date: 11/28/2022   Note:    Watch carbohydrate grams 45-60 meals, 15-30 snacks     Task: Provide education on portion control and consistency in amount, composition and timing of food intake    Responsible User: Judi Dotson RD      Task: Provide education on managing carbohydrate intake (carbohydrate counting, plate planning method, etc.)    Responsible User: Judi Dotson RD      Task: Provide education on weight  management    Responsible User: Judi Dotson RD      Task: Provide education on heart healthy eating    Responsible User: Judi Dotson RD      Task: Provide education on eating out    Responsible User: Judi Dotson RD      Task: Develop individualized healthy eating plan with patient    Responsible User: Judi Dotson RD      Goal: Being Active - get regular physical activity, working up to at least 150 minutes per week       Task: Provide education on relationship of activity to glucose and precautions to take if at risk for low glucose    Responsible User: Judi Dotson RD      Task: Discuss barriers to physical activity with patient    Responsible User: Judi Dotson RD      Task: Develop physical activity plan with patient    Responsible User: Judi Dotson RD      Task: Explore community resources including walking groups, assistance programs, and home videos    Responsible User: Judi Dotson RD      Goal: Monitoring - monitor glucose and ketones as directed       Task: Provide education on blood glucose monitoring (purpose, proper technique, frequency, glucose targets, interpreting results, when to use glucose control solution, sharps disposal)    Responsible User: Judi Dotson RD      Task: Provide education on continuous glucose monitoring (sensor placement, use of juana or /reader, understanding glucose trends, alerts and alarms, differences between sensor glucose and blood glucose)    Responsible User: Judi Dotson RD      Task: Provide education on ketone monitoring (when to monitor, frequency, etc.)    Responsible User: Judi Dotson RD      Goal: Taking Medication - patient is consistently taking medications as directed       Task: Provide education on action of prescribed medication, including when to take and possible side effects    Responsible User: Judi Dotson RD      Task: Provide education on insulin and injectable diabetes medications, including administration, storage, site selection and  rotation for injection sites    Responsible User: Judi Dotson RD      Task: Discuss barriers to medication adherence with patient and provide management technique ideas as appropriate    Responsible User: Judi Dotson RD      Task: Provide education on frequency and refill details of medications    Responsible User: Judi Dotson RD      Goal: Problem Solving - know how to prevent and manage short-term diabetes complications       Task: Provide education on high blood glucose - causes, signs/symptoms, prevention and treatment    Responsible User: Judi Dtoson RD      Task: Provide education on low blood glucose - causes, signs/symptoms, prevention, treatment, carrying a carbohydrate source at all times, and medical identification    Responsible User: Judi Dotson RD      Task: Provide education on safe travel with diabetes    Responsible User: Judi Dotson RD      Task: Provide education on how to care for diabetes on sick days    Responsible User: Judi Dotson RD      Task: Provide education on when to call a health care provider    Responsible User: Judi Dotson RD      Goal: Reducing Risks - know how to prevent and treat long-term diabetes complications       Task: Provide education on major complications of diabetes, prevention, early diagnostic measures and treatment of complications    Responsible User: Judi Dotson RD      Task: Provide education on recommended care for dental, eye and foot health    Responsible User: Judi Dotson RD      Task: Provide education on Hemoglobin A1c - goals and relationship to blood glucose levels    Responsible User: Judi Dotson RD      Task: Provide education on recommendations for heart health - lipid levels and goals, blood pressure and goals, and aspirin therapy, if indicated    Responsible User: Judi Dotson RD      Task: Provide education on tobacco cessation    Responsible User: Judi Dotson RD      Goal: Healthy Coping - use available resources to cope with the  challenges of managing diabetes       Task: Discuss recognizing feelings about having diabetes    Responsible User: Judi Dotson RD      Task: Provide education on the benefits of making appropriate lifestyle changes    Responsible User: Judi Dotson RD      Task: Provide education on benefits of utilizing support systems    Responsible User: Judi Dotson RD      Task: Discuss methods for coping with stress    Responsible User: Judi Dotson RD      Task: Provide education on when to seek professional counseling    Responsible User: Judi Dotson RD              Time Spent: 45 minutes  Encounter Type: Individual    Any diabetes medication dose changes were made via the CDE Protocol per the patient's primary care provider. A copy of this encounter was shared with the provider.

## 2022-10-24 NOTE — PROGRESS NOTES
Roscoe Jang is a 72 year old year old patient who comes in today for a Blood Pressure check because of new medication.   Blood pressure above target goal of less than 140/90 on 10/3/22. Dr Rivers thought cough related to ACE inhibitor.  Lisinopril switched to losartan and amlodipine added for better blood pressure control.  Follow-up with RN in 2 weeks for repeat blood pressure check     Vital Signs as repeated by /72 and 5 minutes later 134/72.  Takes BP medication at HS    Patient is taking medication as prescribed    Patient is tolerating medications well.    Patient is monitoring Blood Pressure at home.  took BP this AM and it wast 128/72    Current complaints: none    Disposition:  patient to continue with the same medication.  Encouraged to try to check BP at home on a regular basis  Simin Marquis RN

## 2022-10-25 ENCOUNTER — TELEPHONE (OUTPATIENT)
Dept: FAMILY MEDICINE | Facility: CLINIC | Age: 72
End: 2022-10-25

## 2022-10-25 DIAGNOSIS — E11.65 TYPE 2 DIABETES MELLITUS WITH HYPERGLYCEMIA, WITHOUT LONG-TERM CURRENT USE OF INSULIN (H): ICD-10-CM

## 2022-10-27 ENCOUNTER — TELEPHONE (OUTPATIENT)
Dept: FAMILY MEDICINE | Facility: CLINIC | Age: 72
End: 2022-10-27

## 2022-10-27 NOTE — TELEPHONE ENCOUNTER
S-(situation): Onset LBP rt side 1 wk ago when loading outdoor stove with wood. States has known disc problem per previous MRI, asking if Dr. Rivers would order imaging to have done along with carotid US scheduled 11-02-22. Tried scheduling appt with Dr. Rivers, booked out into Nov.     B-(background): See above. Had lumbar MRI 03-02-21-  IMPRESSION:  1.  Moderate right foraminal and extraforaminal L3-L4 disc herniation  with severe right neural foraminal stenosis.  2.  Moderate to severe left L4-L5 neural foraminal stenosis due to  disc osteophyte complex.  3.  Moderate to severe bilateral L5-S1 neural foraminal stenoses due  to disc osteophyte complex.    Taking tyl minimally as does not like to take meds.   Alternating heat/ ice.       A-(assessment): States pain positional, denies leg/ foot numbness/ tingling.       R-(recommendations): Advised appt with provider or UC to eval. Wants to see Dr. Rivers only. Pt OK with waiting until 11-01-22 for appt if PCP ok for same day hold use or recommend UC for sooner eval?  Please advise.  VIK Huynh, RN

## 2022-11-01 ENCOUNTER — OFFICE VISIT (OUTPATIENT)
Dept: FAMILY MEDICINE | Facility: CLINIC | Age: 72
End: 2022-11-01
Payer: COMMERCIAL

## 2022-11-01 VITALS
WEIGHT: 244 LBS | HEART RATE: 70 BPM | SYSTOLIC BLOOD PRESSURE: 130 MMHG | TEMPERATURE: 97.5 F | DIASTOLIC BLOOD PRESSURE: 66 MMHG | HEIGHT: 68 IN | BODY MASS INDEX: 36.98 KG/M2 | RESPIRATION RATE: 18 BRPM

## 2022-11-01 DIAGNOSIS — M54.50 CHRONIC RIGHT-SIDED LOW BACK PAIN, UNSPECIFIED WHETHER SCIATICA PRESENT: Primary | ICD-10-CM

## 2022-11-01 DIAGNOSIS — G89.29 CHRONIC RIGHT-SIDED LOW BACK PAIN, UNSPECIFIED WHETHER SCIATICA PRESENT: Primary | ICD-10-CM

## 2022-11-01 PROCEDURE — 99213 OFFICE O/P EST LOW 20 MIN: CPT | Performed by: FAMILY MEDICINE

## 2022-11-01 ASSESSMENT — PAIN SCALES - GENERAL: PAINLEVEL: WORST PAIN (10)

## 2022-11-01 NOTE — PROGRESS NOTES
"  Assessment & Plan     Chronic right-sided low back pain, unspecified whether sciatica present  Differentials discussed in detail.  Suspect symptoms secondary to lumbar degenerative disc disease.  Recommended over-the-counter oral/topical analgesia, well hydration and physical therapy, referral placed.  Return criteria explained in detail.  All questions answered.  - Physical Therapy Referral; Future      Rell Rivers MD  [facility]    Shawn Malhotra is a 72 year old, presenting for the following health issues:  Back Pain      History of Present Illness       Back Pain:  He presents for follow up of back pain. Patient's back pain is a recurring (Has been hauling wood into the boiler everyday. ) problem.  Location of back pain:  Right lower back and right hip  Description of back pain: cramping, sharp and stabbing  Back pain spreads: nowhere    Since last visit, how has back pain changed: gradually worsening  Since patient first noticed back pain, pain is: unchanged  Does back pain interfere with his job:  No  Has the patient tried any new treatments:  Yes If Yes, which: heat, cold and rest      He eats 2-3 servings of fruits and vegetables daily.He consumes 0 sweetened beverage(s) daily.He exercises with enough effort to increase his heart rate 30 to 60 minutes per day.  He exercises with enough effort to increase his heart rate 3 or less days per week.   He is taking medications regularly.         Review of Systems   Constitutional, HEENT, cardiovascular, pulmonary, gi and gu systems are negative, except as otherwise noted.      Objective    /66 (Cuff Size: Adult Large)   Pulse 70   Temp 97.5  F (36.4  C) (Tympanic)   Resp 18   Ht 1.727 m (5' 8\")   Wt 110.7 kg (244 lb)   BMI 37.10 kg/m    Body mass index is 37.1 kg/m .  Physical Exam   GENERAL: alert, no distress and obese  NECK: no adenopathy, no asymmetry, masses, or scars and thyroid normal to palpation  RESP: " lungs clear to auscultation - no rales, rhonchi or wheezes  CV: regular rate and rhythm, normal S1 S2, no S3 or S4, no murmur, click or rub  ABDOMEN: soft, nontender, no hepatosplenomegaly, no masses and bowel sounds normal  MS: No spinal/paraspinal tenderness, skin discoloration or swelling noted, normal lower extremities strength, SLR negative bilaterally, normal gait, no pedal edema noted, peripheral pulses 2+ bilaterally  NEURO: Normal strength and tone, mentation intact and speech normal  PSYCH: mentation appears normal, affect normal/bright    Wt Readings from Last 10 Encounters:   11/01/22 110.7 kg (244 lb)   10/24/22 110.7 kg (244 lb)   10/03/22 111.6 kg (246 lb)   12/06/21 112.9 kg (249 lb)   10/18/21 112.5 kg (248 lb)   06/09/21 111 kg (244 lb 11.4 oz)   06/01/21 111.1 kg (245 lb)   03/15/21 104.3 kg (230 lb)   02/23/21 113.4 kg (250 lb)   02/20/21 113.4 kg (250 lb)

## 2022-11-01 NOTE — NURSING NOTE
"Chief Complaint   Patient presents with     Back Pain     /66 (Cuff Size: Adult Large)   Pulse 70   Temp 97.5  F (36.4  C) (Tympanic)   Resp 18   Ht 1.727 m (5' 8\")   Wt 110.7 kg (244 lb)   BMI 37.10 kg/m   Estimated body mass index is 37.1 kg/m  as calculated from the following:    Height as of this encounter: 1.727 m (5' 8\").    Weight as of this encounter: 110.7 kg (244 lb).  Patient presents to the clinic using No DME      Health Maintenance that is potentially due pending provider review:    Health Maintenance Due   Topic Date Due     EYE EXAM  Never done     COLORECTAL CANCER SCREENING  Never done     DTAP/TDAP/TD IMMUNIZATION (3 - Td or Tdap) 01/06/2022                "

## 2022-11-02 ENCOUNTER — HOSPITAL ENCOUNTER (OUTPATIENT)
Dept: ULTRASOUND IMAGING | Facility: CLINIC | Age: 72
Discharge: HOME | End: 2022-11-02
Attending: SURGERY | Admitting: SURGERY
Payer: MEDICARE

## 2022-11-02 DIAGNOSIS — Z98.890 HISTORY OF BILATERAL CAROTID ENDARTERECTOMY: ICD-10-CM

## 2022-11-02 DIAGNOSIS — I65.23 BILATERAL CAROTID ARTERY STENOSIS: ICD-10-CM

## 2022-11-02 PROCEDURE — 93880 EXTRACRANIAL BILAT STUDY: CPT

## 2022-11-02 NOTE — PROGRESS NOTES
[facility]    Roscoe Jang returns for vascular follow-up.  History of carotid disease.  5/11/2018 right CEA.  6/6/2018 left CEA both with a high-grade asymptomatic stenosis.  11/4/2021 duplex with right ICA PSV= 115 cm/s.  Left ICA PSV= 138 cm/s.  Imaging with minimal stenosis bilaterally.    PMH: Medications: Cozaar, Norvasc, Amaryl, Glucophage, Crestor, aspirin   Medical: Hypertension    Type 2 diabetes    Hyperlipidemia on statin    Degenerative arthritis with right total hip arthroplasty    GERD    No history of PAD with +3 PT pulses in the past.    No AAA by 11/11/2018 CT abdomen      10/3/2022 laboratory: K= 4.4 SCr= 0.89   A1c= 9.7   LDL= 22 Hgb= 14.4     HDL=38    Bilateral carotid duplex performed 11/2/2022.  Very mild wall thickening in both proximal ICA otherwise widely patent.  Right ICA PSV= 126 cm/s.  Left ICA PSV= 104 cm/s.  Normal end-diastolic velocities bilaterally.  Antegrade flow in both vertebral arteries.  Very mild right external carotid artery disease.    TELEPHONE CONSULT: I spoke with Alvarez on the phone this afternoon.  Overall he is doing well.  He has developed some low back problems and is going to physical therapy.  He has done well with his hip replacement surgery.    We discussed the carotid duplex with very minimal disease bilaterally.  This will be reevaluated in 2 years.    We also discussed his laboratory test.  Good results with his Crestor.  His HDL is low which is quite common in males.  Told him he may want to try fish oil to see if this helps but oftentimes there is very little we can do with the protective cholesterol.    He also is aware of his elevated A1c and is working on a much healthier diet to get this under better control.    IMPRESSION: #1.  Very minimal recurrent stenosis within both right and left CEAs.  Repeat exam in 2 years.     #2.  Overall good risk factor control except for diabetes issues as mentioned above.    Spent 12 minutes on  the phone today with patient being concluded at 1600 hrs.       Gaurav Bustos MD

## 2022-11-03 ENCOUNTER — VIRTUAL VISIT (OUTPATIENT)
Dept: OTHER | Facility: CLINIC | Age: 72
End: 2022-11-03
Attending: SURGERY
Payer: COMMERCIAL

## 2022-11-03 DIAGNOSIS — Z98.890 HISTORY OF BILATERAL CAROTID ENDARTERECTOMY: Primary | ICD-10-CM

## 2022-11-03 DIAGNOSIS — I65.23 BILATERAL CAROTID ARTERY STENOSIS: ICD-10-CM

## 2022-11-03 DIAGNOSIS — E78.5 HYPERLIPIDEMIA LDL GOAL <70: ICD-10-CM

## 2022-11-03 PROCEDURE — 99213 OFFICE O/P EST LOW 20 MIN: CPT | Mod: 95 | Performed by: SURGERY

## 2022-11-03 NOTE — PROGRESS NOTES
Roscoe is a 72 year old who is being evaluated via a billable telephone visit.      What phone number would you like to be contacted at? [phone]  How would you like to obtain your AVS? Derrek Flores

## 2022-11-09 ENCOUNTER — HOSPITAL ENCOUNTER (OUTPATIENT)
Dept: PHYSICAL THERAPY | Facility: CLINIC | Age: 72
Setting detail: THERAPIES SERIES
Discharge: HOME | End: 2022-11-09
Attending: FAMILY MEDICINE
Payer: MEDICARE

## 2022-11-09 DIAGNOSIS — G89.29 CHRONIC RIGHT-SIDED LOW BACK PAIN, UNSPECIFIED WHETHER SCIATICA PRESENT: ICD-10-CM

## 2022-11-09 DIAGNOSIS — M54.50 CHRONIC RIGHT-SIDED LOW BACK PAIN, UNSPECIFIED WHETHER SCIATICA PRESENT: ICD-10-CM

## 2022-11-09 PROCEDURE — 97161 PT EVAL LOW COMPLEX 20 MIN: CPT | Mod: GP | Performed by: PHYSICAL THERAPIST

## 2022-11-09 PROCEDURE — 97110 THERAPEUTIC EXERCISES: CPT | Mod: GP | Performed by: PHYSICAL THERAPIST

## 2022-11-09 NOTE — PROGRESS NOTES
[facility]    OUTPATIENT PHYSICAL THERAPY ORTHOPEDIC EVALUATION  PLAN OF TREATMENT FOR OUTPATIENT REHABILITATION  (COMPLETE FOR INITIAL CLAIMS ONLY)  Patient's Last Name, First Name, M.I.  YOB: 1950  Roscoe Jang    Provider s Name:  [facility]   Medical Record No.  [id number]   Start of Care Date:  11/09/22   Onset Date:  10/19/22   Type:     _X__PT   ___OT   ___SLP Medical Diagnosis:  Chronic right-sided low back pain, unspecified whether sciatica present (M54.50, G89.29)  - Primary     PT Diagnosis:  LBP   Visits from SOC:  1      _________________________________________________________________________________  Plan of Treatment/Functional Goals:  balance training, gait training, joint mobilization, manual therapy, neuromuscular re-education, ROM, strengthening, stretching     Cryotherapy, Electrical stimulation, TENS, Traction, Ultrasound, Hot packs     Goals  Goal Identifier: get in skidsteer  Goal Description: Pt will be able to get in/out of skidsteer w/o LBP  Target Date: 11/23/22    Goal Identifier: HEP  Goal Description: Pt will be compliant and competent in HEP to allow them to achieve maximal strength and reduce pain  Target Date: 12/07/22                                  Therapy Frequency:  other (see comments) (up to 1x/week for 4 weeks if needed)  Predicted Duration of Therapy Intervention:  4 weeks    Judi Huynh, PT                 I CERTIFY THE NEED FOR THESE SERVICES FURNISHED UNDER        THIS PLAN OF TREATMENT AND WHILE UNDER MY CARE     (Physician co-signature of this document indicates review and certification of the therapy plan).                     Certification Date From:  11/09/22   Certification Date To:  12/07/22    Referring Provider:  Rell Rivers MD    Initial Assessment        See Epic Evaluation Start of Care Date:  11/09/22 11/09/22 0700   General Information   Type of Visit Initial OP Ortho PT Evaluation   Start of Care Date 11/09/22   Referring Physician Rell Rivers MD   Patient/Family Goals Statement get HEP to continue at home   Orders Evaluate and Treat   Date of Order 11/01/22   Certification Required? Yes   Medical Diagnosis Chronic right-sided low back pain, unspecified whether sciatica present (M54.50, G89.29)  - Primary   Surgical/Medical history reviewed Yes   Precautions/Limitations no known precautions/limitations   General Information Comments PMH: diabetes, high BP, OA in knee, hip replaces   Body Part(s)   Body Part(s) Lumbar Spine/SI   Presentation and Etiology   Pertinent history of current problem (include personal factors and/or comorbidities that impact the POC) Pt relates chronic hip/back pain for years. Pt then got a R FANTA which helped a lot (aprox 1.5 years ago) . Pt relates then he got flare up of LBP this October when stacking wood. Pt overall is doing significantly better. He has been doing seated hip abd/add. He denies radicular symptoms but has ache in R SI.   Impairments A. Pain;E. Decreased flexibility   Functional Limitations perform activities of daily living   Symptom Location low back - R sided   How/Where did it occur At home   Onset date of current episode/exacerbation 10/19/22   Chronicity New   Pain rating (0-10 point scale) Best (/10);Worst (/10)   Best (/10) 0   Worst (/10) 6   Pain quality C. Aching;B. Dull   Frequency of pain/symptoms C. With activity   Pain/symptoms exacerbated by G. Certain positions;H. Overhead reach;I. Bending   Pain/symptoms eased by A. Sitting;C. Rest   Progression of symptoms since onset: Improved   Current Level of Function   Current Community Support Family/friend caregiver   Patient role/employment history F. Retired   [facility] environment [place]   Fall Risk Screen   Fall screen completed by PT   Have you fallen 2 or more times  in the past year? No   Have you fallen and had an injury in the past year? No   Is patient a fall risk? No   Abuse Screen (yes response referral indicated)   Feels Unsafe at Home or Work/School no   Feels Threatened by Someone no   Does Anyone Try to Keep You From Having Contact with Others or Doing Things Outside Your Home? no   Physical Signs of Abuse Present no   System Outcome Measures   Outcome Measures Low Back Pain (see Oswestry and Antonio)   Lumbar Spine/SI Objective Findings   Gait/Locomotion WNL   Flexion ROM 12 in from floor   Extension %   Pelvic Screen neutral   Transversus Abdominus Strength (Dez Leg Lowering-deg) sit up: able to complete   Hip Flexion (L2) Strength 5/5   Hip Abduction Strength 3/5   Knee Flexion Strength 5/5   Knee Extension (L3) Strength 5/5   Ankle Dorsiflexion (L4) Strength able to walk on heels   Ankle Plantar Flexion (S1) Strength able to walk on toes   SLR 40 deg - no pain   Palpation TTP R piriformis   Slump Test negative   Planned Therapy Interventions   Planned Therapy Interventions balance training;gait training;joint mobilization;manual therapy;neuromuscular re-education;ROM;strengthening;stretching   Planned Modality Interventions   Planned Modality Interventions Cryotherapy;Electrical stimulation;TENS;Traction;Ultrasound;Hot packs   Clinical Impression   Criteria for Skilled Therapeutic Interventions Met yes, treatment indicated   PT Diagnosis LBP   Influenced by the following impairments limited ROM, weakness, pain   Functional limitations due to impairments pronged working   Clinical Presentation Stable/Uncomplicated   Clinical Presentation Rationale Pt is 72 yr old male who presents after acute flare up of chornic LBP. Pt is very active and motivated to complete HEP thus pt set up with exercises. Held stretching due to FANTA and pt relates this has been normal ROM for years. Plan to hold chart open for 4 weeks for pt to return if pain increases.   Clinical  Decision Making (Complexity) Low complexity   Therapy Frequency other (see comments)  (up to 1x/week for 4 weeks if needed)   Predicted Duration of Therapy Intervention (days/wks) 4 weeks   Risk & Benefits of therapy have been explained Yes   Patient, Family & other staff in agreement with plan of care Yes   Education Assessment   Preferred Learning Style Demonstration;Reading;Pictures/video;Listening   Barriers to Learning No barriers   ORTHO GOALS   PT Ortho Eval Goals 1;2;3;4   Ortho Goal 1   Goal Identifier get in skidsteer   Goal Description Pt will be able to get in/out of skidsteer w/o LBP   Target Date 11/23/22   Ortho Goal 2   Goal Identifier HEP   Goal Description Pt will be compliant and competent in HEP to allow them to achieve maximal strength and reduce pain   Target Date 12/07/22   Total Evaluation Time   PT Eval, Low Complexity Minutes (05822) 15   Therapy Certification   Certification date from 11/09/22   Certification date to 12/07/22   Medical Diagnosis Chronic right-sided low back pain, unspecified whether sciatica present (M54.50, G89.29)  - Primary

## 2022-11-15 ENCOUNTER — TELEPHONE (OUTPATIENT)
Dept: FAMILY MEDICINE | Facility: CLINIC | Age: 72
End: 2022-11-15

## 2022-11-15 DIAGNOSIS — F17.211 NICOTINE DEPENDENCE, CIGARETTES, IN REMISSION: ICD-10-CM

## 2022-11-15 DIAGNOSIS — Z12.2 SCREENING FOR LUNG CANCER: Primary | ICD-10-CM

## 2022-11-15 NOTE — TELEPHONE ENCOUNTER
Reason for Call: Request for an order or referral:    Order or referral being requested: Pt asking for an order for his yearly lung scan.  Please call patient and advise.      Date needed: at your convenience    Has the patient been seen by the PCP for this problem? NO    Additional comments:     Phone number Patient can be reached at:  Cell number on file:    Telephone Information:   Mobile [phone]     Best Time:  any    Can we leave a detailed message on this number?  YES    Call taken on 11/15/2022 at 10:54 AM by Erika Ratliff

## 2022-12-01 DIAGNOSIS — E11.65 TYPE 2 DIABETES MELLITUS WITH HYPERGLYCEMIA, WITHOUT LONG-TERM CURRENT USE OF INSULIN (H): ICD-10-CM

## 2022-12-02 NOTE — TELEPHONE ENCOUNTER
"Routing refill request to provider for review/approval because:  Medication is reported/historical    Requested Prescriptions   Pending Prescriptions Disp Refills     metFORMIN (GLUCOPHAGE) 500 MG tablet 270 tablet 0     Sig: TAKE 1 TABLETS BY MOUTH IN THE MORNING AND 2 IN THE EVENING       Biguanide Agents Passed - 12/1/2022  3:32 PM        Passed - Patient is age 10 or older        Passed - Patient has documented A1c within the specified period of time.     If HgbA1C is 8 or greater, it needs to be on file within the past 3 months.  If less than 8, must be on file within the past 6 months.     Recent Labs   Lab Test 10/03/22  0906   A1C 9.7*             Passed - Patient's CR is NOT>1.4 OR Patient's EGFR is NOT<45 within past 12 mos.     Recent Labs   Lab Test 10/03/22  0906 12/06/21  0909 06/09/21  1043   GFRESTIMATED >90   < > 76   GFRESTBLACK  --   --  88    < > = values in this interval not displayed.       Recent Labs   Lab Test 10/03/22  0906   CR 0.89             Passed - Patient does NOT have a diagnosis of CHF.        Passed - Medication is active on med list        Passed - Recent (6 mo) or future (30 days) visit within the authorizing provider's specialty     Patient had office visit in the last 6 months or has a visit in the next 30 days with authorizing provider or within the authorizing provider's specialty.  See \"Patient Info\" tab in inbasket, or \"Choose Columns\" in Meds & Orders section of the refill encounter.                       "

## 2022-12-12 ENCOUNTER — HOSPITAL ENCOUNTER (OUTPATIENT)
Dept: CT IMAGING | Facility: CLINIC | Age: 72
Discharge: HOME | End: 2022-12-12
Attending: FAMILY MEDICINE | Admitting: FAMILY MEDICINE
Payer: MEDICARE

## 2022-12-12 DIAGNOSIS — F17.211 NICOTINE DEPENDENCE, CIGARETTES, IN REMISSION: ICD-10-CM

## 2022-12-12 DIAGNOSIS — Z12.2 SCREENING FOR LUNG CANCER: ICD-10-CM

## 2022-12-12 PROCEDURE — 71271 CT THORAX LUNG CANCER SCR C-: CPT

## 2023-01-22 DIAGNOSIS — E11.65 TYPE 2 DIABETES MELLITUS WITH HYPERGLYCEMIA, WITHOUT LONG-TERM CURRENT USE OF INSULIN (H): ICD-10-CM

## 2023-01-24 RX ORDER — GLIMEPIRIDE 4 MG/1
TABLET ORAL
Qty: 90 TABLET | Refills: 0 | Status: SHIPPED | OUTPATIENT
Start: 2023-01-24 | End: 2023-03-21

## 2023-02-27 DIAGNOSIS — I10 BENIGN ESSENTIAL HYPERTENSION: ICD-10-CM

## 2023-02-27 RX ORDER — AMLODIPINE BESYLATE 5 MG/1
TABLET ORAL
Qty: 90 TABLET | Refills: 2 | Status: SHIPPED | OUTPATIENT
Start: 2023-02-27 | End: 2023-03-21

## 2023-03-05 DIAGNOSIS — E11.65 TYPE 2 DIABETES MELLITUS WITH HYPERGLYCEMIA, WITHOUT LONG-TERM CURRENT USE OF INSULIN (H): ICD-10-CM

## 2023-03-06 RX ORDER — GLIMEPIRIDE 4 MG/1
TABLET ORAL
Qty: 90 TABLET | Refills: 0 | OUTPATIENT
Start: 2023-03-06

## 2023-03-06 NOTE — TELEPHONE ENCOUNTER
Called patient, he is due for Diabetes follow up and A1c. Appt scheduled for 03/21/23. He has enough Amaryl and metformin until his appt.  Toma BURGOS RN

## 2023-03-17 DIAGNOSIS — I10 BENIGN ESSENTIAL HYPERTENSION: ICD-10-CM

## 2023-03-17 RX ORDER — LOSARTAN POTASSIUM 100 MG/1
TABLET ORAL
Qty: 90 TABLET | Refills: 0 | Status: SHIPPED | OUTPATIENT
Start: 2023-03-17 | End: 2023-03-21

## 2023-03-19 NOTE — LETTER
2/23/2021         RE: Roscoe Jang  [address] [address] [address]        Dear Colleague,    Thank you for referring your patient, Roscoe Jang, to the [facility]. Please see a copy of my visit note below.    ASSESSMENT & PLAN  Roscoe was seen today for pain.    Diagnoses and all orders for this visit:    Lumbar radiculopathy, acute  -     Orthopedic & Spine  Referral  -     methylPREDNISolone (MEDROL DOSEPAK) 4 MG tablet therapy pack; Follow Package Directions  -     nabumetone (RELAFEN) 500 MG tablet; Take 1 tablet (500 mg) by mouth 2 times daily  -     cyclobenzaprine (FLEXERIL) 10 MG tablet; Take 1 tablet (10 mg) by mouth nightly as needed for muscle spasms  -     DG PT, HAND, AND CHIROPRACTIC REFERRAL; Future    Hip pain, right      Patient is a 70 year old male presenting for evaluation of   Chief Complaint   Patient presents with     Right Hip - Pain      # Acute on Chronic Right Lumbar Radiculopathy: Noted after a fall 2 weeks ago with right ant thigh pain going down leg.  Pain over sciatic notch with positive straight leg raise and slump test.  Reviewed x-ray showing degenerative changes in the lumbar region.  Likely cause of pain right lumbar radiculopathy.  No red flag symptoms/signs on history or examination.  Counseled patient on nature of condition and treatment options.  Given this plan as below, follow-up 3-4 weeks    Image Findings: degenerative changes  Treatment: Activity encouraged, physical therapy  Medications/Injections: Medrol dosepak, flexeril at night, Relafen afterwards  Follow-up: 3-4 weeks if not improved, sooner if worsening      Concerning signs/sx that would warrant urgent evaluation were discussed.  All questions were answered, patient understands and agrees with plan.      Return in about 1 month (around 3/23/2021).    -----    SUBJECTIVE  oRscoe Jang is a/an 70 year old male who is seen in  consultation at the request of  Rell Rivers M.D. for evaluation of right hip pain. The patient is seen with their friend.    Onset: 2.5-3 week(s) ago worsening over the last week. Patient describes injury as hauling a lot of wood, repetitive movements. Patient was seen by PCP 21 and in ED 21.  Fell off of skid  2 weeks ago fell on bottom.  Had something similar 2019 went away on its own.  Location of Pain: right lateral hip to knee   Rating of Pain at worst: 20/10  Rating of Pain Currently: 20/10  Worsened by: sitting, standing, walking   Better with: lying down   Treatments tried: ice, heat, chiropractor, oxycodone, troch bursitis injection    Quality: sharp, burning   Associated symptoms: numbness, tingling and weakness of right leg   Orthopedic history: NO  Relevant surgical history: NO  Social: Retired , gambling  Past Medical History:   Diagnosis Date     Arthritis     neck and hip and generalized     Cerebral infarction (H)     TIA?     Diabetes (H)      Hyperlipidemia      Hypertension      Obese      Social History     Socioeconomic History     Marital status:      Spouse name: None     Number of children: None     Years of education: None     Highest education level: None   Occupational History     None   Social Needs     Financial resource strain: None     Food insecurity     Worry: None     Inability: None     Transportation needs     Medical: None     Non-medical: None   Tobacco Use     Smoking status: Former Smoker     Quit date: 10/3/2007     Years since quittin.4     Smokeless tobacco: Never Used   Substance and Sexual Activity     Alcohol use: No     Drug use: No     Sexual activity: Yes     Partners: Female   Lifestyle     Physical activity     Days per week: None     Minutes per session: None     Stress: None   Relationships     Social connections     Talks on phone: None     Gets together: None     Attends Religion service: None     Active member  "of club or organization: None     Attends meetings of clubs or organizations: None     Relationship status: None     Intimate partner violence     Fear of current or ex partner: None     Emotionally abused: None     Physically abused: None     Forced sexual activity: None   Other Topics Concern     Parent/sibling w/ CABG, MI or angioplasty before 65F 55M? Not Asked   Social History Narrative    Live with girl friend, 3 children, wife passed away in Jan 2000, quit smoking in 2007.          Patient's past medical, surgical, social, and family histories were reviewed today and no changes are noted.  No family history pertinent to the patient's problem today    REVIEW OF SYSTEMS:  10 point ROS is negative other than symptoms noted above in HPI, Past Medical History or as stated below  Constitutional: NEGATIVE for fever, chills, change in weight  Skin: NEGATIVE for worrisome rashes, moles or lesions  GI/: NEGATIVE for bowel or bladder changes  Neuro: NEGATIVE for weakness, dizziness or paresthesias    OBJECTIVE:  Ht 1.727 m (5' 8\")   Wt 113.4 kg (250 lb)   BMI 38.01 kg/m     General: healthy, alert and in no distress  HEENT: no scleral icterus or conjunctival erythema  Skin: no suspicious lesions or rash. No jaundice.  CV: no pedal edema  Resp: normal respiratory effort without conversational dyspnea   Psych: normal mood and affect  Gait: normal steady gait with appropriate coordination and balance  Neuro: Normal light sensory exam of lower extremity  MSK:  BILATERAL HIP  Inspection:    No obvious deformity or asymmetry, level pelvis  Palpation:  Nontender.  Active Range of Motion:     Flexion full, IR full, ER  full  Strength:    Flexion 5/5, adduction 5/5, abduction 5/5  Special Tests:    Positive: none    Negative: Logroll, NOE, anterior impingement (FADIR)    THORACIC/LUMBAR SPINE  Inspection:    No redness, swelling, overlying skin change, gross deformity/asymmetry, scapular winging  Palpation:    Tender about " the right parathoracic muscles. Otherwise remainder of landmarks are nontender.  Range of Motion:     Lumbar flexion limited substantially by pain    Lumbar extension limited substantially by pain    Right side bend full    Left side bend full    Right rotation full    Left rotation full  Strength:    5/5 - quadriceps, hamstrings, tibialis anterior, gastrocsoleus, and extensor hallicus longus  Special Tests:    Positive: straight leg raise (right), slump test (right)  Negative: straight leg raise (left), slump test (left)    Independent visualization of the below image:  No results found for this or any previous visit (from the past 24 hour(s)).  LUMBAR SPINE TWO - THREE VIEWS 2/20/2021 3:50 PM      COMPARISON: None     HISTORY: Right hip/back pain.                                                                      IMPRESSION: 5 lumbar type vertebrae. Normal alignment. Vertebral body  heights normal. No fractures. Mild loss of intervertebral disc space  height and mild degenerative endplate spurring at L3-L4, L4-L5 and  L5-S1. Moderate facet arthropathy at L4-L5 and L5-S1.     SIRI PERALTA MD    EXAM: XR PELVIS AND HIP RIGHT 1 VIEW  LOCATION: [facility]  DATE/TIME: 2/20/2021 3:44 PM     INDICATION: Pain, no known injury  COMPARISON: None.                                                                      IMPRESSION: Normal joint spaces and alignment. No acute fracture. Mild degenerative changes in the lumbar spine.    I visualized and reviewed these images with the patient       Patient's conditions were thoroughly discussed during today's visit with greater than 50% of the visit spent counseling the patient with total time spent face-to-face with the patient being 40 minutes.    Tobi Casey MD, [facility]        Again, thank you for allowing me to participate in the care of your patient.        Sincerely,        Tobi Casey MD     Patient

## 2023-03-21 ENCOUNTER — ANCILLARY PROCEDURE (OUTPATIENT)
Dept: GENERAL RADIOLOGY | Facility: CLINIC | Age: 73
End: 2023-03-21
Attending: FAMILY MEDICINE
Payer: COMMERCIAL

## 2023-03-21 ENCOUNTER — OFFICE VISIT (OUTPATIENT)
Dept: FAMILY MEDICINE | Facility: CLINIC | Age: 73
End: 2023-03-21
Payer: COMMERCIAL

## 2023-03-21 ENCOUNTER — TELEPHONE (OUTPATIENT)
Dept: FAMILY MEDICINE | Facility: CLINIC | Age: 73
End: 2023-03-21

## 2023-03-21 VITALS
BODY MASS INDEX: 37.44 KG/M2 | SYSTOLIC BLOOD PRESSURE: 130 MMHG | RESPIRATION RATE: 18 BRPM | HEART RATE: 71 BPM | OXYGEN SATURATION: 97 % | TEMPERATURE: 97.3 F | HEIGHT: 68 IN | WEIGHT: 247 LBS | DIASTOLIC BLOOD PRESSURE: 68 MMHG

## 2023-03-21 DIAGNOSIS — E66.01 MORBID OBESITY (H): ICD-10-CM

## 2023-03-21 DIAGNOSIS — Z91.89 CARDIOVASCULAR RISK FACTOR: ICD-10-CM

## 2023-03-21 DIAGNOSIS — Z12.11 COLON CANCER SCREENING: ICD-10-CM

## 2023-03-21 DIAGNOSIS — I65.22 CAROTID STENOSIS, LEFT: ICD-10-CM

## 2023-03-21 DIAGNOSIS — E11.65 TYPE 2 DIABETES MELLITUS WITH HYPERGLYCEMIA, WITHOUT LONG-TERM CURRENT USE OF INSULIN (H): Primary | ICD-10-CM

## 2023-03-21 DIAGNOSIS — I10 BENIGN ESSENTIAL HYPERTENSION: ICD-10-CM

## 2023-03-21 DIAGNOSIS — I77.9 BILATERAL CAROTID ARTERY DISEASE, UNSPECIFIED TYPE (H): ICD-10-CM

## 2023-03-21 DIAGNOSIS — M25.522 LEFT ELBOW PAIN: ICD-10-CM

## 2023-03-21 DIAGNOSIS — L98.9 CRACKING SKIN: ICD-10-CM

## 2023-03-21 LAB
ANION GAP SERPL CALCULATED.3IONS-SCNC: 13 MMOL/L (ref 7–15)
BUN SERPL-MCNC: 15.9 MG/DL (ref 8–23)
CALCIUM SERPL-MCNC: 9.5 MG/DL (ref 8.8–10.2)
CHLORIDE SERPL-SCNC: 103 MMOL/L (ref 98–107)
CREAT SERPL-MCNC: 0.88 MG/DL (ref 0.67–1.17)
CREAT UR-MCNC: 191.9 MG/DL
DEPRECATED HCO3 PLAS-SCNC: 26 MMOL/L (ref 22–29)
GFR SERPL CREATININE-BSD FRML MDRD: >90 ML/MIN/1.73M2
GLUCOSE SERPL-MCNC: 240 MG/DL (ref 70–99)
HBA1C MFR BLD: 10.3 % (ref 0–5.6)
MICROALBUMIN UR-MCNC: 51.9 MG/L
MICROALBUMIN/CREAT UR: 27.05 MG/G CR (ref 0–17)
POTASSIUM SERPL-SCNC: 4.6 MMOL/L (ref 3.4–5.3)
SODIUM SERPL-SCNC: 142 MMOL/L (ref 136–145)

## 2023-03-21 PROCEDURE — 99214 OFFICE O/P EST MOD 30 MIN: CPT | Performed by: FAMILY MEDICINE

## 2023-03-21 PROCEDURE — 80048 BASIC METABOLIC PNL TOTAL CA: CPT | Performed by: FAMILY MEDICINE

## 2023-03-21 PROCEDURE — 82570 ASSAY OF URINE CREATININE: CPT | Performed by: FAMILY MEDICINE

## 2023-03-21 PROCEDURE — 36415 COLL VENOUS BLD VENIPUNCTURE: CPT | Performed by: FAMILY MEDICINE

## 2023-03-21 PROCEDURE — 99207 PR FOOT EXAM NO CHARGE: CPT | Performed by: FAMILY MEDICINE

## 2023-03-21 PROCEDURE — 73070 X-RAY EXAM OF ELBOW: CPT | Mod: TC | Performed by: RADIOLOGY

## 2023-03-21 PROCEDURE — 83036 HEMOGLOBIN GLYCOSYLATED A1C: CPT | Performed by: FAMILY MEDICINE

## 2023-03-21 PROCEDURE — 82043 UR ALBUMIN QUANTITATIVE: CPT | Performed by: FAMILY MEDICINE

## 2023-03-21 RX ORDER — GLIMEPIRIDE 4 MG/1
4 TABLET ORAL
Qty: 90 TABLET | Refills: 1 | Status: SHIPPED | OUTPATIENT
Start: 2023-03-21 | End: 2024-01-10 | Stop reason: DRUGHIGH

## 2023-03-21 RX ORDER — LOSARTAN POTASSIUM 100 MG/1
100 TABLET ORAL DAILY
Qty: 90 TABLET | Refills: 1 | Status: SHIPPED | OUTPATIENT
Start: 2023-03-21 | End: 2023-12-18

## 2023-03-21 RX ORDER — AMLODIPINE BESYLATE 5 MG/1
5 TABLET ORAL DAILY
Qty: 90 TABLET | Refills: 1 | Status: SHIPPED | OUTPATIENT
Start: 2023-03-21 | End: 2024-06-24

## 2023-03-21 RX ORDER — LIRAGLUTIDE 6 MG/ML
INJECTION SUBCUTANEOUS
Qty: 18 ML | Refills: 1 | Status: SHIPPED | OUTPATIENT
Start: 2023-03-21 | End: 2023-03-29

## 2023-03-21 RX ORDER — NITROGLYCERIN 0.4 MG/1
TABLET SUBLINGUAL
Qty: 12 TABLET | Refills: 0 | Status: SHIPPED | OUTPATIENT
Start: 2023-03-21

## 2023-03-21 RX ORDER — ROSUVASTATIN CALCIUM 20 MG/1
20 TABLET, COATED ORAL DAILY
Qty: 90 TABLET | Refills: 1 | Status: SHIPPED | OUTPATIENT
Start: 2023-03-21 | End: 2024-01-09

## 2023-03-21 ASSESSMENT — PAIN SCALES - GENERAL: PAINLEVEL: NO PAIN (0)

## 2023-03-21 NOTE — TELEPHONE ENCOUNTER
MTM referral placed.  Please let patient know to call [phone] to schedule appointment.    Dr Rivers

## 2023-03-21 NOTE — NURSING NOTE
"Chief Complaint   Patient presents with     Diabetes     /68 (Cuff Size: Adult Large)   Pulse 71   Temp 97.3  F (36.3  C) (Tympanic)   Resp 18   Ht 1.727 m (5' 8\")   Wt 112 kg (247 lb)   SpO2 97%   BMI 37.56 kg/m   Estimated body mass index is 37.56 kg/m  as calculated from the following:    Height as of this encounter: 1.727 m (5' 8\").    Weight as of this encounter: 112 kg (247 lb).  Patient presents to the clinic using No DME      Health Maintenance that is potentially due pending provider review:    Health Maintenance Due   Topic Date Due     EYE EXAM  Never done     COLORECTAL CANCER SCREENING  Never done     DTAP/TDAP/TD IMMUNIZATION (3 - Td or Tdap) 01/06/2022     MICROALBUMIN  12/06/2022     DIABETIC FOOT EXAM  12/06/2022     A1C  04/03/2023                "

## 2023-03-21 NOTE — PROGRESS NOTES
Assessment & Plan     Type 2 diabetes mellitus with hyperglycemia, without long-term current use of insulin (H)  Last hemoglobin A1c 9.7, consistent with uncontrolled diabetes.  Management option discussed.  Victoza prescribed and recommended to continue metformin, glimepiride.  Patient deferred long-acting insulin for now.  Stressed on regular exercise, healthy diet and weight loss  - Albumin Random Urine Quantitative with Creat Ratio; Future  - HEMOGLOBIN A1C; Future  - liraglutide (VICTOZA) 18 MG/3ML solution; SUBQ: Initial: 0.6 mg once daily for 1 week, then increase to 1.2 mg once daily; if optimal glycemic response is not achieved after an additional week of treatment, may increase further to 1.8 mg once daily.  - Basic metabolic panel  (Ca, Cl, CO2, Creat, Gluc, K, Na, BUN); Future  - glimepiride (AMARYL) 4 MG tablet; Take 1 tablet (4 mg) by mouth daily before breakfast  - rosuvastatin (CRESTOR) 20 MG tablet; Take 1 tablet (20 mg) by mouth daily  - metFORMIN (GLUCOPHAGE) 500 MG tablet; TAKE 1 TABLET BY MOUTH IN THE MORNING AND 2 TABLETS IN THE EVENING      Colon cancer screening  Patient declined colon cancer screening      Morbid obesity (H)  Healthy lifestyle modifications stressed including regular exercise, balanced diet, weight loss and limiting salt/caffeine/pop intake  - liraglutide (VICTOZA) 18 MG/3ML solution; SUBQ: Initial: 0.6 mg once daily for 1 week, then increase to 1.2 mg once daily; if optimal glycemic response is not achieved after an additional week of treatment, may increase further to 1.8 mg once daily.    Bilateral carotid artery disease, unspecified type (H)  S/p bilateral carotid endarterectomy.  Suggested to continue rosuvastatin and aspirin      Left elbow pain  Experiencing left elbow pain for last 3 weeks or so, hit left elbow with metal door accidentally, pain started few days later.  Left elbow x-ray ordered and suggested over-the-counter analgesia and icing.  Orthopedic  referral placed  - Orthopedic  Referral; Future  - XR Elbow Left 2 Views; Future      Carotid stenosis, left  - rosuvastatin (CRESTOR) 20 MG tablet; Take 1 tablet (20 mg) by mouth daily      Cardiovascular risk factor  - nitroGLYcerin (NITROSTAT) 0.4 MG sublingual tablet; For chest pain place 1 tablet under the tongue every 5 minutes for 3 doses. If symptoms persist 5 minutes after 1st dose call 911.    Benign essential hypertension  - losartan (COZAAR) 100 MG tablet; Take 1 tablet (100 mg) by mouth daily  - amLODIPine (NORVASC) 5 MG tablet; Take 1 tablet (5 mg) by mouth daily    Cracking skin  Small crack involving right inner ruchi area.  Recommended to use Vaseline and cortisone.  Instructed to avoid using hydrogen peroxide, Q-tips      Follow-up in 3 to 6 months or earlier if needed.        Rell Rivers MD  [facility]      Shawn Malhotra is a 72 year old, presenting for the following health issues:  Diabetes      History of Present Illness       Diabetes:   He presents for follow up of diabetes.  He is checking home blood glucose a few times a week. He checks blood glucose before meals and at bedtime.  Blood glucose is sometimes over 200 and never under 70. He is aware of hypoglycemia symptoms including shakiness, dizziness and weakness. He has no concerns regarding his diabetes at this time.  He is not experiencing numbness or burning in feet, excessive thirst, blurry vision, weight changes or redness, sores or blisters on feet. The patient has not had a diabetic eye exam in the last 12 months.         Reason for visit:  Right ear is draining.  Has  small bump on his left elbow    He eats 2-3 servings of fruits and vegetables daily.He consumes 1 sweetened beverage(s) daily.He exercises with enough effort to increase his heart rate 30 to 60 minutes per day.  He exercises with enough effort to increase his heart rate 6 days per week.   He is taking medications  "regularly.         Review of Systems   Constitutional, HEENT, cardiovascular, pulmonary, GI, , musculoskeletal, neuro, skin, endocrine and psych systems are negative, except as otherwise noted.        Objective    /68 (Cuff Size: Adult Large)   Pulse 71   Temp 97.3  F (36.3  C) (Tympanic)   Resp 18   Ht 1.727 m (5' 8\")   Wt 112 kg (247 lb)   SpO2 97%   BMI 37.56 kg/m    Body mass index is 37.56 kg/m .  Physical Exam   GENERAL: alert, no distress and obese  EYES: Eyes grossly normal to inspection, PERRL and conjunctivae and sclerae normal  HENT: normal cephalic/atraumatic, right ear: small skin cracking involving inner ruchi area, normal outer external canal and tympanic membrane, left ear: normal: no effusions, no erythema, normal landmarks, nose and mouth without ulcers or lesions, oropharynx clear and oral mucous membranes moist  NECK: no adenopathy, no asymmetry, masses, or scars and thyroid normal to palpation  RESP: lungs clear to auscultation - no rales, rhonchi or wheezes  CV: regular rate and rhythm, normal S1 S2, no S3 or S4, no murmur, click or rub, no peripheral edema and peripheral pulses strong  ABDOMEN: soft, nontender, no hepatosplenomegaly, no masses and bowel sounds normal  MS: small localized tender area involving left elbow, no skin discoloration, swelling or warmth noted, range of movement normal, radial pulses 3+ bilaterally  SKIN: no suspicious lesions or rashes  NEURO: normal strength and tone, mentation intact and speech normal  PSYCH: mentation appears normal, affect normal/bright      Lab Results   Component Value Date    A1C 9.7 10/03/2022    A1C 7.8 12/06/2021    A1C 7.0 06/01/2021    A1C 7.0 10/05/2020    A1C 8.8 05/04/2020    A1C 7.0 10/14/2019    A1C 7.2 06/05/2019                 "

## 2023-03-21 NOTE — TELEPHONE ENCOUNTER
Medication Change    Contacts       Type Contact Phone/Fax    03/21/2023 10:22 AM CDT Phone (Incoming) Roscoe Jang (Self) [phone] (M)        Reason for Call: Pt calling concerned his Victoza is [phone]. Please Advise something cheaper.    Date of last appointment with provider: 3/21/23    Okay to leave a detailed message?: Yes at Home number on file [phone] (home)  [facility]

## 2023-03-24 ENCOUNTER — TELEPHONE (OUTPATIENT)
Dept: FAMILY MEDICINE | Facility: CLINIC | Age: 73
End: 2023-03-24
Payer: COMMERCIAL

## 2023-03-24 DIAGNOSIS — E11.65 TYPE 2 DIABETES MELLITUS WITH HYPERGLYCEMIA, WITHOUT LONG-TERM CURRENT USE OF INSULIN (H): ICD-10-CM

## 2023-03-24 NOTE — TELEPHONE ENCOUNTER
General Call      Reason for Call:  Medication    What are your questions or concerns:  Pt calling because he tried picking up his glimepiride medication from pharmacy but they stated his insurance is not covering it because he should not be due for another refill until 4/11. Pt stated that Dr. Rivers had pt start taking 2 tablets daily, once in the morning and once at night, so that is why he is out of them already. Wondering what he should do because he is currently out of them.      Okay to leave a detailed message?: Yes at Home number on file [phone] (home)

## 2023-03-29 ENCOUNTER — VIRTUAL VISIT (OUTPATIENT)
Dept: PHARMACY | Facility: CLINIC | Age: 73
End: 2023-03-29
Attending: FAMILY MEDICINE
Payer: COMMERCIAL

## 2023-03-29 DIAGNOSIS — R52 PAIN: ICD-10-CM

## 2023-03-29 DIAGNOSIS — E11.65 TYPE 2 DIABETES MELLITUS WITH HYPERGLYCEMIA, WITHOUT LONG-TERM CURRENT USE OF INSULIN (H): Primary | Chronic | ICD-10-CM

## 2023-03-29 DIAGNOSIS — I10 BENIGN ESSENTIAL HYPERTENSION: Chronic | ICD-10-CM

## 2023-03-29 DIAGNOSIS — I65.23 BILATERAL CAROTID ARTERY STENOSIS: Chronic | ICD-10-CM

## 2023-03-29 DIAGNOSIS — E78.2 MIXED HYPERLIPIDEMIA: Chronic | ICD-10-CM

## 2023-03-29 PROCEDURE — 99607 MTMS BY PHARM ADDL 15 MIN: CPT | Performed by: PHARMACIST

## 2023-03-29 PROCEDURE — 99605 MTMS BY PHARM NP 15 MIN: CPT | Performed by: PHARMACIST

## 2023-03-29 RX ORDER — METFORMIN HCL 500 MG
1000 TABLET, EXTENDED RELEASE 24 HR ORAL 2 TIMES DAILY WITH MEALS
Qty: 360 TABLET | Refills: 1 | Status: SHIPPED | OUTPATIENT
Start: 2023-03-29 | End: 2023-09-11

## 2023-03-29 RX ORDER — ASPIRIN 81 MG/1
81 TABLET ORAL DAILY
COMMUNITY

## 2023-03-29 NOTE — LETTER
March 29, 2023  Roscoe Jang  [address] [address] [address]    Dear LEVAR [facility]     Thank you for talking with me on Mar 29, 2023 about your health and medications. As a follow-up to our conversation, I have included two documents:      1. Your Recommended To-Do List has steps you should take to get the best results from your medications.  2. Your Medication List will help you keep track of your medications and how to take them.    If you want to talk about these documents, please call Tiara Linda RPH at phone: [phone], Monday-Friday 8-4:30pm.    I look forward to working with you and your doctors to make sure your medications work well for you.    Sincerely,  Tiara Linda RPH  [facility] Pharmacist, [facility]

## 2023-03-29 NOTE — LETTER
"Recommended To-Do List      Prepared on: Mar 29, 2023       You can get the best results from your medications by completing the items on this \"To-Do List.\"      Bring your To-Do List when you go to your doctor. And, share it with your family or caregivers.    My To-Do List:  What we talked about: What I should do:   Glimepiride timing    Move glimepiride 4mg to morning with breakfast.          What we talked about: What I should do:   New diabetes medication    Start Jardiance 10mg daily. We will need to recheck labs in 1 month.          What we talked about: What I should do:   Metformin tolerability    Change metformin to extended-release to 1000mg twice daily with meals.           What we talked about: What I should do:                       "

## 2023-03-29 NOTE — PATIENT INSTRUCTIONS
"Recommendations from today's MTM visit:                                                    MTM (medication therapy management) is a service provided by a clinical pharmacist designed to help you get the most of out of your medicines.   Today we reviewed what your medicines are for, how to know if they are working, that your medicines are safe and how to make your medicine regimen as easy as possible.      1. Change metformin to extended-release to 1000mg twice daily with meals.  2. Move glimepiride 4mg to morning with breakfast.  3. Start Jardiance 10mg daily. We will need to recheck labs in 1 month.    Follow-up: Return in 2 weeks (on 4/12/2023) for Medication Therapy Management, via phone.    It was great speaking with you today.  I value your experience and would be very thankful for your time in providing feedback in our clinic survey. In the next few days, you may receive an email or text message from [employer] with a link to a survey related to your  clinical pharmacist.\"     To schedule another MTM appointment, please call the clinic directly or you may call the MTM scheduling line at [phone] or toll-free at [phone].     My Clinical Pharmacist's contact information:                                                      Please feel free to contact me with any questions or concerns you have.      Tiara Linda, PharmD, [facility]  Medication Therapy Management Pharmacist  Pager: [phone]    "

## 2023-03-29 NOTE — PROGRESS NOTES
"Medication Therapy Management (MTM) Encounter    ASSESSMENT:                            Medication Adherence/Access: {adherencechoices:134999}    ***: ***    ***: ***    ***: ***    PLAN:                            ***    Follow-up: No follow-ups on file.    SUBJECTIVE/OBJECTIVE:                          Roscoe Jang is a 72 year old male called for an initial visit. He was referred to me from Rell Rivers MD. {mtisitdetails:479138}     Reason for visit: Diabetes management.    Allergies/ADRs: Reviewed in chart  Past Medical History: Reviewed in chart  Tobacco: He reports that he quit smoking about 15 years ago. His smoking use included cigarettes. He has never used smokeless tobacco.  Alcohol: {ALCOHOL CONSUMPTION HX:773066}    Medication Adherence/Access:   Patient {medadmin:391144}.  Patient takes medications {NUMBERS 0-10:733520} time(s) per day.   Per patient, misses medication {NUMBERS 0-10:619549} times per week.   Medication barriers: {medbarriers:882244}.   The patient fills medications at [place]: {YESNOFV:630405}.    Type 2 Diabetes: Currently taking Victoza 0.6mg daily, metformin 500mg every morning and 1000mg every evening, glimepiride 4mg daily. {sideeffects:877908}  Blood sugar monitoring: {MTM self monitorin}. Ranges {Pt report:872891}: {bgranges:410960}  Symptoms of low blood sugar? {HYPOGLYCEMIA SYMPTOMS:809706::\"none\"}  Symptoms of high blood sugar? {diabetessymptoms:480632}  Eye exam: {up to date:529500}  Foot exam: up to date  Diet/Exercise: ***  Aspirin: Taking 325mg daily { :786729}  Statin: Yes: rosuvastatin.   ACEi/ARB: Yes: losartan.   Urine Albumin:   Lab Results   Component Value Date    UMALCR 27.05 (H) 2023      Lab Results   Component Value Date    A1C 10.3 2023    A1C 9.7 10/03/2022    A1C 7.8 2021    A1C 7.0 2021    A1C 7.0 10/05/2020    A1C 8.8 2020    A1C 7.0 10/14/2019    A1C 7.2 2019     Hyperlipidemia: Current therapy includes " rosuvastatinstatin 20mg daily.  Patient reports {mtmlipidsideeffect:883398}    Recent Labs   Lab Test 10/03/22  0906 12/06/21  0909   CHOL 95 89   HDL 38* 38*   LDL 22 23   TRIG 174* 138     Hypertension/CAD: Current medications include losartan 100mg daily, amlodipine 5mg daily, aspirin 325mg daily, and has Nitrostat on hand.  Patient {HTNDoes/doesnot:662178}.  Patient reports { :700658}.  BP Readings from Last 3 Encounters:   03/21/23 130/68   11/01/22 130/66   10/24/22 134/72     Pain: Currently taking meloxicam 15mg daily and acetaminophen as needed.    Today's Vitals: There were no vitals taken for this visit.     Last Comprehensive Metabolic Panel:  Lab Results   Component Value Date     03/21/2023    POTASSIUM 4.6 03/21/2023    CHLORIDE 103 03/21/2023    CO2 26 03/21/2023    ANIONGAP 13 03/21/2023     (H) 03/21/2023    BUN 15.9 03/21/2023    CR 0.88 03/21/2023    GFRESTIMATED >90 03/21/2023    ROWENA 9.5 03/21/2023     ----------------    I spent *** minutes with this patient today. All changes were made via collaborative practice agreement with Rell Rivers MD. A copy of the visit note was provided to the patient's provider(s).    A summary of these recommendations {GIVEN/NOT GIVEN:648841}.    Tiara Linda, PharmD, BCACP  Medication Therapy Management Pharmacist  Pager: [phone]    Telemedicine Visit Details  Type of service:  Telephone visit  Start Time: ***  End Time: ***     Medication Therapy Recommendations  No medication therapy recommendations to display

## 2023-03-29 NOTE — PROGRESS NOTES
Medication Therapy Management (MTM) Encounter    ASSESSMENT:                            Medication Adherence/Access: No issues identified    Type 2 Diabetes: Patient is not meeting A1c goal of < 8%. Self monitoring of blood glucose is not at goal of fasting  mg/dL and post prandial < 180 mg/dL. Patient would benefit from changing metformin IR to ER formulation, which may be better tolerated to allow for dose maximization. Discussed addition of SGLT-2 inhibitors or GLP-1 agonists are more expensive but preferred for weight loss and CV/renal protection; whereas cheaper medications can cause weight gain and do not provide CV/renal benefits. Patient called his [employer] and found out his insurance plan has a $500 deductible, thus first fill would be expensive and future fills would be $124 per month. Patient wishes to proceed with Jardiance to avoid injections. Counseled on dosing, lab monitoring, efficacy, and side effects.    Hyperlipidemia/Bilateral Carotid Artery Disease: Stable. Patient is on high intensity statin which is indicated based on 2019 ACC/AHA guidelines for lipid management.      Hypertension: Stable. Patient is meeting blood pressure goal of < 140/90mmHg.    Pain: Stable.    PLAN:                            1. Change metformin to extended-release to 1000mg twice daily with meals.  2. Move glimepiride 4mg to morning with breakfast.  3. Start Jardiance 10mg daily. We will need to recheck labs in 1 month.    Follow-up: Return in 2 weeks (on 4/12/2023) for Medication Therapy Management, via phone.    SUBJECTIVE/OBJECTIVE:                          Roscoe Jang is a 72 year old male called for an initial visit. He was referred to me from Rell Rivers MD.      Reason for visit: Diabetes management.    Allergies/ADRs: Reviewed in chart  Past Medical History: Reviewed in chart  Tobacco: He reports that he quit smoking about 15 years ago. His smoking use included cigarettes. He has never used smokeless  tobacco.  Alcohol: none    Medication Adherence/Access:   Patient uses pill box(es), sets up himself.  Patient takes medications 2 time(s) per day.   Per patient, misses medication 0 times per week.   Medication barriers: none.   The patient fills medications at [place]: NO, fills medications at [facility].    Type 2 Diabetes: Currently taking metformin 500mg every morning and 1000mg every evening, glimepiride 4mg every evening. Patient is not experiencing side effects. Previously metformin 4 tabs/day caused diarrhea. PCP tried prescribing Jardiance and Victoza which were both too expensive.  Blood sugar monitorin times daily. Ranges (patient reported): fasting -200 mg/dL, last night 212 mg/dL after dinner.   Symptoms of low blood sugar? Dizzy if goes too long without eating.  Symptoms of high blood sugar? none  Eye exam: due - gave reminder today.  Foot exam: up to date  Diet/Exercise: Working on cutting back on potatoes and bread. Previously seen by Judi KHAN - asked him to please schedule follow-up.  Aspirin: Taking 81mg daily for secondary prevention  Statin: Yes: rosuvastatin.   ACEi/ARB: Yes: losartan.   Urine Albumin:   Lab Results   Component Value Date    UMALCR 27.05 (H) 2023      Lab Results   Component Value Date    A1C 10.3 2023    A1C 9.7 10/03/2022    A1C 7.8 2021    A1C 7.0 2021    A1C 7.0 10/05/2020    A1C 8.8 2020    A1C 7.0 10/14/2019    A1C 7.2 2019     Hyperlipidemia/Bilateral Carotid Artery Disease: Current therapy includes rosuvastatin 20mg daily, aspirin 81mg daily, and has Nitrostat on hand (never used, bottle in-date).  Patient reports no significant myalgias or other side effects. Patient reports minor bruising, but no other bleeding concerns.     Recent Labs   Lab Test 10/03/22  0906 21  0909   CHOL 95 89   HDL 38* 38*   LDL 22 23   TRIG 174* 138     Hypertension: Current medications include losartan 100mg daily and amlodipine 5mg  daily. Patient does occasionally self-monitor blood pressure. Home BP monitoring in range of 120-140's systolic over 70-80's diastolic.  Patient reports no current medication side effects.    BP Readings from Last 3 Encounters:   03/21/23 130/68   11/01/22 130/66   10/24/22 134/72     Pain: Currently taking acetaminophen 1500mg every now and then, maybe 1-2 times monthly after cutting wood. Endorses shoulder pain which is improved since prison.     Today's Vitals: There were no vitals taken for this visit.     Last Comprehensive Metabolic Panel:  Lab Results   Component Value Date     03/21/2023    POTASSIUM 4.6 03/21/2023    CHLORIDE 103 03/21/2023    CO2 26 03/21/2023    ANIONGAP 13 03/21/2023     (H) 03/21/2023    BUN 15.9 03/21/2023    CR 0.88 03/21/2023    GFRESTIMATED >90 03/21/2023    ROWENA 9.5 03/21/2023     ----------------    I spent 45 minutes with this patient today. All changes were made via collaborative practice agreement with Rell Rivers MD. A copy of the visit note was provided to the patient's provider(s).    A summary of these recommendations was declined by the patient.    Tiara Linda, PharmD, [facility]  Medication Therapy Management Pharmacist  Pager: [phone]    Telemedicine Visit Details  Type of service:  Telephone visit  Start Time: 1035  End Time: 1120     Medication Therapy Recommendations  Type 2 diabetes mellitus with hyperglycemia, without long-term current use of insulin (H)    Current Medication: glimepiride (AMARYL) 4 MG tablet   Rationale: Does not understand instructions - Adherence - Adherence   Recommendation: Change Medication - glimepiride 4 MG tablet - Take 1 tablet by mouth every morning with breakfast.   Status: Patient Agreed - Adherence/Education          Current Medication: metFORMIN (GLUCOPHAGE XR) 500 MG 24 hr tablet   Rationale: Synergistic therapy - Needs additional medication therapy - Indication   Recommendation: Start Medication - Jardiance 10  MG Tabs - Take 1 tablet by mouth daily.   Status: Accepted per CPA          Current Medication: metFORMIN (GLUCOPHAGE) 500 MG tablet (Discontinued)   Rationale: Undesirable effect - Adverse medication event - Safety   Recommendation: Change Medication - metFORMIN 500 MG 24 hr tablet - Take 2 tablets by mouth twice daily with meals.   Status: Accepted per CPA

## 2023-03-29 NOTE — LETTER
_  Medication List        Prepared on: Mar 29, 2023     Bring your Medication List when you go to the doctor, hospital, or   emergency room. And, share it with your family or caregivers.     Note any changes to how you take your medications.  Cross out medications when you no longer use them.    Medication How I take it Why I use it Prescriber   acetaminophen (TYLENOL) 500 MG tablet Take 3 tablets (1,500 mg) by mouth daily as needed for pain Pain Rell Rivers MD   amLODIPine (NORVASC) 5 MG tablet Take 1 tablet (5 mg) by mouth daily Blood pressure Rell Rivers MD   aspirin 81 MG EC tablet Take 1 tablet (81 mg) by mouth daily Bilateral Carotid Artery Disease Rell Rivers MD   blood glucose (NO BRAND SPECIFIED) test strip Use to test blood sugar 1 times daily or as directed. Diabetes Rell Rivers MD   blood glucose monitoring (RHYS MICROLET) lancets Use to test blood sugar 1 times daily or as directed. Diabetes Rell Rivers MD   empagliflozin (JARDIANCE) 10 MG TABS tablet Take 1 tablet (10 mg) by mouth daily Diabetes Rell Rivers MD   glimepiride (AMARYL) 4 MG tablet Take 1 tablet (4 mg) by mouth daily before breakfast Diabetes Rell Rivers MD   losartan (COZAAR) 100 MG tablet Take 1 tablet (100 mg) by mouth daily Blood pressure Rell Rivers MD   metFORMIN (GLUCOPHAGE XR) 500 MG 24 hr tablet Take 2 tablets (1,000 mg) by mouth 2 times daily (with meals) Diabetes Rell Rivers MD   nitroGLYcerin (NITROSTAT) 0.4 MG sublingual tablet For chest pain place 1 tablet under the tongue every 5 minutes for 3 doses. If symptoms persist 5 minutes after 1st dose call 911. Chest pain Rell Rivers MD   rosuvastatin (CRESTOR) 20 MG tablet Take 1 tablet (20 mg) by mouth daily Cholesterol, Bilateral Carotid Artery Disease Rell Rivers MD         Add new medications, over-the-counter drugs, herbals, vitamins, or  minerals in the blank rows below.    Medication How I take it Why I use it  Prescriber                                      Allergies:      No Known Allergies        Side effects I have had:               Other Information:              My notes and questions:

## 2023-04-11 NOTE — PROGRESS NOTES
Medication Therapy Management (MTM) Encounter    ASSESSMENT:                            Medication Adherence/Access: No issues identified    Type 2 Diabetes: Patient is not meeting A1c goal of < 8%. Self monitoring of blood glucose is not at goal of fasting  mg/dL and post prandial < 180 mg/dL. Patient would benefit from continuous glucose monitoring to get a better picture of glucose patterns throughout the day, which will assist with making appropriate medication changes. Today downloaded Garret 2 juana on his cell phone and connect to my [employer] practice. Patient is due for BMP recheck next week since Jardiance initiation.    PLAN:                            1. Start Freestyle Garret 2 and scan every 8 hours as directed. Sent Rx to [facility] pharmacy. If not covered by insurance, then cost would be $75 per month.   2. Scheduled fasting lab-only appointment next Wednesday April 19th.    Follow-up: Return in 2 weeks (on 4/26/2023) for Medication Therapy Management, via phone.    SUBJECTIVE/OBJECTIVE:                          Roscoe Jang is a 72 year old male called for a follow-up visit. Today's visit is a follow-up MTM visit from 3/29/23.     Reason for visit: Recheck diabetes.    Allergies/ADRs: Reviewed in chart  Past Medical History: Reviewed in chart  Tobacco: He reports that he quit smoking about 15 years ago. His smoking use included cigarettes. He has never used smokeless tobacco.  Alcohol: none    Medication Adherence/Access: no issues reported    Type 2 Diabetes: Currently taking metformin ER 1000mg twice daily with meals, glimepiride 4mg every morning, and Jardiance 10mg every evening. At last MTM visit- changed metformin IR to ER and increased dose, moved glimepiride from PM to AM, and started Jardiance. Patient is experiencing side effects of overnight urination every 3 hours instead of 4 hours. Denies any diarrhea or GI upset issues. Previously called insurance and checked on cost of Victoza  which was more expensive and prefers not to do injections.   Blood sugar monitorin times daily. Ranges (patient reported): 130-220 mg/dL, right now 339 mg/dL after eating cereal.   Symptoms of low blood sugar? Dizzy if goes too long without eating.  Symptoms of high blood sugar? none  Eye exam: due - done 2 weeks ago, asked them to fax us results.  Foot exam: up to date  Diet/Exercise: Working on cutting back on potatoes and bread. Previously seen by Judi KHAN.  Aspirin: Taking 81mg daily for secondary prevention  Statin: Yes: rosuvastatin.   ACEi/ARB: Yes: losartan.   Urine Albumin:   Lab Results   Component Value Date    UMALCR 27.05 (H) 2023      Lab Results   Component Value Date    A1C 10.3 2023    A1C 9.7 10/03/2022    A1C 7.8 2021    A1C 7.0 2021    A1C 7.0 10/05/2020    A1C 8.8 2020    A1C 7.0 10/14/2019    A1C 7.2 2019     Today's Vitals: There were no vitals taken for this visit.     Last Comprehensive Metabolic Panel:  Lab Results   Component Value Date     2023    POTASSIUM 4.6 2023    CHLORIDE 103 2023    CO2 26 2023    ANIONGAP 13 2023     (H) 2023    BUN 15.9 2023    CR 0.88 2023    GFRESTIMATED >90 2023    ROWENA 9.5 2023     ----------------    I spent 30 minutes with this patient today. All changes were made via collaborative practice agreement with Rell Rivers MD. A copy of the visit note was provided to the patient's provider(s).    A summary of these recommendations was declined by the patient.    Tiara Linda, PharmD, BCACP  Medication Therapy Management Pharmacist  Pager: [phone]    Telemedicine Visit Details  Type of service:  Telephone visit  Start Time: 1100  End Time: 1130     Medication Therapy Recommendations  Type 2 diabetes mellitus with hyperglycemia, without long-term current use of insulin (H)    Current Medication: blood glucose (NO BRAND SPECIFIED) test  strip   Rationale: More effective medication available - Ineffective medication - Effectiveness   Recommendation: Change Medication - FreeStyle Garret 2 Sensor Misc - Change every 14 days as directed.   Status: Accepted per CPA

## 2023-04-12 ENCOUNTER — VIRTUAL VISIT (OUTPATIENT)
Dept: PHARMACY | Facility: CLINIC | Age: 73
End: 2023-04-12
Payer: COMMERCIAL

## 2023-04-12 DIAGNOSIS — E11.65 TYPE 2 DIABETES MELLITUS WITH HYPERGLYCEMIA, WITHOUT LONG-TERM CURRENT USE OF INSULIN (H): Primary | Chronic | ICD-10-CM

## 2023-04-12 PROCEDURE — 99606 MTMS BY PHARM EST 15 MIN: CPT | Performed by: PHARMACIST

## 2023-04-12 PROCEDURE — 99607 MTMS BY PHARM ADDL 15 MIN: CPT | Performed by: PHARMACIST

## 2023-04-14 NOTE — PATIENT INSTRUCTIONS
"Recommendations from today's MTM visit:                                                    MTM (medication therapy management) is a service provided by a clinical pharmacist designed to help you get the most of out of your medicines.   Today we reviewed what your medicines are for, how to know if they are working, that your medicines are safe and how to make your medicine regimen as easy as possible.      1. Start Freestyle Garret 2 and scan every 8 hours as directed. Sent Rx to [facility] pharmacy. If not covered by insurance, then cost would be $75 per month.   2. Scheduled fasting lab-only appointment next Wednesday April 19th.    Follow-up: Return in 2 weeks (on 4/26/2023) for Medication Therapy Management, via phone.    It was great speaking with you today.  I value your experience and would be very thankful for your time in providing feedback in our clinic survey. In the next few days, you may receive an email or text message from [employer] with a link to a survey related to your  clinical pharmacist.\"     To schedule another MTM appointment, please call the clinic directly or you may call the MTM scheduling line at [phone] or toll-free at [phone].     My Clinical Pharmacist's contact information:                                                      Please feel free to contact me with any questions or concerns you have.      Tiara Linda, PharmD, [facility]  Medication Therapy Management Pharmacist  Pager: [phone]    "

## 2023-04-19 ENCOUNTER — OFFICE VISIT (OUTPATIENT)
Dept: PHARMACY | Facility: CLINIC | Age: 73
End: 2023-04-19
Payer: COMMERCIAL

## 2023-04-19 ENCOUNTER — LAB (OUTPATIENT)
Dept: LAB | Facility: CLINIC | Age: 73
End: 2023-04-19
Payer: COMMERCIAL

## 2023-04-19 DIAGNOSIS — E11.65 TYPE 2 DIABETES MELLITUS WITH HYPERGLYCEMIA, WITHOUT LONG-TERM CURRENT USE OF INSULIN (H): Chronic | ICD-10-CM

## 2023-04-19 DIAGNOSIS — E11.65 TYPE 2 DIABETES MELLITUS WITH HYPERGLYCEMIA, WITHOUT LONG-TERM CURRENT USE OF INSULIN (H): Primary | ICD-10-CM

## 2023-04-19 LAB
ANION GAP SERPL CALCULATED.3IONS-SCNC: 11 MMOL/L (ref 7–15)
BUN SERPL-MCNC: 20.8 MG/DL (ref 8–23)
CALCIUM SERPL-MCNC: 9.5 MG/DL (ref 8.8–10.2)
CHLORIDE SERPL-SCNC: 104 MMOL/L (ref 98–107)
CREAT SERPL-MCNC: 1.13 MG/DL (ref 0.67–1.17)
DEPRECATED HCO3 PLAS-SCNC: 26 MMOL/L (ref 22–29)
GFR SERPL CREATININE-BSD FRML MDRD: 69 ML/MIN/1.73M2
GLUCOSE SERPL-MCNC: 144 MG/DL (ref 70–99)
POTASSIUM SERPL-SCNC: 4.6 MMOL/L (ref 3.4–5.3)
SODIUM SERPL-SCNC: 141 MMOL/L (ref 136–145)

## 2023-04-19 PROCEDURE — 80048 BASIC METABOLIC PNL TOTAL CA: CPT

## 2023-04-19 PROCEDURE — 99207 PR NO CHARGE LOS: CPT | Performed by: PHARMACIST

## 2023-04-19 PROCEDURE — 36415 COLL VENOUS BLD VENIPUNCTURE: CPT

## 2023-04-19 NOTE — PROGRESS NOTES
Clinical Pharmacy Consult:                                                    Roscoe Jang is a 72 year old male coming in for a clinical pharmacist consult.  He was referred to me from Rell Rivers MD.     Reason for Consult: RelTele 2 teaching. Patient is here for labs today and brings in his CGM supplies for help with sensor application.    Discussion: Patient reports Garret was fully covered by insurance at [facility]. I assisted patient in placing first sensor in clinic today. His [employer] cell phone juana is already set-up and connected to my [employer] practice. Instructed to scan at least every 8 hours to capture all data. Advised to call [employer] customer service if sensor malfunctions or falls off early. Patient is glad not to have to poke fingers anymore.    Plan:  1. Start Freestyle Garret 2 and scan every 8 hours as directed.  2. MTM phone call scheduled next Wednesday 4/26.    Tiara Linda, PharmD, BCACP  Medication Therapy Management Pharmacist  Pager: [phone]

## 2023-04-19 NOTE — PROGRESS NOTES
Addendum 4/19/23:  Lab results returned showing modest bump in sCr (0.88 to 1.13 which is a 28.4% rise). [place] consulted with Dr. Rivers who advised ok to increase Jardiance dose to 25mg daily and recheck BMP in 1 month. Patient already drinks lots of water and advised to keep pushing fluids. He will take 2 of the 10mg tablets to use up his current supply and sent new Rx for 25mg tablets to [facility] pharmacy. Next MT visit scheduled on Wed 4/26 for Garret rhodes.    Tiara Linda, PharmD, BCACP  Medication Therapy Management Pharmacist  Pager: [phone]

## 2023-04-22 NOTE — PROGRESS NOTES
Medication Therapy Management (MTM) Encounter    ASSESSMENT:                            Medication Adherence/Access: No issues identified    Type 2 Diabetes: Improving since Jardiance initiation. Patient is not meeting A1c goal of < 8%. Patient is not meeting average glucose goal of <150 mg/dL. Patient is not meeting goal of > 70% time in target with continuous glucose monitoring. Patient would benefit from continuing same medications for the time being and focusing on dietary modifications. Today provided education on how to read a nutrition label and high vs low carb food choices. Patient may benefit from addition of GLP-1 agonist in the future.    PLAN:                            1. No medication changes today.  2. Work on diet by reducing carbs and increasing protein/veggies.  3. Next MTM visit will recheck labs and consider starting Ozempic. You can call your insurance to inquire about cost.    Follow-up: Return in 3 weeks (on 5/17/2023) for Medication Therapy Management.    SUBJECTIVE/OBJECTIVE:                          Roscoe Jang is a 72 year old male called for a follow-up visit. Today's visit is a follow-up MTM visit from 4/19/23.     Reason for visit: Recheck Garret.    Allergies/ADRs: Reviewed in chart  Past Medical History: Reviewed in chart  Tobacco: He reports that he quit smoking about 15 years ago. His smoking use included cigarettes. He has never used smokeless tobacco.  Alcohol: none    Medication Adherence/Access: No issues reported. Patient believes his deductible should be met at this point.      Type 2 Diabetes: Currently taking metformin ER 1000mg twice daily with meals, glimepiride 4mg every morning, and Jardiance 25mg every evening. At baseline patient had overnight urination every 4 hours, which slightly increased to every 3 hours since Jardiance initiation 1 month ago, however remains unchanged since Jardiance dose increase 1 week ago and does drink a lot of water. Patient believes his  deductible should be met at this point and would be willing to do weekly injections.  Blood sugar monitoring: Continuous Glucose Monitor - Freestyle Garret 2. Ranges (per LibreView): See below.     Symptoms of low blood sugar? none  Symptoms of high blood sugar? none  Diet/Exercise: Yesterday ate 2 bowls of cheerios. Today ate sausage/2 medium potatoes.   Aspirin: Taking 81mg daily for secondary prevention  Statin: Yes: rosuvastatin.   ACEi/ARB: Yes: losartan.   Urine Albumin:   Lab Results   Component Value Date    UMALCR 27.05 (H) 03/21/2023      Lab Results   Component Value Date    A1C 10.3 03/21/2023    A1C 9.7 10/03/2022    A1C 7.8 12/06/2021    A1C 7.0 06/01/2021    A1C 7.0 10/05/2020    A1C 8.8 05/04/2020    A1C 7.0 10/14/2019    A1C 7.2 06/05/2019     Today's Vitals: There were no vitals taken for this visit.     Last Comprehensive Metabolic Panel:  Lab Results   Component Value Date     04/19/2023    POTASSIUM 4.6 04/19/2023    CHLORIDE 104 04/19/2023    CO2 26 04/19/2023    ANIONGAP 11 04/19/2023     (H) 04/19/2023    BUN 20.8 04/19/2023    CR 1.13 04/19/2023    GFRESTIMATED 69 04/19/2023    ROWENA 9.5 04/19/2023     ----------------    I spent 15 minutes with this patient today. All changes were made via collaborative practice agreement with Rell Rivers MD. A copy of the visit note was provided to the patient's provider(s).    A summary of these recommendations was declined by the patient.    Tiara Linda, PharmD, BCACP  Medication Therapy Management Pharmacist  Pager: [phone]    Telemedicine Visit Details  Type of service:  Telephone visit  Start Time: 1215  End Time: 1230     Medication Therapy Recommendations  No medication therapy recommendations to display

## 2023-04-24 DIAGNOSIS — E11.65 TYPE 2 DIABETES MELLITUS WITH HYPERGLYCEMIA, WITHOUT LONG-TERM CURRENT USE OF INSULIN (H): ICD-10-CM

## 2023-04-24 RX ORDER — GLIMEPIRIDE 4 MG/1
TABLET ORAL
Qty: 90 TABLET | Refills: 0 | OUTPATIENT
Start: 2023-04-24

## 2023-04-26 ENCOUNTER — VIRTUAL VISIT (OUTPATIENT)
Dept: PHARMACY | Facility: CLINIC | Age: 73
End: 2023-04-26
Payer: COMMERCIAL

## 2023-04-26 DIAGNOSIS — E11.65 TYPE 2 DIABETES MELLITUS WITH HYPERGLYCEMIA, WITHOUT LONG-TERM CURRENT USE OF INSULIN (H): Primary | ICD-10-CM

## 2023-04-26 PROCEDURE — 99606 MTMS BY PHARM EST 15 MIN: CPT | Performed by: PHARMACIST

## 2023-04-27 NOTE — PATIENT INSTRUCTIONS
"Recommendations from today's MTM visit:                                                    MTM (medication therapy management) is a service provided by a clinical pharmacist designed to help you get the most of out of your medicines.   Today we reviewed what your medicines are for, how to know if they are working, that your medicines are safe and how to make your medicine regimen as easy as possible.      1. No medication changes today.  2. Work on diet by reducing carbs and increasing protein/veggies.  3. Next MTM visit will recheck labs and consider starting Ozempic. You can call your insurance to inquire about cost.    Follow-up: Return in 3 weeks (on 5/17/2023) for Medication Therapy Management.    It was great speaking with you today.  I value your experience and would be very thankful for your time in providing feedback in our clinic survey. In the next few days, you may receive an email or text message from [employer] with a link to a survey related to your  clinical pharmacist.\"     To schedule another MTM appointment, please call the clinic directly or you may call the MTM scheduling line at [phone] or toll-free at [phone].     My Clinical Pharmacist's contact information:                                                      Please feel free to contact me with any questions or concerns you have.      Tiara Linda, PharmD, [facility]  Medication Therapy Management Pharmacist  Pager: [phone]   "

## 2023-05-12 DIAGNOSIS — E11.65 TYPE 2 DIABETES MELLITUS WITH HYPERGLYCEMIA, WITHOUT LONG-TERM CURRENT USE OF INSULIN (H): ICD-10-CM

## 2023-05-17 ENCOUNTER — LAB (OUTPATIENT)
Dept: LAB | Facility: CLINIC | Age: 73
End: 2023-05-17
Payer: COMMERCIAL

## 2023-05-17 ENCOUNTER — OFFICE VISIT (OUTPATIENT)
Dept: PHARMACY | Facility: CLINIC | Age: 73
End: 2023-05-17
Payer: COMMERCIAL

## 2023-05-17 VITALS
BODY MASS INDEX: 37.04 KG/M2 | HEART RATE: 72 BPM | WEIGHT: 243.6 LBS | SYSTOLIC BLOOD PRESSURE: 121 MMHG | DIASTOLIC BLOOD PRESSURE: 70 MMHG

## 2023-05-17 DIAGNOSIS — E11.65 TYPE 2 DIABETES MELLITUS WITH HYPERGLYCEMIA, WITHOUT LONG-TERM CURRENT USE OF INSULIN (H): Primary | Chronic | ICD-10-CM

## 2023-05-17 DIAGNOSIS — I10 BENIGN ESSENTIAL HYPERTENSION: Chronic | ICD-10-CM

## 2023-05-17 LAB
ANION GAP SERPL CALCULATED.3IONS-SCNC: 12 MMOL/L (ref 7–15)
BUN SERPL-MCNC: 21.7 MG/DL (ref 8–23)
CALCIUM SERPL-MCNC: 10.4 MG/DL (ref 8.8–10.2)
CHLORIDE SERPL-SCNC: 105 MMOL/L (ref 98–107)
CREAT SERPL-MCNC: 1.09 MG/DL (ref 0.67–1.17)
DEPRECATED HCO3 PLAS-SCNC: 26 MMOL/L (ref 22–29)
GFR SERPL CREATININE-BSD FRML MDRD: 73 ML/MIN/1.73M2
GLUCOSE SERPL-MCNC: 212 MG/DL (ref 70–99)
POTASSIUM SERPL-SCNC: 4.7 MMOL/L (ref 3.4–5.3)
SODIUM SERPL-SCNC: 143 MMOL/L (ref 136–145)

## 2023-05-17 PROCEDURE — 99606 MTMS BY PHARM EST 15 MIN: CPT | Performed by: PHARMACIST

## 2023-05-17 PROCEDURE — 36415 COLL VENOUS BLD VENIPUNCTURE: CPT

## 2023-05-17 PROCEDURE — 80048 BASIC METABOLIC PNL TOTAL CA: CPT

## 2023-05-17 PROCEDURE — 99607 MTMS BY PHARM ADDL 15 MIN: CPT | Performed by: PHARMACIST

## 2023-05-17 NOTE — PROGRESS NOTES
Medication Therapy Management (MTM) Encounter    ASSESSMENT:                            Medication Adherence/Access: No issues identified    Type 2 Diabetes: Improved since Jardiance initiation. Patient is not meeting A1c goal of < 8%. Patient is not meeting average glucose goal of <150 mg/dL. Patient is not meeting goal of > 70% time in target with continuous glucose monitoring. Patient needs BMP recheck since Jardiance dose increase. Patient would benefit from initiation of GLP-1 agonist for additional glucose lowering and to promote weight loss, however he prefers to await next A1c until potentially adding another expensive medication.    PLAN:                            1. Recheck BMP lab today  2. Continue same medications for now. Continue working on dietary changes.  3. If next A1c is still high >8%, then will plan on starting Ozempic weekly shot.    Follow-up: Return in 5 weeks (on 6/21/2023) for Medication Therapy Management, Lab Work.    SUBJECTIVE/OBJECTIVE:                          Alvarez Jang is a 72 year old male called for a follow-up visit. Today's visit is a follow-up MTM visit from 4/26/23.     Reason for visit: Recheck blood sugars.    Allergies/ADRs: Reviewed in chart  Past Medical History: Reviewed in chart  Tobacco: He reports that he quit smoking about 15 years ago. His smoking use included cigarettes. He has never used smokeless tobacco.  Alcohol: none    Medication Adherence/Access: No issues reported. Patient believes his deductible should be met at this point. He hasn't called his insurance yet to inquire about Ozempic cost.    Type 2 Diabetes: Currently taking metformin ER 1000mg twice daily with meals, glimepiride 4mg every morning, and Jardiance 25mg every morning (started on 3/29, then dose increased on 4/19). At baseline patient had overnight urination every 4 hours, which slightly increased to every 3 hours since Jardiance initiation and does drink a lot of water. Patient is  "reluctant to do self-injections.  Blood sugar monitoring: Continuous Glucose Monitor - Freestyle Garret 2. Ranges (per LibreView): See below.     Symptoms of low blood sugar? none  Symptoms of high blood sugar? none  Diet/Exercise: Less bread and potatoes. More fruits and veggies. Also lower portion sizes in general.  Aspirin: Taking 81mg daily for secondary prevention  Statin: Yes: rosuvastatin.   ACEi/ARB: Yes: losartan.   Urine Albumin:   Lab Results   Component Value Date    UMALCR 27.05 (H) 03/21/2023      Lab Results   Component Value Date    A1C 10.3 03/21/2023    A1C 9.7 10/03/2022    A1C 7.8 12/06/2021    A1C 7.0 06/01/2021    A1C 7.0 10/05/2020    A1C 8.8 05/04/2020    A1C 7.0 10/14/2019    A1C 7.2 06/05/2019      Latest Reference Range & Units 06/01/21 15:34 06/09/21 10:43 12/06/21 09:09 10/03/22 09:06 03/21/23 08:45 04/19/23 08:38   Creatinine 0.67 - 1.17 mg/dL 1.19 1.00 0.86 0.89 0.88 1.13   GFR Estimate >60 mL/min/1.73m2 62 76 88 >90 >90 69     Estimated Creatinine Clearance: 74.9 mL/min (based on SCr of 1.09 mg/dL).    Last Comprehensive Metabolic Panel:  Lab Results   Component Value Date     05/17/2023    POTASSIUM 4.7 05/17/2023    CHLORIDE 105 05/17/2023    CO2 26 05/17/2023    ANIONGAP 12 05/17/2023     (H) 05/17/2023    BUN 21.7 05/17/2023    CR 1.09 05/17/2023    GFRESTIMATED 69 04/19/2023    ROWENA 10.4 (H) 05/17/2023     Today's Vitals: /70   Pulse 72   Wt 243 lb 9.6 oz (110.5 kg)   BMI 37.04 kg/m       BP Readings from Last 1 Encounters:   05/17/23 121/70     Pulse Readings from Last 1 Encounters:   05/17/23 72     Wt Readings from Last 1 Encounters:   05/17/23 243 lb 9.6 oz (110.5 kg)     Estimated body mass index is 37.04 kg/m  as calculated from the following:    Height as of 3/21/23: 5' 8\" (1.727 m).    Weight as of this encounter: 243 lb 9.6 oz (110.5 kg).    ----------------    I spent 15 minutes with this patient today. All changes were made via collaborative " practice agreement with Rell Rivers MD. A copy of the visit note was provided to the patient's provider(s).    A summary of these recommendations was declined by the patient.    Tiara Linda, PharmD, BCACP  Medication Therapy Management Pharmacist  Pager: [phone]    Telemedicine Visit Details  Type of service:  Telephone visit  Start Time: 1230  End Time: 1245     Medication Therapy Recommendations  Type 2 diabetes mellitus with hyperglycemia, without long-term current use of insulin (H)    Current Medication: JARDIANCE 25 MG TABS tablet   Rationale: Synergistic therapy - Needs additional medication therapy - Indication   Recommendation: Start Medication - Ozempic (0.25 or 0.5 MG/DOSE) 2 MG/1.5ML Sopn - Inject 0.25mg under the skin weekly for 4 doses, then increase to 0.5mg weekly thereafter.   Status: Declined per Patient

## 2023-05-18 DIAGNOSIS — E83.52 HYPERCALCEMIA: Primary | ICD-10-CM

## 2023-05-24 NOTE — PATIENT INSTRUCTIONS
"Recommendations from today's MTM visit:                                                    MTM (medication therapy management) is a service provided by a clinical pharmacist designed to help you get the most of out of your medicines.   Today we reviewed what your medicines are for, how to know if they are working, that your medicines are safe and how to make your medicine regimen as easy as possible.      1. Recheck kidney function/electrolyte lab today  2. Continue same medications for now. Continue working on dietary changes.  3. If next A1c is still high >8%, then will plan on starting Ozempic weekly shot.    Follow-up: Return in 5 weeks (on 6/21/2023) for Medication Therapy Management, Lab Work.    It was great speaking with you today.  I value your experience and would be very thankful for your time in providing feedback in our clinic survey. In the next few days, you may receive an email or text message from [employer] with a link to a survey related to your  clinical pharmacist.\"     To schedule another MTM appointment, please call the clinic directly or you may call the MTM scheduling line at [phone] or toll-free at [phone].     My Clinical Pharmacist's contact information:                                                      Please feel free to contact me with any questions or concerns you have.      Tiara Linda, PharmD, [facility]  Medication Therapy Management Pharmacist  Pager: [phone]   "

## 2023-06-14 DIAGNOSIS — E11.65 TYPE 2 DIABETES MELLITUS WITH HYPERGLYCEMIA, WITHOUT LONG-TERM CURRENT USE OF INSULIN (H): ICD-10-CM

## 2023-06-14 RX ORDER — EMPAGLIFLOZIN 25 MG/1
TABLET, FILM COATED ORAL
Qty: 30 TABLET | Refills: 2 | Status: SHIPPED | OUTPATIENT
Start: 2023-06-14 | End: 2023-09-25

## 2023-06-19 NOTE — PROGRESS NOTES
Medication Therapy Management (MTM) Encounter    ASSESSMENT:                            Medication Adherence/Access: No issues identified    Type 2 Diabetes: Improved on Jardiance. Patient is not meeting average glucose goal of <150mg/dL. Patient is not meeting goal of > 70% time in target with continuous glucose monitoring. Patient would benefit from A1c recheck and dietary modifications.    Hypercalcemia: Due for repeat calcium level.    PLAN:                            1. Labs today - A1c and calcium.  2. Try to recognize patterns between blood sugar spikes and food choices. If blood sugar in high 200's to 300's mg/dL, then put a note in your Garret juana what you ate.    Follow-up: Return in 2 months (on 8/21/2023) for Medication Therapy Management, via phone.    SUBJECTIVE/OBJECTIVE:                          Alvarez Jang is a 72 year old male coming in for a follow-up visit. Today's visit is a follow-up MTM visit from 5/17/23.    Reason for visit: Recheck blood sugars.    Allergies/ADRs: Reviewed in chart  Past Medical History: Reviewed in chart  Tobacco: He reports that he quit smoking about 15 years ago. His smoking use included cigarettes. He has never used smokeless tobacco.  Alcohol: none    Medication Adherence/Access: No issues reported. Patient believes his deductible should be met at this point. He hasn't called his insurance yet to inquire about Ozempic cost.    Type 2 Diabetes: Currently taking metformin ER 1000mg twice daily with meals, glimepiride 4mg every morning, and Jardiance 25mg every morning (started on 3/29, then dose increased on 4/19). Patient is not experiencing side effects.   Blood sugar monitoring: Continuous Glucose Monitor - Freestyle Garret 2. Ranges (per LibreView): See below.     Current diabetes symptoms: none.  Diet/Exercise: Breakfast is oatmeal with brown sugar, toast with butter, Belvita or 6-10 Ritz crackers or banana. If goes out to eat then orders a omelette with meat. Overall  has cut way back on potatoes and bread.  Aspirin: Taking 81mg daily for secondary prevention  Statin: Yes: rosuvastatin.   ACEi/ARB: Yes: losartan.   Urine Albumin:   Lab Results   Component Value Date    UMALCR 27.05 (H) 03/21/2023      Lab Results   Component Value Date    A1C 10.3 03/21/2023    A1C 9.7 10/03/2022    A1C 7.8 12/06/2021    A1C 7.0 06/01/2021    A1C 7.0 10/05/2020    A1C 8.8 05/04/2020    A1C 7.0 10/14/2019    A1C 7.2 06/05/2019     Hypercalcemia: Patient is due for calcium level recheck. Lab was already ordered by PCP.    Today's Vitals: None taken (declined)    Last Comprehensive Metabolic Panel:  Lab Results   Component Value Date     05/17/2023    POTASSIUM 4.7 05/17/2023    CHLORIDE 105 05/17/2023    CO2 26 05/17/2023    ANIONGAP 12 05/17/2023     (H) 05/17/2023    BUN 21.7 05/17/2023    CR 1.09 05/17/2023    GFRESTIMATED 73 05/17/2023    ROWENA 10.4 (H) 05/17/2023     ----------------    I spent 15 minutes with this patient today. All changes were made via collaborative practice agreement with Rell Rivers MD. A copy of the visit note was provided to the patient's provider(s).    A summary of these recommendations was declined by the patient.    Tiara Linda, PharmD, BCACP  Medication Therapy Management Pharmacist  Pager: [phone]     Medication Therapy Recommendations  No medication therapy recommendations to display

## 2023-06-21 ENCOUNTER — OFFICE VISIT (OUTPATIENT)
Dept: PHARMACY | Facility: CLINIC | Age: 73
End: 2023-06-21
Payer: COMMERCIAL

## 2023-06-21 ENCOUNTER — LAB (OUTPATIENT)
Dept: LAB | Facility: CLINIC | Age: 73
End: 2023-06-21
Payer: COMMERCIAL

## 2023-06-21 DIAGNOSIS — E11.65 TYPE 2 DIABETES MELLITUS WITH HYPERGLYCEMIA, WITHOUT LONG-TERM CURRENT USE OF INSULIN (H): Chronic | ICD-10-CM

## 2023-06-21 DIAGNOSIS — E11.65 TYPE 2 DIABETES MELLITUS WITH HYPERGLYCEMIA, WITHOUT LONG-TERM CURRENT USE OF INSULIN (H): Primary | Chronic | ICD-10-CM

## 2023-06-21 DIAGNOSIS — E83.52 HYPERCALCEMIA: ICD-10-CM

## 2023-06-21 LAB
CALCIUM SERPL-MCNC: 9.6 MG/DL (ref 8.8–10.2)
HBA1C MFR BLD: 7.4 % (ref 0–5.6)

## 2023-06-21 PROCEDURE — 83036 HEMOGLOBIN GLYCOSYLATED A1C: CPT

## 2023-06-21 PROCEDURE — 99606 MTMS BY PHARM EST 15 MIN: CPT | Performed by: PHARMACIST

## 2023-06-21 PROCEDURE — 36415 COLL VENOUS BLD VENIPUNCTURE: CPT

## 2023-06-21 PROCEDURE — 99607 MTMS BY PHARM ADDL 15 MIN: CPT | Performed by: PHARMACIST

## 2023-06-21 PROCEDURE — 82310 ASSAY OF CALCIUM: CPT

## 2023-06-21 NOTE — PATIENT INSTRUCTIONS
"Recommendations from today's MTM visit:                                                    MTM (medication therapy management) is a service provided by a clinical pharmacist designed to help you get the most of out of your medicines.   Today we reviewed what your medicines are for, how to know if they are working, that your medicines are safe and how to make your medicine regimen as easy as possible.      1. Labs today - A1c and calcium.  2. Try to recognize patterns between blood sugar spikes and food choices. If blood sugar in high 200's to 300's mg/dL, then put a note in your Famigo juana what you ate.    Follow-up: Return in 2 months (on 8/21/2023) for Medication Therapy Management, via phone.    It was great speaking with you today.  I value your experience and would be very thankful for your time in providing feedback in our clinic survey. In the next few days, you may receive an email or text message from [employer] with a link to a survey related to your  clinical pharmacist.\"     To schedule another MTM appointment, please call the clinic directly or you may call the MTM scheduling line at [phone] or toll-free at [phone].     My Clinical Pharmacist's contact information:                                                      Please feel free to contact me with any questions or concerns you have.      Tiara Linda, PharmD, [facility]  Medication Therapy Management Pharmacist  Pager: [phone]   "

## 2023-06-29 DIAGNOSIS — E11.65 TYPE 2 DIABETES MELLITUS WITH HYPERGLYCEMIA, WITHOUT LONG-TERM CURRENT USE OF INSULIN (H): Chronic | ICD-10-CM

## 2023-08-06 DIAGNOSIS — E11.65 TYPE 2 DIABETES MELLITUS WITH HYPERGLYCEMIA, WITHOUT LONG-TERM CURRENT USE OF INSULIN (H): Chronic | ICD-10-CM

## 2023-08-08 NOTE — TELEPHONE ENCOUNTER
Routing to ordering provider for consideration, not on refill protocol.           Corrine Jang     RN MSN

## 2023-08-23 NOTE — PROGRESS NOTES
Medication Therapy Management (MTM) Encounter    ASSESSMENT:                            Medication Adherence/Access: No issues identified    Type 2 Diabetes/Obesity: Improved on SGLT-2 inhibitor therapy. Patient is meeting A1c goal of < 8%. Patient is not meeting average glucose goal of <150mg/dL. Patient is not meeting goal of > 70% time in target with continuous glucose monitoring. We considered reducing sulfonylurea dose due to hypoglycemia, however patient opts to first try moving administration timing in regards to breakfast meal and will continue monitoring. We discussed potentially starting a GLP-1 agonist for benefits of reducing hypoglycemia risk, targeting post-prandial glucose spikes, promoting weight loss, and cardiovascular risk reduction given CAD comorbidity; however patient declines due to cost and injection concerns.     PLAN:                            Try moving glimepiride (along with other morning medications) to take at the beginning of eating breakfast instead of afterwards. Please call me if low blood sugars persist.  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    Follow-up: Return in about 22 days (around 9/21/2023) for Primary Care Provider Visit, Diabetes Recheck.    SUBJECTIVE/OBJECTIVE:                          Alvarez Jang is a 73 year old male called for a follow-up visit from 6/21/23.       Reason for visit: Recheck blood sugars.    Allergies/ADRs: Reviewed in chart  Past Medical History: Reviewed in chart  Tobacco: He reports that he quit smoking about 15 years ago. His smoking use included cigarettes. He has never used smokeless tobacco.  Alcohol: none    Medication Adherence/Access: No issues reported.    Type 2 Diabetes/Obesity:   Metformin ER 1000mg twice daily - with meals helps prevent GI upset.  Glimepiride 4mg every morning - eats breakfast then takes AM meds afterwards.  Jardiance 25mg every morning - started on 3/29/23, then dose  "increased on 4/19/23.  Cost of Jardiance is expensive, which he can afford however prefers not to add Ozempic because of additional cost and prefers not to do injections.  Aspirin 81mg daily for CAD secondary prevention.  Patient is not experiencing side effects.   Blood sugar monitoring: Continuous Glucose Monitor - Freestyle Garret 2, see report below.   Current diabetes symptoms: none. Has had a few lows recently but is asymptomatic.  Diet/Exercise: Losing weight - currently at 231 lbs. He reduced bread and potatoes. Is active in the morning chopping wood.              Lab Results   Component Value Date    UMALCR 27.05 (H) 03/21/2023      Lab Results   Component Value Date    A1C 7.4 06/21/2023    A1C 10.3 03/21/2023    A1C 9.7 10/03/2022    A1C 7.8 12/06/2021    A1C 7.0 06/01/2021    A1C 7.0 10/05/2020    A1C 8.8 05/04/2020    A1C 7.0 10/14/2019    A1C 7.2 06/05/2019     Estimated body mass index is 37.04 kg/m  as calculated from the following:    Height as of 3/21/23: 5' 8\" (1.727 m).    Weight as of 5/17/23: 243 lb 9.6 oz (110.5 kg).    Wt Readings from Last 4 Encounters:   05/17/23 243 lb 9.6 oz (110.5 kg)   03/21/23 247 lb (112 kg)   11/01/22 244 lb (110.7 kg)   10/24/22 244 lb (110.7 kg)     Today's Vitals: There were no vitals taken for this visit.    Last Comprehensive Metabolic Panel:  Lab Results   Component Value Date     05/17/2023    POTASSIUM 4.7 05/17/2023    CHLORIDE 105 05/17/2023    CO2 26 05/17/2023    ANIONGAP 12 05/17/2023     (H) 05/17/2023    BUN 21.7 05/17/2023    CR 1.09 05/17/2023    GFRESTIMATED 73 05/17/2023    ROWENA 9.6 06/21/2023     ----------------    I spent 15 minutes with this patient today. All changes were made via collaborative practice agreement with Rell Rivers MD. A copy of the visit note was provided to the patient's provider(s).    A summary of these recommendations was declined by the patient.    Tiff CobosD, [facility]  Medication Therapy Management " Pharmacist  Pager: [phone]    Telemedicine Visit Details  Type of service:  Telephone visit  Start Time:  0945  End Time:  1000     Medication Therapy Recommendations  Type 2 diabetes mellitus with hyperglycemia, without long-term current use of insulin (H)    Current Medication: glimepiride (AMARYL) 4 MG tablet   Rationale: Incorrect administration - Adverse medication event - Safety   Recommendation: Change Medication - glimepiride 4 MG tablet - Change administration timing and continue monitoring.   Status: Patient Agreed - Adherence/Education

## 2023-08-30 ENCOUNTER — VIRTUAL VISIT (OUTPATIENT)
Dept: PHARMACY | Facility: CLINIC | Age: 73
End: 2023-08-30
Payer: COMMERCIAL

## 2023-08-30 DIAGNOSIS — E11.65 TYPE 2 DIABETES MELLITUS WITH HYPERGLYCEMIA, WITHOUT LONG-TERM CURRENT USE OF INSULIN (H): Primary | ICD-10-CM

## 2023-08-30 DIAGNOSIS — E66.01 MORBID OBESITY (H): Chronic | ICD-10-CM

## 2023-08-30 PROCEDURE — 99607 MTMS BY PHARM ADDL 15 MIN: CPT | Performed by: PHARMACIST

## 2023-08-30 PROCEDURE — 99606 MTMS BY PHARM EST 15 MIN: CPT | Performed by: PHARMACIST

## 2023-08-30 NOTE — PATIENT INSTRUCTIONS
"Recommendations from today's MTM visit:                                                    MTM (medication therapy management) is a service provided by a clinical pharmacist designed to help you get the most of out of your medicines.   Today we reviewed what your medicines are for, how to know if they are working, that your medicines are safe and how to make your medicine regimen as easy as possible.      Try moving glimepiride (along with other morning medications) to take at the beginning of eating breakfast instead of afterwards. Please call me if low blood sugars persist.  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    Follow-up: Return in about 22 days (around 9/21/2023) for Primary Care Provider Visit, Diabetes Recheck.    It was great speaking with you today.  I value your experience and would be very thankful for your time in providing feedback in our clinic survey. In the next few days, you may receive an email or text message from [employer] with a link to a survey related to your  clinical pharmacist.\"     To schedule another MTM appointment, please call the clinic directly or you may call the MTM scheduling line at [phone] or toll-free at [phone].     My Clinical Pharmacist's contact information:                                                      Please feel free to contact me with any questions or concerns you have.      Tiara Linda, PharmD, [facility]  Medication Therapy Management Pharmacist  Pager: [phone]     "

## 2023-09-11 DIAGNOSIS — E11.65 TYPE 2 DIABETES MELLITUS WITH HYPERGLYCEMIA, WITHOUT LONG-TERM CURRENT USE OF INSULIN (H): Chronic | ICD-10-CM

## 2023-09-11 RX ORDER — METFORMIN HCL 500 MG
1000 TABLET, EXTENDED RELEASE 24 HR ORAL 2 TIMES DAILY WITH MEALS
Qty: 360 TABLET | Refills: 0 | Status: SHIPPED | OUTPATIENT
Start: 2023-09-11 | End: 2023-12-06

## 2023-09-13 ENCOUNTER — VIRTUAL VISIT (OUTPATIENT)
Dept: PHARMACY | Facility: CLINIC | Age: 73
End: 2023-09-13
Payer: COMMERCIAL

## 2023-09-13 DIAGNOSIS — E66.01 MORBID OBESITY (H): ICD-10-CM

## 2023-09-13 DIAGNOSIS — E11.65 TYPE 2 DIABETES MELLITUS WITH HYPERGLYCEMIA, WITHOUT LONG-TERM CURRENT USE OF INSULIN (H): Primary | ICD-10-CM

## 2023-09-13 PROCEDURE — 99606 MTMS BY PHARM EST 15 MIN: CPT | Performed by: PHARMACIST

## 2023-09-13 PROCEDURE — 99607 MTMS BY PHARM ADDL 15 MIN: CPT | Performed by: PHARMACIST

## 2023-09-13 NOTE — PROGRESS NOTES
Medication Therapy Management (MTM) Encounter    ASSESSMENT:                            Medication Adherence/Access: See below for considerations    Type 2 Diabetes/Obesity: Patient is meeting A1c goal of < 8%. Patient is not meeting average glucose goal of <150 mg/dL. Patient is meeting goal of > 70% time in target with continuous glucose monitoring. Recommend holding sulfonylurea due to hypoglycemia and may need to restart at a lower dose in the future. Again offered starting a GLP-1 for benefits of reducing hypoglycemia risk, targeting post-prandial glucose spikes, promoting weight loss, and cardiovascular risk reduction given CAD comorbidity; however patient declines due to cost and injection concerns.    PLAN:                            Hold glimpiride for now due to low blood sugars. In the future, we may consider restarting glimepiride at a lower dose or starting a GLP1 medication if affordable.  Please call clinic to verify appointments on 9/29. Currently only scheduled for lab not DOT physical.    Follow-up: Return in 16 days (on 9/29/2023) for Primary Care Provider Visit, Lab Work.    SUBJECTIVE/OBJECTIVE:                          Alvarez Jang is a 73 year old male called for a follow-up visit from 8/30/23.       Reason for visit: Low blood sugars yesterday and today.    Allergies/ADRs: Reviewed in chart  Past Medical History: Reviewed in chart  Tobacco: He reports that he quit smoking about 15 years ago. His smoking use included cigarettes. He has never used smokeless tobacco.  Alcohol: none    Medication Adherence/Access: Issues reported - see below.    Type 2 Diabetes/Obesity:   Metformin ER 1000mg twice daily - with meals helps prevent GI upset.  Glimepiride 4mg every morning before breakfast - pills too small to cut in half.  Jardiance 25mg every morning - started on 3/29/23, then dose increased on 4/19/23. Today skipped Jardiance dose due to low blood sugars.  Cost of Jardiance is expensive, which he  "can afford however prefers not to add Ozempic because of additional cost and prefers not to do injections.  Aspirin 81mg daily for CAD secondary prevention.  Patient is not experiencing side effects.   Blood sugar monitoring: Continuous Glucose Monitor - Freestyle Garret 2, see report below.   Current diabetes symptoms: none. Hypoglycemia is asymptomatic.  Diet/Exercise: Patient has been more active lately. Losing weight currently at 230 lbs. Reduced bread/potatoes.  Is expecting DOT physical with Dr. Rivers on 9/29, however only lab appt is scheduled.         Lab Results   Component Value Date    UMALCR 27.05 (H) 03/21/2023      Lab Results   Component Value Date    A1C 7.4 06/21/2023    A1C 10.3 03/21/2023    A1C 9.7 10/03/2022    A1C 7.8 12/06/2021    A1C 7.0 06/01/2021    A1C 7.0 10/05/2020    A1C 8.8 05/04/2020    A1C 7.0 10/14/2019    A1C 7.2 06/05/2019     Estimated body mass index is 37.04 kg/m  as calculated from the following:    Height as of 3/21/23: 5' 8\" (1.727 m).    Weight as of 5/17/23: 243 lb 9.6 oz (110.5 kg).    Wt Readings from Last 4 Encounters:   05/17/23 243 lb 9.6 oz (110.5 kg)   03/21/23 247 lb (112 kg)   11/01/22 244 lb (110.7 kg)   10/24/22 244 lb (110.7 kg)     Today's Vitals: There were no vitals taken for this visit.    Last Comprehensive Metabolic Panel:  Lab Results   Component Value Date     05/17/2023    POTASSIUM 4.7 05/17/2023    CHLORIDE 105 05/17/2023    CO2 26 05/17/2023    ANIONGAP 12 05/17/2023     (H) 05/17/2023    BUN 21.7 05/17/2023    CR 1.09 05/17/2023    GFRESTIMATED 73 05/17/2023    ROWENA 9.6 06/21/2023     ----------------    I spent 10 minutes with this patient today. All changes were made via collaborative practice agreement with Rell Rivers MD. A copy of the visit note was provided to the patient's provider(s).    A summary of these recommendations was sent via Telepo.    Tiff CobosD, [facility]  Medication Therapy Management Pharmacist  Pager: " [phone]    Telemedicine Visit Details  Type of service:  Telephone visit  Start Time:  1230  End Time:  1240     Medication Therapy Recommendations  Type 2 diabetes mellitus with hyperglycemia, without long-term current use of insulin (H)    Current Medication: glimepiride (AMARYL) 4 MG tablet   Rationale: Undesirable effect - Adverse medication event - Safety   Recommendation: Discontinue Medication - Hold glimepiride.   Status: Accepted per CPA

## 2023-09-16 NOTE — PATIENT INSTRUCTIONS
"Recommendations from today's MTM visit:                                                    MTM (medication therapy management) is a service provided by a clinical pharmacist designed to help you get the most of out of your medicines.   Today we reviewed what your medicines are for, how to know if they are working, that your medicines are safe and how to make your medicine regimen as easy as possible.      Hold glimpiride for now due to low blood sugars. In the future, we may consider restarting glimepiride at a lower dose or starting a GLP1 medication if affordable.  Please call clinic to verify appointments on 9/29. Currently only scheduled for lab not DOT physical.    Follow-up: Return in 16 days (on 9/29/2023) for Primary Care Provider Visit, Lab Work.    It was great speaking with you today.  I value your experience and would be very thankful for your time in providing feedback in our clinic survey. In the next few days, you may receive an email or text message from [employer] with a link to a survey related to your  clinical pharmacist.\"     To schedule another MTM appointment, please call the clinic directly or you may call the MTM scheduling line at [phone] or toll-free at [phone].     My Clinical Pharmacist's contact information:                                                      Please feel free to contact me with any questions or concerns you have.      Tiara Linda, PharmD, BCACP  Medication Therapy Management Pharmacist  Pager: [phone]   "

## 2023-09-22 ENCOUNTER — DOCUMENTATION ONLY (OUTPATIENT)
Dept: FAMILY MEDICINE | Facility: CLINIC | Age: 73
End: 2023-09-22
Payer: COMMERCIAL

## 2023-09-22 DIAGNOSIS — E11.65 TYPE 2 DIABETES MELLITUS WITH HYPERGLYCEMIA, WITHOUT LONG-TERM CURRENT USE OF INSULIN (H): Primary | ICD-10-CM

## 2023-09-22 NOTE — PROGRESS NOTES
Roscoe Jang has an upcoming lab appointment:    Future Appointments   Date Time Provider Department Center   9/29/2023  8:00 AM [facility] LAB [facility]     Patient is scheduled for the following lab(s): A1c    There is no order available. Please review and place either future orders or HMPO (Review of Health Maintenance Protocol Orders), as appropriate.    Health Maintenance Due   Topic    LIPID     ANNUAL REVIEW OF HM ORDERS      Julissa Barton    
room air

## 2023-09-24 DIAGNOSIS — E11.65 TYPE 2 DIABETES MELLITUS WITH HYPERGLYCEMIA, WITHOUT LONG-TERM CURRENT USE OF INSULIN (H): ICD-10-CM

## 2023-09-25 RX ORDER — EMPAGLIFLOZIN 25 MG/1
TABLET, FILM COATED ORAL
Qty: 30 TABLET | Refills: 0 | Status: SHIPPED | OUTPATIENT
Start: 2023-09-25 | End: 2023-10-23

## 2023-09-29 ENCOUNTER — LAB (OUTPATIENT)
Dept: LAB | Facility: CLINIC | Age: 73
End: 2023-09-29
Payer: COMMERCIAL

## 2023-09-29 DIAGNOSIS — E11.65 TYPE 2 DIABETES MELLITUS WITH HYPERGLYCEMIA, WITHOUT LONG-TERM CURRENT USE OF INSULIN (H): ICD-10-CM

## 2023-09-29 LAB
CHOLEST SERPL-MCNC: 93 MG/DL
HBA1C MFR BLD: 7.7 % (ref 0–5.6)
HDLC SERPL-MCNC: 41 MG/DL
LDLC SERPL CALC-MCNC: 26 MG/DL
NONHDLC SERPL-MCNC: 52 MG/DL
TRIGL SERPL-MCNC: 128 MG/DL

## 2023-09-29 PROCEDURE — 36415 COLL VENOUS BLD VENIPUNCTURE: CPT

## 2023-09-29 PROCEDURE — 83036 HEMOGLOBIN GLYCOSYLATED A1C: CPT

## 2023-09-29 PROCEDURE — 80061 LIPID PANEL: CPT

## 2023-10-01 ENCOUNTER — TRANSFERRED RECORDS (OUTPATIENT)
Dept: SURGERY | Facility: CLINIC | Age: 73
End: 2023-10-01

## 2023-10-01 LAB — RETINOPATHY: NORMAL

## 2023-10-22 DIAGNOSIS — E11.65 TYPE 2 DIABETES MELLITUS WITH HYPERGLYCEMIA, WITHOUT LONG-TERM CURRENT USE OF INSULIN (H): ICD-10-CM

## 2023-10-23 RX ORDER — EMPAGLIFLOZIN 25 MG/1
TABLET, FILM COATED ORAL
Qty: 30 TABLET | Refills: 0 | Status: SHIPPED | OUTPATIENT
Start: 2023-10-23 | End: 2023-11-20

## 2023-10-25 NOTE — PROGRESS NOTES
Outpatient Physical Therapy Discharge Note     Patient: Roscoe Jang  : 1950    Evaluation date:  22     Referring Provider: Tita Gonzalez Diagnosis: LBP    Client Self Report:   Current status is unknown since patient did not return for further PT visits.    Objective Measurements:  Current objective status is unknown since patient failed to complete treatment     Goals:  Goal Identifier get in skidsteer   Goal Description Pt will be able to get in/out of skidsteer w/o LBP   Target Date 22   Date Met      Progress (detail required for progress note):       Goal Identifier HEP   Goal Description Pt will be compliant and competent in HEP to allow them to achieve maximal strength and reduce pain   Target Date 22   Date Met      Progress (detail required for progress note):       Goal Identifier     Goal Description     Target Date     Date Met      Progress (detail required for progress note):       Goal Identifier     Goal Description     Target Date     Date Met      Progress (detail required for progress note):       Goal Identifier     Goal Description     Target Date     Date Met      Progress (detail required for progress note):       Goal Identifier     Goal Description     Target Date     Date Met      Progress (detail required for progress note):       Goal Identifier     Goal Description     Target Date     Date Met      Progress (detail required for progress note):       Goal Identifier     Goal Description     Target Date     Date Met      Progress (detail required for progress note):         Progress towards Goals:   Progress this reporting period: Pt has failed to schedule f/u visits within 30 days from last visit thus is being d/c from therapy at this time. Objective measures are all taken from last visit. Current status is unknown at this time.    Plan:  Discharge from therapy.    Discharge:    Reason for Discharge: Patient has failed to schedule further  appointments.    Equipment Issued: none    Discharge Plan:  Not completed since patient failed to schedule further appointments.    Judi Huynh  Physical Therapist  [facility]  [address] [place] [id number]  [email or web address]   [email or web address]   Office: [phone] Fax: [phone]

## 2023-11-19 DIAGNOSIS — E11.65 TYPE 2 DIABETES MELLITUS WITH HYPERGLYCEMIA, WITHOUT LONG-TERM CURRENT USE OF INSULIN (H): ICD-10-CM

## 2023-11-20 RX ORDER — EMPAGLIFLOZIN 25 MG/1
TABLET, FILM COATED ORAL
Qty: 30 TABLET | Refills: 0 | Status: SHIPPED | OUTPATIENT
Start: 2023-11-20 | End: 2023-12-18

## 2023-12-06 DIAGNOSIS — E11.65 TYPE 2 DIABETES MELLITUS WITH HYPERGLYCEMIA, WITHOUT LONG-TERM CURRENT USE OF INSULIN (H): Chronic | ICD-10-CM

## 2023-12-06 RX ORDER — METFORMIN HCL 500 MG
1000 TABLET, EXTENDED RELEASE 24 HR ORAL 2 TIMES DAILY WITH MEALS
Qty: 360 TABLET | Refills: 0 | Status: SHIPPED | OUTPATIENT
Start: 2023-12-06 | End: 2024-02-29

## 2023-12-17 DIAGNOSIS — E11.65 TYPE 2 DIABETES MELLITUS WITH HYPERGLYCEMIA, WITHOUT LONG-TERM CURRENT USE OF INSULIN (H): ICD-10-CM

## 2023-12-17 DIAGNOSIS — I10 BENIGN ESSENTIAL HYPERTENSION: ICD-10-CM

## 2023-12-18 RX ORDER — EMPAGLIFLOZIN 25 MG/1
TABLET, FILM COATED ORAL
Qty: 90 TABLET | Refills: 0 | Status: SHIPPED | OUTPATIENT
Start: 2023-12-18 | End: 2024-03-25

## 2023-12-18 RX ORDER — LOSARTAN POTASSIUM 100 MG/1
100 TABLET ORAL DAILY
Qty: 90 TABLET | Refills: 1 | Status: SHIPPED | OUTPATIENT
Start: 2023-12-18 | End: 2024-06-17

## 2024-01-08 NOTE — PROGRESS NOTES
Medication Therapy Management (MTM) Encounter    ASSESSMENT:                            Medication Adherence/Access: No issues identified.    Type 2 Diabetes/Obesity: Patient is meeting A1c goal of < 8%, but due for recheck. Self monitoring of blood glucose is not at goal of fasting  mg/dL and post prandial < 180 mg/dL. Patient is not meeting goal of > 70% time in target with continuous glucose monitoring. Patient may benefit from restarting daily glimepiride, however recommend a lower dose to prevent hypoglycemia.    Hypertension/Hyperlipidemia/Bilateral Carotid Artery Disease: Stable. Patient is meeting blood pressure goal of < 130/80mmHg. Patient is on appropriate therapy of high-intensity statin and low-dose aspirin.    Pain: Stable.    PLAN:                            Restart glimepiride at a lower dose of 1mg every morning with breakfast. Sent new Rx to [facility] pharmacy.  Please recheck A1c at PCP visit next week.    Follow-up: Return in 1 month (on 2/12/2024) for Medication Therapy Management, via phone.    SUBJECTIVE/OBJECTIVE:                          Alvarez Jnag is a 73 year old male called for a follow-up visit from 9/13/23. This serves as an initial visit for 2024.    Reason for visit: General medication review, no specific concerns today.    Allergies/ADRs: Reviewed in chart  Past Medical History: Reviewed in chart  Tobacco: He reports that he quit smoking about 16 years ago. His smoking use included cigarettes. He has never used smokeless tobacco.  Alcohol: none    Medication Adherence/Access: No issues reported.    Type 2 Diabetes/Obesity:   Metformin ER 1000mg twice daily with meals.  Glimepiride 4mg in the morning if needed - about once every 2 weeks if wakes up with higher blood sugars. Previously had lows with daily glimepiride 4mg.  Jardiance 25mg every morning - started on 3/29/23, then dose increased on 4/19/23.  Cost of Jardiance is expensive, which he can afford however prefers not to  "add Ozempic because of additional cost and prefers not to do injections.  Patient report no current medication side effects.  Blood sugar monitoring: Continuous Glucose Monitor - Freestyle Garret 2, see report below.   Current diabetes symptoms: none. Hypoglycemia is asymptomatic.  Diet/Exercise: Current weight 225-228 lbs. More active and watching what he eats.         Lab Results   Component Value Date    UMALCR 27.05 (H) 03/21/2023      Lab Results   Component Value Date    A1C 7.7 09/29/2023    A1C 7.4 06/21/2023    A1C 10.3 03/21/2023    A1C 9.7 10/03/2022    A1C 7.8 12/06/2021    A1C 7.0 06/01/2021    A1C 7.0 10/05/2020    A1C 8.8 05/04/2020    A1C 7.0 10/14/2019    A1C 7.2 06/05/2019     Estimated body mass index is 37.04 kg/m  as calculated from the following:    Height as of 3/21/23: 5' 8\" (1.727 m).    Weight as of 5/17/23: 243 lb 9.6 oz (110.5 kg).    Wt Readings from Last 4 Encounters:   05/17/23 243 lb 9.6 oz (110.5 kg)   03/21/23 247 lb (112 kg)   11/01/22 244 lb (110.7 kg)   10/24/22 244 lb (110.7 kg)     Hypertension/Hyperlipidemia/Bilateral Carotid Artery Disease:   Losartan 100mg daily.  Amlodipine 5mg daily.  Rosuvastatin 20mg daily.  Aspirin 81mg daily.  Nitrostat on hand (never used, last filled 3/2023).    Patient has a BP machine with arm cuff but doesn't check regularly.   Patient reports no current medication side effects.    Recent Labs   Lab Test 09/29/23  0743 10/03/22  0906   CHOL 93 95   HDL 41 38*   LDL 26 22   TRIG 128 174*      BP Readings from Last 3 Encounters:   05/17/23 121/70   03/21/23 130/68   11/01/22 130/66     Pulse Readings from Last 3 Encounters:   05/17/23 72   03/21/23 71   11/01/22 70     Last Comprehensive Metabolic Panel:  Lab Results   Component Value Date     05/17/2023    POTASSIUM 4.7 05/17/2023    CHLORIDE 105 05/17/2023    CO2 26 05/17/2023    ANIONGAP 12 05/17/2023     (H) 05/17/2023    BUN 21.7 05/17/2023    CR 1.09 05/17/2023    GFRESTIMATED 73 " 05/17/2023    ROWENA 9.6 06/21/2023     Pain:   Tylenol 1500mg as needed, only about once monthly.   Endorses shoulder pain which is improved since detention. No concerns today.    Today's Vitals: There were no vitals taken for this visit.  ----------------    I spent 15 minutes with this patient today. All changes were made via collaborative practice agreement with Rell Rivers MD. A copy of the visit note was provided to the patient's provider(s).    A summary of these recommendations was sent via [employer].    Tiara Linda, PharmD, BCACP  Medication Therapy Management Pharmacist  [facility]    Telemedicine Visit Details  Type of service:  Telephone visit  Start Time:  0935  End Time:  0950     Medication Therapy Recommendations  Type 2 diabetes mellitus with hyperglycemia, without long-term current use of insulin (H)    Current Medication: glimepiride (AMARYL) 4 MG tablet (Discontinued)   Rationale: Dose too high - Dosage too high - Safety   Recommendation: Decrease Dose - glimepiride 1 MG tablet   Status: Accepted per CPA

## 2024-01-09 DIAGNOSIS — E11.65 TYPE 2 DIABETES MELLITUS WITH HYPERGLYCEMIA, WITHOUT LONG-TERM CURRENT USE OF INSULIN (H): ICD-10-CM

## 2024-01-09 DIAGNOSIS — I65.22 CAROTID STENOSIS, LEFT: ICD-10-CM

## 2024-01-09 RX ORDER — ROSUVASTATIN CALCIUM 20 MG/1
20 TABLET, COATED ORAL DAILY
Qty: 90 TABLET | Refills: 2 | Status: SHIPPED | OUTPATIENT
Start: 2024-01-09 | End: 2024-09-25

## 2024-01-10 ENCOUNTER — VIRTUAL VISIT (OUTPATIENT)
Dept: PHARMACY | Facility: CLINIC | Age: 74
End: 2024-01-10
Payer: COMMERCIAL

## 2024-01-10 DIAGNOSIS — I65.23 BILATERAL CAROTID ARTERY STENOSIS: ICD-10-CM

## 2024-01-10 DIAGNOSIS — E66.01 MORBID OBESITY (H): ICD-10-CM

## 2024-01-10 DIAGNOSIS — E78.2 MIXED HYPERLIPIDEMIA: ICD-10-CM

## 2024-01-10 DIAGNOSIS — E11.65 TYPE 2 DIABETES MELLITUS WITH HYPERGLYCEMIA, WITHOUT LONG-TERM CURRENT USE OF INSULIN (H): Primary | Chronic | ICD-10-CM

## 2024-01-10 DIAGNOSIS — R52 PAIN: ICD-10-CM

## 2024-01-10 DIAGNOSIS — I10 BENIGN ESSENTIAL HYPERTENSION: ICD-10-CM

## 2024-01-10 PROCEDURE — 99605 MTMS BY PHARM NP 15 MIN: CPT | Mod: 93 | Performed by: PHARMACIST

## 2024-01-10 RX ORDER — GLIMEPIRIDE 1 MG/1
1 TABLET ORAL
Qty: 90 TABLET | Refills: 0 | Status: SHIPPED | OUTPATIENT
Start: 2024-01-10 | End: 2024-02-14 | Stop reason: ALTCHOICE

## 2024-01-10 NOTE — LETTER
_  Medication List        Prepared on: 01/10/2024     Bring your Medication List when you go to the doctor, hospital, or   emergency room. And, share it with your family or caregivers.     Note any changes to how you take your medications.  Cross out medications when you no longer use them.    Medication How I take it Why I use it Prescriber   acetaminophen (TYLENOL) 500 MG tablet Take 3 tablets (1,500 mg) by mouth daily as needed for pain Pain Rell Rivers MD   amLODIPine (NORVASC) 5 MG tablet Take 1 tablet (5 mg) by mouth daily Blood pressure Rell Rivers MD   aspirin 81 MG EC tablet Take 1 tablet (81 mg) by mouth daily Heart disease Rell Rivers MD   blood glucose (NO BRAND SPECIFIED) test strip Use to test blood sugar 1 times daily or as directed. Diabetes Rell Rivers MD   blood glucose monitoring (RHYS MICROLET) lancets Use to test blood sugar 1 times daily or as directed. Diabetes Rell Rivers MD   Continuous Blood Gluc Sensor (FREESTYLE THANIA 2 SENSOR) MISC CHANGE EVERY 14 DAYS Diabetes Rell Rivers MD   empagliflozin (JARDIANCE) 25 MG TABS tablet Take 1 tablet (25 mg) by mouth once daily Diabetes Rell Rivers MD   glimepiride (AMARYL) 1 MG tablet Take 1 tablet (1 mg) by mouth every morning (before breakfast) Diabetes Rell Rivers MD   losartan (COZAAR) 100 MG tablet TAKE 1 TABLET (100 MG) BY MOUTH ONCE DAILY. Blood pressure Rell Rivers MD   metFORMIN (GLUCOPHAGE XR) 500 MG 24 hr tablet TAKE 2 TABLETS (1000 MG) BY MOUTH TWICE DAILY WITH MEALS Diabetes Rell Rivers MD   nitroGLYcerin (NITROSTAT) 0.4 MG sublingual tablet For chest pain place 1 tablet under the tongue every 5 minutes for 3 doses. If symptoms persist 5 minutes after 1st dose call 911. Heart disease, chest pain Rell Rivers MD   rosuvastatin (CRESTOR) 20 MG tablet Take 1 tablet (20 mg) by mouth once daily Heart disease, cholesterol Rell Rivers MD         Add new medications, over-the-counter  drugs, herbals, vitamins, or  minerals in the blank rows below.    Medication How I take it Why I use it Prescriber                                      Allergies:      No Known Allergies        Side effects I have had:               Other Information:              My notes and questions:

## 2024-01-10 NOTE — LETTER
"Recommended To-Do List      Prepared on: 01/10/2024       You can get the best results from your medications by completing the items on this \"To-Do List.\"      Bring your To-Do List when you go to your doctor. And, share it with your family or caregivers.    My To-Do List:  What we talked about: What I should do:   Glimepiride dose    Restart glimepiride at a lower dose of 1mg every morning with breakfast.          What we talked about: What I should do:                     "

## 2024-01-10 NOTE — LETTER
January 10, 2024  Roscoe Jang  [address] [address] [address]    Dear [facility]     Thank you for talking with me on Bobby 10, 2024 about your health and medications. As a follow-up to our conversation, I have included two documents:      Your Recommended To-Do List has steps you should take to get the best results from your medications.  Your Medication List will help you keep track of your medications and how to take them.    If you want to talk about these documents, please call Tiara Linda RPH at phone: [phone], Monday-Friday 8-4:30pm.    I look forward to working with you and your doctors to make sure your medications work well for you.    Sincerely,  Tiara Linda RPH  [facility] Pharmacist, [facility]

## 2024-01-10 NOTE — PATIENT INSTRUCTIONS
"Recommendations from today's MTM visit:                                                    MTM (medication therapy management) is a service provided by a clinical pharmacist designed to help you get the most of out of your medicines.   Today we reviewed what your medicines are for, how to know if they are working, that your medicines are safe and how to make your medicine regimen as easy as possible.      Restart glimepiride at a lower dose of 1mg every morning with breakfast. Sent new Rx to [facility] pharmacy.  Please recheck A1c at PCP visit next week.    Follow-up: Return in 1 month (on 2/12/2024) for Medication Therapy Management, via phone.    It was great speaking with you today.  I value your experience and would be very thankful for your time in providing feedback in our clinic survey. In the next few days, you may receive an email or text message from [employer] with a link to a survey related to your  clinical pharmacist.\"     To schedule another MTM appointment, please call the clinic directly or you may call the MTM scheduling line at [phone] or toll-free at [phone].     My Clinical Pharmacist's contact information:                                                      Please feel free to contact me with any questions or concerns you have.      Tiara Linda, PharmD, [facility]  Medication Therapy Management Pharmacist  Voicemail: [phone] (Mon/Wed only)     "

## 2024-01-31 ENCOUNTER — OFFICE VISIT (OUTPATIENT)
Dept: FAMILY MEDICINE | Facility: CLINIC | Age: 74
End: 2024-01-31
Payer: COMMERCIAL

## 2024-01-31 VITALS
HEIGHT: 69 IN | OXYGEN SATURATION: 98 % | HEART RATE: 77 BPM | RESPIRATION RATE: 20 BRPM | TEMPERATURE: 97.1 F | WEIGHT: 234 LBS | DIASTOLIC BLOOD PRESSURE: 70 MMHG | BODY MASS INDEX: 34.66 KG/M2 | SYSTOLIC BLOOD PRESSURE: 124 MMHG

## 2024-01-31 DIAGNOSIS — Z00.00 ROUTINE HISTORY AND PHYSICAL EXAMINATION OF ADULT: Primary | ICD-10-CM

## 2024-01-31 DIAGNOSIS — Z29.11 NEED FOR VACCINATION AGAINST RESPIRATORY SYNCYTIAL VIRUS: ICD-10-CM

## 2024-01-31 DIAGNOSIS — Z12.5 SCREENING FOR PROSTATE CANCER: ICD-10-CM

## 2024-01-31 DIAGNOSIS — Z12.11 SCREEN FOR COLON CANCER: ICD-10-CM

## 2024-01-31 DIAGNOSIS — E11.65 TYPE 2 DIABETES MELLITUS WITH HYPERGLYCEMIA, WITHOUT LONG-TERM CURRENT USE OF INSULIN (H): ICD-10-CM

## 2024-01-31 DIAGNOSIS — Z23 NEED FOR TDAP VACCINATION: ICD-10-CM

## 2024-01-31 PROBLEM — I77.9 BILATERAL CAROTID ARTERY DISEASE (H): Chronic | Status: RESOLVED | Noted: 2018-05-10 | Resolved: 2024-01-31

## 2024-01-31 PROBLEM — I71.40 ABDOMINAL AORTIC ANEURYSM (H): Chronic | Status: RESOLVED | Noted: 2017-01-10 | Resolved: 2024-01-31

## 2024-01-31 PROBLEM — I77.9 CAROTID ARTERY DISEASE (H): Chronic | Status: RESOLVED | Noted: 2019-06-05 | Resolved: 2024-01-31

## 2024-01-31 PROCEDURE — 91320 SARSCV2 VAC 30MCG TRS-SUC IM: CPT | Performed by: FAMILY MEDICINE

## 2024-01-31 PROCEDURE — 99397 PER PM REEVAL EST PAT 65+ YR: CPT | Mod: 25 | Performed by: FAMILY MEDICINE

## 2024-01-31 PROCEDURE — 99213 OFFICE O/P EST LOW 20 MIN: CPT | Mod: 25 | Performed by: FAMILY MEDICINE

## 2024-01-31 PROCEDURE — 90662 IIV NO PRSV INCREASED AG IM: CPT | Performed by: FAMILY MEDICINE

## 2024-01-31 PROCEDURE — 90480 ADMN SARSCOV2 VAC 1/ONLY CMP: CPT | Performed by: FAMILY MEDICINE

## 2024-01-31 PROCEDURE — G0008 ADMIN INFLUENZA VIRUS VAC: HCPCS | Performed by: FAMILY MEDICINE

## 2024-01-31 RX ORDER — RESPIRATORY SYNCYTIAL VIRUS VACCINE 120MCG/0.5
0.5 KIT INTRAMUSCULAR ONCE
Qty: 1 EACH | Refills: 0 | Status: CANCELLED | OUTPATIENT
Start: 2024-01-31 | End: 2024-01-31

## 2024-01-31 SDOH — HEALTH STABILITY: PHYSICAL HEALTH: ON AVERAGE, HOW MANY DAYS PER WEEK DO YOU ENGAGE IN MODERATE TO STRENUOUS EXERCISE (LIKE A BRISK WALK)?: 6 DAYS

## 2024-01-31 ASSESSMENT — PAIN SCALES - GENERAL: PAINLEVEL: NO PAIN (0)

## 2024-01-31 ASSESSMENT — SOCIAL DETERMINANTS OF HEALTH (SDOH): HOW OFTEN DO YOU GET TOGETHER WITH FRIENDS OR RELATIVES?: TWICE A WEEK

## 2024-01-31 NOTE — PROGRESS NOTES
"Preventive Care Visit  [facility]  Rell Rivers MD, Family Medicine  Jan 31, 2024    Assessment & Plan     Routine history and physical examination of adult  Screen for colon cancer  Medically doing well.  Medications reviewed and no changes made.  Influenza and COVID-19 vaccine administered in office today.  Suggested to continue regular exercise, healthy diet and weight loss.  Patient deferred colon cancer screening.  Patient stopped smoking 17 years ago, lung cancer screening not indicated    Type 2 diabetes mellitus with hyperglycemia, without long-term current use of insulin (H)  Suggested to continue metformin, glimepiride and Jardiance.  Following diabetic educator  Lab Results   Component Value Date    A1C 7.7 09/29/2023    A1C 7.4 06/21/2023    A1C 10.3 03/21/2023    A1C 9.7 10/03/2022    A1C 7.8 12/06/2021    A1C 7.0 06/01/2021    A1C 7.0 10/05/2020    A1C 8.8 05/04/2020    A1C 7.0 10/14/2019    A1C 7.2 06/05/2019        Need for Tdap vaccination  Need for vaccination against respiratory syncytial virus      BMI  Estimated body mass index is 34.56 kg/m  as calculated from the following:    Height as of this encounter: 1.753 m (5' 9\").    Weight as of this encounter: 106.1 kg (234 lb).   Weight management plan: Discussed healthy diet and exercise guidelines    Counseling  Appropriate preventive services were discussed with this patient, including applicable screening as appropriate for fall prevention, nutrition, physical activity, Tobacco-use cessation, weight loss and cognition.  Checklist reviewing preventive services available has been given to the patient.  Reviewed patient's diet, addressing concerns and/or questions.       Shawn Pino is a 73 year old, presenting for the following:  Physical        1/31/2024     2:18 PM   Additional Questions   Roomed by Zora         Via the Health Maintenance questionnaire, the patient has reported the following services have " been completed -Eye Exam, this information has been sent to the abstraction team.  Health Care Directive  Patient does not have a Health Care Directive or Living Will: Patient states has Advance Directive and will bring in a copy to clinic.  HPI        1/31/2024   General Health   How would you rate your overall physical health? Excellent   Feel stress (tense, anxious, or unable to sleep) Not at all         1/31/2024   Nutrition   Diet: Diabetic    Carbohydrate counting         1/31/2024   Exercise   Days per week of moderate/strenous exercise 6 days         1/31/2024   Social Factors   Frequency of gathering with friends or relatives Twice a week   Worry food won't last until get money to buy more No   Food not last or not have enough money for food? No   Do you have housing?  Yes   Are you worried about losing your housing? No   Lack of transportation? No   Unable to get utilities (heat,electricity)? No         1/31/2024   Fall Risk   Fallen 2 or more times in the past year? No    No   Trouble with walking or balance? No          1/31/2024   Activities of Daily Living- Home Safety   Needs help with the following daily activites None of the above   Safety concerns in the home None of the above         1/31/2024   Dental   Dentist two times every year? Yes         1/31/2024   Hearing Screening   Hearing concerns? None of the above         1/31/2024   Driving Risk Screening   Patient/family members have concerns about driving No         1/31/2024   General Alertness/Fatigue Screening   Have you been more tired than usual lately? No         1/31/2024   Urinary Incontinence Screening   Bothered by leaking urine in past 6 months No          No data to display                  Today's PHQ-2 Score:       1/31/2024     2:17 PM   PHQ-2 ( 1999 [employer])   Q1: Little interest or pleasure in doing things 0   Q2: Feeling down, depressed or hopeless 0   PHQ-2 Score 0   Q1: Little interest or pleasure in doing things Not at all    Q2: Feeling down, depressed or hopeless Not at all   PHQ-2 Score 0           2024   Substance Use   Alcohol more than 3/day or more than 7/wk No   Do you have a current opioid prescription? No   How severe/bad is pain from 1 to 10? 0/10 (No Pain)   Do you use any other substances recreationally? No     Social History     Tobacco Use    Smoking status: Former     Types: Cigarettes     Quit date: 10/3/2007     Years since quittin.3    Smokeless tobacco: Never   Vaping Use    Vaping Use: Never used   Substance Use Topics    Alcohol use: No    Drug use: No       The ASCVD Risk score (Rafael NICHOLAS, et al., 2019) failed to calculate for the following reasons:    The valid total cholesterol range is 130 to 320 mg/dL          Reviewed and updated as needed this visit by Provider                    Past Medical History:   Diagnosis Date    Arthritis     neck and hip and generalized    Cerebral infarction (H)     TIA?    Diabetes (H)     Hyperlipidemia     Hypertension     Obese      Past Surgical History:   Procedure Laterality Date    ARTHROPLASTY HIP Right 2021    Procedure: Right  ARTHROPLASTY, HIP, TOTAL;  Surgeon: Charles Antonio MD;  Location: [facility]    ENDARTERECTOMY CAROTID Right 2018    Procedure: ENDARTERECTOMY CAROTID;  RIGHT CAROTID ENDARTERECTOMY WITH EEG;  Surgeon: Gaurav Bustos MD;  Location:  OR    ENDARTERECTOMY CAROTID Left 2018    Procedure: ENDARTERECTOMY CAROTID;  LEFT CAROTID ENDARTERECTOMY with EXTERNAL JUGULAR VEIN PATCH;  Surgeon: Gaurav Bustos MD;  Location:  OR    ENDARTERECTOMY CAROTID Left 2019    Procedure: REDO LEFT CAROTID ENDARTERECTOMY WITH EEG with greater saphenous vein graft;  Surgeon: Gaurav Bustos MD;  Location: [facility] OR     OB History   No obstetric history on file.     Lab work is in process  Labs reviewed in EPIC  BP Readings from Last 3 Encounters:   24 124/70   23 121/70   23 130/68    Wt Readings from  Last 3 Encounters:   24 106.1 kg (234 lb)   23 110.5 kg (243 lb 9.6 oz)   23 112 kg (247 lb)                  Patient Active Problem List   Diagnosis    Benign essential hypertension    Mixed hyperlipidemia    Abdominal aortic aneurysm (H24)    Fatty liver    Type 2 diabetes mellitus with hyperglycemia, without long-term current use of insulin (H)    Morbid obesity (H)    Bilateral carotid artery disease (H24)    Carotid artery stenosis, symptomatic, right    Carotid stenosis, asymptomatic    Carotid stenosis, left    Carotid artery disease (H24)    Arthritis of right hip    Status post total replacement of right hip    GERD (gastroesophageal reflux disease)     Past Surgical History:   Procedure Laterality Date    ARTHROPLASTY HIP Right 2021    Procedure: Right  ARTHROPLASTY, HIP, TOTAL;  Surgeon: Charles Antonio MD;  Location: [facility]    ENDARTERECTOMY CAROTID Right 2018    Procedure: ENDARTERECTOMY CAROTID;  RIGHT CAROTID ENDARTERECTOMY WITH EEG;  Surgeon: Gaurav Bustos MD;  Location:  OR    ENDARTERECTOMY CAROTID Left 2018    Procedure: ENDARTERECTOMY CAROTID;  LEFT CAROTID ENDARTERECTOMY with EXTERNAL JUGULAR VEIN PATCH;  Surgeon: Gaurav Bustos MD;  Location:  OR    ENDARTERECTOMY CAROTID Left 2019    Procedure: REDO LEFT CAROTID ENDARTERECTOMY WITH EEG with greater saphenous vein graft;  Surgeon: Gaurav Bustos MD;  Location:  OR       Social History     Tobacco Use    Smoking status: Former     Types: Cigarettes     Quit date: 10/3/2007     Years since quittin.3    Smokeless tobacco: Never   Substance Use Topics    Alcohol use: No     No family history on file.      Current Outpatient Medications   Medication Sig Dispense Refill    acetaminophen (TYLENOL) 500 MG tablet Take 1,500 mg by mouth daily as needed for pain      amLODIPine (NORVASC) 5 MG tablet Take 1 tablet (5 mg) by mouth daily 90 tablet 1    aspirin 81 MG EC tablet Take 81  mg by mouth daily      blood glucose (NO BRAND SPECIFIED) test strip Use to test blood sugar 1 time daily 100 strip 0    blood glucose monitoring (RHYS MICROLET) lancets Use to test blood sugar 1 times daily or as directed. 100 each 5    Continuous Blood Gluc Sensor (FREESTYLE THANIA 2 SENSOR) MISC CHANGE EVERY 14 DAYS 2 each 11    empagliflozin (JARDIANCE) 25 MG TABS tablet Take 1 tablet by mouth once daily 90 tablet 0    glimepiride (AMARYL) 1 MG tablet Take 1 tablet (1 mg) by mouth every morning (before breakfast) 90 tablet 0    losartan (COZAAR) 100 MG tablet TAKE 1 TABLET BY MOUTH ONCE DAILY . APPOINTMENT REQUIRED FOR FUTURE REFILLS 90 tablet 1    metFORMIN (GLUCOPHAGE XR) 500 MG 24 hr tablet TAKE 2 TABLETS BY MOUTH TWICE DAILY WITH MEALS 360 tablet 0    rosuvastatin (CRESTOR) 20 MG tablet Take 1 tablet by mouth once daily 90 tablet 2    nitroGLYcerin (NITROSTAT) 0.4 MG sublingual tablet For chest pain place 1 tablet under the tongue every 5 minutes for 3 doses. If symptoms persist 5 minutes after 1st dose call 911. (Patient not taking: Reported on 1/31/2024) 12 tablet 0     No Known Allergies  Recent Labs   Lab Test 09/29/23  0743 06/21/23  0928 05/17/23  1317 04/19/23  0838 03/21/23  0845 10/03/22  0906 12/06/21  0909 12/06/21  0909 06/09/21  1043 06/01/21  1534 03/16/21  0904 03/15/21  1536 06/03/19  1143 06/03/19  0920 04/01/19  1148 11/19/18  1048 03/27/18  1545 10/06/17  1000   A1C 7.7* 7.4*  --   --  10.3* 9.7*   < > 7.8*  --   --    < >  --    < > 7.2*   < >  --    < > 6.4*   LDL 26  --   --   --   --  22  --  23  --   --   --   --   --  20  --   --    < >  --    HDL 41  --   --   --   --  38*  --  38*  --   --   --   --   --  46  --   --    < >  --    TRIG 128  --   --   --   --  174*  --  138  --   --   --   --   --  113  --   --    < >  --    ALT  --   --   --   --   --   --   --   --   --   --   --  63  --  31  --  35   < >  --    CR  --   --  1.09 1.13 0.88 0.89   < > 0.86 1.00 1.19  --  1.54*   <  > 0.84  --  0.88   < >  --    GFRESTIMATED  --   --  73 69 >90 >90   < > 88 76 62   < > 45*   < > 90  --  86   < >  --    GFRESTBLACK  --   --   --   --   --   --   --   --  88 71   < > 52*   < > >90  --  >90   < >  --    POTASSIUM  --   --  4.7 4.6 4.6 4.4   < > 4.6 4.8 4.5  --  5.4*   < > 4.5  --  4.1   < >  --    TSH  --   --   --   --   --   --   --   --   --   --   --   --   --  1.05  --   --   --  0.90    < > = values in this interval not displayed.      Current providers sharing in care for this patient include:  Patient Care Team:  Rell Rivers MD as PCP - General (Family Medicine)  Gaurav Bustos MD as Assigned Heart and Vascular Provider  Rell Rivers MD as Assigned PCP  Judi Dotson RD as Diabetes Educator (Dietitian, Registered)  Tiara Linda RPH as Pharmacist (Pharmacist)  Tiara Linda RPH as Assigned MTM Pharmacist    The following health maintenance items are reviewed in Epic and correct as of today:  Health Maintenance   Topic Date Due    EYE EXAM  Never done    COLORECTAL CANCER SCREENING  Never done    RSV VACCINE (Pregnancy & 60+) (1 - 1-dose 60+ series) Never done    DTAP/TDAP/TD IMMUNIZATION (2 - Td or Tdap) 01/06/2022    INFLUENZA VACCINE (1) 09/01/2023    COVID-19 Vaccine (5 - 2023-24 season) 09/01/2023    MEDICARE ANNUAL WELLNESS VISIT  10/03/2023    ANNUAL REVIEW OF HM ORDERS  10/03/2023    MICROALBUMIN  03/21/2024    DIABETIC FOOT EXAM  03/21/2024    A1C  03/29/2024    BMP  05/17/2024    LIPID  09/29/2024    FALL RISK ASSESSMENT  01/31/2025    ADVANCE CARE PLANNING  10/03/2027    HEPATITIS C SCREENING  Completed    PHQ-2 (once per calendar year)  Completed    Pneumococcal Vaccine: 65+ Years  Completed    ZOSTER IMMUNIZATION  Completed    AORTIC ANEURYSM SCREENING (SYSTEM ASSIGNED)  Completed    IPV IMMUNIZATION  Aged Out    HPV IMMUNIZATION  Aged Out    MENINGITIS IMMUNIZATION  Aged Out    RSV MONOCLONAL ANTIBODY  Aged Out    LUNG CANCER SCREENING   "Discontinued     Review of Systems    Review of Systems  Constitutional, neuro, ENT, endocrine, pulmonary, cardiac, gastrointestinal, genitourinary, musculoskeletal, integument and psychiatric systems are negative, except as otherwise noted.     Objective    Exam  /70 (BP Location: Right arm)   Pulse 77   Temp 97.1  F (36.2  C) (Tympanic)   Resp 20   Ht 1.753 m (5' 9\")   Wt 106.1 kg (234 lb)   SpO2 98%   BMI 34.56 kg/m     Estimated body mass index is 34.56 kg/m  as calculated from the following:    Height as of this encounter: 1.753 m (5' 9\").    Weight as of this encounter: 106.1 kg (234 lb).  Physical Exam  GENERAL: alert and no distress  EYES: Eyes grossly normal to inspection, PERRL and conjunctivae and sclerae normal  HENT: normal cephalic/atraumatic, nose and mouth without ulcers or lesions, oropharynx clear, and oral mucous membranes moist  NECK: no adenopathy, no asymmetry, masses, or scars  RESP: lungs clear to auscultation - no rales, rhonchi or wheezes  CV: regular rate and rhythm, normal S1 S2, no S3 or S4, no murmur, click or rub  ABDOMEN: soft, nontender, no hepatosplenomegaly, no masses   MS: no gross musculoskeletal defects noted, no edema  SKIN: no suspicious lesions or rashes  NEURO: Normal strength and tone, mentation intact and speech normal  PSYCH: mentation appears normal, affect normal/bright        1/31/2024   Mini Cog   Mini-Cog Not Completed (choose reason) Patient declines       Patient declines, there are NO concerns for cognitive deficits.         Signed Electronically by: Rell Rivers MD    "

## 2024-02-05 NOTE — PROGRESS NOTES
Medication Therapy Management (MTM) Encounter    ASSESSMENT:                            Medication Adherence/Access: No issues identified.    Type 2 Diabetes: Patient is meeting A1c goal of < 8%, due for recheck and PCP already placed future labs. Self monitoring of blood glucose is not at goal of fasting  mg/dL and post prandial < 180 mg/dL. Patient is not meeting goal of > 70% time in target with continuous glucose monitoring. Hypoglycemic episode likely caused by sulfonylurea use in older adult. Patient may benefit from changing glimepiride to pioglitazone, however will check with PCP given CV history.    Hypertension/Hyperlipidemia/Bilateral Carotid Artery Stenosis:   Stable. Patient is meeting blood pressure goal of < 130/80mmHg. On appropriate therapy of high-intensity statin and low-dose aspirin.    PLAN:                            Tiara to discuss with Dr. Rivers to consider switching glimepiride to pioglitazone 15mg daily. Call patient with response and ok to leave a voicemail.  Reminder to schedule a lab-only visit in the near future.    Follow-up: Return in 5 weeks (on 3/18/2024) for Medication Therapy Management, via phone.    SUBJECTIVE/OBJECTIVE:                          Alvarez Jang is a 73 year old male called for a follow-up visit from 1/10/24.       Reason for visit: Recheck blood sugars.    Allergies/ADRs: Reviewed in chart  Past Medical History: Reviewed in chart  Tobacco: He reports that he quit smoking about 16 years ago. His smoking use included cigarettes. He has never used smokeless tobacco.  Alcohol: none    Medication Adherence/Access: No issues reported.    Type 2 Diabetes:   Metformin ER 1000mg twice daily with meals.  Glimepiride 1mg every morning with breakfast.   Jardiance 25mg every morning - started on 3/29/23, dose increased on 4/19/23.  Cost of Jardiance is expensive, which he can afford however prefers not to add GLP1 because of additional cost and prefers not to do  injections.  Patient report no current medication side effects.  Blood sugar monitoring: Continuous Glucose Monitor - Freestyle Garret 2, see report below. Low yesterday afternoon and felt dizzy with blood sugar in 60's mg/dL. Physical labor for the last 5 days.        Lab Results   Component Value Date    UMALCR 27.05 (H) 03/21/2023      Lab Results   Component Value Date    A1C 7.7 09/29/2023    A1C 7.4 06/21/2023    A1C 10.3 03/21/2023    A1C 9.7 10/03/2022    A1C 7.8 12/06/2021    A1C 7.0 06/01/2021    A1C 7.0 10/05/2020    A1C 8.8 05/04/2020    A1C 7.0 10/14/2019    A1C 7.2 06/05/2019     Hypertension/Hyperlipidemia/Bilateral Carotid Artery Stenosis:   Losartan 100mg daily.  Amlodipine 5mg daily.  Rosuvastatin 20mg daily.  Aspirin 81mg daily.  Nitrostat on hand (never used, last filled 3/2023).    Patient has a BP machine with arm cuff but doesn't check regularly.   Patient reports no current medication side effects.  No known history of CHF. Denies any edema issues.    Recent Labs   Lab Test 09/29/23  0743 10/03/22  0906   CHOL 93 95   HDL 41 38*   LDL 26 22   TRIG 128 174*     BP Readings from Last 3 Encounters:   01/31/24 124/70   05/17/23 121/70   03/21/23 130/68     Pulse Readings from Last 3 Encounters:   01/31/24 77   05/17/23 72   03/21/23 71     Last Comprehensive Metabolic Panel:  Lab Results   Component Value Date     05/17/2023    POTASSIUM 4.7 05/17/2023    CHLORIDE 105 05/17/2023    CO2 26 05/17/2023    ANIONGAP 12 05/17/2023     (H) 05/17/2023    BUN 21.7 05/17/2023    CR 1.09 05/17/2023    GFRESTIMATED 73 05/17/2023    ROWENA 9.6 06/21/2023     Today's Vitals: There were no vitals taken for this visit.  ----------------    I spent 10 minutes with this patient today. All changes were made via collaborative practice agreement with Rell Rivers MD. A copy of the visit note was provided to the patient's provider(s).    A summary of these recommendations was sent via [employer].    Tiara  Alexei, PharmD, BCACP  Medication Therapy Management Pharmacist  [facility]    Telemedicine Visit Details  Type of service:  Telephone visit  Start Time:  0900  End Time:  0910     Medication Therapy Recommendations  Type 2 diabetes mellitus with hyperglycemia, without long-term current use of insulin (H)    Current Medication: glimepiride (AMARYL) 1 MG tablet (Discontinued)   Rationale: Undesirable effect - Adverse medication event - Safety   Recommendation: Change Medication - pioglitazone 15 MG tablet   Status: Accepted per Provider

## 2024-02-14 ENCOUNTER — VIRTUAL VISIT (OUTPATIENT)
Dept: PHARMACY | Facility: CLINIC | Age: 74
End: 2024-02-14
Payer: COMMERCIAL

## 2024-02-14 DIAGNOSIS — E78.2 MIXED HYPERLIPIDEMIA: ICD-10-CM

## 2024-02-14 DIAGNOSIS — I10 BENIGN ESSENTIAL HYPERTENSION: ICD-10-CM

## 2024-02-14 DIAGNOSIS — E11.65 TYPE 2 DIABETES MELLITUS WITH HYPERGLYCEMIA, WITHOUT LONG-TERM CURRENT USE OF INSULIN (H): Primary | ICD-10-CM

## 2024-02-14 DIAGNOSIS — I65.23 BILATERAL CAROTID ARTERY STENOSIS: ICD-10-CM

## 2024-02-14 PROCEDURE — 99606 MTMS BY PHARM EST 15 MIN: CPT | Mod: 93 | Performed by: PHARMACIST

## 2024-02-14 RX ORDER — PIOGLITAZONEHYDROCHLORIDE 15 MG/1
15 TABLET ORAL DAILY
Qty: 30 TABLET | Refills: 1 | Status: SHIPPED | OUTPATIENT
Start: 2024-02-14 | End: 2024-04-15

## 2024-02-14 NOTE — PATIENT INSTRUCTIONS
"Recommendations from today's MTM visit:                                                    MTM (medication therapy management) is a service provided by a clinical pharmacist designed to help you get the most of out of your medicines.   Today we reviewed what your medicines are for, how to know if they are working, that your medicines are safe and how to make your medicine regimen as easy as possible.      Tiraa to discuss with Dr. Rivers to consider switching glimepiride to pioglitazone.  Reminder to schedule a lab-only visit in the near future.    Follow-up: Return in 5 weeks (on 3/18/2024) for Medication Therapy Management, via phone.    It was great speaking with you today.  I value your experience and would be very thankful for your time in providing feedback in our clinic survey. In the next few days, you may receive an email or text message from [employer] with a link to a survey related to your  clinical pharmacist.\"     To schedule another MTM appointment, please call the clinic directly or you may call the MTM scheduling line at [phone] or toll-free at [phone].     My Clinical Pharmacist's contact information:                                                      Please feel free to contact me with any questions or concerns you have.      Tiara Linda, PharmD, BCACP  Medication Therapy Management Pharmacist  Voicemail: [phone] (Mon/Wed only)     "

## 2024-02-14 NOTE — PROGRESS NOTES
I called and notified patient to stop glimepiride and start pioglitazone 15mg daily. Sent prescription to [facility] pharmacy. Patient will monitor for edema and call clinic if side effects arise. Scheduled MTM follow-up on 3/18/24 via phone.    Tiara Linda PharmD, [facility]  Medication Therapy Management Pharmacist  ----- Message -----  From: Rell Rivers MD  Sent: 2/14/2024  11:59 AM CST  To: Tiara Linda [facility]  Subject: RE: MTM Question                                 Thanks Tiara,    You can try Actos for better blood glycemic control.    Thanks    Dr Rivers  ----- Message -----  From: Tiara Linda [facility]  Sent: 2/14/2024  10:40 AM CST  To: Rell Rivers MD  Subject: MTM Question                                     Hi Dr. Rivers - Last month I restarted glimepiride due to higher blood sugars, but even at the lowest 1mg dose, he still had a low blood sugar. I'm wondering if it would be ok to try Actos instead? He has history of bilateral carotid artery stenosis, but no known CHF or edema. Unfortunately can't afford GLP1. Let me know if ok to try Actos or if you prefer I avoid it due to CHF risks. Thank you!    Tiara Linda PharmD, [facility]  Medication Therapy Management Pharmacist

## 2024-02-29 DIAGNOSIS — E11.65 TYPE 2 DIABETES MELLITUS WITH HYPERGLYCEMIA, WITHOUT LONG-TERM CURRENT USE OF INSULIN (H): Chronic | ICD-10-CM

## 2024-02-29 RX ORDER — METFORMIN HCL 500 MG
1000 TABLET, EXTENDED RELEASE 24 HR ORAL 2 TIMES DAILY WITH MEALS
Qty: 360 TABLET | Refills: 0 | Status: SHIPPED | OUTPATIENT
Start: 2024-02-29 | End: 2024-06-17

## 2024-03-16 NOTE — PROGRESS NOTES
Medication Therapy Management (MTM) Encounter    ASSESSMENT:                            Medication Adherence/Access: No issues identified    Type 2 Diabetes: Patient is meeting A1c goal of < 8%, although due for recheck. Self monitoring of blood glucose is not at goal of fasting  mg/dL and post prandial < 180 mg/dL.Patient is not meeting goal of > 70% time in target with continuous glucose monitoring. CGM report still showing hyperglycemia, however lows resolved off sulfonylurea. Patient opts to await upcoming A1c and may consider increasing Actos dose thereafter if necessary.    PLAN:                            Continue same medications for now.  Lab appointment scheduled on April 2nd. If A1c elevated, then suggest increasing pioglitazone to 30mg daily.     Follow-up: Return in about 15 days (around 4/2/2024) for Lab Work.    SUBJECTIVE/OBJECTIVE:                          Alvarez Jang is a 73 year old male called for a follow-up visit.       Reason for visit: Recheck blood sugars.    Allergies/ADRs: Reviewed in chart  Past Medical History: Reviewed in chart  Tobacco: He reports that he quit smoking about 16 years ago. His smoking use included cigarettes. He has never used smokeless tobacco.  Alcohol: none    Medication Adherence/Access: no issues reported    Type 2 Diabetes:   Metformin ER 1000mg twice daily with meals.  Pioglitazone 15mg daily - switched from glimepiride on 2/14/24.   Jardiance 25mg every morning - started on 3/29/23, dose increased on 4/19/23.  Cost of Jardiance is expensive, which he can afford however prefers not to add GLP1 because of additional cost and prefers not to do injections.  Patient report no current medication side effects. Denies any swelling/edema.  Blood sugar monitoring: Continuous Glucose Monitor - Freestyle Garret 2, see report below.   Diet/exercise: Still losing a bit of weight. He doesn't watch what he eats.  Eye/foot exams: up to date        Lab Results   Component  "Value Date    UMALCR 27.05 (H) 03/21/2023      Lab Results   Component Value Date    A1C 7.7 09/29/2023    A1C 7.4 06/21/2023    A1C 10.3 03/21/2023    A1C 9.7 10/03/2022    A1C 7.8 12/06/2021    A1C 7.0 06/01/2021    A1C 7.0 10/05/2020    A1C 8.8 05/04/2020    A1C 7.0 10/14/2019    A1C 7.2 06/05/2019     Today's Vitals: There were no vitals taken for this visit.    BP Readings from Last 1 Encounters:   01/31/24 124/70     Pulse Readings from Last 1 Encounters:   01/31/24 77     Wt Readings from Last 1 Encounters:   01/31/24 234 lb (106.1 kg)     Ht Readings from Last 1 Encounters:   01/31/24 5' 9\" (1.753 m)     Estimated body mass index is 34.56 kg/m  as calculated from the following:    Height as of 1/31/24: 5' 9\" (1.753 m).    Weight as of 1/31/24: 234 lb (106.1 kg).    ----------------    I spent 10 minutes with this patient today. All changes were made via collaborative practice agreement with Rell Rivers MD. A copy of the visit note was provided to the patient's provider(s).    A summary of these recommendations was sent via [employer].    Tiara Linda, Carlos, BCACP  Medication Therapy Management Pharmacist  [facility]    Telemedicine Visit Details  Type of service:  Telephone visit  Start Time:  0920  End Time:  0930     Medication Therapy Recommendations  No medication therapy recommendations to display   "

## 2024-03-18 ENCOUNTER — VIRTUAL VISIT (OUTPATIENT)
Dept: PHARMACY | Facility: CLINIC | Age: 74
End: 2024-03-18
Payer: COMMERCIAL

## 2024-03-18 DIAGNOSIS — E11.65 TYPE 2 DIABETES MELLITUS WITH HYPERGLYCEMIA, WITHOUT LONG-TERM CURRENT USE OF INSULIN (H): Primary | ICD-10-CM

## 2024-03-18 PROCEDURE — 99606 MTMS BY PHARM EST 15 MIN: CPT | Mod: 93 | Performed by: PHARMACIST

## 2024-03-18 NOTE — PATIENT INSTRUCTIONS
"Recommendations from today's MTM visit:                                                    MTM (medication therapy management) is a service provided by a clinical pharmacist designed to help you get the most of out of your medicines.   Today we reviewed what your medicines are for, how to know if they are working, that your medicines are safe and how to make your medicine regimen as easy as possible.      Continue same medications for now.  Lab appointment scheduled on April 2nd. If A1c elevated, then suggest increasing pioglitazone to 30mg daily.     Follow-up: Return in about 15 days (around 4/2/2024) for Lab Work.    It was great speaking with you today.  I value your experience and would be very thankful for your time in providing feedback in our clinic survey. In the next few days, you may receive an email or text message from [employer] with a link to a survey related to your  clinical pharmacist.\"     To schedule another MTM appointment, please call the clinic directly or you may call the MTM scheduling line at [phone] or toll-free at [phone].     My Clinical Pharmacist's contact information:                                                      Please feel free to contact me with any questions or concerns you have.      Tiara Linda, PharmD, BCACP  Medication Therapy Management Pharmacist  Voicemail: [phone] (Mon/Wed only)     "

## 2024-03-24 DIAGNOSIS — E11.65 TYPE 2 DIABETES MELLITUS WITH HYPERGLYCEMIA, WITHOUT LONG-TERM CURRENT USE OF INSULIN (H): ICD-10-CM

## 2024-03-25 RX ORDER — EMPAGLIFLOZIN 25 MG/1
TABLET, FILM COATED ORAL
Qty: 90 TABLET | Refills: 1 | Status: SHIPPED | OUTPATIENT
Start: 2024-03-25 | End: 2024-09-30

## 2024-04-02 ENCOUNTER — LAB (OUTPATIENT)
Dept: LAB | Facility: CLINIC | Age: 74
End: 2024-04-02
Payer: COMMERCIAL

## 2024-04-02 DIAGNOSIS — E11.65 TYPE 2 DIABETES MELLITUS WITH HYPERGLYCEMIA, WITHOUT LONG-TERM CURRENT USE OF INSULIN (H): Primary | ICD-10-CM

## 2024-04-02 DIAGNOSIS — Z12.5 SCREENING FOR PROSTATE CANCER: ICD-10-CM

## 2024-04-02 LAB
ANION GAP SERPL CALCULATED.3IONS-SCNC: 12 MMOL/L (ref 7–15)
BUN SERPL-MCNC: 20.2 MG/DL (ref 8–23)
CALCIUM SERPL-MCNC: 9.3 MG/DL (ref 8.8–10.2)
CHLORIDE SERPL-SCNC: 105 MMOL/L (ref 98–107)
CHOLEST SERPL-MCNC: 105 MG/DL
CREAT SERPL-MCNC: 1.05 MG/DL (ref 0.67–1.17)
CREAT UR-MCNC: 65.8 MG/DL
DEPRECATED HCO3 PLAS-SCNC: 25 MMOL/L (ref 22–29)
EGFRCR SERPLBLD CKD-EPI 2021: 75 ML/MIN/1.73M2
FASTING STATUS PATIENT QL REPORTED: YES
GLUCOSE SERPL-MCNC: 177 MG/DL (ref 70–99)
HBA1C MFR BLD: 8.5 % (ref 0–5.6)
HDLC SERPL-MCNC: 48 MG/DL
LDLC SERPL CALC-MCNC: 29 MG/DL
MICROALBUMIN UR-MCNC: <12 MG/L
MICROALBUMIN/CREAT UR: NORMAL MG/G{CREAT}
NONHDLC SERPL-MCNC: 57 MG/DL
POTASSIUM SERPL-SCNC: 4.5 MMOL/L (ref 3.4–5.3)
PSA SERPL DL<=0.01 NG/ML-MCNC: 0.11 NG/ML (ref 0–6.5)
SODIUM SERPL-SCNC: 142 MMOL/L (ref 135–145)
TRIGL SERPL-MCNC: 141 MG/DL

## 2024-04-02 PROCEDURE — 83036 HEMOGLOBIN GLYCOSYLATED A1C: CPT

## 2024-04-02 PROCEDURE — 80048 BASIC METABOLIC PNL TOTAL CA: CPT

## 2024-04-02 PROCEDURE — 82043 UR ALBUMIN QUANTITATIVE: CPT

## 2024-04-02 PROCEDURE — G0103 PSA SCREENING: HCPCS

## 2024-04-02 PROCEDURE — 80061 LIPID PANEL: CPT

## 2024-04-02 PROCEDURE — 36415 COLL VENOUS BLD VENIPUNCTURE: CPT

## 2024-04-02 PROCEDURE — 82570 ASSAY OF URINE CREATININE: CPT

## 2024-04-15 DIAGNOSIS — E11.65 TYPE 2 DIABETES MELLITUS WITH HYPERGLYCEMIA, WITHOUT LONG-TERM CURRENT USE OF INSULIN (H): ICD-10-CM

## 2024-04-15 RX ORDER — PIOGLITAZONEHYDROCHLORIDE 15 MG/1
15 TABLET ORAL DAILY
Qty: 90 TABLET | Refills: 1 | Status: SHIPPED | OUTPATIENT
Start: 2024-04-15 | End: 2024-07-02

## 2024-04-15 NOTE — TELEPHONE ENCOUNTER
Requested Prescriptions   Pending Prescriptions Disp Refills    pioglitazone (ACTOS) 15 MG tablet [Pharmacy Med Name: Pioglitazone HCl 15 MG Oral Tablet] 30 tablet 0     Sig: Take 1 tablet by mouth once daily       Thiazolidinedione Agents (TZDs)  Failed - 4/15/2024  8:03 AM        Failed - Patient has a normal ALT within the past 12 mos.     Recent Labs   Lab Test 03/15/21  1536   ALT 63             Failed - Patient has a normal AST within the past 12 mos.      Recent Labs   Lab Test 03/15/21  1536   AST 21             Passed - Patient has documented A1c within the specified period of time.     If HgbA1C is 8 or greater, it needs to be on file within the past 3 months.  If less than 8, must be on file within the past 6 months.     Recent Labs   Lab Test 04/02/24  0746   A1C 8.5*             Passed - Diagnosis not CHF        Passed - Medication is active on med list        Passed - Has GFR on file in past 12 months and most recent value is normal        Passed - Recent (6 mo) or future (90 days) visit within the authorizing provider's specialty     The patient must have completed an in-person or virtual visit within the past 6 months or has a future visit scheduled within the next 90 days with the authorizing provider s specialty.  Urgent care and e-visits do not quality as an office visit for this protocol.          Passed - Medication indicated for associated diagnosis     Medication is associated with one or more of the following diagnoses:   - Type 2 diabetes mellitus          Passed - Patient is age 18 or older

## 2024-05-19 DIAGNOSIS — E11.65 TYPE 2 DIABETES MELLITUS WITH HYPERGLYCEMIA, WITHOUT LONG-TERM CURRENT USE OF INSULIN (H): Chronic | ICD-10-CM

## 2024-05-29 DIAGNOSIS — I10 BENIGN ESSENTIAL HYPERTENSION: ICD-10-CM

## 2024-05-29 DIAGNOSIS — E11.65 TYPE 2 DIABETES MELLITUS WITH HYPERGLYCEMIA, WITHOUT LONG-TERM CURRENT USE OF INSULIN (H): Chronic | ICD-10-CM

## 2024-05-29 RX ORDER — METFORMIN HCL 500 MG
1000 TABLET, EXTENDED RELEASE 24 HR ORAL 2 TIMES DAILY WITH MEALS
Qty: 360 TABLET | Refills: 0 | OUTPATIENT
Start: 2024-05-29

## 2024-05-29 RX ORDER — AMLODIPINE BESYLATE 5 MG/1
5 TABLET ORAL DAILY
Qty: 90 TABLET | Refills: 0 | OUTPATIENT
Start: 2024-05-29

## 2024-05-29 NOTE — TELEPHONE ENCOUNTER
Left message for patient to call care team.     Patient requesting the following Medication:   Metformin and Amlodipine    Patient due for Diabetic check up

## 2024-05-29 NOTE — TELEPHONE ENCOUNTER
Overdue for needed care. Please call to schedule in clinic appointment/diabetic follow up. Once appt is scheduled, route back to the pool.

## 2024-06-15 DIAGNOSIS — I10 BENIGN ESSENTIAL HYPERTENSION: ICD-10-CM

## 2024-06-15 DIAGNOSIS — E11.65 TYPE 2 DIABETES MELLITUS WITH HYPERGLYCEMIA, WITHOUT LONG-TERM CURRENT USE OF INSULIN (H): Chronic | ICD-10-CM

## 2024-06-16 DIAGNOSIS — E11.65 TYPE 2 DIABETES MELLITUS WITH HYPERGLYCEMIA, WITHOUT LONG-TERM CURRENT USE OF INSULIN (H): Primary | Chronic | ICD-10-CM

## 2024-06-17 RX ORDER — METFORMIN HCL 500 MG
1000 TABLET, EXTENDED RELEASE 24 HR ORAL 2 TIMES DAILY WITH MEALS
Qty: 360 TABLET | Refills: 1 | Status: SHIPPED | OUTPATIENT
Start: 2024-06-17

## 2024-06-17 RX ORDER — LOSARTAN POTASSIUM 100 MG/1
100 TABLET ORAL DAILY
Qty: 90 TABLET | Refills: 1 | Status: SHIPPED | OUTPATIENT
Start: 2024-06-17

## 2024-06-17 NOTE — TELEPHONE ENCOUNTER
Left message for patient to call care team.     Patient requesting the following Medication:   Glimepiride 1MG

## 2024-06-17 NOTE — TELEPHONE ENCOUNTER
Patient is overdue for needed care, patient was advised to schedule a follow up appointment on 4/3/24 per Dr. Rivers's request.    Pls assist patient with scheduling this then routed back to RN.    Lab Results   Component Value Date    A1C 8.5 04/02/2024    A1C 7.7 09/29/2023    A1C 7.4 06/21/2023    A1C 10.3 03/21/2023    A1C 9.7 10/03/2022    A1C 7.0 06/01/2021    A1C 7.0 10/05/2020    A1C 8.8 05/04/2020    A1C 7.0 10/14/2019    A1C 7.2 06/05/2019       Julie Behrendt RN

## 2024-06-18 RX ORDER — GLIMEPIRIDE 1 MG/1
1 TABLET ORAL
Qty: 30 TABLET | Refills: 0 | Status: SHIPPED | OUTPATIENT
Start: 2024-06-18 | End: 2024-07-15

## 2024-06-23 DIAGNOSIS — I10 BENIGN ESSENTIAL HYPERTENSION: ICD-10-CM

## 2024-06-24 RX ORDER — AMLODIPINE BESYLATE 5 MG/1
5 TABLET ORAL DAILY
Qty: 90 TABLET | Refills: 1 | Status: SHIPPED | OUTPATIENT
Start: 2024-06-24

## 2024-07-02 ENCOUNTER — OFFICE VISIT (OUTPATIENT)
Dept: FAMILY MEDICINE | Facility: CLINIC | Age: 74
End: 2024-07-02
Payer: COMMERCIAL

## 2024-07-02 VITALS
HEART RATE: 69 BPM | TEMPERATURE: 97 F | SYSTOLIC BLOOD PRESSURE: 130 MMHG | HEIGHT: 69 IN | OXYGEN SATURATION: 98 % | RESPIRATION RATE: 20 BRPM | BODY MASS INDEX: 34.54 KG/M2 | WEIGHT: 233.2 LBS | DIASTOLIC BLOOD PRESSURE: 70 MMHG

## 2024-07-02 DIAGNOSIS — I10 BENIGN ESSENTIAL HYPERTENSION: Chronic | ICD-10-CM

## 2024-07-02 DIAGNOSIS — Z12.11 SCREEN FOR COLON CANCER: ICD-10-CM

## 2024-07-02 DIAGNOSIS — E11.69 TYPE 2 DIABETES MELLITUS WITH OTHER SPECIFIED COMPLICATION, WITHOUT LONG-TERM CURRENT USE OF INSULIN (H): Primary | ICD-10-CM

## 2024-07-02 LAB — HBA1C MFR BLD: 7.6 % (ref 0–5.6)

## 2024-07-02 PROCEDURE — G2211 COMPLEX E/M VISIT ADD ON: HCPCS | Performed by: FAMILY MEDICINE

## 2024-07-02 PROCEDURE — 36415 COLL VENOUS BLD VENIPUNCTURE: CPT | Performed by: FAMILY MEDICINE

## 2024-07-02 PROCEDURE — 99214 OFFICE O/P EST MOD 30 MIN: CPT | Performed by: FAMILY MEDICINE

## 2024-07-02 PROCEDURE — 83036 HEMOGLOBIN GLYCOSYLATED A1C: CPT | Performed by: FAMILY MEDICINE

## 2024-07-02 ASSESSMENT — PAIN SCALES - GENERAL: PAINLEVEL: MODERATE PAIN (5)

## 2024-07-02 NOTE — PROGRESS NOTES
{PROVIDER CHARTING PREFERENCE:619590}    Shawn Pino is a 73 year old, presenting for the following health issues:  Diabetes  {(!) Visit Details have not yet been documented.  Please enter Visit Details and then use this list to pull in documentation. (Optional):093543}  HPI       Diabetes Follow-up    How often are you checking your blood sugar? { :186749}  What concerns do you have today about your diabetes? { :302277}   Do you have any of these symptoms? (Select all that apply)  { :012750}      BP Readings from Last 2 Encounters:   01/31/24 124/70   05/17/23 121/70     Hemoglobin A1C (%)   Date Value   04/02/2024 8.5 (H)   09/29/2023 7.7 (H)   06/01/2021 7.0 (H)   10/05/2020 7.0 (H)     LDL Cholesterol Calculated (mg/dL)   Date Value   04/02/2024 29   09/29/2023 26   06/03/2019 20   05/11/2018 45     LDL Cholesterol Direct (mg/dL)   Date Value   10/02/2018 48       {Reference  Diabetes Management Resources  Blood Glucose Log - 3 weeks  Blood Glucose Log with Food and Insulin Record :741202}  How many servings of fruits and vegetables do you eat daily?  { :754911}  On average, how many sweetened beverages do you drink each day (Examples: soda, juice, sweet tea, etc.  Do NOT count diet or artificially sweetened beverages)?   { 1-11:441964}  How many days per week do you exercise enough to make your heart beat faster? { :826863}  How many minutes a day do you exercise enough to make your heart beat faster? { :786993}  How many days per week do you miss taking your medication? {0-7 :793657}  {additonal problems for provider to add (Optional):829665}    {ROS Picklists (Optional):767172}      Objective    There were no vitals taken for this visit.  There is no height or weight on file to calculate BMI.  Physical Exam   {Exam List (Optional):028257}    {Diagnostic Test Results (Optional):996324}        Signed Electronically by: Rell Rivers MD  {Email feedback regarding this note to  [email or web address]   :146966}

## 2024-07-02 NOTE — PROGRESS NOTES
Assessment & Plan     Type 2 diabetes mellitus with other specified complication, without long-term current use of insulin (H)  Hemoglobin A1c 7.6, within target goal of less than 8.  Will recommend to continue Jardiance, glimepiride and metformin.  Patient could not tolerate Actos well.  Recommended to continue regular exercise, healthy diet and weight loss  - Hemoglobin A1c; Future  - Hemoglobin A1c    Screen for colon cancer  Patient deferred colon cancer screening    Benign essential hypertension  Blood pressure within target goal of less than 140/90.  Recommended to continue amlodipine and losartan      Blood sugar testing frequency justification:  Adjustment of medication(s)      Shawn Pino is a 73 year old, presenting for the following health issues:  Wants cortisone shot  Diabetes, Hypertension, and Lipids    History of Present Illness       Diabetes:   He presents for follow up of diabetes.  He is checking home blood glucose three times daily.   He checks blood glucose before meals and before and after meals.  Blood glucose is sometimes over 200 and never under 70.  When his blood glucose is low, the patient is asymptomatic for confusion, blurred vision, lethargy and reports not feeling dizzy, shaky, or weak.   He has no concerns regarding his diabetes at this time.  He is having numbness in feet.            Hyperlipidemia:  He presents for follow up of hyperlipidemia.   He is taking medication to lower cholesterol. He is not having myalgia or other side effects to statin medications.    Hypertension: He presents for follow up of hypertension.  He does not check blood pressure  regularly outside of the clinic. Outpatient blood pressures have not been over 140/90. He follows a low salt diet.     He eats 2-3 servings of fruits and vegetables daily.He exercises with enough effort to increase his heart rate 60 or more minutes per day.  He exercises with enough effort to increase his heart rate 6 days  "per week.   He is taking medications regularly.      BP Readings from Last 2 Encounters:   07/02/24 130/70   01/31/24 124/70     Hemoglobin A1C (%)   Date Value   07/02/2024 7.6 (H)   04/02/2024 8.5 (H)   06/01/2021 7.0 (H)   10/05/2020 7.0 (H)     LDL Cholesterol Calculated (mg/dL)   Date Value   04/02/2024 29   09/29/2023 26   06/03/2019 20   05/11/2018 45     LDL Cholesterol Direct (mg/dL)   Date Value   10/02/2018 48       Review of Systems  Constitutional, neuro, ENT, endocrine, pulmonary, cardiac, gastrointestinal, genitourinary, musculoskeletal, integument and psychiatric systems are negative, except as otherwise noted.      Objective    /70   Pulse 69   Temp 97  F (36.1  C) (Tympanic)   Resp 20   Ht 1.753 m (5' 9\")   Wt 105.8 kg (233 lb 3.2 oz)   SpO2 98%   BMI 34.44 kg/m    Body mass index is 34.44 kg/m .  Physical Exam   GENERAL: alert and no distress  EYES: Eyes grossly normal to inspection, PERRL and conjunctivae and sclerae normal  HENT: normal cephalic/atraumatic, nose and mouth without ulcers or lesions, oropharynx clear, and oral mucous membranes moist  NECK: no adenopathy, no asymmetry, masses, or scars  RESP: lungs clear to auscultation - no rales, rhonchi or wheezes  CV: regular rate and rhythm, normal S1 S2, no S3 or S4, no murmur, click or rub, no peripheral edema  ABDOMEN: soft, nontender, no hepatosplenomegaly, no masses   MS: no gross musculoskeletal defects noted, no edema  NEURO: Normal strength and tone, mentation intact and speech normal  PSYCH: mentation appears normal, affect normal/bright  Diabetic foot exam: normal DP and PT pulses, no trophic changes or ulcerative lesions, and normal sensory exam      Wt Readings from Last 10 Encounters:   07/02/24 105.8 kg (233 lb 3.2 oz)   01/31/24 106.1 kg (234 lb)   05/17/23 110.5 kg (243 lb 9.6 oz)   03/21/23 112 kg (247 lb)   11/01/22 110.7 kg (244 lb)   10/24/22 110.7 kg (244 lb)   10/03/22 111.6 kg (246 lb)   12/06/21 112.9 kg " (249 lb)   10/18/21 112.5 kg (248 lb)   06/09/21 111 kg (244 lb 11.4 oz)            Signed Electronically by: Rell Rivers MD

## 2024-07-08 ENCOUNTER — ANCILLARY PROCEDURE (OUTPATIENT)
Dept: GENERAL RADIOLOGY | Facility: CLINIC | Age: 74
End: 2024-07-08
Attending: FAMILY MEDICINE
Payer: COMMERCIAL

## 2024-07-08 ENCOUNTER — OFFICE VISIT (OUTPATIENT)
Dept: FAMILY MEDICINE | Facility: CLINIC | Age: 74
End: 2024-07-08
Payer: COMMERCIAL

## 2024-07-08 VITALS
WEIGHT: 236 LBS | OXYGEN SATURATION: 96 % | DIASTOLIC BLOOD PRESSURE: 70 MMHG | BODY MASS INDEX: 34.96 KG/M2 | SYSTOLIC BLOOD PRESSURE: 120 MMHG | HEIGHT: 69 IN | RESPIRATION RATE: 18 BRPM | HEART RATE: 72 BPM | TEMPERATURE: 97.6 F

## 2024-07-08 DIAGNOSIS — M25.512 CHRONIC PAIN OF BOTH SHOULDERS: ICD-10-CM

## 2024-07-08 DIAGNOSIS — G89.29 CHRONIC PAIN OF BOTH SHOULDERS: Primary | ICD-10-CM

## 2024-07-08 DIAGNOSIS — G89.29 CHRONIC PAIN OF BOTH SHOULDERS: ICD-10-CM

## 2024-07-08 DIAGNOSIS — M19.012 PRIMARY OSTEOARTHRITIS OF BOTH SHOULDERS: ICD-10-CM

## 2024-07-08 DIAGNOSIS — M19.011 PRIMARY OSTEOARTHRITIS OF BOTH SHOULDERS: ICD-10-CM

## 2024-07-08 DIAGNOSIS — M25.511 CHRONIC PAIN OF BOTH SHOULDERS: ICD-10-CM

## 2024-07-08 DIAGNOSIS — M25.511 CHRONIC PAIN OF BOTH SHOULDERS: Primary | ICD-10-CM

## 2024-07-08 DIAGNOSIS — M25.512 CHRONIC PAIN OF BOTH SHOULDERS: Primary | ICD-10-CM

## 2024-07-08 PROCEDURE — 20610 DRAIN/INJ JOINT/BURSA W/O US: CPT | Mod: 50 | Performed by: FAMILY MEDICINE

## 2024-07-08 PROCEDURE — 73030 X-RAY EXAM OF SHOULDER: CPT | Mod: TC | Performed by: RADIOLOGY

## 2024-07-08 RX ORDER — TRIAMCINOLONE ACETONIDE 40 MG/ML
40 INJECTION, SUSPENSION INTRA-ARTICULAR; INTRAMUSCULAR ONCE
Status: COMPLETED | OUTPATIENT
Start: 2024-07-08 | End: 2024-07-08

## 2024-07-08 RX ADMIN — TRIAMCINOLONE ACETONIDE 40 MG: 40 INJECTION, SUSPENSION INTRA-ARTICULAR; INTRAMUSCULAR at 10:51

## 2024-07-08 RX ADMIN — TRIAMCINOLONE ACETONIDE 40 MG: 40 INJECTION, SUSPENSION INTRA-ARTICULAR; INTRAMUSCULAR at 10:50

## 2024-07-08 ASSESSMENT — PAIN SCALES - GENERAL: PAINLEVEL: NO PAIN (0)

## 2024-07-08 NOTE — PROGRESS NOTES
PROCEDURE:  JOINT INJECTION.- Both shoulders   X-ray findings consistent with mild bilateral shoulder osteoarthritis    After a discussion of risks, benefits and side effects of procedure, informed patient consent was obtained.  The bilateral shoulder  were prepped and draped in the usual clean fashion (sterile not required for this procedure).  3 cc of 1 % lidocaine was used for local analgesia.    ASPIRATION: Not performed.     INJECTION:  Using 3 cc of 1 % lidocaine mixed with 40 mg of kenalog, both shoulders were successfully injected without complication.  Patient did experience some pain relief following injection.      Rell Rivers MD  [facility]

## 2024-07-08 NOTE — NURSING NOTE
"Chief Complaint   Patient presents with    Musculoskeletal Problem     /70 (Cuff Size: Adult Large)   Pulse 72   Temp 97.6  F (36.4  C) (Tympanic)   Resp 18   Ht 1.753 m (5' 9\")   Wt 107 kg (236 lb)   SpO2 96%   BMI 34.85 kg/m   Estimated body mass index is 34.85 kg/m  as calculated from the following:    Height as of this encounter: 1.753 m (5' 9\").    Weight as of this encounter: 107 kg (236 lb).  Patient presents to the clinic using No DME      Health Maintenance that is potentially due pending provider review:    Health Maintenance Due   Topic Date Due    COLORECTAL CANCER SCREENING  Never done    RSV VACCINE (Pregnancy & 60+) (1 - 1-dose 60+ series) Never done    DTAP/TDAP/TD IMMUNIZATION (2 - Td or Tdap) 01/06/2022    COVID-19 Vaccine (6 - 2023-24 season) 05/31/2024                  "

## 2024-07-14 DIAGNOSIS — E11.65 TYPE 2 DIABETES MELLITUS WITH HYPERGLYCEMIA, WITHOUT LONG-TERM CURRENT USE OF INSULIN (H): Chronic | ICD-10-CM

## 2024-07-15 RX ORDER — GLIMEPIRIDE 1 MG/1
1 TABLET ORAL
Qty: 30 TABLET | Refills: 0 | Status: SHIPPED | OUTPATIENT
Start: 2024-07-15 | End: 2024-08-12

## 2024-08-11 DIAGNOSIS — E11.65 TYPE 2 DIABETES MELLITUS WITH HYPERGLYCEMIA, WITHOUT LONG-TERM CURRENT USE OF INSULIN (H): Chronic | ICD-10-CM

## 2024-08-12 RX ORDER — GLIMEPIRIDE 1 MG/1
1 TABLET ORAL
Qty: 90 TABLET | Refills: 0 | Status: SHIPPED | OUTPATIENT
Start: 2024-08-12

## 2024-08-12 NOTE — TELEPHONE ENCOUNTER
GFR Estimate   Date Value Ref Range Status   04/02/2024 75 >60 mL/min/1.73m2 Final   06/09/2021 76 >60 mL/min/[1.73_m2] Final     Comment:     Non  GFR Calc  Starting 12/18/2018, serum creatinine based estimated GFR (eGFR) will be   calculated using the Chronic Kidney Disease Epidemiology Collaboration   (CKD-EPI) equation.

## 2024-09-11 ENCOUNTER — TELEPHONE (OUTPATIENT)
Dept: PHARMACY | Facility: CLINIC | Age: 74
End: 2024-09-11
Payer: COMMERCIAL

## 2024-09-11 NOTE — TELEPHONE ENCOUNTER
This patient is due for MTM follow-up. I called the patient to schedule an appointment and left a message with the clinic phone number for the patient to call to schedule.    Tiara Linda, PharmD, BCACP  Medication Therapy Management Pharmacist

## 2024-09-25 DIAGNOSIS — I65.22 CAROTID STENOSIS, LEFT: ICD-10-CM

## 2024-09-25 DIAGNOSIS — E11.65 TYPE 2 DIABETES MELLITUS WITH HYPERGLYCEMIA, WITHOUT LONG-TERM CURRENT USE OF INSULIN (H): ICD-10-CM

## 2024-09-25 RX ORDER — ROSUVASTATIN CALCIUM 20 MG/1
20 TABLET, COATED ORAL DAILY
Qty: 90 TABLET | Refills: 1 | Status: SHIPPED | OUTPATIENT
Start: 2024-09-25

## 2024-09-28 DIAGNOSIS — E11.65 TYPE 2 DIABETES MELLITUS WITH HYPERGLYCEMIA, WITHOUT LONG-TERM CURRENT USE OF INSULIN (H): ICD-10-CM

## 2024-09-30 RX ORDER — EMPAGLIFLOZIN 25 MG/1
TABLET, FILM COATED ORAL
Qty: 90 TABLET | Refills: 0 | Status: SHIPPED | OUTPATIENT
Start: 2024-09-30

## 2024-09-30 NOTE — TELEPHONE ENCOUNTER
GFR Estimate   Date Value Ref Range Status   04/02/2024 75 >60 mL/min/1.73m2 Final   06/09/2021 76 >60 mL/min/[1.73_m2] Final     Comment:     Non  GFR Calc  Starting 12/18/2018, serum creatinine based estimated GFR (eGFR) will be   calculated using the Chronic Kidney Disease Epidemiology Collaboration   (CKD-EPI) equation.       Patient just saw provider 3 months ago.   Bindu Nogueira RN on 9/30/2024 at 12:32 PM

## 2024-10-10 ENCOUNTER — TRANSFERRED RECORDS (OUTPATIENT)
Dept: MULTI SPECIALTY CLINIC | Facility: CLINIC | Age: 74
End: 2024-10-10

## 2024-10-10 LAB — RETINOPATHY: NORMAL

## 2024-11-17 DIAGNOSIS — E11.65 TYPE 2 DIABETES MELLITUS WITH HYPERGLYCEMIA, WITHOUT LONG-TERM CURRENT USE OF INSULIN (H): Chronic | ICD-10-CM

## 2024-11-18 RX ORDER — GLIMEPIRIDE 1 MG/1
1 TABLET ORAL
Qty: 90 TABLET | Refills: 0 | Status: SHIPPED | OUTPATIENT
Start: 2024-11-18

## 2024-12-07 DIAGNOSIS — E11.65 TYPE 2 DIABETES MELLITUS WITH HYPERGLYCEMIA, WITHOUT LONG-TERM CURRENT USE OF INSULIN (H): Chronic | ICD-10-CM

## 2024-12-09 RX ORDER — METFORMIN HYDROCHLORIDE 500 MG/1
1000 TABLET, EXTENDED RELEASE ORAL 2 TIMES DAILY WITH MEALS
Qty: 360 TABLET | Refills: 0 | Status: SHIPPED | OUTPATIENT
Start: 2024-12-09

## 2024-12-14 DIAGNOSIS — I10 BENIGN ESSENTIAL HYPERTENSION: ICD-10-CM

## 2024-12-16 RX ORDER — AMLODIPINE BESYLATE 5 MG/1
5 TABLET ORAL DAILY
Qty: 90 TABLET | Refills: 0 | Status: SHIPPED | OUTPATIENT
Start: 2024-12-16

## 2024-12-18 DIAGNOSIS — E11.65 TYPE 2 DIABETES MELLITUS WITH HYPERGLYCEMIA, WITHOUT LONG-TERM CURRENT USE OF INSULIN (H): ICD-10-CM

## 2024-12-19 RX ORDER — EMPAGLIFLOZIN 25 MG/1
TABLET, FILM COATED ORAL
Qty: 90 TABLET | Refills: 0 | Status: SHIPPED | OUTPATIENT
Start: 2024-12-19

## 2024-12-19 RX ORDER — GLIMEPIRIDE 1 MG/1
1 TABLET ORAL
Qty: 90 TABLET | Refills: 0 | OUTPATIENT
Start: 2024-12-19

## 2024-12-24 DIAGNOSIS — I10 BENIGN ESSENTIAL HYPERTENSION: ICD-10-CM

## 2024-12-26 RX ORDER — LOSARTAN POTASSIUM 100 MG/1
100 TABLET ORAL DAILY
Qty: 90 TABLET | Refills: 0 | OUTPATIENT
Start: 2024-12-26

## 2024-12-26 NOTE — TELEPHONE ENCOUNTER
Left a voice msg for pt to call back and schedule a follow up for BP with PCP.  Katelynn Winkler CMA (ELISEO)   (aka: Bettie Winkler)

## 2024-12-26 NOTE — TELEPHONE ENCOUNTER
Overdue for needed care. Please call to schedule in person clinic appt for med/BP check. Once appt is scheduled, route back to the pool.    Julie Behrendt RN

## 2025-01-07 ENCOUNTER — HOSPITAL ENCOUNTER (OUTPATIENT)
Dept: ULTRASOUND IMAGING | Facility: CLINIC | Age: 75
Discharge: HOME | End: 2025-01-07
Attending: SURGERY
Payer: MEDICARE

## 2025-01-07 DIAGNOSIS — Z98.890 HISTORY OF BILATERAL CAROTID ENDARTERECTOMY: ICD-10-CM

## 2025-01-07 DIAGNOSIS — I65.23 BILATERAL CAROTID ARTERY STENOSIS: ICD-10-CM

## 2025-01-07 PROCEDURE — 93880 EXTRACRANIAL BILAT STUDY: CPT

## 2025-01-09 ENCOUNTER — OFFICE VISIT (OUTPATIENT)
Dept: FAMILY MEDICINE | Facility: CLINIC | Age: 75
End: 2025-01-09
Payer: COMMERCIAL

## 2025-01-09 VITALS
HEART RATE: 80 BPM | BODY MASS INDEX: 35.4 KG/M2 | HEIGHT: 69 IN | RESPIRATION RATE: 20 BRPM | OXYGEN SATURATION: 95 % | WEIGHT: 239 LBS | TEMPERATURE: 97 F | DIASTOLIC BLOOD PRESSURE: 72 MMHG | SYSTOLIC BLOOD PRESSURE: 138 MMHG

## 2025-01-09 DIAGNOSIS — E11.69 TYPE 2 DIABETES MELLITUS WITH OTHER SPECIFIED COMPLICATION, WITHOUT LONG-TERM CURRENT USE OF INSULIN (H): Primary | ICD-10-CM

## 2025-01-09 DIAGNOSIS — E66.01 MORBID OBESITY (H): ICD-10-CM

## 2025-01-09 DIAGNOSIS — Z12.11 SCREEN FOR COLON CANCER: ICD-10-CM

## 2025-01-09 DIAGNOSIS — Z23 ENCOUNTER FOR IMMUNIZATION: ICD-10-CM

## 2025-01-09 LAB
EST. AVERAGE GLUCOSE BLD GHB EST-MCNC: 206 MG/DL
HBA1C MFR BLD: 8.8 % (ref 0–5.6)

## 2025-01-09 RX ORDER — METFORMIN HYDROCHLORIDE 500 MG/1
500 TABLET, EXTENDED RELEASE ORAL 2 TIMES DAILY WITH MEALS
Status: SHIPPED
Start: 2025-01-09

## 2025-01-09 ASSESSMENT — PAIN SCALES - GENERAL: PAINLEVEL_OUTOF10: EXTREME PAIN (8)

## 2025-01-09 NOTE — PROGRESS NOTES
"  Assessment & Plan     Type 2 diabetes mellitus with other specified complication, without long-term current use of insulin (H)  Last hemoglobin A1c 7.6, within target goal of less than 8.  Patient reduce metformin dose due to diarrhea, bloating.  Recommended to continue regular exercise, healthy diet, Jardiance, and Amaryl.  Lab Results   Component Value Date    A1C 7.6 07/02/2024    A1C 8.5 04/02/2024    A1C 7.7 09/29/2023    A1C 7.4 06/21/2023    A1C 10.3 03/21/2023    A1C 7.0 06/01/2021    A1C 7.0 10/05/2020    A1C 8.8 05/04/2020    A1C 7.0 10/14/2019    A1C 7.2 06/05/2019      - HEMOGLOBIN A1C; Future  - REVIEW OF HEALTH MAINTENANCE PROTOCOL ORDERS  - metFORMIN (GLUCOPHAGE XR) 500 MG 24 hr tablet; Take 1 tablet (500 mg) by mouth 2 times daily (with meals).    Screen for colon cancer  Patient deferred colon cancer screening    Morbid obesity (H)  Recommended regular exercise, healthy diet and weight loss    Encounter for immunization  Influenza and COVID-19 vaccine administered in office today  - INFLUENZA HIGH DOSE, TRIVALENT, PF (FLUZONE)  - COVID-19 12+ (PFIZER)      BMI  Estimated body mass index is 35.29 kg/m  as calculated from the following:    Height as of this encounter: 1.753 m (5' 9\").    Weight as of this encounter: 108.4 kg (239 lb).   Weight management plan: Discussed healthy diet and exercise guidelines  Blood sugar testing frequency justification:  Adjustment of medication(s)      Shawn Pino is a 74 year old, presenting for the following health issues:  Diabetes and Hypertension        1/9/2025     2:38 PM   Additional Questions   Roomed by Karrie ORTIZ LPN   Accompanied by self       Via the Health Maintenance questionnaire, the patient has reported the following services have been completed -Eye Exam: [facility] 2024-10-10, this information has been sent to the abstraction team.  History of Present Illness       Diabetes:   He presents for follow up of diabetes.  He is checking " "home blood glucose two times daily.   He checks blood glucose before and after meals and at bedtime.  Blood glucose is sometimes over 200 and never under 70.  When his blood glucose is low, the patient is asymptomatic for confusion, blurred vision, lethargy and reports not feeling dizzy, shaky, or weak.   He has no concerns regarding his diabetes at this time.  He is having numbness in feet.  The patient has had a diabetic eye exam in the last 12 months. Eye exam performed on 102024. Location of last eye exam [facility].        Hypertension: He presents for follow up of hypertension.  He does check blood pressure  regularly outside of the clinic. Outside blood pressures have been over 140/90. He follows a low salt diet.     He eats 0-1 servings of fruits and vegetables daily.He consumes 0 sweetened beverage(s) daily.He exercises with enough effort to increase his heart rate 20 to 29 minutes per day.  He exercises with enough effort to increase his heart rate 6 days per week.   He is taking medications regularly.       Review of Systems  Constitutional, neuro, ENT, endocrine, pulmonary, cardiac, gastrointestinal, genitourinary, musculoskeletal, integument and psychiatric systems are negative, except as otherwise noted.      Objective    /72   Pulse 80   Temp 97  F (36.1  C) (Tympanic)   Resp 20   Ht 1.753 m (5' 9\")   Wt 108.4 kg (239 lb)   SpO2 95%   BMI 35.29 kg/m    Body mass index is 35.29 kg/m .  Physical Exam   GENERAL: alert and no distress  EYES: Eyes grossly normal to inspection, PERRL and conjunctivae and sclerae normal  HENT: normal cephalic/atraumatic, nose and mouth without ulcers or lesions, oropharynx clear, and oral mucous membranes moist  NECK: no adenopathy, no asymmetry, masses, or scars  RESP: lungs clear to auscultation - no rales, rhonchi or wheezes  CV: regular rates and rhythm, normal S1 S2, no S3 or S4, and no murmur, click or rub  MS: no gross musculoskeletal defects " noted, no edema  NEURO: Normal strength and tone, mentation intact and speech normal  PSYCH: mentation appears normal, affect normal/bright      Signed Electronically by: Rell Rivers MD

## 2025-02-13 DIAGNOSIS — E11.65 TYPE 2 DIABETES MELLITUS WITH HYPERGLYCEMIA, WITHOUT LONG-TERM CURRENT USE OF INSULIN (H): Chronic | ICD-10-CM

## 2025-02-13 RX ORDER — GLIMEPIRIDE 1 MG/1
1 TABLET ORAL
Qty: 90 TABLET | Refills: 1 | Status: SHIPPED | OUTPATIENT
Start: 2025-02-13

## 2025-02-19 ENCOUNTER — TELEPHONE (OUTPATIENT)
Dept: PHARMACY | Facility: CLINIC | Age: 75
End: 2025-02-19
Payer: COMMERCIAL

## 2025-03-13 DIAGNOSIS — I10 BENIGN ESSENTIAL HYPERTENSION: ICD-10-CM

## 2025-03-13 RX ORDER — AMLODIPINE BESYLATE 5 MG/1
5 TABLET ORAL DAILY
Qty: 90 TABLET | Refills: 1 | Status: SHIPPED | OUTPATIENT
Start: 2025-03-13

## 2025-03-19 DIAGNOSIS — E11.65 TYPE 2 DIABETES MELLITUS WITH HYPERGLYCEMIA, WITHOUT LONG-TERM CURRENT USE OF INSULIN (H): ICD-10-CM

## 2025-03-19 DIAGNOSIS — I65.22 CAROTID STENOSIS, LEFT: ICD-10-CM

## 2025-03-19 RX ORDER — ROSUVASTATIN CALCIUM 20 MG/1
20 TABLET, COATED ORAL DAILY
Qty: 90 TABLET | Refills: 0 | Status: SHIPPED | OUTPATIENT
Start: 2025-03-19

## 2025-03-26 DIAGNOSIS — I10 BENIGN ESSENTIAL HYPERTENSION: ICD-10-CM

## 2025-03-26 RX ORDER — LOSARTAN POTASSIUM 100 MG/1
100 TABLET ORAL DAILY
Qty: 90 TABLET | Refills: 0 | Status: SHIPPED | OUTPATIENT
Start: 2025-03-26

## 2025-04-13 DIAGNOSIS — E11.65 TYPE 2 DIABETES MELLITUS WITH HYPERGLYCEMIA, WITHOUT LONG-TERM CURRENT USE OF INSULIN (H): ICD-10-CM

## 2025-04-14 RX ORDER — EMPAGLIFLOZIN 25 MG/1
TABLET, FILM COATED ORAL
Qty: 90 TABLET | Refills: 1 | Status: SHIPPED | OUTPATIENT
Start: 2025-04-14

## 2025-05-04 DIAGNOSIS — E11.65 TYPE 2 DIABETES MELLITUS WITH HYPERGLYCEMIA, WITHOUT LONG-TERM CURRENT USE OF INSULIN (H): Chronic | ICD-10-CM

## 2025-05-05 NOTE — TELEPHONE ENCOUNTER
Panel Management Review      Patient has the following on his problem list:     Hypertension   Last three blood pressure readings:  BP Readings from Last 3 Encounters:   10/17/19 (!) 149/84   10/14/19 110/78   07/01/19 120/64     Blood pressure: MONITOR    HTN Guidelines:  Less than 140/90      Composite cancer screening  Chart review shows that this patient is due/due soon for the following Colonoscopy  Summary:    Patient is due/failing the following:   BP CHECK    Action needed:   Patient needs nurse only appointment.    Type of outreach:    Phone, left message for patient to call back.  Left message to stop in when he is in the area to have BP check since it was elevated at Vascular appointment     Questions for provider review:    None                                                                                                                                    MM     Chart routed to Care Team .           Reason for Call:  Form, our goal is to have forms completed with 72 hours, however, some forms may require a visit or additional information.    Type of letter, form or note:  Home Health Certification    Who is the form from?: Home care    Where did the form come from: form was faxed in    What clinic location was the form placed at?: [facility]    Where the form was placed: Given to physician    What number is listed as a contact on the form?: [phone]       Additional comments: [place]    Call taken on 5/5/2025 at 2:59 PM by Mimi Gutiérrez

## 2025-06-14 DIAGNOSIS — I65.22 CAROTID STENOSIS, LEFT: ICD-10-CM

## 2025-06-14 DIAGNOSIS — E11.65 TYPE 2 DIABETES MELLITUS WITH HYPERGLYCEMIA, WITHOUT LONG-TERM CURRENT USE OF INSULIN (H): ICD-10-CM

## 2025-06-16 RX ORDER — ROSUVASTATIN CALCIUM 20 MG/1
20 TABLET, COATED ORAL DAILY
Qty: 90 TABLET | Refills: 1 | Status: SHIPPED | OUTPATIENT
Start: 2025-06-16

## 2025-06-19 DIAGNOSIS — I10 BENIGN ESSENTIAL HYPERTENSION: ICD-10-CM

## 2025-06-19 RX ORDER — LOSARTAN POTASSIUM 100 MG/1
100 TABLET ORAL DAILY
Qty: 90 TABLET | Refills: 1 | Status: SHIPPED | OUTPATIENT
Start: 2025-06-19

## 2025-07-16 DIAGNOSIS — E11.65 TYPE 2 DIABETES MELLITUS WITH HYPERGLYCEMIA, WITHOUT LONG-TERM CURRENT USE OF INSULIN (H): Chronic | ICD-10-CM

## 2025-08-05 DIAGNOSIS — E11.65 TYPE 2 DIABETES MELLITUS WITH HYPERGLYCEMIA, WITHOUT LONG-TERM CURRENT USE OF INSULIN (H): Chronic | ICD-10-CM

## 2025-08-05 RX ORDER — GLIMEPIRIDE 1 MG/1
1 TABLET ORAL
Qty: 30 TABLET | Refills: 0 | Status: SHIPPED | OUTPATIENT
Start: 2025-08-05

## 2025-08-06 DIAGNOSIS — E11.65 TYPE 2 DIABETES MELLITUS WITH HYPERGLYCEMIA, WITHOUT LONG-TERM CURRENT USE OF INSULIN (H): Chronic | ICD-10-CM

## 2025-08-06 RX ORDER — GLIMEPIRIDE 1 MG/1
1 TABLET ORAL
Qty: 90 TABLET | Refills: 0 | OUTPATIENT
Start: 2025-08-06

## 2025-08-27 ENCOUNTER — TELEPHONE (OUTPATIENT)
Dept: PHARMACY | Facility: CLINIC | Age: 75
End: 2025-08-27
Payer: COMMERCIAL

## 2025-09-02 DIAGNOSIS — E11.65 TYPE 2 DIABETES MELLITUS WITH HYPERGLYCEMIA, WITHOUT LONG-TERM CURRENT USE OF INSULIN (H): Chronic | ICD-10-CM

## 2025-09-02 RX ORDER — GLIMEPIRIDE 1 MG/1
1 TABLET ORAL
Qty: 30 TABLET | Refills: 0 | Status: SHIPPED | OUTPATIENT
Start: 2025-09-02

## 2025-09-03 DIAGNOSIS — I10 BENIGN ESSENTIAL HYPERTENSION: ICD-10-CM

## 2025-09-03 RX ORDER — AMLODIPINE BESYLATE 5 MG/1
5 TABLET ORAL DAILY
Qty: 90 TABLET | Refills: 0 | Status: SHIPPED | OUTPATIENT
Start: 2025-09-03

## (undated) DEVICE — DRSG STERI STRIP 1/2X4" R1547

## (undated) DEVICE — NDL BLUNT 19GA 1.5"

## (undated) DEVICE — ESU GROUND PAD UNIVERSAL W/O CORD

## (undated) DEVICE — SU SILK 3-0 TIE 24" SA74H

## (undated) DEVICE — DRAPE SHEET REV FOLD 3/4 9349

## (undated) DEVICE — DRSG AQUACEL AG 3.5X9.75" HYDROFIBER 412011

## (undated) DEVICE — SU SILK 2-0 TIE 24" SA75H

## (undated) DEVICE — SOL NACL 0.9% IRRIG 1000ML BOTTLE 07138-09

## (undated) DEVICE — LINEN TOWEL PACK X5 5464

## (undated) DEVICE — PREP CHLORAPREP W/ORANGE TINT 10.5ML 260715

## (undated) DEVICE — SOL NACL 0.9% INJ 250ML BAG 2B1322Q

## (undated) DEVICE — SU PROLENE 6-0 C-1DA 30" 8706H

## (undated) DEVICE — DRAPE MAYO STAND 23X54 8337

## (undated) DEVICE — SHUNT SUNDT 3X4X10CM NL850-5060

## (undated) DEVICE — SYR 10ML FINGER CONTROL W/O NDL 309695

## (undated) DEVICE — SUCTION CANISTER MEDIVAC LINER 3000ML W/LID 65651-530

## (undated) DEVICE — PACK VASCULAR SCV15VAFSB

## (undated) DEVICE — DRAPE IOBAN INCISE 23X17" 6650EZ

## (undated) DEVICE — SUCTION TIP FLEXI CLEAR TIP DISP K62

## (undated) DEVICE — DECANTER VIAL 2006S

## (undated) DEVICE — DRSG TEGADERM 2 3/8X2 3/4" 1624W

## (undated) DEVICE — IONM EEG UP TO 7 HOURS

## (undated) DEVICE — SU STRATAFIX MONOCRYL 3-0 SPIRAL PS-2 30CM SXMP1B106

## (undated) DEVICE — GLOVE PROTEXIS W/NEU-THERA 8.0  2D73TE80

## (undated) DEVICE — SU STRATAFIX PDS PLUS 1 CT-1 18" SXPP1A404

## (undated) DEVICE — SU MONOCRYL 4-0 PS-2 18" UND Y496G

## (undated) DEVICE — NDL ANGIOCATH 20GA 1.25" 4056

## (undated) DEVICE — ESU ELEC BLADE 4" COATED

## (undated) DEVICE — MANIFOLD NEPTUNE 4 PORT 700-20

## (undated) DEVICE — GLOVE PROTEXIS W/NEU-THERA 7.5  2D73TE75

## (undated) DEVICE — SU SILK 4-0 TIE 12X30" A303H

## (undated) DEVICE — SOL WATER IRRIG 1000ML BOTTLE 2F7114

## (undated) DEVICE — BLADE KNIFE BEAVER MINI BEAVER6400

## (undated) DEVICE — DECANTER BAG 2002S

## (undated) DEVICE — SURGICEL HEMOSTAT 2X3" 1953

## (undated) DEVICE — SU VICRYL 3-0 SH 27" J316H

## (undated) DEVICE — SYR 03ML LL W/O NDL 309657

## (undated) DEVICE — SU PDO 1 STRATAFIX 36X36CM CTX TAPERPOINT SXPD2B405

## (undated) DEVICE — IOM SUPPLIES

## (undated) DEVICE — SUCTION IRR SYSTEM W/O TIP INTERPULSE HANDPIECE 0210-100-000

## (undated) DEVICE — SU PROLENE 7-0 BV-1DA 4X30" M8703

## (undated) DEVICE — IMM PILLOW ABDUCT HIP MED 0814-8033

## (undated) DEVICE — IONM EEG SUPPLIES

## (undated) DEVICE — DEVICE RETRIEVER HEWSON 71111579

## (undated) DEVICE — SU ETHIBOND 1 CT-1 30" X425H

## (undated) DEVICE — SU FIBERWIRE 2 38" T-8 NDL  AR-7206

## (undated) DEVICE — HOOD T4 PROTECTIVE STERI FACE SHIELD 400-800

## (undated) DEVICE — ESU PENCIL SMOKE EVAC W/ROCKER SWITCH 0703-047-000

## (undated) DEVICE — SYR 50ML LL W/O NDL 309653

## (undated) DEVICE — PACK HIP LAKES WITH POUCH

## (undated) DEVICE — DRAIN JACKSON PRATT RESERVOIR 100ML SU130-1305

## (undated) DEVICE — SOL NACL 0.9% IRRIG 3000ML BAG 07972-08

## (undated) DEVICE — NDL 19GA 1.5"

## (undated) DEVICE — SU VICRYL 2-0 CT-1 36" UND J945H

## (undated) DEVICE — NDL 18GA 1.5" 305196

## (undated) DEVICE — DRAIN JACKSON PRATT 15FR ROUND SU130-1323

## (undated) DEVICE — GLOVE PROTEXIS BLUE W/NEU-THERA 8.5  2D73EB85

## (undated) DEVICE — BLADE SAW SAGITTAL STRK 18X90X1.27MM HD SYS 6 6118-127-090

## (undated) DEVICE — BONE CLEANING TIP INTERPULSE  0210-010-000

## (undated) DEVICE — SPONGE RAY-TEC 4X8" 7318

## (undated) DEVICE — NDL 22GA 1.5"

## (undated) DEVICE — SU ETHILON 2-0 FS 18" 664H

## (undated) DEVICE — SOL NACL 0.9% IRRIG 1000ML BOTTLE 2F7124

## (undated) DEVICE — BLADE KNIFE SURG 15 371115

## (undated) DEVICE — BLADE CLIPPER 4406

## (undated) DEVICE — DRAPE IOBAN LG .375X23.5" 6648EZ

## (undated) DEVICE — GOWN IMPERVIOUS SPECIALTY XLG/XLONG 32474

## (undated) DEVICE — GLOVE PROTEXIS BLUE W/NEU-THERA 7.5  2D73EB75

## (undated) DEVICE — BLANKET BAIR HUGGER UPPER BODY 42268

## (undated) DEVICE — GLOVE PROTEXIS W/NEU-THERA 7.0  2D73TE70

## (undated) DEVICE — SOL WATER IRRIG 1000ML BOTTLE 07139-09

## (undated) DEVICE — PREP DURAPREP 26ML APL 8630

## (undated) DEVICE — Device

## (undated) RX ORDER — CEFAZOLIN SODIUM 2 G/100ML
INJECTION, SOLUTION INTRAVENOUS
Status: DISPENSED
Start: 2018-06-06

## (undated) RX ORDER — NEOSTIGMINE METHYLSULFATE 1 MG/ML
VIAL (ML) INJECTION
Status: DISPENSED
Start: 2018-06-06

## (undated) RX ORDER — PROPOFOL 10 MG/ML
INJECTION, EMULSION INTRAVENOUS
Status: DISPENSED
Start: 2019-06-05

## (undated) RX ORDER — FENTANYL CITRATE-0.9 % NACL/PF 10 MCG/ML
PLASTIC BAG, INJECTION (ML) INTRAVENOUS
Status: DISPENSED
Start: 2021-06-09

## (undated) RX ORDER — FENTANYL CITRATE 50 UG/ML
INJECTION, SOLUTION INTRAMUSCULAR; INTRAVENOUS
Status: DISPENSED
Start: 2018-05-11

## (undated) RX ORDER — FENTANYL CITRATE 50 UG/ML
INJECTION, SOLUTION INTRAMUSCULAR; INTRAVENOUS
Status: DISPENSED
Start: 2019-06-05

## (undated) RX ORDER — LIDOCAINE HYDROCHLORIDE 20 MG/ML
INJECTION, SOLUTION EPIDURAL; INFILTRATION; INTRACAUDAL; PERINEURAL
Status: DISPENSED
Start: 2018-06-06

## (undated) RX ORDER — CEFAZOLIN SODIUM 1 G/3ML
INJECTION, POWDER, FOR SOLUTION INTRAMUSCULAR; INTRAVENOUS
Status: DISPENSED
Start: 2018-05-11

## (undated) RX ORDER — NALOXONE HYDROCHLORIDE 0.4 MG/ML
INJECTION, SOLUTION INTRAMUSCULAR; INTRAVENOUS; SUBCUTANEOUS
Status: DISPENSED
Start: 2018-06-06

## (undated) RX ORDER — HEPARIN SODIUM 1000 [USP'U]/ML
INJECTION, SOLUTION INTRAVENOUS; SUBCUTANEOUS
Status: DISPENSED
Start: 2018-06-06

## (undated) RX ORDER — PROPOFOL 10 MG/ML
INJECTION, EMULSION INTRAVENOUS
Status: DISPENSED
Start: 2021-06-09

## (undated) RX ORDER — FENTANYL CITRATE 50 UG/ML
INJECTION, SOLUTION INTRAMUSCULAR; INTRAVENOUS
Status: DISPENSED
Start: 2018-06-06

## (undated) RX ORDER — HEPARIN SODIUM 1000 [USP'U]/ML
INJECTION, SOLUTION INTRAVENOUS; SUBCUTANEOUS
Status: DISPENSED
Start: 2019-06-05

## (undated) RX ORDER — BUPIVACAINE HYDROCHLORIDE 5 MG/ML
INJECTION, SOLUTION EPIDURAL; INTRACAUDAL
Status: DISPENSED
Start: 2018-06-06

## (undated) RX ORDER — GABAPENTIN 300 MG/1
CAPSULE ORAL
Status: DISPENSED
Start: 2021-06-09

## (undated) RX ORDER — HYDROMORPHONE HYDROCHLORIDE 1 MG/ML
INJECTION, SOLUTION INTRAMUSCULAR; INTRAVENOUS; SUBCUTANEOUS
Status: DISPENSED
Start: 2018-06-06

## (undated) RX ORDER — NEOSTIGMINE METHYLSULFATE 1 MG/ML
VIAL (ML) INJECTION
Status: DISPENSED
Start: 2018-05-11

## (undated) RX ORDER — LIDOCAINE HYDROCHLORIDE 10 MG/ML
INJECTION, SOLUTION INFILTRATION; PERINEURAL
Status: DISPENSED
Start: 2018-05-11

## (undated) RX ORDER — BUPIVACAINE HYDROCHLORIDE 5 MG/ML
INJECTION, SOLUTION EPIDURAL; INTRACAUDAL
Status: DISPENSED
Start: 2018-05-11

## (undated) RX ORDER — CEFAZOLIN SODIUM 2 G/100ML
INJECTION, SOLUTION INTRAVENOUS
Status: DISPENSED
Start: 2019-06-05

## (undated) RX ORDER — KETOROLAC TROMETHAMINE 30 MG/ML
INJECTION, SOLUTION INTRAMUSCULAR; INTRAVENOUS
Status: DISPENSED
Start: 2018-06-06

## (undated) RX ORDER — CEFAZOLIN SODIUM 1 G/3ML
INJECTION, POWDER, FOR SOLUTION INTRAMUSCULAR; INTRAVENOUS
Status: DISPENSED
Start: 2018-06-06

## (undated) RX ORDER — HEPARIN SODIUM 1000 [USP'U]/ML
INJECTION, SOLUTION INTRAVENOUS; SUBCUTANEOUS
Status: DISPENSED
Start: 2018-05-11

## (undated) RX ORDER — VASOPRESSIN 20 U/ML
INJECTION PARENTERAL
Status: DISPENSED
Start: 2021-06-09

## (undated) RX ORDER — ASPIRIN 600 MG/1
SUPPOSITORY RECTAL
Status: DISPENSED
Start: 2018-06-06

## (undated) RX ORDER — LIDOCAINE HYDROCHLORIDE 20 MG/ML
INJECTION, SOLUTION EPIDURAL; INFILTRATION; INTRACAUDAL; PERINEURAL
Status: DISPENSED
Start: 2019-06-05

## (undated) RX ORDER — GLYCOPYRROLATE 0.2 MG/ML
INJECTION, SOLUTION INTRAMUSCULAR; INTRAVENOUS
Status: DISPENSED
Start: 2021-06-09

## (undated) RX ORDER — PROPOFOL 10 MG/ML
INJECTION, EMULSION INTRAVENOUS
Status: DISPENSED
Start: 2018-06-06

## (undated) RX ORDER — ALBUMIN, HUMAN INJ 5% 5 %
SOLUTION INTRAVENOUS
Status: DISPENSED
Start: 2018-05-11

## (undated) RX ORDER — CEFAZOLIN SODIUM 1 G/3ML
INJECTION, POWDER, FOR SOLUTION INTRAMUSCULAR; INTRAVENOUS
Status: DISPENSED
Start: 2019-06-05

## (undated) RX ORDER — GLYCOPYRROLATE 0.2 MG/ML
INJECTION, SOLUTION INTRAMUSCULAR; INTRAVENOUS
Status: DISPENSED
Start: 2018-05-11

## (undated) RX ORDER — ACETAMINOPHEN 325 MG/1
TABLET ORAL
Status: DISPENSED
Start: 2021-06-09

## (undated) RX ORDER — CELECOXIB 200 MG/1
CAPSULE ORAL
Status: DISPENSED
Start: 2021-06-09

## (undated) RX ORDER — EPHEDRINE SULFATE 50 MG/ML
INJECTION, SOLUTION INTRAMUSCULAR; INTRAVENOUS; SUBCUTANEOUS
Status: DISPENSED
Start: 2021-06-09

## (undated) RX ORDER — KETOROLAC TROMETHAMINE 30 MG/ML
INJECTION, SOLUTION INTRAMUSCULAR; INTRAVENOUS
Status: DISPENSED
Start: 2019-06-05

## (undated) RX ORDER — GLYCOPYRROLATE 0.2 MG/ML
INJECTION, SOLUTION INTRAMUSCULAR; INTRAVENOUS
Status: DISPENSED
Start: 2018-06-06

## (undated) RX ORDER — BUPIVACAINE HYDROCHLORIDE 5 MG/ML
INJECTION, SOLUTION EPIDURAL; INTRACAUDAL
Status: DISPENSED
Start: 2019-06-05

## (undated) RX ORDER — FENTANYL CITRATE 50 UG/ML
INJECTION, SOLUTION INTRAMUSCULAR; INTRAVENOUS
Status: DISPENSED
Start: 2021-06-09

## (undated) RX ORDER — CEFAZOLIN SODIUM 2 G/100ML
INJECTION, SOLUTION INTRAVENOUS
Status: DISPENSED
Start: 2018-05-11

## (undated) RX ORDER — LIDOCAINE HYDROCHLORIDE 10 MG/ML
INJECTION, SOLUTION EPIDURAL; INFILTRATION; INTRACAUDAL; PERINEURAL
Status: DISPENSED
Start: 2019-06-05

## (undated) RX ORDER — PROPOFOL 10 MG/ML
INJECTION, EMULSION INTRAVENOUS
Status: DISPENSED
Start: 2018-05-11

## (undated) RX ORDER — VECURONIUM BROMIDE 1 MG/ML
INJECTION, POWDER, LYOPHILIZED, FOR SOLUTION INTRAVENOUS
Status: DISPENSED
Start: 2018-06-06

## (undated) RX ORDER — CEFAZOLIN SODIUM 2 G/100ML
INJECTION, SOLUTION INTRAVENOUS
Status: DISPENSED
Start: 2021-06-09

## (undated) RX ORDER — ASPIRIN 600 MG/1
SUPPOSITORY RECTAL
Status: DISPENSED
Start: 2018-05-11

## (undated) RX ORDER — TRANEXAMIC ACID 650 MG/1
TABLET ORAL
Status: DISPENSED
Start: 2021-06-09